# Patient Record
Sex: FEMALE | Race: WHITE | Employment: OTHER | URBAN - METROPOLITAN AREA
[De-identification: names, ages, dates, MRNs, and addresses within clinical notes are randomized per-mention and may not be internally consistent; named-entity substitution may affect disease eponyms.]

---

## 2017-03-21 ENCOUNTER — HOSPITAL ENCOUNTER (OUTPATIENT)
Dept: NON INVASIVE DIAGNOSTICS | Age: 77
Discharge: HOME | End: 2017-03-21
Attending: PSYCHIATRY & NEUROLOGY
Payer: MEDICARE

## 2017-03-21 DIAGNOSIS — G40.89 OTHER SEIZURES (HCC): ICD-10-CM

## 2017-03-21 PROCEDURE — 95819 EEG AWAKE AND ASLEEP: CPT

## 2017-03-22 NOTE — PROCEDURES
[address] 63   ELECTROENCEPHALOGRAM       Name:  Peyman Simms   MR#:  [id number]   :  1940   Account #:  [de-identified]   Date of Adm:  2017       CLINICAL PROBLEM: Question seizures. Medications are not listed. FINDINGS: The patient is 76years old, awake at the onset of the   recording at which time there is reactive alpha of 9 Hz. Photic   stimulation does not produce a driving response. Hyperventilation was not performed. The patient does become   drowsy, at which time theta range activity dominates background   rhythms. However, deeper stages of sleep are not recorded. No   focal or paroxysmal abnormalities noted. IMPRESSION: Normal awake and brief drowsy electroencephalogram.        MD Laura Levy / Sonia Gillette   D:  2017   08:53   T:  2017   12:34   Job #:  [id number]

## 2017-06-20 ENCOUNTER — HOSPITAL ENCOUNTER (EMERGENCY)
Age: 77
Discharge: HOME | End: 2017-06-20
Attending: EMERGENCY MEDICINE
Payer: MEDICARE

## 2017-06-20 VITALS
OXYGEN SATURATION: 92 % | TEMPERATURE: 97.9 F | HEIGHT: 66 IN | DIASTOLIC BLOOD PRESSURE: 73 MMHG | BODY MASS INDEX: 29.73 KG/M2 | SYSTOLIC BLOOD PRESSURE: 158 MMHG | HEART RATE: 65 BPM | WEIGHT: 185 LBS | RESPIRATION RATE: 18 BRPM

## 2017-06-20 DIAGNOSIS — I10 ESSENTIAL HYPERTENSION: ICD-10-CM

## 2017-06-20 DIAGNOSIS — R55 SYNCOPE AND COLLAPSE: Primary | ICD-10-CM

## 2017-06-20 LAB
ALBUMIN SERPL BCP-MCNC: 3.5 G/DL (ref 3.2–4.6)
ALBUMIN/GLOB SERPL: 0.9 {RATIO} (ref 1.2–3.5)
ALP SERPL-CCNC: 74 U/L (ref 50–136)
ALT SERPL-CCNC: 22 U/L (ref 12–65)
ANION GAP BLD CALC-SCNC: 7 MMOL/L (ref 7–16)
AST SERPL W P-5'-P-CCNC: 19 U/L (ref 15–37)
ATRIAL RATE: 68 BPM
BACTERIA URNS QL MICRO: 0 /HPF
BASOPHILS # BLD AUTO: 0.1 K/UL (ref 0–0.2)
BASOPHILS # BLD: 1 % (ref 0–2)
BILIRUB SERPL-MCNC: 0.7 MG/DL (ref 0.2–1.1)
BUN SERPL-MCNC: 6 MG/DL (ref 8–23)
CALCIUM SERPL-MCNC: 9 MG/DL (ref 8.3–10.4)
CALCULATED P AXIS, ECG09: 91 DEGREES
CALCULATED R AXIS, ECG10: 31 DEGREES
CALCULATED T AXIS, ECG11: 40 DEGREES
CASTS URNS QL MICRO: 0 /LPF
CHLORIDE SERPL-SCNC: 97 MMOL/L (ref 98–107)
CO2 SERPL-SCNC: 28 MMOL/L (ref 21–32)
CREAT SERPL-MCNC: 0.58 MG/DL (ref 0.6–1)
DIAGNOSIS, 93000: NORMAL
DIFFERENTIAL METHOD BLD: ABNORMAL
EOSINOPHIL # BLD: 0.1 K/UL (ref 0–0.8)
EOSINOPHIL NFR BLD: 1 % (ref 0.5–7.8)
EPI CELLS #/AREA URNS HPF: NORMAL /HPF
ERYTHROCYTE [DISTWIDTH] IN BLOOD BY AUTOMATED COUNT: 13.1 % (ref 11.9–14.6)
GLOBULIN SER CALC-MCNC: 3.8 G/DL (ref 2.3–3.5)
GLUCOSE SERPL-MCNC: 101 MG/DL (ref 65–100)
HCT VFR BLD AUTO: 39.5 % (ref 35.8–46.3)
HGB BLD-MCNC: 13.6 G/DL (ref 11.7–15.4)
IMM GRANULOCYTES # BLD: 0.1 K/UL (ref 0–0.5)
IMM GRANULOCYTES NFR BLD AUTO: 1.2 % (ref 0–5)
LYMPHOCYTES # BLD AUTO: 13 % (ref 13–44)
LYMPHOCYTES # BLD: 1 K/UL (ref 0.5–4.6)
MCH RBC QN AUTO: 29.2 PG (ref 26.1–32.9)
MCHC RBC AUTO-ENTMCNC: 34.4 G/DL (ref 31.4–35)
MCV RBC AUTO: 84.9 FL (ref 79.6–97.8)
MONOCYTES # BLD: 0.7 K/UL (ref 0.1–1.3)
MONOCYTES NFR BLD AUTO: 10 % (ref 4–12)
NEUTS SEG # BLD: 5.7 K/UL (ref 1.7–8.2)
NEUTS SEG NFR BLD AUTO: 74 % (ref 43–78)
P-R INTERVAL, ECG05: 194 MS
PLATELET # BLD AUTO: 388 K/UL (ref 150–450)
PMV BLD AUTO: 9.3 FL (ref 10.8–14.1)
POTASSIUM SERPL-SCNC: 3.6 MMOL/L (ref 3.5–5.1)
PROT SERPL-MCNC: 7.3 G/DL (ref 6.3–8.2)
Q-T INTERVAL, ECG07: 418 MS
QRS DURATION, ECG06: 82 MS
QTC CALCULATION (BEZET), ECG08: 444 MS
RBC # BLD AUTO: 4.65 M/UL (ref 4.05–5.25)
RBC #/AREA URNS HPF: NORMAL /HPF
SODIUM SERPL-SCNC: 132 MMOL/L (ref 136–145)
VENTRICULAR RATE, ECG03: 68 BPM
WBC # BLD AUTO: 7.6 K/UL (ref 4.3–11.1)
WBC URNS QL MICRO: NORMAL /HPF

## 2017-06-20 PROCEDURE — 85025 COMPLETE CBC W/AUTO DIFF WBC: CPT | Performed by: EMERGENCY MEDICINE

## 2017-06-20 PROCEDURE — 80053 COMPREHEN METABOLIC PANEL: CPT | Performed by: EMERGENCY MEDICINE

## 2017-06-20 PROCEDURE — 93005 ELECTROCARDIOGRAM TRACING: CPT | Performed by: EMERGENCY MEDICINE

## 2017-06-20 PROCEDURE — 81015 MICROSCOPIC EXAM OF URINE: CPT | Performed by: EMERGENCY MEDICINE

## 2017-06-20 PROCEDURE — 96360 HYDRATION IV INFUSION INIT: CPT | Performed by: EMERGENCY MEDICINE

## 2017-06-20 PROCEDURE — 99285 EMERGENCY DEPT VISIT HI MDM: CPT | Performed by: EMERGENCY MEDICINE

## 2017-06-20 PROCEDURE — 74011250636 HC RX REV CODE- 250/636: Performed by: EMERGENCY MEDICINE

## 2017-06-20 RX ORDER — SODIUM CHLORIDE 0.9 % (FLUSH) 0.9 %
5-10 SYRINGE (ML) INJECTION AS NEEDED
Status: DISCONTINUED | OUTPATIENT
Start: 2017-06-20 | End: 2017-06-20 | Stop reason: HOSPADM

## 2017-06-20 RX ORDER — SODIUM CHLORIDE 0.9 % (FLUSH) 0.9 %
5-10 SYRINGE (ML) INJECTION EVERY 8 HOURS
Status: DISCONTINUED | OUTPATIENT
Start: 2017-06-20 | End: 2017-06-20 | Stop reason: HOSPADM

## 2017-06-20 RX ADMIN — SODIUM CHLORIDE 1000 ML: 900 INJECTION, SOLUTION INTRAVENOUS at 05:17

## 2017-06-20 NOTE — ED NOTES
I have reviewed discharge instructions with the patient. The patient verbalized understanding. Patient adamant about not using wheelchair. Instructed patient to followup with Dr. Larry Nolasco.

## 2017-06-20 NOTE — ED NOTES
Pt assisted to [facility] and replaced to bed, approx 1L clear-colored UOP. Pt asking to have some water, was denied at this time w/ pt stating \"well, you know I pass out if I don't have my water\". Discussed w/ patient about her over use of drinking water w/ pt adamant that she is still dehydrated.

## 2017-06-20 NOTE — ED NOTES
Pt presents to ER for c/o dizziness that has been persisting x 1 year that \"No one can find a reason for, the last time it was from dehydration\". Pt states that she had a poss near-syncope yesterday w/ nausea and vomiting x 1 event, tonight she reports \"just feeling wrong\" w/ pt unable to elaborate. Pt appears quite anxious, agitated when being asked specific questions about her concerns tonight.

## 2017-06-20 NOTE — DISCHARGE INSTRUCTIONS

## 2017-06-20 NOTE — ED NOTES
Patient observed from entryway of room and noted to be resting quietly on stretcher with eyes closed with pulse oximetry attached, IVF continuing to infuse.

## 2017-06-20 NOTE — ED PROVIDER NOTES
HPI Comments: Patient states she has been having syncopal episodes and \"heavy headedness\" off and on for more than a year. She was admitted here a year ago for syncopal episode and had an extensive workup which did not lead to a definitive diagnosis. She states that she tonight she had the feeling of heavy headedness when she woke up to use the restroom. She states her hands then got very jittery and she got concerned. She states she felt anxious and that is very unusual for her. She just came here for evaluation. Elements of this note were made using speech recognition software. As such, errors of speech recognition may occur. Patient is a 76 y.o. female presenting with dizziness. The history is provided by the patient. Dizziness   Pertinent negatives include no vomiting and no nausea. Past Medical History:   Diagnosis Date    Acute encephalopathy 6/27/2016    Anxiety     Arthritis     Chronic pain     hip    Endocrine disease     hypothyroidism    Hypertension     Sepsis ([facility] [address] 6/27/2016    Thyroid disease        Past Surgical History:   Procedure Laterality Date    HX ORTHOPAEDIC      right total hip    TOTAL KNEE ARTHROPLASTY Left 7/23/15         No family history on file. Social History     Social History    Marital status:      Spouse name: N/A    Number of children: N/A    Years of education: N/A     Occupational History    Not on file. Social History Main Topics    Smoking status: Never Smoker    Smokeless tobacco: Not on file    Alcohol use No    Drug use: No    Sexual activity: Not on file     Other Topics Concern    Not on file     Social History Narrative         ALLERGIES: Ciprofloxacin; Percocet [oxycodone-acetaminophen]; and Other medication    Review of Systems   Constitutional: Negative for chills and fever. Gastrointestinal: Negative for nausea and vomiting. All other systems reviewed and are negative.       Vitals:    06/20/17 0238   BP: 179/78   Pulse: 65   Resp: 18   Temp: 97.9 °F (36.6 °C)   SpO2: 100%   Weight: 83.9 kg (185 lb)   Height: 5' 6\" (1.676 m)            Physical Exam   Constitutional: She is oriented to person, place, and time. She appears well-developed and well-nourished. HENT:   Head: Normocephalic and atraumatic. Eyes: Conjunctivae are normal. Pupils are equal, round, and reactive to light. Neck: Normal range of motion. Neck supple. Cardiovascular: Normal rate and regular rhythm. Pulmonary/Chest: Effort normal and breath sounds normal.   Abdominal: Soft. Bowel sounds are normal.   Musculoskeletal: She exhibits no edema or tenderness. Neurological: She is alert and oriented to person, place, and time. Skin: Skin is warm and dry. Psychiatric: She has a normal mood and affect. Her behavior is normal.   Nursing note and vitals reviewed. MDM  Number of Diagnoses or Management Options  Essential hypertension: established and worsening  Syncope and collapse: established and worsening  Diagnosis management comments: Differential diagnoses: Orthostatic hypotension, syncope, electrolyte abnormality, dehydration, arrhythmia  6:48 AM discussed results with the patient and son, no significant abnormality found.   She has a primary doctor that she has seen for  More than 20 years she can follow-up with       Amount and/or Complexity of Data Reviewed  Clinical lab tests: ordered and reviewed  Tests in the medicine section of CPT®: ordered and reviewed    Risk of Complications, Morbidity, and/or Mortality  Presenting problems: moderate  Diagnostic procedures: moderate  Management options: moderate    Patient Progress  Patient progress: stable    ED Course       Procedures

## 2017-11-13 ENCOUNTER — APPOINTMENT (OUTPATIENT)
Dept: CT IMAGING | Age: 77
End: 2017-11-13
Attending: EMERGENCY MEDICINE
Payer: MEDICARE

## 2017-11-13 ENCOUNTER — HOSPITAL ENCOUNTER (OUTPATIENT)
Age: 77
Setting detail: OBSERVATION
Discharge: HOME | End: 2017-11-14
Attending: EMERGENCY MEDICINE | Admitting: HOSPITALIST
Payer: MEDICARE

## 2017-11-13 DIAGNOSIS — R47.81 SLURRED SPEECH: ICD-10-CM

## 2017-11-13 DIAGNOSIS — G45.9 TRANSIENT CEREBRAL ISCHEMIA, UNSPECIFIED TYPE: Primary | ICD-10-CM

## 2017-11-13 LAB
ALBUMIN SERPL-MCNC: 3.8 G/DL (ref 3.2–4.6)
ALBUMIN/GLOB SERPL: 1 {RATIO} (ref 1.2–3.5)
ALP SERPL-CCNC: 88 U/L (ref 50–136)
ALT SERPL-CCNC: 22 U/L (ref 12–65)
ANION GAP SERPL CALC-SCNC: 10 MMOL/L (ref 7–16)
APTT PPP: 26.2 SEC (ref 23.5–31.7)
AST SERPL-CCNC: 20 U/L (ref 15–37)
ATRIAL RATE: 68 BPM
BASOPHILS # BLD: 0 K/UL (ref 0–0.2)
BASOPHILS NFR BLD: 0 % (ref 0–2)
BILIRUB SERPL-MCNC: 0.5 MG/DL (ref 0.2–1.1)
BUN SERPL-MCNC: 8 MG/DL (ref 8–23)
CALCIUM SERPL-MCNC: 9.1 MG/DL (ref 8.3–10.4)
CALCULATED P AXIS, ECG09: 108 DEGREES
CALCULATED R AXIS, ECG10: 36 DEGREES
CALCULATED T AXIS, ECG11: 70 DEGREES
CHLORIDE SERPL-SCNC: 93 MMOL/L (ref 98–107)
CO2 SERPL-SCNC: 26 MMOL/L (ref 21–32)
CREAT SERPL-MCNC: 0.69 MG/DL (ref 0.6–1)
DIAGNOSIS, 93000: NORMAL
DIFFERENTIAL METHOD BLD: ABNORMAL
EOSINOPHIL # BLD: 0.1 K/UL (ref 0–0.8)
EOSINOPHIL NFR BLD: 1 % (ref 0.5–7.8)
ERYTHROCYTE [DISTWIDTH] IN BLOOD BY AUTOMATED COUNT: 13.1 % (ref 11.9–14.6)
GLOBULIN SER CALC-MCNC: 3.8 G/DL (ref 2.3–3.5)
GLUCOSE BLD STRIP.AUTO-MCNC: 96 MG/DL (ref 65–100)
GLUCOSE SERPL-MCNC: 109 MG/DL (ref 65–100)
HCT VFR BLD AUTO: 41.2 % (ref 35.8–46.3)
HGB BLD-MCNC: 14.1 G/DL (ref 11.7–15.4)
IMM GRANULOCYTES # BLD: 0.1 K/UL (ref 0–0.5)
IMM GRANULOCYTES NFR BLD AUTO: 1 % (ref 0–5)
INR PPP: 1 (ref 0.9–1.2)
LYMPHOCYTES # BLD: 1.3 K/UL (ref 0.5–4.6)
LYMPHOCYTES NFR BLD: 13 % (ref 13–44)
MCH RBC QN AUTO: 30 PG (ref 26.1–32.9)
MCHC RBC AUTO-ENTMCNC: 34.2 G/DL (ref 31.4–35)
MCV RBC AUTO: 87.7 FL (ref 79.6–97.8)
MONOCYTES # BLD: 0.9 K/UL (ref 0.1–1.3)
MONOCYTES NFR BLD: 9 % (ref 4–12)
NEUTS SEG # BLD: 8 K/UL (ref 1.7–8.2)
NEUTS SEG NFR BLD: 76 % (ref 43–78)
P-R INTERVAL, ECG05: 194 MS
PLATELET # BLD AUTO: 361 K/UL (ref 150–450)
PMV BLD AUTO: 9.6 FL (ref 10.8–14.1)
POTASSIUM SERPL-SCNC: 3.6 MMOL/L (ref 3.5–5.1)
PROT SERPL-MCNC: 7.6 G/DL (ref 6.3–8.2)
PROTHROMBIN TIME: 10.7 SEC (ref 9.6–12)
Q-T INTERVAL, ECG07: 392 MS
QRS DURATION, ECG06: 80 MS
QTC CALCULATION (BEZET), ECG08: 416 MS
RBC # BLD AUTO: 4.7 M/UL (ref 4.05–5.25)
SODIUM SERPL-SCNC: 129 MMOL/L (ref 136–145)
VENTRICULAR RATE, ECG03: 68 BPM
WBC # BLD AUTO: 10.5 K/UL (ref 4.3–11.1)

## 2017-11-13 PROCEDURE — 93005 ELECTROCARDIOGRAM TRACING: CPT | Performed by: EMERGENCY MEDICINE

## 2017-11-13 PROCEDURE — 85610 PROTHROMBIN TIME: CPT | Performed by: EMERGENCY MEDICINE

## 2017-11-13 PROCEDURE — 85730 THROMBOPLASTIN TIME PARTIAL: CPT | Performed by: EMERGENCY MEDICINE

## 2017-11-13 PROCEDURE — 82962 GLUCOSE BLOOD TEST: CPT

## 2017-11-13 PROCEDURE — 70450 CT HEAD/BRAIN W/O DYE: CPT

## 2017-11-13 PROCEDURE — 85025 COMPLETE CBC W/AUTO DIFF WBC: CPT | Performed by: EMERGENCY MEDICINE

## 2017-11-13 PROCEDURE — 74011250637 HC RX REV CODE- 250/637: Performed by: EMERGENCY MEDICINE

## 2017-11-13 PROCEDURE — 83036 HEMOGLOBIN GLYCOSYLATED A1C: CPT | Performed by: HOSPITALIST

## 2017-11-13 PROCEDURE — 99285 EMERGENCY DEPT VISIT HI MDM: CPT | Performed by: EMERGENCY MEDICINE

## 2017-11-13 PROCEDURE — 84484 ASSAY OF TROPONIN QUANT: CPT

## 2017-11-13 PROCEDURE — 80053 COMPREHEN METABOLIC PANEL: CPT | Performed by: EMERGENCY MEDICINE

## 2017-11-13 RX ORDER — GUAIFENESIN 100 MG/5ML
324 LIQUID (ML) ORAL
Status: COMPLETED | OUTPATIENT
Start: 2017-11-13 | End: 2017-11-13

## 2017-11-13 RX ADMIN — ASPIRIN 81 MG 324 MG: 81 TABLET ORAL at 23:28

## 2017-11-13 NOTE — IP AVS SNAPSHOT
Fredy Bunch 
 
 
 [address] 
[phone] Patient: Liza Vivas MRN: [id number] NME:8/48/7430 About your hospitalization You were admitted on:  November 14, 2017 You last received care in the:  [facility] You were discharged on:  November 14, 2017 Why you were hospitalized Your primary diagnosis was:  Tia (Transient Ischemic Attack) Your diagnoses also included:  Slurred Speech, Hyponatremia Things You Need To Do (next 8 weeks) Follow up with Monika Hui MD in 2 week(s) APPT. NOV. 28th @ 3:30 Phone:  [phone] Where:  [address] Discharge Orders None A check víctor indicates which time of day the medication should be taken. My Medications TAKE these medications as instructed Instructions Each Dose to Equal  
 Morning Noon Evening Bedtime  
 aspirin 81 mg chewable tablet Your next dose is:  Tomorrow Take 2 Tabs by mouth daily. Indications: prevention of cerebrovascular accident 162 mg  
    
   
   
   
  
 pravastatin 10 mg tablet Commonly known as:  PRAVACHOL Your next dose is: Today Take 1 Tab by mouth nightly. Indications: prevention of cerebrovascular accident, prevention of transient ischemic attack 10 mg SYNTHROID 100 mcg tablet Generic drug:  levothyroxine Your next dose is:  Tomorrrow Take 100 mcg by mouth Daily (before breakfast). 100 mcg Where to Get Your Medications Information on where to get these meds will be given to you by the nurse or doctor. ! Ask your nurse or doctor about these medications  
  aspirin 81 mg chewable tablet  
 pravastatin 10 mg tablet Discharge Instructions DISCHARGE SUMMARY from Nurse The following personal items are in your possession at time of discharge: Dental Appliances: None Home Medications: None Jewelry: Watch, With patient Clothing: Pants, Sweater, Undergarments, Footwear Other Valuables: Melanie, [address], [place] [address], [place] PATIENT INSTRUCTIONS: 
 
After general anesthesia or intravenous sedation, for 24 hours or while taking prescription Narcotics: · Limit your activities · Do not drive and operate hazardous machinery · Do not make important personal or business decisions · Do  not drink alcoholic beverages · If you have not urinated within 8 hours after discharge, please contact your surgeon on call. Report the following to your surgeon: 
· Excessive pain, swelling, redness or odor of or around the surgical area · Temperature over 100.5 · Nausea and vomiting lasting longer than 4 hours or if unable to take medications · Any signs of decreased circulation or nerve impairment to extremity: change in color, persistent  numbness, tingling, coldness or increase pain · Any questions What to do at Home: 
Recommended activity: Activity as tolerated, perMD 
 
If you experience any of the following symptoms fever>101, pain unrelieved with medication, nausea/vomiting, shortness of breath, dizziness/fainting, chest pain. , please follow up with your doctor. *  Please give a list of your current medications to your Primary Care Provider. *  Please update this list whenever your medications are discontinued, doses are 
    changed, or new medications (including over-the-counter products) are added. *  Please carry medication information at all times in case of emergency situations. These are general instructions for a healthy lifestyle: No smoking/ No tobacco products/ Avoid exposure to second hand smoke Surgeon General's Warning:  Quitting smoking now greatly reduces serious risk to your health. Obesity, smoking, and sedentary lifestyle greatly increases your risk for illness A healthy diet, regular physical exercise & weight monitoring are important for maintaining a healthy lifestyle You may be retaining fluid if you have a history of heart failure or if you experience any of the following symptoms:  Weight gain of 3 pounds or more overnight or 5 pounds in a week, increased swelling in our hands or feet or shortness of breath while lying flat in bed. Please call your doctor as soon as you notice any of these symptoms; do not wait until your next office visit. Recognize signs and symptoms of STROKE: 
 
F-face looks uneven A-arms unable to move or move unevenly S-speech slurred or non-existent T-time-call 911 as soon as signs and symptoms begin-DO NOT go Back to bed or wait to see if you get better-TIME IS BRAIN. Warning Signs of HEART ATTACK Call 911 if you have these symptoms: 
? Chest discomfort. Most heart attacks involve discomfort in the center of the chest that lasts more than a few minutes, or that goes away and comes back. It can feel like uncomfortable pressure, squeezing, fullness, or pain. ? Discomfort in other areas of the upper body. Symptoms can include pain or discomfort in one or both arms, the back, neck, jaw, or stomach. ? Shortness of breath with or without chest discomfort. ? Other signs may include breaking out in a cold sweat, nausea, or lightheadedness. Don't wait more than five minutes to call [address]! Fast action can save your life. Calling 911 is almost always the fastest way to get lifesaving treatment. Emergency Medical Services staff can begin treatment when they arrive  up to an hour sooner than if someone gets to the hospital by car. The discharge information has been reviewed with the patient. The patient verbalized understanding. Discharge medications reviewed with the patient and appropriate educational materials and side effects teaching were provided. Stroke: After Your Visit Your Care Instructions You have had a stroke. Risk factors for stroke include being overweight, smoking, and sedentary lifestyle. This means that the blood flow to a part of your brain was blocked for some time, which damages the nerve cells in that part of the brain. The part of your body controlled by that part of your brain may not function properly now. The brain is an amazing organ that can heal itself to some degree. The stroke you had damaged part of your brain, but other parts of your brain may take over in some way for the damaged areas. You have already started this process. Going home may be hard for you and your family. The more you can try to do for yourself, the better. Remember to take each day one at a time. Follow-up care is a key part of your treatment and safety. Be sure to make and go to all appointments, and call your doctor if you are having problems. Its also a good idea to know your test results and keep a list of the medicines you take. How can you care for yourself at home? Enter a stroke rehabilitation (rehab) program, if your doctor recommends it. Physical, speech, and occupational therapies can help you manage bathing, dressing, eating, and other basics of daily living. Eat a heart-healthy diet that is low in cholesterol, saturated fat, and salt. Eat lots of fresh fruits and vegetables and foods high in fiber. Increase your activities slowly. Take short rest breaks when you get tired. Gradually increase the amount you walk. Start out by walking a little more than you did the day before. Do not drive until your doctor says it is okay. It is normal to feel sad or depressed after a stroke. If the blues last, talk to your doctor. If you are having problems with urine leakage, go to the bathroom at regular times, including when you first wake up and at bedtime. Also, limit fluids after dinner. If you are constipated, drink plenty of fluids, enough so that your urine is light yellow or clear like water. If you have kidney, heart, or liver disease and have to limit fluids, talk with your doctor before you increase the amount of fluids you drink. Set up a regular time for using the toilet. If you continue to have constipation, your doctor may suggest using a bulking agent, such as Metamucil, or a stool softener, laxative, or enema. Medicines Take your medicines exactly as prescribed. Call your doctor if you think you are having a problem with your medicine. You may be taking several medicines. ACE (angiotensin-converting enzyme) inhibitors, angiotensin II receptor blockers (ARBs), beta-blockers, diuretics (water pills), and calcium channel blockers control your blood pressure. Statins help lower cholesterol. Your doctor may also prescribe medicines for depression, pain, sleep problems, anxiety, or agitation. If your doctor has given you medicine that prevents blood clots, such as warfarin (Coumadin), aspirin combined with extended-release dipyridamole (Aggrenox), clopidogrel (Plavix), or aspirin to prevent another stroke, you should: 
Tell your dentist, pharmacist, and other health professionals that you take these medicines. Watch for unusual bruising or bleeding, such as blood in your urine, red or black stools, or bleeding from your nose or gums. Get regular blood tests to check your clotting time if you are taking Coumadin. Wear medical alert jewelry that says you take blood thinners. You can buy this at most drugstores. Do not take any over-the-counter medicines or herbal products without talking to your doctor first. 
If you take birth control pills or hormone replacement therapy, talk to your doctor about whether they are right for you. For family members and caregivers Make the home safe.  Set up a room so that your loved one does not have to climb stairs. Be sure the bathroom is on the same floor. Move throw rugs and furniture that could cause falls, and make sure that the lighting is good. Put grab bars and seats in tubs and showers. Find out what your loved one can do and what he or she needs help with. Try not to do things for your loved one that your loved one can do on his or her own. Help him or her learn and practice new skills. Visit and talk with your loved one often. Try doing activities together that you both enjoy, such as playing cards or board games. Keep in touch with your loved one's friends as much as you can, and encourage them to visit. Take care of yourself. Do not try to do everything yourself. Ask other family members to help. Eat well, get enough rest, and take time to do things that you enjoy. Keep up with your own doctor visits, and make sure to take your medicines regularly. Get out of the house as much as you can. Join a local support group. Find out if you qualify for home health care visits to help with rehab or for adult day care. When should you call for help? Call 911 anytime you think you may need emergency care. For example, call if: 
You have signs of another stroke. These may include: 
Sudden numbness, paralysis, or weakness in your face, arm, or leg, especially on only one side of your body. New problems with walking or balance. Sudden vision changes. Drooling or slurred speech. New problems speaking or understanding simple statements, or you feel confused. A sudden, severe headache that is different from past headaches. Call 911 even if these symptoms go away in a few minutes. You cough up blood. You vomit blood or what looks like coffee grounds. You pass maroon or very bloody stools. Call your doctor now or seek immediate medical care if: 
You have new bruises or blood spots under your skin. You have a nosebleed. Your gums bleed when you brush your teeth. You have blood in your urine. Your stools are black and tarlike or have streaks of blood. You have vaginal bleeding when you are not having your period, or heavy period bleeding. You have new symptoms that may be related to your stroke, such as falls or trouble swallowing. Watch closely for changes in your health, and be sure to contact your doctor if you have any problems. Where can you learn more? Go to Ide[employer].be Enter B580  in the search box to learn more about \"Stroke: After Your Visit\". © 7462-7022 Healthwise, Incorporated. Care instructions adapted under license by Leela Garner (which disclaims liability or warranty for this information). This care instruction is for use with your licensed healthcare professional. If you have questions about a medical condition or this instruction, always ask your healthcare professional. Arnold Del Rioes any warranty or liability for your use of this information. Smart Lunches Announcement We are excited to announce that we are making your provider's discharge notes available to you in Smart Lunches. You will see these notes when they are completed and signed by the physician that discharged you from your recent hospital stay. If you have any questions or concerns about any information you see in Smart Lunches, please call the Health Information Department where you were seen or reach out to your Primary Care Provider for more information about your plan of care. Introducing [facility] & HEALTH SERVICES! Dear Jeovany Sessions: 
Thank you for requesting a Smart Lunches account. Our records indicate that you already have an active Smart Lunches account. You can access your account anytime at [email or web address] Did you know that you can access your hospital and ER discharge instructions at any time in Smart Lunches? You can also review all of your test results from your hospital stay or ER visit. Additional Information If you have questions, please visit the Frequently Asked Questions section of the [employer] website at [email or web address] Remember, MyChart is NOT to be used for urgent needs. For medical emergencies, dial 911. Now available from your iPhone and Android! Providers Seen During Your Hospitalization Provider Specialty Primary office phone [address] [facility] MD Amadou Emergency Medicine [phone] Isha Elias MD Internal Medicine [phone] Your Primary Care Physician (PCP) Primary Care Physician Office Phone Office Fax Alex Stalls [address] [phone] You are allergic to the following Allergen Reactions Ciprofloxacin Nausea and Vomiting Percocet (Oxycodone-Acetaminophen) Other (comments)  
 hallucinations Other Medication Other (comments) I don't do well with any strong medications (poss narcotics, pt difficult to get an elaboration from) Recent Documentation Height Weight BMI OB Status Smoking Status 1.626 m 78 kg 29.52 kg/m2 Postmenopausal Never Smoker Emergency Contacts Name Discharge Info Relation Home Work Mobile Sasha Parsons  Son [22] [phone] Patient Belongings The following personal items are in your possession at time of discharge: 
  Dental Appliances: None         Home Medications: None   Jewelry: Watch, With patient  Clothing: Pants, Sweater, Undergarments, Footwear    Other Valuables: Arabella Navarro Please provide this summary of care documentation to your next provider. Signatures-by signing, you are acknowledging that this After Visit Summary has been reviewed with you and you have received a copy. Patient Signature:  ____________________________________________________________ Date:  ____________________________________________________________  
  
Althia Kras  Provider Signature: ____________________________________________________________ Date:  ____________________________________________________________

## 2017-11-14 ENCOUNTER — APPOINTMENT (OUTPATIENT)
Dept: MRI IMAGING | Age: 77
End: 2017-11-14
Attending: HOSPITALIST
Payer: MEDICARE

## 2017-11-14 ENCOUNTER — APPOINTMENT (OUTPATIENT)
Dept: ULTRASOUND IMAGING | Age: 77
End: 2017-11-14
Attending: HOSPITALIST
Payer: MEDICARE

## 2017-11-14 VITALS
OXYGEN SATURATION: 95 % | RESPIRATION RATE: 18 BRPM | HEART RATE: 61 BPM | SYSTOLIC BLOOD PRESSURE: 135 MMHG | BODY MASS INDEX: 29.37 KG/M2 | DIASTOLIC BLOOD PRESSURE: 63 MMHG | TEMPERATURE: 98.9 F | HEIGHT: 64 IN | WEIGHT: 172 LBS

## 2017-11-14 PROBLEM — G45.9 TIA (TRANSIENT ISCHEMIC ATTACK): Status: ACTIVE | Noted: 2017-11-14

## 2017-11-14 PROBLEM — R47.81 SLURRED SPEECH: Status: ACTIVE | Noted: 2017-11-14

## 2017-11-14 PROBLEM — E87.1 HYPONATREMIA: Status: ACTIVE | Noted: 2017-11-14

## 2017-11-14 LAB
ANION GAP SERPL CALC-SCNC: 7 MMOL/L (ref 7–16)
BUN SERPL-MCNC: 4 MG/DL (ref 8–23)
CALCIUM SERPL-MCNC: 8.3 MG/DL (ref 8.3–10.4)
CHLORIDE SERPL-SCNC: 98 MMOL/L (ref 98–107)
CHOLEST SERPL-MCNC: 167 MG/DL
CO2 SERPL-SCNC: 26 MMOL/L (ref 21–32)
CREAT SERPL-MCNC: 0.53 MG/DL (ref 0.6–1)
EST. AVERAGE GLUCOSE BLD GHB EST-MCNC: 111 MG/DL
GLUCOSE SERPL-MCNC: 91 MG/DL (ref 65–100)
HBA1C MFR BLD: 5.5 % (ref 4.8–6)
HDLC SERPL-MCNC: 63 MG/DL (ref 40–60)
HDLC SERPL: 2.7 {RATIO}
LDLC SERPL CALC-MCNC: 96.4 MG/DL
LIPID PROFILE,FLP: ABNORMAL
POTASSIUM SERPL-SCNC: 3.5 MMOL/L (ref 3.5–5.1)
SODIUM SERPL-SCNC: 131 MMOL/L (ref 136–145)
TRIGL SERPL-MCNC: 38 MG/DL (ref 35–150)
TROPONIN I BLD-MCNC: 0 NG/ML (ref 0.02–0.05)
VLDLC SERPL CALC-MCNC: 7.6 MG/DL (ref 6–23)

## 2017-11-14 PROCEDURE — 99218 HC RM OBSERVATION: CPT

## 2017-11-14 PROCEDURE — G8980 MOBILITY D/C STATUS: HCPCS

## 2017-11-14 PROCEDURE — 70551 MRI BRAIN STEM W/O DYE: CPT

## 2017-11-14 PROCEDURE — 97165 OT EVAL LOW COMPLEX 30 MIN: CPT

## 2017-11-14 PROCEDURE — 74011250637 HC RX REV CODE- 250/637: Performed by: HOSPITALIST

## 2017-11-14 PROCEDURE — G8987 SELF CARE CURRENT STATUS: HCPCS

## 2017-11-14 PROCEDURE — 36415 COLL VENOUS BLD VENIPUNCTURE: CPT | Performed by: HOSPITALIST

## 2017-11-14 PROCEDURE — 74011250636 HC RX REV CODE- 250/636: Performed by: HOSPITALIST

## 2017-11-14 PROCEDURE — G8996 SWALLOW CURRENT STATUS: HCPCS | Performed by: SPEECH-LANGUAGE PATHOLOGIST

## 2017-11-14 PROCEDURE — G8979 MOBILITY GOAL STATUS: HCPCS

## 2017-11-14 PROCEDURE — G8997 SWALLOW GOAL STATUS: HCPCS | Performed by: SPEECH-LANGUAGE PATHOLOGIST

## 2017-11-14 PROCEDURE — 80048 BASIC METABOLIC PNL TOTAL CA: CPT | Performed by: INTERNAL MEDICINE

## 2017-11-14 PROCEDURE — G8988 SELF CARE GOAL STATUS: HCPCS

## 2017-11-14 PROCEDURE — G8989 SELF CARE D/C STATUS: HCPCS

## 2017-11-14 PROCEDURE — 97161 PT EVAL LOW COMPLEX 20 MIN: CPT

## 2017-11-14 PROCEDURE — 92610 EVALUATE SWALLOWING FUNCTION: CPT | Performed by: SPEECH-LANGUAGE PATHOLOGIST

## 2017-11-14 PROCEDURE — G8978 MOBILITY CURRENT STATUS: HCPCS

## 2017-11-14 PROCEDURE — 80061 LIPID PANEL: CPT | Performed by: HOSPITALIST

## 2017-11-14 PROCEDURE — G8998 SWALLOW D/C STATUS: HCPCS | Performed by: SPEECH-LANGUAGE PATHOLOGIST

## 2017-11-14 PROCEDURE — 93880 EXTRACRANIAL BILAT STUDY: CPT

## 2017-11-14 RX ORDER — GUAIFENESIN 100 MG/5ML
162 LIQUID (ML) ORAL DAILY
Qty: 60 TAB | Refills: 0 | Status: SHIPPED
Start: 2017-11-14 | End: 2018-05-16

## 2017-11-14 RX ORDER — PRAVASTATIN SODIUM 20 MG/1
20 TABLET ORAL
Status: DISCONTINUED | OUTPATIENT
Start: 2017-11-14 | End: 2017-11-14 | Stop reason: HOSPADM

## 2017-11-14 RX ORDER — ENOXAPARIN SODIUM 100 MG/ML
40 INJECTION SUBCUTANEOUS EVERY 24 HOURS
Status: DISCONTINUED | OUTPATIENT
Start: 2017-11-14 | End: 2017-11-14 | Stop reason: HOSPADM

## 2017-11-14 RX ORDER — ONDANSETRON 2 MG/ML
4 INJECTION INTRAMUSCULAR; INTRAVENOUS
Status: DISCONTINUED | OUTPATIENT
Start: 2017-11-14 | End: 2017-11-14 | Stop reason: HOSPADM

## 2017-11-14 RX ORDER — HYDRALAZINE HYDROCHLORIDE 20 MG/ML
10 INJECTION INTRAMUSCULAR; INTRAVENOUS
Status: DISCONTINUED | OUTPATIENT
Start: 2017-11-14 | End: 2017-11-14 | Stop reason: HOSPADM

## 2017-11-14 RX ORDER — LEVOTHYROXINE SODIUM 88 UG/1
88 TABLET ORAL
Status: DISCONTINUED | OUTPATIENT
Start: 2017-11-14 | End: 2017-11-14

## 2017-11-14 RX ORDER — ASPIRIN 325 MG
325 TABLET ORAL DAILY
Status: DISCONTINUED | OUTPATIENT
Start: 2017-11-14 | End: 2017-11-14 | Stop reason: HOSPADM

## 2017-11-14 RX ORDER — PRAVASTATIN SODIUM 10 MG/1
10 TABLET ORAL
Qty: 30 TAB | Refills: 0 | Status: SHIPPED | OUTPATIENT
Start: 2017-11-14 | End: 2018-04-16

## 2017-11-14 RX ORDER — SODIUM CHLORIDE 0.9 % (FLUSH) 0.9 %
5-10 SYRINGE (ML) INJECTION AS NEEDED
Status: DISCONTINUED | OUTPATIENT
Start: 2017-11-14 | End: 2017-11-14 | Stop reason: HOSPADM

## 2017-11-14 RX ORDER — PRAVASTATIN SODIUM 20 MG/1
80 TABLET ORAL ONCE
Status: COMPLETED | OUTPATIENT
Start: 2017-11-14 | End: 2017-11-14

## 2017-11-14 RX ORDER — LEVOTHYROXINE SODIUM 100 UG/1
80 TABLET ORAL
COMMUNITY
End: 2020-02-03 | Stop reason: DRUGHIGH

## 2017-11-14 RX ORDER — ACETAMINOPHEN 325 MG/1
650 TABLET ORAL
Status: DISCONTINUED | OUTPATIENT
Start: 2017-11-14 | End: 2017-11-14 | Stop reason: HOSPADM

## 2017-11-14 RX ORDER — SODIUM CHLORIDE 0.9 % (FLUSH) 0.9 %
5-10 SYRINGE (ML) INJECTION EVERY 8 HOURS
Status: DISCONTINUED | OUTPATIENT
Start: 2017-11-14 | End: 2017-11-14 | Stop reason: HOSPADM

## 2017-11-14 RX ORDER — LEVOTHYROXINE SODIUM 100 UG/1
100 TABLET ORAL
Status: DISCONTINUED | OUTPATIENT
Start: 2017-11-15 | End: 2017-11-14 | Stop reason: HOSPADM

## 2017-11-14 RX ORDER — SODIUM CHLORIDE 9 MG/ML
75 INJECTION, SOLUTION INTRAVENOUS CONTINUOUS
Status: DISCONTINUED | OUTPATIENT
Start: 2017-11-14 | End: 2017-11-14 | Stop reason: HOSPADM

## 2017-11-14 RX ADMIN — Medication 10 ML: at 05:36

## 2017-11-14 RX ADMIN — LEVOTHYROXINE SODIUM 88 MCG: 88 TABLET ORAL at 05:35

## 2017-11-14 RX ADMIN — Medication 10 ML: at 02:51

## 2017-11-14 RX ADMIN — PRAVASTATIN SODIUM 80 MG: 20 TABLET ORAL at 02:49

## 2017-11-14 RX ADMIN — SODIUM CHLORIDE 75 ML/HR: 900 INJECTION, SOLUTION INTRAVENOUS at 02:03

## 2017-11-14 RX ADMIN — ASPIRIN 325 MG ORAL TABLET 325 MG: 325 PILL ORAL at 08:40

## 2017-11-14 NOTE — PROGRESS NOTES
Physical/Occupational therapy note: checked on pt for therapy assessments and pt off floor to MRI, will check back on later as schedule allows.  Shobha Ferro, PT

## 2017-11-14 NOTE — PROGRESS NOTES
Am shift assessment . Pt is A/o x 4. Lungs clear and respirations even and unlabored. S1 and S2 auscultated and heart rate regular. abdomen is soft and tender to touch , bowel sounds active x 4 quads. Pt states \" she feels like she is getting constipated \" . Iv patent and infusing .  No slurred speech and all  are strong and equal . No c/o pain this am . Son at bedside and call light In reach

## 2017-11-14 NOTE — PROGRESS NOTES
Problem: Mobility Impaired (Adult and Pediatric)  Goal: *Acute Goals and Plan of Care (Insert Text)    PHYSICAL THERAPY: Initial Assessment, Discharge, Treatment Day: Day of Assessment, PM 11/14/2017  OBSERVATION: Hospital Day: 2  [place] / [place] / Product Type: Medicare /      NAME/AGE/GENDER: Jaxon Ennis is a 76 y.o. female   PRIMARY DIAGNOSIS: TIA (transient ischemic attack) TIA (transient ischemic attack) TIA (transient ischemic attack)        ICD-10: Treatment Diagnosis:   · Generalized Muscle Weakness (M62.81)   Precaution/Allergies:  Ciprofloxacin; Percocet [oxycodone-acetaminophen]; and Other medication      ASSESSMENT:     Ms. Wyatt Trotter presents supine on contact and agreeable to assessment. Pt able to get out of bed, walk into bathroom and void, walked in muller and was able to go up and down stairs in stairwell with one rail. Returned to room and set up for lunch. No skilled PT interventions indicated at this time. Pt is safe with mobility, no dizziness or losses or balance. Will discharge PT at this time. This section established at most recent assessment   PROBLEM LIST (Impairments causing functional limitations): 1. none   INTERVENTIONS PLANNED: (Benefits and precautions of physical therapy have been discussed with the patient.)  1. none     TREATMENT PLAN: Frequency/Duration: one time visit for evaluation       RECOMMENDED REHABILITATION/EQUIPMENT: (at time of discharge pending progress): Due to the probability of continued deficits (see above) this patient will not likely need continued skilled physical therapy after discharge.   Equipment:    None at this time              HISTORY:   History of Present Injury/Illness (Reason for Referral):  PER MD NOTE: Patient is a 76years old female with history of HTN presents with slurred speech started around 6 pm or after tonight and almost resolved while in ED after 10 pm. She denies any other associated acute symptoms. She has no known hx of stroke or pre-stroke. She use aspirin only occasionally for her legs. She has chronic left ear pressure. Sometimes she feel dizzy when she get up from bed. Her BP was significantly elevated on arrival which is not normal for her  Past Medical History/Comorbidities:   Ms. Holden Ellison  has a past medical history of Acute encephalopathy (6/27/2016); Anxiety; Arthritis; Chronic pain; Endocrine disease; Hypertension; Sepsis ([address] (6/27/2016); Thyroid disease; and TIA (transient ischemic attack) (11/14/2017). She also has no past medical history of Aneurysm ([address] Arrhythmia; Asthma; Autoimmune disease ([address] CAD (coronary artery disease); Cancer ([address] Chronic kidney disease; Coagulation disorder ([address] COPD; DEMENTIA; Diabetes ([address] Gastrointestinal disorder; GERD (gastroesophageal reflux disease); Heart failure ([address] Ill-defined condition; Liver disease; Morbid obesity ([address] Other ill-defined conditions(799.89); Psychiatric disorder; PUD (peptic ulcer disease); Rheumatic fever; Seizures ([address] Sleep apnea; Sleep disorder; or Thromboembolus ([address] Ms. Holden Ellison  has a past surgical history that includes orthopaedic and total knee arthroplasty (Left, 7/23/15). Social History/Living Environment:   Home Environment: Private residence  # Steps to Enter: 0  One/Two Story Residence: Split level  Living Alone: Yes  Support Systems: Child(vivi), Family member(s)  Patient Expects to be Discharged to[de-identified] Private residence  Current DME Used/Available at Home: Cane, quad, Cane, straight  Tub or Shower Type: Shower  Prior Level of Function/Work/Activity:  Pt living at home, independent with gait and ADLs without assistive devices     Number of Personal Factors/Comorbidities that affect the Plan of Care: 0: LOW COMPLEXITY   EXAMINATION:   Most Recent Physical Functioning:   Gross Assessment:  AROM: Within functional limits  Strength:  Within functional limits  Coordination: Within functional limits  Tone: Normal  Sensation: Intact (except some general numbness lower extremities)               Posture:  Posture (WDL): Within defined limits  Balance:  Sitting: Intact  Standing: Intact Bed Mobility:  Supine to Sit: Independent  Sit to Supine:  (stayed up at side of bed)  Wheelchair Mobility:     Transfers:  Sit to Stand: Independent  Stand to Sit: Independent  Gait:     Distance (ft): 200 Feet (ft)  Ambulation - Level of Assistance: Independent  Number of Stairs Trained: 12  Stairs - Level of Assistance: Modified independent  Rail Use: Right       Body Structures Involved:  1. None Body Functions Affected:  1. None Activities and Participation Affected:  1. None   Number of elements that affect the Plan of Care: 1-2: LOW COMPLEXITY   CLINICAL PRESENTATION:   Presentation: Stable and uncomplicated: LOW COMPLEXITY   CLINICAL DECISION MAKIN[address] [place] [id number] AM-PAC 6 Clicks   Basic Mobility Inpatient Short Form  How much difficulty does the patient currently have. .. Unable A Lot A Little None   1. Turning over in bed (including adjusting bedclothes, sheets and blankets)? [] 1   [] 2   [] 3   [x] 4   2. Sitting down on and standing up from a chair with arms ( e.g., wheelchair, bedside commode, etc.)   [] 1   [] 2   [] 3   [x] 4   3. Moving from lying on back to sitting on the side of the bed? [] 1   [] 2   [] 3   [x] 4   How much help from another person does the patient currently need. .. Total A Lot A Little None   4. Moving to and from a bed to a chair (including a wheelchair)? [] 1   [] 2   [] 3   [x] 4   5. Need to walk in hospital room? [] 1   [] 2   [] 3   [x] 4   6. Climbing 3-5 steps with a railing? [] 1   [] 2   [] 3   [x] 4   © , Trustees of [address] [place] [id number], under license to [employer]. All rights reserved      Score:  Initial: 24 Most Recent: X (Date: -- )    Interpretation of Tool:  Represents activities that are increasingly more difficult (i.e. Bed mobility, Transfers, Gait). Score 24 23 22-20 19-15 14-10 9-7 6     Modifier CH CI CJ CK CL CM CN      ? Mobility - Walking and Moving Around:     - CURRENT STATUS: CH - 0% impaired, limited or restricted    - GOAL STATUS: CH - 0% impaired, limited or restricted    - D/C STATUS:  CH - 0% impaired, limited or restricted  [place] / Product Type: Medicare /      Medical Necessity:     · none. Reason for Services/Other Comments:  · none, independent and safe with all mobility.    Use of outcome tool(s) and clinical judgement create a POC that gives a: Clear prediction of patient's progress: LOW COMPLEXITY            TREATMENT:      Pre-treatment Symptoms/Complaints:  none  Pain: Initial:   Pain Intensity 1: 0  Post Session:  0/10     Assessment/Reassessment only, no treatment provided today    Braces/Orthotics/Lines/Etc:   · O2 Device: Room air  Treatment/Session Assessment:    · Response to Treatment:  Tolerated assessment well  · Interdisciplinary Collaboration:   o Occupational Therapist  o Registered Nurse  · After treatment position/precautions:   o sitting up on the side of the bed to eat lunch   · Recommendations/Intent for next treatment session:  Will discharge PT at this time  Total Treatment Duration:  PT Patient Time In/Time Out  Time In: 1230  Time Out: [address], PT

## 2017-11-14 NOTE — DISCHARGE SUMMARY
Hospitalist Discharge Summary     Patient ID:  Kelly Castellanos  [id number]  12 y.o.  1940  Admit date: 11/13/2017 10:16 PM  Discharge date and time: 11/14/2017  Attending: Tamara Womack MD  PCP:  Marcelino Mcgill MD  Treatment Team: Attending Provider: Tamara Womack MD; Utilization Review: Rainer Caceres RN    Principal Diagnosis TIA (transient ischemic attack)   Principal Problem:    TIA (transient ischemic attack) (11/14/2017)    Active Problems:    Slurred speech (11/14/2017)      Hyponatremia (11/14/2017)           HPI:  'Patient is a 76years old female with history of HTN presents with slurred speech started around 6 pm or after tonight and almost resolved while in ED after 10 pm. She denies any other associated acute symptoms. She has no known hx of stroke or pre-stroke. She use aspirin only occasionally for her legs. She has chronic left ear pressure. Sometimes she feel dizzy when she get up from bed. Her BP was significantly elevated on arrival which is not normal for her.'    Hospital Course:  She was placed in observation. Her symptoms resolved while in the ED and did not return. She was given aspirin 325 mg and pravastatin. MRI of the brain showed small vessel ischemic change and possible lacunar mindy CVA (but was more likely artifact). Carotid dopplers showed atherosclerosis but no blockage. She worked with PT and did fine with no dizziness nor balance issues. Upon discussion, she was ready to discharge home and deemed stable to do so. Her son, Seun Renee, was present at bedside for discharge discussion. It is recommended she start taking aspirin 162 mg daily (takes 81 mg every other day) and start low dose pravastatin for TIA prevention. Of note, she was noted to be slightly hyponatremic, which is chronic. I have recommended her to discuss possible nephrology consult with her PCP.     Significant Diagnostic Studies:       Labs: Results:       Chemistry Recent Labs      11/14/17 9696  11/13/17 2231   GLU  91  109*   NA  131*  129*   K  3.5  3.6   CL  98  93*   CO2  26  26   BUN  4*  8   CREA  0.53*  0.69   CA  8.3  9.1   AGAP  7  10   AP   --   88   TP   --   7.6   ALB   --   3.8   GLOB   --   3.8*   AGRAT   --   1.0*      CBC w/Diff Recent Labs      11/13/17 2231   WBC  10.5   RBC  4.70   HGB  14.1   HCT  41.2   PLT  361   GRANS  76   LYMPH  13   EOS  1      Cardiac Enzymes No results for input(s): CPK, CKND1, BLAINE in the last 72 hours. No lab exists for component: CKRMB, TROIP   Coagulation Recent Labs      11/13/17 2231   PTP  10.7   INR  1.0   APTT  26.2       Lipid Panel Lab Results   Component Value Date/Time    Cholesterol, total 167 11/14/2017 06:08 AM    HDL Cholesterol 63 11/14/2017 06:08 AM    LDL, calculated 96.4 11/14/2017 06:08 AM    VLDL, calculated 7.6 11/14/2017 06:08 AM    Triglyceride 38 11/14/2017 06:08 AM    CHOL/HDL Ratio 2.7 11/14/2017 06:08 AM      BNP No results for input(s): BNPP in the last 72 hours. Liver Enzymes Recent Labs      11/13/17 2231   TP  7.6   ALB  3.8   AP  88   SGOT  20      Thyroid Studies Lab Results   Component Value Date/Time    TSH 2.140 07/24/2016 12:40 AM          Imaging:    DUPLEX CAROTID BILATERAL   Final Result   IMPRESSION:      Bilateral carotid artery atherosclerosis. However, no hemodynamically   significant internal carotid artery stenosis. MRI BRAIN WO CONT   Final Result   IMPRESSION:      1. Bilateral anterior temporal encephalomalacia. 2. White matter findings compatible with chronic small vessel ischemic disease. 3. Artifact versus lacunar infarct in the left paramedian mindy. 4. Motion artifact. CT HEAD WO CONT   Final Result   IMPRESSION:      No evidence of acute intracranial abnormalities.       Date of Dictation: 11/13/2017 11:05 PM             Discharge Exam:  Visit Vitals    /63 (BP 1 Location: Left arm, BP Patient Position: At rest;Head of bed elevated (Comment degrees))  Comment: pt was down in MRI during vital times    Pulse 61    Temp 98.9 °F (37.2 °C)    Resp 18    Ht 5' 4\" (1.626 m)    Wt 78 kg (172 lb)    SpO2 95%    BMI 29.52 kg/m2     General appearance: alert, cooperative, no distress, appears stated age  Lungs: clear to auscultation bilaterally  Heart: regular rate and rhythm, S1, S2 normal, no murmur, click, rub or gallop  Abdomen: soft, non-tender. Bowel sounds normal. No masses,  no organomegaly  Extremities: no cyanosis or edema  Neurologic: Grossly normal    Disposition: home  Discharge Condition: stable  Patient Instructions:   Current Discharge Medication List      START taking these medications    Details   pravastatin (PRAVACHOL) 10 mg tablet Take 1 Tab by mouth nightly. Indications: prevention of cerebrovascular accident, prevention of transient ischemic attack  Qty: 30 Tab, Refills: 0      aspirin 81 mg chewable tablet Take 2 Tabs by mouth daily. Indications: prevention of cerebrovascular accident  Qty: 60 Tab, Refills: 0         CONTINUE these medications which have NOT CHANGED    Details   levothyroxine (SYNTHROID) 100 mcg tablet Take 100 mcg by mouth Daily (before breakfast). Activity: Activity as tolerated  Diet: Regular Diet      Follow-up  Follow-up Appointments   Procedures    FOLLOW UP VISIT Appointment in: Two Weeks With Dr. Luis E Gomez (PCP) for hospital follow up and med refill     With Dr. Luis E Gomez (PCP) for hospital follow up and med refill     Standing Status:   Standing     Number of Occurrences:   1     Order Specific Question:   Appointment in     Answer: Two Weeks       Time spent to discharge patient 28 minutes  Signed:  Sol Zendejas.  Sharee Cyr MD  11/14/2017  2:16 PM

## 2017-11-14 NOTE — ED NOTES
TRANSFER - OUT REPORT:    Verbal report given to Grisel Hutson RN on [facility]o & Sukhdeep  being transferred to med surg) for routine progression of care       Report consisted of patients Situation, Background, Assessment and   Recommendations(SBAR). Information from the following report(s) SBAR was reviewed with the receiving nurse. Lines:  20g lac     Opportunity for questions and clarification was provided.       Patient transported with:   Registered Nurse

## 2017-11-14 NOTE — DISCHARGE INSTRUCTIONS
DISCHARGE SUMMARY from Nurse    The following personal items are in your possession at time of discharge:    Dental Appliances: None        Home Medications: None  Jewelry: Watch, With patient  Clothing: Pants, Sweater, Undergarments, Footwear  Other Valuables: Cell Phone, Purse, Wallet, Keys             PATIENT INSTRUCTIONS:    After general anesthesia or intravenous sedation, for 24 hours or while taking prescription Narcotics:  · Limit your activities  · Do not drive and operate hazardous machinery  · Do not make important personal or business decisions  · Do  not drink alcoholic beverages  · If you have not urinated within 8 hours after discharge, please contact your surgeon on call. Report the following to your surgeon:  · Excessive pain, swelling, redness or odor of or around the surgical area  · Temperature over 100.5  · Nausea and vomiting lasting longer than 4 hours or if unable to take medications  · Any signs of decreased circulation or nerve impairment to extremity: change in color, persistent  numbness, tingling, coldness or increase pain  · Any questions        What to do at Home:  Recommended activity: Activity as tolerated, perMD    If you experience any of the following symptoms fever>101, pain unrelieved with medication, nausea/vomiting, shortness of breath, dizziness/fainting, chest pain. , please follow up with your doctor. *  Please give a list of your current medications to your Primary Care Provider. *  Please update this list whenever your medications are discontinued, doses are      changed, or new medications (including over-the-counter products) are added. *  Please carry medication information at all times in case of emergency situations.           These are general instructions for a healthy lifestyle:    No smoking/ No tobacco products/ Avoid exposure to second hand smoke    Surgeon General's Warning:  Quitting smoking now greatly reduces serious risk to your health. Obesity, smoking, and sedentary lifestyle greatly increases your risk for illness    A healthy diet, regular physical exercise & weight monitoring are important for maintaining a healthy lifestyle    You may be retaining fluid if you have a history of heart failure or if you experience any of the following symptoms:  Weight gain of 3 pounds or more overnight or 5 pounds in a week, increased swelling in our hands or feet or shortness of breath while lying flat in bed. Please call your doctor as soon as you notice any of these symptoms; do not wait until your next office visit. Recognize signs and symptoms of STROKE:    F-face looks uneven    A-arms unable to move or move unevenly    S-speech slurred or non-existent    T-time-call 911 as soon as signs and symptoms begin-DO NOT go       Back to bed or wait to see if you get better-TIME IS BRAIN. Warning Signs of HEART ATTACK     Call 911 if you have these symptoms:   Chest discomfort. Most heart attacks involve discomfort in the center of the chest that lasts more than a few minutes, or that goes away and comes back. It can feel like uncomfortable pressure, squeezing, fullness, or pain.  Discomfort in other areas of the upper body. Symptoms can include pain or discomfort in one or both arms, the back, neck, jaw, or stomach.  Shortness of breath with or without chest discomfort.  Other signs may include breaking out in a cold sweat, nausea, or lightheadedness. Don't wait more than five minutes to call 911 - MINUTES MATTER! Fast action can save your life. Calling 911 is almost always the fastest way to get lifesaving treatment. Emergency Medical Services staff can begin treatment when they arrive -- up to an hour sooner than if someone gets to the hospital by car. The discharge information has been reviewed with the patient. The patient verbalized understanding.     Discharge medications reviewed with the patient and appropriate educational materials and side effects teaching were provided. Stroke: After Your Visit     Your Care Instructions     You have had a stroke. Risk factors for stroke include being overweight, smoking, and sedentary lifestyle. This means that the blood flow to a part of your brain was blocked for some time, which damages the nerve cells in that part of the brain. The part of your body controlled by that part of your brain may not function properly now. The brain is an amazing organ that can heal itself to some degree. The stroke you had damaged part of your brain, but other parts of your brain may take over in some way for the damaged areas. You have already started this process. Going home may be hard for you and your family. The more you can try to do for yourself, the better. Remember to take each day one at a time. Follow-up care is a key part of your treatment and safety. Be sure to make and go to all appointments, and call your doctor if you are having problems. Its also a good idea to know your test results and keep a list of the medicines you take. How can you care for yourself at home? Enter a stroke rehabilitation (rehab) program, if your doctor recommends it. Physical, speech, and occupational therapies can help you manage bathing, dressing, eating, and other basics of daily living. Eat a heart-healthy diet that is low in cholesterol, saturated fat, and salt. Eat lots of fresh fruits and vegetables and foods high in fiber. Increase your activities slowly. Take short rest breaks when you get tired. Gradually increase the amount you walk. Start out by walking a little more than you did the day before. Do not drive until your doctor says it is okay. It is normal to feel sad or depressed after a stroke. If the blues last, talk to your doctor. If you are having problems with urine leakage, go to the bathroom at regular times, including when you first wake up and at bedtime. Also, limit fluids after dinner. If you are constipated, drink plenty of fluids, enough so that your urine is light yellow or clear like water. If you have kidney, heart, or liver disease and have to limit fluids, talk with your doctor before you increase the amount of fluids you drink. Set up a regular time for using the toilet. If you continue to have constipation, your doctor may suggest using a bulking agent, such as Metamucil, or a stool softener, laxative, or enema. Medicines  Take your medicines exactly as prescribed. Call your doctor if you think you are having a problem with your medicine. You may be taking several medicines. ACE (angiotensin-converting enzyme) inhibitors, angiotensin II receptor blockers (ARBs), beta-blockers, diuretics (water pills), and calcium channel blockers control your blood pressure. Statins help lower cholesterol. Your doctor may also prescribe medicines for depression, pain, sleep problems, anxiety, or agitation. If your doctor has given you medicine that prevents blood clots, such as warfarin (Coumadin), aspirin combined with extended-release dipyridamole (Aggrenox), clopidogrel (Plavix), or aspirin to prevent another stroke, you should:  Tell your dentist, pharmacist, and other health professionals that you take these medicines. Watch for unusual bruising or bleeding, such as blood in your urine, red or black stools, or bleeding from your nose or gums. Get regular blood tests to check your clotting time if you are taking Coumadin. Wear medical alert jewelry that says you take blood thinners. You can buy this at most drugstores. Do not take any over-the-counter medicines or herbal products without talking to your doctor first.  If you take birth control pills or hormone replacement therapy, talk to your doctor about whether they are right for you. For family members and caregivers  Make the home safe.  Set up a room so that your loved one does not have to climb stairs. Be sure the bathroom is on the same floor. Move throw rugs and furniture that could cause falls, and make sure that the lighting is good. Put grab bars and seats in tubs and showers. Find out what your loved one can do and what he or she needs help with. Try not to do things for your loved one that your loved one can do on his or her own. Help him or her learn and practice new skills. Visit and talk with your loved one often. Try doing activities together that you both enjoy, such as playing cards or board games. Keep in touch with your loved one's friends as much as you can, and encourage them to visit. Take care of yourself. Do not try to do everything yourself. Ask other family members to help. Eat well, get enough rest, and take time to do things that you enjoy. Keep up with your own doctor visits, and make sure to take your medicines regularly. Get out of the house as much as you can. Join a local support group. Find out if you qualify for home health care visits to help with rehab or for adult day care. When should you call for help? Call 911 anytime you think you may need emergency care. For example, call if:  You have signs of another stroke. These may include:  Sudden numbness, paralysis, or weakness in your face, arm, or leg, especially on only one side of your body. New problems with walking or balance. Sudden vision changes. Drooling or slurred speech. New problems speaking or understanding simple statements, or you feel confused. A sudden, severe headache that is different from past headaches. Call 911 even if these symptoms go away in a few minutes. You cough up blood. You vomit blood or what looks like coffee grounds. You pass maroon or very bloody stools. Call your doctor now or seek immediate medical care if:  You have new bruises or blood spots under your skin. You have a nosebleed. Your gums bleed when you brush your teeth. You have blood in your urine.   Your stools are black and tarlike or have streaks of blood. You have vaginal bleeding when you are not having your period, or heavy period bleeding. You have new symptoms that may be related to your stroke, such as falls or trouble swallowing. Watch closely for changes in your health, and be sure to contact your doctor if you have any problems. Where can you learn more? Go to [employer]    Enter C294  in the search box to learn more about \"Stroke: After Your Visit\". © 1608-6060 [employer]. Care instructions adapted under license by [employer] (which disclaims liability or warranty for this information). This care instruction is for use with your licensed healthcare professional. If you have questions about a medical condition or this instruction, always ask your healthcare professional. Prashant Dunlap any warranty or liability for your use of this information.

## 2017-11-14 NOTE — PROGRESS NOTES
Problem: Interdisciplinary Rounds  Goal: Interdisciplinary Rounds  Interdisciplinary team rounds were held 11/14/2017 with the following team members:Care Management, Nursing, Nutrition, Patient Relations and Pharmacy and the patient was in MRI. Plan of care discussed. See clinical pathway and/or care plan for interventions and desired outcomes.

## 2017-11-14 NOTE — ED PROVIDER NOTES
HPI Comments: 64-year-old female with history of HTN, Hypothyroidism presents with complaint of slurred speech, nausea, dizziness since noon. Patient last seen normal at noon. Patient with slurred speech and difficulty with word finding on arrival.  Denies any focal weakness, numbness, tingling, chest pain, shortness of breath, abdominal pain. Denies any recent trauma or injury. Patient is a 76 y.o. female presenting with weakness. The history is provided by the patient. No  was used. Extremity Weakness    This is a new problem. The current episode started 6 to 12 hours ago. The problem occurs constantly. The problem has not changed since onset. The quality of the pain is described as constant. The pain is at a severity of 0/10. The patient is experiencing no pain. Pertinent negatives include no numbness, full range of motion, no stiffness, no tingling, no itching, no back pain and no neck pain. She has tried nothing for the symptoms. The treatment provided no relief. There has been no history of extremity trauma. Past Medical History:   Diagnosis Date    Acute encephalopathy 6/27/2016    Anxiety     Arthritis     Chronic pain     hip    Endocrine disease     hypothyroidism    Hypertension     Sepsis ([facility] [address] 6/27/2016    Thyroid disease        Past Surgical History:   Procedure Laterality Date    HX ORTHOPAEDIC      right total hip    TOTAL KNEE ARTHROPLASTY Left 7/23/15         History reviewed. No pertinent family history. Social History     Social History    Marital status:      Spouse name: N/A    Number of children: N/A    Years of education: N/A     Occupational History    Not on file.      Social History Main Topics    Smoking status: Never Smoker    Smokeless tobacco: Never Used    Alcohol use No    Drug use: No    Sexual activity: Not on file     Other Topics Concern    Not on file     Social History Narrative         ALLERGIES: Ciprofloxacin; Percocet [oxycodone-acetaminophen]; and Other medication    Review of Systems   Constitutional: Negative for chills, fatigue and fever. HENT: Negative for congestion and rhinorrhea. Eyes: Negative for photophobia and visual disturbance. Respiratory: Negative for cough and shortness of breath. Cardiovascular: Negative for chest pain, palpitations and leg swelling. Gastrointestinal: Negative for abdominal distention, abdominal pain, blood in stool, diarrhea, nausea and vomiting. Genitourinary: Negative for dysuria, flank pain, genital sores, hematuria, pelvic pain, vaginal bleeding and vaginal discharge. Musculoskeletal: Negative for back pain, gait problem, joint swelling, neck pain, neck stiffness and stiffness. Skin: Negative for itching, pallor and rash. Neurological: Positive for speech difficulty and weakness. Negative for dizziness, tingling, seizures, syncope, numbness and headaches. Psychiatric/Behavioral: Negative for agitation and confusion. Vitals:    11/13/17 2209   BP: (!) 206/99   Pulse: 67   Resp: 16   Temp: 97.9 °F (36.6 °C)   SpO2: 100%   Height: 5' 8\" (1.727 m)            Physical Exam   Constitutional: She is oriented to person, place, and time. She appears well-developed and well-nourished. No distress. HENT:   Head: Normocephalic and atraumatic. Mouth/Throat: Oropharynx is clear and moist. No oropharyngeal exudate. Eyes: Conjunctivae and EOM are normal. Pupils are equal, round, and reactive to light. Neck: Normal range of motion. No JVD present. No tracheal deviation present. Cardiovascular: Normal rate, regular rhythm, normal heart sounds and intact distal pulses. No murmur heard. Pulmonary/Chest: Effort normal and breath sounds normal. No respiratory distress. She has no wheezes. She has no rales. She exhibits no tenderness. Abdominal: Soft. Bowel sounds are normal. She exhibits no distension. There is no tenderness. There is no rebound. Musculoskeletal: Normal range of motion. She exhibits no edema, tenderness or deformity. Neurological: She is alert and oriented to person, place, and time. No cranial nerve deficit. Coordination normal.   Strength 5 out of 5 throughout. Normal sensory exam.  No facial droop. Trouble with word finding. Mild slurred speech. Skin: Skin is warm and dry. No rash noted. No erythema. No pallor. Psychiatric: She has a normal mood and affect. Her behavior is normal.   Nursing note and vitals reviewed. MDM  Number of Diagnoses or Management Options  Slurred speech: new and requires workup  Transient cerebral ischemia, unspecified type: new and requires workup  Diagnosis management comments: Pt last seen normal around 12 noon. Pt outside tPA window. Pt given ASA 324mg po. Hospitalist consulted for admission. Amount and/or Complexity of Data Reviewed  Clinical lab tests: ordered and reviewed  Tests in the radiology section of CPT®: ordered and reviewed  Tests in the medicine section of CPT®: ordered and reviewed  Review and summarize past medical records: yes  Independent visualization of images, tracings, or specimens: yes    Risk of Complications, Morbidity, and/or Mortality  Presenting problems: moderate  Diagnostic procedures: moderate  Management options: moderate    Patient Progress  Patient progress: stable    ED Course   Comment By Time   CT head IMPRESSION:    No evidence of acute intracranial abnormalities. Burt Rodrigues MD 11/13 1076       Procedures                       NIHSS Stroke Scale      Interval: Baseline  Time: 10:15 PM    Person Administering Scale: Moy Sanders MD  Administer stroke scale items in the order listed. Record performance in each category after each subscale exam. Do not go back and change scores. Follow directions provided for each exam technique. Scores should reflect what the patient does, not what the clinician thinks the patient can do. The clinician should record answers while administering the exam and work quickly. Except where indicated, the patient should not be coached (i.e., repeated requests to patient to make a special effort). 1a  Level of consciousness: 0=alert; keenly responsive   1b. LOC questions:  0=Answers both questions correctly   1c. LOC commands: 0=Performs both tasks correctly   2. Best Gaze: 0=normal   3. Visual: 0=No visual loss   4. Facial Palsy: 0=Normal symmetric movement   5a. Motor left arm: 0=No drift, arm holds 90 (or 45) degrees for full 10 seconds   5b. Motor right arm: 0=No drift, arm holds 90 (or 45) degrees for full 10 seconds   6a. Motor left le=No drift; leg holds 30-degree position for full 5 seconds. 6b  Motor right le=No drift; leg holds 30-degree position for full 5 seconds. 7. Limb Ataxia: 0=Absent   8. Sensory: 0=Normal; no sensory loss   9. Best Language:  1=Mild to moderate aphasia; some obvious loss of fluency or facility of comprehension without significant limitation on ideas expressed or form of expression. Reduction of speech and/or comprehension, however, makes conversation about provided materials difficult or impossible. For example, in conversation about provided materials, examiner can identify picture or naming card content from patients response. 10. Dysarthria: 1=Mild to moderate, patient slurs at least some words and at worst, can be understood with some difficulty   11.  Extinction and Inattention: 0=No abnormality    Total:   1-4= Minor stroke

## 2017-11-14 NOTE — H&P
INTERNAL MEDICINE H&P/CONSULT    Subjective:     Patient is a 76years old female with history of HTN presents with slurred speech started around 6 pm or after tonight and almost resolved while in ED after 10 pm. She denies any other associated acute symptoms. She has no known hx of stroke or pre-stroke. She use aspirin only occasionally for her legs. She has chronic left ear pressure. Sometimes she feel dizzy when she get up from bed. Her BP was significantly elevated on arrival which is not normal for her. Past Medical History:   Diagnosis Date    Acute encephalopathy 6/27/2016    Anxiety     Arthritis     Chronic pain     hip    Endocrine disease     hypothyroidism    Hypertension     Sepsis ([facility] [address] 6/27/2016    Thyroid disease     TIA (transient ischemic attack) 11/14/2017      Past Surgical History:   Procedure Laterality Date    HX ORTHOPAEDIC      right total hip    TOTAL KNEE ARTHROPLASTY Left 7/23/15      Prior to Admission medications    Medication Sig Start Date End Date Taking? Authorizing Provider   levETIRAcetam (KEPPRA) 1,000 mg tablet Take 1 Tab by mouth two (2) times a day. 7/1/16   Wilfredo Yanez MD   amLODIPine (NORVASC) 10 mg tablet Take 1 Tab by mouth daily. 7/1/16   Wilfredo Yanez MD   levothyroxine (SYNTHROID) 88 mcg tablet Take 1 Tab by mouth Daily (before breakfast). 7/1/16   Wilfredo Yanez MD   dexamethasone (DECADRON) 2 mg tablet Take 2 Tabs by mouth two (2) times daily (with meals). Taper over 2 weeks 7/1/16   Wilfredo Yanez MD   ergocalciferol (ERGOCALCIFEROL) 50,000 unit capsule Take 50,000 Units by mouth. Every 5 days    Historical Provider   carboxymethylcellulose sodium 1 % dlgl Apply  to eye. Historical Provider   sodium chloride (OCEAN) 0.65 % nasal spray 2 Sprays by Both Nostrils route as needed for Congestion.     Historical Provider     Allergies   Allergen Reactions    Ciprofloxacin Nausea and Vomiting    Percocet [Oxycodone-Acetaminophen] Other (comments)     hallucinations    Other Medication Other (comments)     I don't do well with any strong medications (poss narcotics, pt difficult to get an elaboration from)      Social History   Substance Use Topics    Smoking status: Never Smoker    Smokeless tobacco: Never Used    Alcohol use No        Family History:  HTN    Review of Systems   A comprehensive review of systems was negative except for that written in the HPI. Objective: Intake / Output:          Physical Exam:  Visit Vitals    BP (!) 206/99 (BP 1 Location: Left arm, BP Patient Position: At rest)    Pulse 67    Temp 97.9 °F (36.6 °C)    Resp 16    Ht 5' 8\" (1.727 m)    SpO2 100%     General appearance: awake, alert, cooperative, mild distress, appears stated age   Head: Normocephalic, without obvious abnormality, atraumatic  Back: symmetric, no curvature. ROM normal. No CVA tenderness. Lungs: clear to auscultation bilaterally   Heart: regular rate and rhythm, S1, S2 normal, no murmur, click, rub or gallop. Abdomen: soft, no tenderness, no distension, normal bowel sound, no masses, no organomegaly  Extremities: atraumatic, no cyanosis - Bilateral lower limbs edema none. Skin: No rashes or ulceration. Neurologic: Cranial nerves 2-12 intact. Motor 5/5 in 4 limbs. No sensory deficits. No visual field defects. No dysmetria or nystagmus. DTR +2. Babiniski's reflex negative. Romberg's and gait tests are deferred at this time.     ECG: sinus rhythm     Data Review (Labs):   Recent Results (from the past 24 hour(s))   EKG, 12 LEAD, INITIAL    Collection Time: 11/13/17 10:28 PM   Result Value Ref Range    Ventricular Rate 68 BPM    Atrial Rate 68 BPM    P-R Interval 194 ms    QRS Duration 80 ms    Q-T Interval 392 ms    QTC Calculation (Bezet) 416 ms    Calculated P Axis 108 degrees    Calculated R Axis 36 degrees    Calculated T Axis 70 degrees    Diagnosis       Sinus rhythm with Premature supraventricular complexes  Nonspecific ST and T wave abnormality  Abnormal ECG  No previous ECGs available  Confirmed by lAea Thompson ([id number]) on 11/13/2017 11:08:51 PM     CBC WITH AUTOMATED DIFF    Collection Time: 11/13/17 10:31 PM   Result Value Ref Range    WBC 10.5 4.3 - 11.1 K/uL    RBC 4.70 4.05 - 5.25 M/uL    HGB 14.1 11.7 - 15.4 g/dL    HCT 41.2 35.8 - 46.3 %    MCV 87.7 79.6 - 97.8 FL    MCH 30.0 26.1 - 32.9 PG    MCHC 34.2 31.4 - 35.0 g/dL    RDW 13.1 11.9 - 14.6 %    PLATELET 657 684 - 300 K/uL    MPV 9.6 (L) 10.8 - 14.1 FL    DF AUTOMATED      NEUTROPHILS 76 43 - 78 %    LYMPHOCYTES 13 13 - 44 %    MONOCYTES 9 4.0 - 12.0 %    EOSINOPHILS 1 0.5 - 7.8 %    BASOPHILS 0 0.0 - 2.0 %    IMMATURE GRANULOCYTES 1 0.0 - 5.0 %    ABS. NEUTROPHILS 8.0 1.7 - 8.2 K/UL    ABS. LYMPHOCYTES 1.3 0.5 - 4.6 K/UL    ABS. MONOCYTES 0.9 0.1 - 1.3 K/UL    ABS. EOSINOPHILS 0.1 0.0 - 0.8 K/UL    ABS. BASOPHILS 0.0 0.0 - 0.2 K/UL    ABS. IMM. GRANS. 0.1 0.0 - 0.5 K/UL   METABOLIC PANEL, COMPREHENSIVE    Collection Time: 11/13/17 10:31 PM   Result Value Ref Range    Sodium 129 (L) 136 - 145 mmol/L    Potassium 3.6 3.5 - 5.1 mmol/L    Chloride 93 (L) 98 - 107 mmol/L    CO2 26 21 - 32 mmol/L    Anion gap 10 7 - 16 mmol/L    Glucose 109 (H) 65 - 100 mg/dL    BUN 8 8 - 23 MG/DL    Creatinine 0.69 0.6 - 1.0 MG/DL    GFR est AA >60 >60 ml/min/1.73m2    GFR est non-AA >60 >60 ml/min/1.73m2    Calcium 9.1 8.3 - 10.4 MG/DL    Bilirubin, total 0.5 0.2 - 1.1 MG/DL    ALT (SGPT) 22 12 - 65 U/L    AST (SGOT) 20 15 - 37 U/L    Alk.  phosphatase 88 50 - 136 U/L    Protein, total 7.6 6.3 - 8.2 g/dL    Albumin 3.8 3.2 - 4.6 g/dL    Globulin 3.8 (H) 2.3 - 3.5 g/dL    A-G Ratio 1.0 (L) 1.2 - 3.5     PROTHROMBIN TIME + INR    Collection Time: 11/13/17 10:31 PM   Result Value Ref Range    Prothrombin time 10.7 9.6 - 12.0 sec    INR 1.0 0.9 - 1.2     PTT    Collection Time: 11/13/17 10:31 PM   Result Value Ref Range    aPTT 26.2 23.5 - 31.7 SEC   POC TROPONIN-I    Collection Time: 11/13/17 10:32 PM   Result Value Ref Range    Troponin-I (POC) 0 (L) 0.02 - 0.05 ng/ml   GLUCOSE, POC    Collection Time: 11/13/17 10:33 PM   Result Value Ref Range    Glucose (POC) 96 65 - 100 mg/dL       Assessment:     Principal Problem:    TIA (transient ischemic attack) (11/14/2017)    Active Problems:    Slurred speech (11/14/2017)      Hyponatremia (11/14/2017)        Plan:     Observation  Daily aspirin  Statin x 1 and follow lipids in am  Follow MRI brain and Carotid US and orthostatics in am  UA pending  PT/ OT consulted  Speech Rx Consulted  IVF hydration  Blood pressure monitoring  AM lab as needed  Continue essential home medications. Patient is full code. Further management depends on patient progress. Thank you for the oppourtinity to contribute in the care of your patient. Will follow the patient with you as needed. Time 30 minutes.     Signed By: Mark Anthony Salgado MD     November 14, 2017

## 2017-11-14 NOTE — PROGRESS NOTES
Admission assessment complete. Pt alert and oriented x4, no facial droop, speech clear, respirations present, even and unlabored, HOB elevated, pt denies any SOB at this time, S1&S2 auscultated, HR regular, abd soft, non- tender, Active BS in all 4 quadrants, No pressure ulcers or edema noted, pt is up with assistance to the bathroom, voiding, IVF infusing without difficulty, pt instructed to call for assistance, pt verbalizes understanding, bed low and locked, side rails x3, call light within reach. Dual skin assessment completed with Brian Lovett RN. Scar to left knee, right ankle, right hip. Bruise to right arm, states its from a bracelet.  All other skin clean, dry, intact

## 2017-11-14 NOTE — PROGRESS NOTES
Problem: Dysphagia (Adult)  Goal: *Speech Goal: (INSERT TEXT)  NO SPEECH GOALS  Speech language pathology: bedside swallow note: Initial Assessment and Discharge   OBSERVATION    NAME/AGE/GENDER: Tee Vicente is a 76 y.o. female  DATE: 11/14/2017  PRIMARY DIAGNOSIS: TIA (transient ischemic attack)       ICD-10: Treatment Diagnosis: Dysphagia Other R13.19  INTERDISCIPLINARY COLLABORATION: Speech Therapist and Registered Nurse  PRECAUTIONS/ALLERGIES: Ciprofloxacin; Percocet [oxycodone-acetaminophen]; and Other medication ASSESSMENT:Based on the objective data described below, Ms. Mono Baird presents with a swallow function WNL's. Currently she is on a regular diet with thin liquids. OME was unremarkable. She was given p.o trials thin liquids via cup/straw, puree, mixed consistencies and solids. No overt clinical s/sx of aspiration was observed. No further ST is warranted at this time. Please re-consult if changes in patient's current. ?????? ? ? This section established at most recent assessment??????????  PLAN OF CARE:   Patient will benefit from skilled intervention to address the following impairments. RECOMMENDATIONS AND PLANNED INTERVENTIONS (Benefits and precautions of therapy have been discussed with the patient.):  · continue prescribed diet  · Liquids:  regular thin  MEDICATIONS:  · With liquid  COMPENSATORY STRATEGIES/MODIFICATIONS INCLUDING:  · None  OTHER RECOMMENDATIONS (including follow up treatment recommendations):   · none  RECOMMENDED DIET MODIFICATIONS DISCUSSED WITH:  · Nursing  · Patient  FREQUENCY/DURATION:Evaluation only. RECOMMENDED REHABILITATION/EQUIPMENT: (at time of discharge pending progress): Due to the probability of continued deficits (see above) this patient will not likely need continued skilled speech therapy after discharge. SUBJECTIVE:   \"I don't know why I have to do this. \"  History of Present Injury/Illness: Ms. Mono Baird  has a past medical history of Acute encephalopathy (6/27/2016); Anxiety; Arthritis; Chronic pain; Endocrine disease; Hypertension; Sepsis ([facility] [address] (6/27/2016); Thyroid disease; and TIA (transient ischemic attack) (11/14/2017). She also has no past medical history of Aneurysm ([address] Arrhythmia; Asthma; Autoimmune disease ([address] CAD (coronary artery disease); Cancer ([facility] [address] Chronic kidney disease; Coagulation disorder ([address] COPD; DEMENTIA; Diabetes ([address] Gastrointestinal disorder; GERD (gastroesophageal reflux disease); Heart failure ([address] Ill-defined condition; Liver disease; Morbid obesity ([facility] [address] Other ill-defined conditions(799.89); Psychiatric disorder; PUD (peptic ulcer disease); Rheumatic fever; Seizures ([facility] [address] Sleep apnea; Sleep disorder; or Thromboembolus ([facility] [address] .  She also  has a past surgical history that includes orthopaedic and total knee arthroplasty (Left, 7/23/15). Present Symptoms:    Pain Intensity 1: 0  Pain Location 1: Head  Current Medications:   No current facility-administered medications on file prior to encounter. Current Outpatient Prescriptions on File Prior to Encounter   Medication Sig Dispense Refill    levothyroxine (SYNTHROID) 88 mcg tablet Take 1 Tab by mouth Daily (before breakfast). 30 Tab 0    carboxymethylcellulose sodium 1 % dlgl Apply  to eye.  levETIRAcetam (KEPPRA) 1,000 mg tablet Take 1 Tab by mouth two (2) times a day. 60 Tab 0    amLODIPine (NORVASC) 10 mg tablet Take 1 Tab by mouth daily. 30 Tab 0    dexamethasone (DECADRON) 2 mg tablet Take 2 Tabs by mouth two (2) times daily (with meals). Taper over 2 weeks 28 Tab 0    ergocalciferol (ERGOCALCIFEROL) 50,000 unit capsule Take 50,000 Units by mouth. Every 5 days      sodium chloride (OCEAN) 0.65 % nasal spray 2 Sprays by Both Nostrils route as needed for Congestion.        Current Dietary Status:  Regular/Thin      History of reflux:  NO    Reflux medication:N/A  Social History/Home Situation:    Home Environment: Private residence  One/Two Story Residence: Split level  Living Alone: Yes  Support Systems: Child(vivi), Family member(s), Spouse/Significant Other/Partner  Patient Expects to be Discharged to[de-identified] Private residence  Current DME Used/Available at Home: [employer] [place], straight, Cane, quad, Walker  OBJECTIVE:   Respiratory Status:  Room air     CXR Results:N/A  MRI/CT Results:MRI pending  Oral Motor Structure/Speech:  Oral-Motor Structure/Motor Speech  Labial: No impairment  Dentition: Natural  Oral Hygiene: good  Lingual: Decreased strength (during left lateralization )  Velum: No impairment  Mandible: No impairment    Cognitive and Communication Status:  Neurologic State: Alert  Orientation Level: Oriented X4  Cognition: Follows commands  Perception: Appears intact  Perseveration: No perseveration noted  Safety/Judgement: Awareness of environment    BEDSIDE SWALLOW EVALUATION  Oral Assessment:  Oral Assessment  Labial: No impairment  Dentition: Natural  Oral Hygiene: good  Lingual: Decreased strength (during left lateralization )  Velum: No impairment  Mandible: No impairment  Gag Reflex: Present  P.O. Trials:  Patient Position: upright in bed    The patient was given teaspoon to tablespoon   amounts of the following:   Consistency Presented: Thin liquid; Solid;Puree;Mixed consistency  How Presented: Self-fed/presented;Cup/sip;Spoon;Straw    ORAL PHASE:  Bolus Acceptance: No impairment  Bolus Formation/Control: No impairment  Propulsion: No impairment     Oral Residue: None    PHARYNGEAL PHASE:  Initiation of Swallow: No impairment  Laryngeal Elevation: Functional  Aspiration Signs/Symptoms: None  Vocal Quality: No impairment           Pharyngeal Phase Characteristics: No impairment, issues, or problems     OTHER OBSERVATIONS:  Rate/bite size: WNL   Endurance:   WNL   Comments: some impulsivity while eating     Tool Used: Dysphagia Outcome and Severity Scale (BJORN)    Score Comments   Normal Diet  [x] 7 With no strategies or extra time needed   Functional Swallow  [] 6 May have mild oral or pharyngeal delay       Mild Dysphagia    [] 5 Which may require one diet consistency restricted (those who demonstrate penetration which is entirely cleared on MBS would be included)   Mild-Moderate Dysphagia  [] 4 With 1-2 diet consistencies restricted       Moderate Dysphagia  [] 3 With 2 or more diet consistencies restricted       Moderately Severe Dysphagia  [] 2 With partial PO strategies (trials with ST only)       Severe Dysphagia  [] 1 With inability to tolerate any PO safely          Score:  Initial: 7 Most Recent: X (Date: -- )   Interpretation of Tool: The Dysphagia Outcome and Severity Scale (BJORN) is a simple, easy-to-use, 7-point scale developed to systematically rate the functional severity of dysphagia based on objective assessment and make recommendations for diet level, independence level, and type of nutrition. Score 7 6 5 4 3 2 1   Modifier CH CI CJ CK CL CM CN   ?  Swallowing:     - CURRENT STATUS: CH - 0% impaired, limited or restricted    - GOAL STATUS:  CH - 0% impaired, limited or restricted    - D/C STATUS:  CH - 0% impaired, limited or restricted  [place] / [place] / Product Type: Medicare /     TREATMENT:    (In addition to Assessment/Re-Assessment sessions the following treatments were rendered)  Assessment/Reassessment only, no treatment provided today  MODALITIES:                                                                    ORAL MOTOR  EXERCISES:                                                                                                                                                                      LARYNGEAL / PHARYNGEAL EXERCISES:                                                                                                                                     __________________________________________________________________________________________________  Safety:   After treatment position/precautions:  · Upright in Bed  Treatment Assessment:  Swallow function is WNL's No further ST is warranted. Total Treatment Duration:  Time In: 0840  Time Out: 1350 NISSA Acosta Madeline

## 2017-11-14 NOTE — ED TRIAGE NOTES
Pt fell 2 years ago and head a clear negative head CT. Has had about 5 episodes since of nausea and dizziness. Today is also having slurring and inability to find the works she wants.

## 2017-11-14 NOTE — PROGRESS NOTES
Problem: Self Care Deficits Care Plan (Adult)  Goal: *Acute Goals and Plan of Care (Insert Text)    OCCUPATIONAL THERAPY: Initial Assessment and Discharge 11/14/2017  OBSERVATION: Hospital Day: 2  [place] / [place] / Product Type: Medicare /      NAME/AGE/GENDER: Lloyd Hicks is a 76 y.o. female   PRIMARY DIAGNOSIS:  TIA (transient ischemic attack) TIA (transient ischemic attack) TIA (transient ischemic attack)        ICD-10: Treatment Diagnosis:    · Other lack of cordination (R27.8)   Precautions/Allergies:     Ciprofloxacin; Percocet [oxycodone-acetaminophen]; and Other medication      ASSESSMENT:     Ms. Jose Gloria admitted with above diagnosis; pt is independent with self care and functional mobility. No skilled OT indicated at this time. Will discharge OT. This section established at most recent assessment   PROBLEM LIST (Impairments causing functional limitations)   INTERVENTIONS PLANNED: (Benefits and precautions of occupational therapy have been discussed with the patient. TREATMENT PLAN: Frequency/Duration: discharge OT  Rehabilitation Potential For Stated Goals: discharge OT     RECOMMENDED REHABILITATION/EQUIPMENT: (at time of discharge pending progress): Due to the probability of continued deficits (see above) this patient will not likely need continued skilled occupational therapy after discharge. Equipment:    None at this time              OCCUPATIONAL PROFILE AND HISTORY:   History of Present Injury/Illness (Reason for Referral):  TIA  Past Medical History/Comorbidities:   Ms. Jose Gloria  has a past medical history of Acute encephalopathy (6/27/2016); Anxiety; Arthritis; Chronic pain; Endocrine disease; Hypertension; Sepsis ([address] (6/27/2016); Thyroid disease; and TIA (transient ischemic attack) (11/14/2017). She also has no past medical history of Aneurysm ([address] Arrhythmia; Asthma; Autoimmune disease ([address] CAD (coronary artery disease); Cancer ([address]  Chronic kidney disease; Coagulation disorder ([facility] COPD; DEMENTIA; Diabetes ([facility] Gastrointestinal disorder; GERD (gastroesophageal reflux disease); Heart failure ([facility] Ill-defined condition; Liver disease; Morbid obesity ([facility] Other ill-defined conditions(799.89); Psychiatric disorder; PUD (peptic ulcer disease); Rheumatic fever; Seizures ([facility] Sleep apnea; Sleep disorder; or Thromboembolus ([facility] Ms. Talia Walden  has a past surgical history that includes orthopaedic and total knee arthroplasty (Left, 7/23/15). Social History/Living Environment:   Home Environment: Private residence  # Steps to Enter: 0  One/Two Story Residence: Split level  Living Alone: Yes  Support Systems: Child(vivi), Family member(s)  Patient Expects to be Discharged to[de-identified] Private residence  Current DME Used/Available at Home: Cane, quad, Cane, straight  Tub or Shower Type: Shower  Prior Level of Function/Work/Activity:  independent   Number of Personal Factors/Comorbidities that affect the Plan of Care: Brief history (0):  LOW COMPLEXITY   ASSESSMENT OF OCCUPATIONAL PERFORMANCE[de-identified]   Activities of Daily Living:           Basic ADLs (From Assessment) Complex ADLs (From Assessment)   Basic ADL  Feeding: Independent  Oral Facial Hygiene/Grooming: Independent  Bathing: Independent  Upper Body Dressing: Independent  Lower Body Dressing: Independent  Toileting: Independent     Grooming/Bathing/Dressing Activities of Daily Living     Cognitive Retraining  Safety/Judgement: Awareness of environment; Fall prevention                 Functional Transfers  Toilet Transfer : Independent  Shower Transfer: Independent     Bed/Mat Mobility  Rolling: Independent  Supine to Sit: Independent  Sit to Supine: Independent  Sit to Stand: Independent  Bed to Chair: Independent  Scooting: Independent       Most Recent Physical Functioning:   Gross Assessment:  AROM: Within functional limits (BUE)  Strength:  Within functional limits (BUE)  Coordination: Within functional limits (BUE)  Tone: Normal  Sensation: Intact               Posture:  Posture (WDL): Within defined limits  Balance:  Sitting: Intact  Standing: Intact Bed Mobility:  Rolling: Independent  Supine to Sit: Independent  Sit to Supine: Independent  Scooting: Independent  Wheelchair Mobility:     Transfers:  Sit to Stand: Independent  Stand to Sit: Independent  Bed to Chair: Independent              Patient Vitals for the past 6 hrs:   BP BP Patient Position SpO2 Pulse   17 0753 135/79 At rest;Head of bed elevated (Comment degrees) 96 % 60   17 1228 135/63 At rest;Head of bed elevated (Comment degrees) 95 % 61       Mental Status  Neurologic State: Alert  Orientation Level: Oriented X4  Cognition: Follows commands  Perception: Appears intact  Perseveration: No perseveration noted  Safety/Judgement: Awareness of environment, Fall prevention                          Physical Skills Involved:  1. Balance  2. Strength  3. Activity Tolerance Cognitive Skills Affected (resulting in the inability to perform in a timely and safe manner): 1. none Psychosocial Skills Affected:  1. none   Number of elements that affect the Plan of Care: 1-3:  LOW COMPLEXITY   CLINICAL DECISION MAKIN[address] [place] [id number] AM-PAC 6 Clicks   Daily Activity Inpatient Short Form  How much help from another person does the patient currently need. .. Total A Lot A Little None   1. Putting on and taking off regular lower body clothing? [] 1   [] 2   [] 3   [x] 4   2. Bathing (including washing, rinsing, drying)? [] 1   [] 2   [] 3   [x] 4   3. Toileting, which includes using toilet, bedpan or urinal?   [] 1   [] 2   [] 3   [x] 4   4. Putting on and taking off regular upper body clothing? [] 1   [] 2   [] 3   [x] 4   5. Taking care of personal grooming such as brushing teeth? [] 1   [] 2   [] 3   [x] 4   6. Eating meals? [] 1   [] 2   [] 3   [x] 4   © , Trustees of [address] [place] [id number], under license to [employer].  All rights reserved      Score:  Initial: 24 Most Recent: X (Date: -- )    Interpretation of Tool:  Represents activities that are increasingly more difficult (i.e. Bed mobility, Transfers, Gait). Score 24 23 22-20 19-15 14-10 9-7 6     Modifier CH CI CJ CK CL CM CN      ? Self Care:     - CURRENT STATUS: CH - 0% impaired, limited or restricted    - GOAL STATUS: CH - 0% impaired, limited or restricted    - D/C STATUS:  CH - 0% impaired, limited or restricted  [place] / [place] / Product Type: Medicare /      Medical Necessity:     Discharge OT  Reason for Services/Other Comments:discharge OT   Use of outcome tool(s) and clinical judgement create a POC that gives a: LOW COMPLEXITY         TREATMENT:   (In addition to Assessment/Re-Assessment sessions the following treatments were rendered)     Pre-treatment Symptoms/Complaints:  none  Pain: Initial:   Pain Intensity 1: 0  Post Session:  0     Assessment/Reassessment only, no treatment provided today    Braces/Orthotics/Lines/Etc:   · O2 Device: Room air  Treatment/Session Assessment:    · Response to Treatment:  Tolerated well  · Interdisciplinary Collaboration:   o Physical Therapist  o Occupational Therapist  o Registered Nurse  · After treatment position/precautions:   o sitting edge of bed eating lunch, son present   · Compliance with Program/Exercises: compliant all of the time.   · Recommendations/Intent for next treatment session:  Discharge OT  Total Treatment Duration:  OT Patient Time In/Time Out  Time In: 1240  Time Out: [address], OT

## 2017-11-14 NOTE — PROGRESS NOTES
TRANSFER - IN REPORT:    Verbal report received from Carmelo(name) on Francisco Kennedy  being received from ER(unit) for routine progression of care      Report consisted of patients Situation, Background, Assessment and   Recommendations(SBAR). Information from the following report(s) SBAR, Kardex, ED Summary, Procedure Summary, Intake/Output and MAR was reviewed with the receiving nurse. Opportunity for questions and clarification was provided. Assessment to be completed upon patients arrival to unit and care assumed.

## 2017-11-14 NOTE — PROGRESS NOTES
Discharge instructions and prescriptions provided and explained to the pt. Med side effect sheet reviewed. Opportunity for questions provided. Pt is ready to go, called for wheelchair.

## 2017-11-16 ENCOUNTER — HOSPITAL ENCOUNTER (EMERGENCY)
Age: 77
Discharge: HOME | End: 2017-11-16
Attending: EMERGENCY MEDICINE
Payer: MEDICARE

## 2017-11-16 VITALS
HEIGHT: 64 IN | DIASTOLIC BLOOD PRESSURE: 69 MMHG | WEIGHT: 172 LBS | TEMPERATURE: 97.7 F | SYSTOLIC BLOOD PRESSURE: 153 MMHG | OXYGEN SATURATION: 99 % | HEART RATE: 77 BPM | RESPIRATION RATE: 18 BRPM | BODY MASS INDEX: 29.37 KG/M2

## 2017-11-16 DIAGNOSIS — I10 HYPERTENSION, UNSPECIFIED TYPE: Primary | ICD-10-CM

## 2017-11-16 DIAGNOSIS — R51.9 ACUTE NONINTRACTABLE HEADACHE, UNSPECIFIED HEADACHE TYPE: ICD-10-CM

## 2017-11-16 PROCEDURE — 99284 EMERGENCY DEPT VISIT MOD MDM: CPT | Performed by: EMERGENCY MEDICINE

## 2017-11-16 PROCEDURE — 96374 THER/PROPH/DIAG INJ IV PUSH: CPT | Performed by: EMERGENCY MEDICINE

## 2017-11-16 PROCEDURE — 74011250636 HC RX REV CODE- 250/636: Performed by: EMERGENCY MEDICINE

## 2017-11-16 PROCEDURE — 74011250637 HC RX REV CODE- 250/637: Performed by: EMERGENCY MEDICINE

## 2017-11-16 PROCEDURE — 96375 TX/PRO/DX INJ NEW DRUG ADDON: CPT | Performed by: EMERGENCY MEDICINE

## 2017-11-16 RX ORDER — AMLODIPINE BESYLATE 10 MG/1
10 TABLET ORAL DAILY
Qty: 30 TAB | Refills: 0 | Status: SHIPPED | OUTPATIENT
Start: 2017-11-16 | End: 2017-12-16

## 2017-11-16 RX ORDER — CLONIDINE HYDROCHLORIDE 0.1 MG/1
0.1 TABLET ORAL
Status: DISCONTINUED | OUTPATIENT
Start: 2017-11-16 | End: 2017-11-16

## 2017-11-16 RX ORDER — PROCHLORPERAZINE EDISYLATE 5 MG/ML
10 INJECTION INTRAMUSCULAR; INTRAVENOUS
Status: DISCONTINUED | OUTPATIENT
Start: 2017-11-16 | End: 2017-11-16

## 2017-11-16 RX ORDER — PROCHLORPERAZINE EDISYLATE 5 MG/ML
10 INJECTION INTRAMUSCULAR; INTRAVENOUS
Status: DISCONTINUED | OUTPATIENT
Start: 2017-11-16 | End: 2017-11-16 | Stop reason: HOSPADM

## 2017-11-16 RX ORDER — KETOROLAC TROMETHAMINE 30 MG/ML
30 INJECTION, SOLUTION INTRAMUSCULAR; INTRAVENOUS
Status: DISCONTINUED | OUTPATIENT
Start: 2017-11-16 | End: 2017-11-16

## 2017-11-16 RX ORDER — KETOROLAC TROMETHAMINE 30 MG/ML
15 INJECTION, SOLUTION INTRAMUSCULAR; INTRAVENOUS
Status: DISCONTINUED | OUTPATIENT
Start: 2017-11-16 | End: 2017-11-16 | Stop reason: HOSPADM

## 2017-11-16 RX ORDER — NAPROXEN 250 MG/1
250 TABLET ORAL
Status: COMPLETED | OUTPATIENT
Start: 2017-11-16 | End: 2017-11-16

## 2017-11-16 RX ORDER — HYDRALAZINE HYDROCHLORIDE 20 MG/ML
10 INJECTION INTRAMUSCULAR; INTRAVENOUS
Status: COMPLETED | OUTPATIENT
Start: 2017-11-16 | End: 2017-11-16

## 2017-11-16 RX ADMIN — NAPROXEN 250 MG: 250 TABLET ORAL at 07:11

## 2017-11-16 RX ADMIN — HYDRALAZINE HYDROCHLORIDE 10 MG: 20 INJECTION INTRAMUSCULAR; INTRAVENOUS at 06:18

## 2017-11-16 NOTE — DISCHARGE INSTRUCTIONS
Headache: Care Instructions  Your Care Instructions    Headaches have many possible causes. Most headaches aren't a sign of a more serious problem, and they will get better on their own. Home treatment may help you feel better faster. The doctor has checked you carefully, but problems can develop later. If you notice any problems or new symptoms, get medical treatment right away. Follow-up care is a key part of your treatment and safety. Be sure to make and go to all appointments, and call your doctor if you are having problems. It's also a good idea to know your test results and keep a list of the medicines you take. How can you care for yourself at home? · Do not drive if you have taken a prescription pain medicine. · Rest in a quiet, dark room until your headache is gone. Close your eyes and try to relax or go to sleep. Don't watch TV or read. · Put a cold, moist cloth or cold pack on the painful area for 10 to 20 minutes at a time. Put a thin cloth between the cold pack and your skin. · Use a warm, moist towel or a heating pad set on low to relax tight shoulder and neck muscles. · Have someone gently massage your neck and shoulders. · Take pain medicines exactly as directed. ¨ If the doctor gave you a prescription medicine for pain, take it as prescribed. ¨ If you are not taking a prescription pain medicine, ask your doctor if you can take an over-the-counter medicine. · Be careful not to take pain medicine more often than the instructions allow, because you may get worse or more frequent headaches when the medicine wears off. · Do not ignore new symptoms that occur with a headache, such as a fever, weakness or numbness, vision changes, or confusion. These may be signs of a more serious problem. To prevent headaches  · Keep a headache diary so you can figure out what triggers your headaches. Avoiding triggers may help you prevent headaches.  Record when each headache began, how long it lasted, and what the pain was like (throbbing, aching, stabbing, or dull). Write down any other symptoms you had with the headache, such as nausea, flashing lights or dark spots, or sensitivity to bright light or loud noise. Note if the headache occurred near your period. List anything that might have triggered the headache, such as certain foods (chocolate, cheese, wine) or odors, smoke, bright light, stress, or lack of sleep. · Find healthy ways to deal with stress. Headaches are most common during or right after stressful times. Take time to relax before and after you do something that has caused a headache in the past.  · Try to keep your muscles relaxed by keeping good posture. Check your jaw, face, neck, and shoulder muscles for tension, and try relaxing them. When sitting at a desk, change positions often, and stretch for 30 seconds each hour. · Get plenty of sleep and exercise. · Eat regularly and well. Long periods without food can trigger a headache. · Treat yourself to a massage. Some people find that regular massages are very helpful in relieving tension. · Limit caffeine by not drinking too much coffee, tea, or soda. But don't quit caffeine suddenly, because that can also give you headaches. · Reduce eyestrain from computers by blinking frequently and looking away from the computer screen every so often. Make sure you have proper eyewear and that your monitor is set up properly, about an arm's length away. · Seek help if you have depression or anxiety. Your headaches may be linked to these conditions. Treatment can both prevent headaches and help with symptoms of anxiety or depression. When should you call for help? Call 911 anytime you think you may need emergency care. For example, call if:  ? · You have signs of a stroke. These may include:  ¨ Sudden numbness, paralysis, or weakness in your face, arm, or leg, especially on only one side of your body. ¨ Sudden vision changes.   ¨ Sudden trouble speaking. ¨ Sudden confusion or trouble understanding simple statements. ¨ Sudden problems with walking or balance. ¨ A sudden, severe headache that is different from past headaches. ?Call your doctor now or seek immediate medical care if:  ? · You have a new or worse headache. ? · Your headache gets much worse. Where can you learn more? Go to http://agus-branden.info/. Enter M271 in the search box to learn more about \"Headache: Care Instructions. \"  Current as of: October 14, 2016  Content Version: 11.4  © 8545-0228 [employer]. Care instructions adapted under license by [employer] (which disclaims liability or warranty for this information). If you have questions about a medical condition or this instruction, always ask your healthcare professional. [address] any warranty or liability for your use of this information. High Blood Pressure: Care Instructions  Your Care Instructions    If your blood pressure is usually above 140/90, you have high blood pressure, or hypertension. That means the top number is 140 or higher or the bottom number is 90 or higher, or both. Despite what a lot of people think, high blood pressure usually doesn't cause headaches or make you feel dizzy or lightheaded. It usually has no symptoms. But it does increase your risk for heart attack, stroke, and kidney or eye damage. The higher your blood pressure, the more your risk increases. Your doctor will give you a goal for your blood pressure. Your goal will be based on your health and your age. An example of a goal is to keep your blood pressure below 140/90. Lifestyle changes, such as eating healthy and being active, are always important to help lower blood pressure. You might also take medicine to reach your blood pressure goal.  Follow-up care is a key part of your treatment and safety.  Be sure to make and go to all appointments, and call your doctor if you are having problems. It's also a good idea to know your test results and keep a list of the medicines you take. How can you care for yourself at home? Medical treatment  · If you stop taking your medicine, your blood pressure will go back up. You may take one or more types of medicine to lower your blood pressure. Be safe with medicines. Take your medicine exactly as prescribed. Call your doctor if you think you are having a problem with your medicine. · Talk to your doctor before you start taking aspirin every day. Aspirin can help certain people lower their risk of a heart attack or stroke. But taking aspirin isn't right for everyone, because it can cause serious bleeding. · See your doctor regularly. You may need to see the doctor more often at first or until your blood pressure comes down. · If you are taking blood pressure medicine, talk to your doctor before you take decongestants or anti-inflammatory medicine, such as ibuprofen. Some of these medicines can raise blood pressure. · Learn how to check your blood pressure at home. Lifestyle changes  · Stay at a healthy weight. This is especially important if you put on weight around the waist. Losing even 10 pounds can help you lower your blood pressure. · If your doctor recommends it, get more exercise. Walking is a good choice. Bit by bit, increase the amount you walk every day. Try for at least 30 minutes on most days of the week. You also may want to swim, bike, or do other activities. · Avoid or limit alcohol. Talk to your doctor about whether you can drink any alcohol. · Try to limit how much sodium you eat to less than 2,300 milligrams (mg) a day. Your doctor may ask you to try to eat less than 1,500 mg a day. · Eat plenty of fruits (such as bananas and oranges), vegetables, legumes, whole grains, and low-fat dairy products. · Lower the amount of saturated fat in your diet.  Saturated fat is found in animal products such as milk, cheese, and meat. Limiting these foods may help you lose weight and also lower your risk for heart disease. · Do not smoke. Smoking increases your risk for heart attack and stroke. If you need help quitting, talk to your doctor about stop-smoking programs and medicines. These can increase your chances of quitting for good. When should you call for help? Call 911 anytime you think you may need emergency care. This may mean having symptoms that suggest that your blood pressure is causing a serious heart or blood vessel problem. Your blood pressure may be over 180/110. ? For example, call 911 if:  ? · You have symptoms of a heart attack. These may include:  ¨ Chest pain or pressure, or a strange feeling in the chest.  ¨ Sweating. ¨ Shortness of breath. ¨ Nausea or vomiting. ¨ Pain, pressure, or a strange feeling in the back, neck, jaw, or upper belly or in one or both shoulders or arms. ¨ Lightheadedness or sudden weakness. ¨ A fast or irregular heartbeat. ? · You have symptoms of a stroke. These may include:  ¨ Sudden numbness, tingling, weakness, or loss of movement in your face, arm, or leg, especially on only one side of your body. ¨ Sudden vision changes. ¨ Sudden trouble speaking. ¨ Sudden confusion or trouble understanding simple statements. ¨ Sudden problems with walking or balance. ¨ A sudden, severe headache that is different from past headaches. ? · You have severe back or belly pain. ?Do not wait until your blood pressure comes down on its own. Get help right away. ?Call your doctor now or seek immediate care if:  ? · Your blood pressure is much higher than normal (such as 180/110 or higher), but you don't have symptoms. ? · You think high blood pressure is causing symptoms, such as:  ¨ Severe headache. ¨ Blurry vision. ? Watch closely for changes in your health, and be sure to contact your doctor if:  ? · Your blood pressure measures 140/90 or higher at least 2 times.  That means the top number is 140 or higher or the bottom number is 90 or higher, or both. ? · You think you may be having side effects from your blood pressure medicine. ? · Your blood pressure is usually normal, but it goes above normal at least 2 times. Where can you learn more? Go to http://agus-branden.info/. Enter O183 in the search box to learn more about \"High Blood Pressure: Care Instructions. \"  Current as of: September 21, 2016  Content Version: 11.4  © 2843-8674 [employer] Care instructions adapted under license by [employer] (which disclaims liability or warranty for this information). If you have questions about a medical condition or this instruction, always ask your healthcare professional. [address] any warranty or liability for your use of this information.

## 2017-11-16 NOTE — ED NOTES
Pt denies taking any BP meds in >1 yr, refused all ordered meds other than the hydralazine, MD notified, will cont to monitor, son currently at [facility].

## 2017-11-16 NOTE — ED PROVIDER NOTES
HPI Comments: Patient with left temporal headache. Dull in nature. Woke her up at 4 AM.  Did not take anything for it. Was discharged from the hospital yesterday with negative stroke workup diagnosed with a TIA. Patient is a 76 y.o. female presenting with headaches. The history is provided by the patient. No  was used. Headache    This is a new problem. The current episode started 1 to 2 hours ago. The problem occurs constantly. The problem has been gradually improving. The headache is aggravated by nothing. The pain is located in the temporal and left unilateral region. The quality of the pain is described as dull. The pain is moderate. Associated symptoms include nausea. Pertinent negatives include no fever, no malaise/fatigue, no chest pressure, no orthopnea, no palpitations, no shortness of breath, no weakness, no dizziness, no visual change and no vomiting. She has tried nothing for the symptoms. Past Medical History:   Diagnosis Date    Acute encephalopathy 6/27/2016    Anxiety     Arthritis     Chronic pain     hip    Endocrine disease     hypothyroidism    Hypertension     Sepsis ([address] 6/27/2016    Thyroid disease     TIA (transient ischemic attack) 11/14/2017       Past Surgical History:   Procedure Laterality Date    HX ORTHOPAEDIC      right total hip    TOTAL KNEE ARTHROPLASTY Left 7/23/15         No family history on file. Social History     Social History    Marital status:      Spouse name: N/A    Number of children: N/A    Years of education: N/A     Occupational History    Not on file.      Social History Main Topics    Smoking status: Never Smoker    Smokeless tobacco: Never Used    Alcohol use No    Drug use: No    Sexual activity: Not on file     Other Topics Concern    Not on file     Social History Narrative         ALLERGIES: Ciprofloxacin; Percocet [oxycodone-acetaminophen]; and Other medication    Review of Systems Constitutional: Negative for chills, fever and malaise/fatigue. Eyes: Negative for pain, redness and visual disturbance. Respiratory: Negative for chest tightness, shortness of breath and wheezing. Cardiovascular: Negative for chest pain, palpitations, orthopnea and leg swelling. Gastrointestinal: Positive for nausea. Negative for abdominal pain, diarrhea and vomiting. Musculoskeletal: Negative for back pain, gait problem, neck pain and neck stiffness. Skin: Negative for color change and rash. Neurological: Positive for headaches. Negative for dizziness, facial asymmetry, speech difficulty, weakness and numbness. Vitals:    11/16/17 0557 11/16/17 0606   BP: 190/81 (!) 216/104   Pulse: 64 65   Resp: 16    Temp: 97.7 °F (36.5 °C)    SpO2: 97% 99%   Weight: 78 kg (172 lb)    Height: 5' 4\" (1.626 m)             Physical Exam   Constitutional: She is oriented to person, place, and time. She appears well-developed and well-nourished. HENT:   Head: Normocephalic and atraumatic. Eyes: Conjunctivae and EOM are normal. Pupils are equal, round, and reactive to light. Neck: Normal range of motion. Neck supple. Cardiovascular: Normal rate and regular rhythm. No murmur heard. Pulmonary/Chest: Effort normal and breath sounds normal. She has no wheezes. Abdominal: Soft. Bowel sounds are normal. There is no tenderness. Musculoskeletal: Normal range of motion. She exhibits no edema. Neurological: She is alert and oriented to person, place, and time. No cranial nerve deficit. She exhibits normal muscle tone. Coordination normal.   Skin: Skin is warm and dry. Nursing note and vitals reviewed. MDM  Number of Diagnoses or Management Options  Diagnosis management comments: HA with HTN. Improved now. Will discharge on blood pressure meds.         Amount and/or Complexity of Data Reviewed  Clinical lab tests: ordered and reviewed  Tests in the medicine section of CPT®: ordered and reviewed    Patient Progress  Patient progress: stable    ED Course       Procedures

## 2017-11-16 NOTE — ED TRIAGE NOTES
Current Outpatient Prescriptions on File Prior to Encounter   Medication Sig Dispense Refill    levothyroxine (SYNTHROID) 88 mcg tablet Take 1 Tab by mouth Daily (before breakfast). 30 Tab 0    carboxymethylcellulose sodium 1 % dlgl Apply  to eye.        levETIRAcetam (KEPPRA) 1,000 mg tablet Take 1 Tab by mouth two (2) times a day. 60 Tab 0    amLODIPine (NORVASC) 10 mg tablet Take 1 Tab by mouth daily. 30 Tab 0    dexamethasone (DECADRON) 2 mg tablet Take 2 Tabs by mouth two (2) times daily (with meals). Taper over 2 weeks 28 Tab 0    ergocalciferol (ERGOCALCIFEROL) 50,000 unit capsule Take 50,000 Units by mouth.  Every 5 days        sodium chloride (OCEAN) 0.65 % nasal spray 2 Sprays by Both Nostrils route as needed for Congestion.

## 2017-11-16 NOTE — ED TRIAGE NOTES
Pt presents to ER via EMS from home after waking w/ headache at 0400 w/ assoc nausea. EMS reports BGL 72, pt anxious, short-tempered w/ staff.

## 2017-11-16 NOTE — ED NOTES
I have reviewed discharge instructions with the patient. The patient verbalized understanding. Patient left ED via Discharge Method: ambulatory to Home with son. Opportunity for questions and clarification provided. Patient given 1 scripts. To continue your aftercare when you leave the hospital, you may receive an automated call from our care team to check in on how you are doing. This is a free service and part of our promise to provide the best care and service to meet your aftercare needs.  If you have questions, or wish to unsubscribe from this service please call [phone]. Thank you for Choosing our [facility].

## 2018-04-16 PROBLEM — I48.91 ATRIAL FIBRILLATION (HCC): Status: ACTIVE | Noted: 2018-04-16

## 2018-05-14 ENCOUNTER — HOSPITAL ENCOUNTER (INPATIENT)
Age: 78
LOS: 2 days | Discharge: HOME HEALTH | DRG: 064 | End: 2018-05-16
Attending: EMERGENCY MEDICINE | Admitting: FAMILY MEDICINE
Payer: MEDICARE

## 2018-05-14 ENCOUNTER — APPOINTMENT (OUTPATIENT)
Dept: MRI IMAGING | Age: 78
DRG: 064 | End: 2018-05-14
Attending: FAMILY MEDICINE
Payer: MEDICARE

## 2018-05-14 ENCOUNTER — APPOINTMENT (OUTPATIENT)
Dept: CT IMAGING | Age: 78
DRG: 064 | End: 2018-05-14
Attending: EMERGENCY MEDICINE
Payer: MEDICARE

## 2018-05-14 DIAGNOSIS — R41.82 ALTERED MENTAL STATUS, UNSPECIFIED ALTERED MENTAL STATUS TYPE: ICD-10-CM

## 2018-05-14 DIAGNOSIS — I16.1 HYPERTENSIVE EMERGENCY: Primary | ICD-10-CM

## 2018-05-14 PROBLEM — R41.0 CONFUSION: Status: ACTIVE | Noted: 2018-05-14

## 2018-05-14 PROBLEM — E03.9 HYPOTHYROIDISM: Status: ACTIVE | Noted: 2018-05-14

## 2018-05-14 LAB
ALBUMIN SERPL-MCNC: 3.6 G/DL (ref 3.2–4.6)
ALBUMIN/GLOB SERPL: 0.9 {RATIO} (ref 1.2–3.5)
ALP SERPL-CCNC: 97 U/L (ref 50–136)
ALT SERPL-CCNC: 23 U/L (ref 12–65)
ANION GAP SERPL CALC-SCNC: 10 MMOL/L (ref 7–16)
APPEARANCE UR: CLEAR
AST SERPL-CCNC: 21 U/L (ref 15–37)
ATRIAL RATE: 120 BPM
BASOPHILS # BLD: 0.1 K/UL (ref 0–0.2)
BASOPHILS NFR BLD: 1 % (ref 0–2)
BILIRUB SERPL-MCNC: 0.8 MG/DL (ref 0.2–1.1)
BILIRUB UR QL: NEGATIVE
BUN SERPL-MCNC: 5 MG/DL (ref 8–23)
CALCIUM SERPL-MCNC: 8.9 MG/DL (ref 8.3–10.4)
CALCULATED R AXIS, ECG10: 62 DEGREES
CALCULATED T AXIS, ECG11: 90 DEGREES
CHLORIDE SERPL-SCNC: 98 MMOL/L (ref 98–107)
CO2 SERPL-SCNC: 26 MMOL/L (ref 21–32)
COLOR UR: YELLOW
CREAT SERPL-MCNC: 0.64 MG/DL (ref 0.6–1)
CRP SERPL HS-MCNC: 7.6 MG/L
DIAGNOSIS, 93000: NORMAL
DIFFERENTIAL METHOD BLD: ABNORMAL
EOSINOPHIL # BLD: 0.1 K/UL (ref 0–0.8)
EOSINOPHIL NFR BLD: 2 % (ref 0.5–7.8)
ERYTHROCYTE [DISTWIDTH] IN BLOOD BY AUTOMATED COUNT: 13.3 % (ref 11.9–14.6)
ERYTHROCYTE [SEDIMENTATION RATE] IN BLOOD: 12 MM/HR (ref 0–30)
GLOBULIN SER CALC-MCNC: 3.9 G/DL (ref 2.3–3.5)
GLUCOSE SERPL-MCNC: 104 MG/DL (ref 65–100)
GLUCOSE UR STRIP.AUTO-MCNC: NEGATIVE MG/DL
HCT VFR BLD AUTO: 41.8 % (ref 35.8–46.3)
HGB BLD-MCNC: 14.4 G/DL (ref 11.7–15.4)
HGB UR QL STRIP: NEGATIVE
IMM GRANULOCYTES # BLD: 0.1 K/UL (ref 0–0.5)
IMM GRANULOCYTES NFR BLD AUTO: 1 % (ref 0–5)
INR PPP: 1
KETONES UR QL STRIP.AUTO: >80 MG/DL
LEUKOCYTE ESTERASE UR QL STRIP.AUTO: NEGATIVE
LYMPHOCYTES # BLD: 1.4 K/UL (ref 0.5–4.6)
LYMPHOCYTES NFR BLD: 18 % (ref 13–44)
MCH RBC QN AUTO: 29.5 PG (ref 26.1–32.9)
MCHC RBC AUTO-ENTMCNC: 34.4 G/DL (ref 31.4–35)
MCV RBC AUTO: 85.7 FL (ref 79.6–97.8)
MONOCYTES # BLD: 0.8 K/UL (ref 0.1–1.3)
MONOCYTES NFR BLD: 10 % (ref 4–12)
NEUTS SEG # BLD: 5.4 K/UL (ref 1.7–8.2)
NEUTS SEG NFR BLD: 68 % (ref 43–78)
NITRITE UR QL STRIP.AUTO: NEGATIVE
PH UR STRIP: 7.5 [PH] (ref 5–9)
PLATELET # BLD AUTO: 416 K/UL (ref 150–450)
PMV BLD AUTO: 9.6 FL (ref 10.8–14.1)
POTASSIUM SERPL-SCNC: 3.3 MMOL/L (ref 3.5–5.1)
PROT SERPL-MCNC: 7.5 G/DL (ref 6.3–8.2)
PROT UR STRIP-MCNC: NEGATIVE MG/DL
PROTHROMBIN TIME: 13.4 SEC (ref 11.5–14.5)
Q-T INTERVAL, ECG07: 434 MS
QRS DURATION, ECG06: 82 MS
QTC CALCULATION (BEZET), ECG08: 415 MS
RBC # BLD AUTO: 4.88 M/UL (ref 4.05–5.25)
SODIUM SERPL-SCNC: 134 MMOL/L (ref 136–145)
SP GR UR REFRACTOMETRY: 1.01 (ref 1–1.02)
TROPONIN I SERPL-MCNC: <0.04 NG/ML (ref 0.02–0.05)
TSH SERPL DL<=0.005 MIU/L-ACNC: 1.2 UIU/ML (ref 0.36–3.74)
UROBILINOGEN UR QL STRIP.AUTO: 0.2 EU/DL (ref 0.2–1)
VENTRICULAR RATE, ECG03: 55 BPM
WBC # BLD AUTO: 7.8 K/UL (ref 4.3–11.1)

## 2018-05-14 PROCEDURE — 85025 COMPLETE CBC W/AUTO DIFF WBC: CPT | Performed by: EMERGENCY MEDICINE

## 2018-05-14 PROCEDURE — G8996 SWALLOW CURRENT STATUS: HCPCS | Performed by: SPEECH-LANGUAGE PATHOLOGIST

## 2018-05-14 PROCEDURE — G8998 SWALLOW D/C STATUS: HCPCS | Performed by: SPEECH-LANGUAGE PATHOLOGIST

## 2018-05-14 PROCEDURE — 93005 ELECTROCARDIOGRAM TRACING: CPT | Performed by: EMERGENCY MEDICINE

## 2018-05-14 PROCEDURE — 99285 EMERGENCY DEPT VISIT HI MDM: CPT | Performed by: EMERGENCY MEDICINE

## 2018-05-14 PROCEDURE — 70544 MR ANGIOGRAPHY HEAD W/O DYE: CPT

## 2018-05-14 PROCEDURE — 70450 CT HEAD/BRAIN W/O DYE: CPT

## 2018-05-14 PROCEDURE — 92610 EVALUATE SWALLOWING FUNCTION: CPT | Performed by: SPEECH-LANGUAGE PATHOLOGIST

## 2018-05-14 PROCEDURE — 81003 URINALYSIS AUTO W/O SCOPE: CPT | Performed by: FAMILY MEDICINE

## 2018-05-14 PROCEDURE — 74011250636 HC RX REV CODE- 250/636: Performed by: FAMILY MEDICINE

## 2018-05-14 PROCEDURE — G8997 SWALLOW GOAL STATUS: HCPCS | Performed by: SPEECH-LANGUAGE PATHOLOGIST

## 2018-05-14 PROCEDURE — 85610 PROTHROMBIN TIME: CPT | Performed by: EMERGENCY MEDICINE

## 2018-05-14 PROCEDURE — 84443 ASSAY THYROID STIM HORMONE: CPT | Performed by: EMERGENCY MEDICINE

## 2018-05-14 PROCEDURE — 85652 RBC SED RATE AUTOMATED: CPT | Performed by: FAMILY MEDICINE

## 2018-05-14 PROCEDURE — 84484 ASSAY OF TROPONIN QUANT: CPT | Performed by: EMERGENCY MEDICINE

## 2018-05-14 PROCEDURE — 87086 URINE CULTURE/COLONY COUNT: CPT | Performed by: FAMILY MEDICINE

## 2018-05-14 PROCEDURE — 86141 C-REACTIVE PROTEIN HS: CPT | Performed by: FAMILY MEDICINE

## 2018-05-14 PROCEDURE — 96374 THER/PROPH/DIAG INJ IV PUSH: CPT | Performed by: EMERGENCY MEDICINE

## 2018-05-14 PROCEDURE — 80053 COMPREHEN METABOLIC PANEL: CPT | Performed by: EMERGENCY MEDICINE

## 2018-05-14 PROCEDURE — 74011250636 HC RX REV CODE- 250/636: Performed by: EMERGENCY MEDICINE

## 2018-05-14 PROCEDURE — 99218 HC RM OBSERVATION: CPT

## 2018-05-14 PROCEDURE — 96376 TX/PRO/DX INJ SAME DRUG ADON: CPT | Performed by: EMERGENCY MEDICINE

## 2018-05-14 PROCEDURE — 36415 COLL VENOUS BLD VENIPUNCTURE: CPT | Performed by: FAMILY MEDICINE

## 2018-05-14 PROCEDURE — 96375 TX/PRO/DX INJ NEW DRUG ADDON: CPT | Performed by: EMERGENCY MEDICINE

## 2018-05-14 PROCEDURE — 74011250637 HC RX REV CODE- 250/637: Performed by: FAMILY MEDICINE

## 2018-05-14 PROCEDURE — 70551 MRI BRAIN STEM W/O DYE: CPT

## 2018-05-14 RX ORDER — ONDANSETRON 2 MG/ML
4 INJECTION INTRAMUSCULAR; INTRAVENOUS
Status: COMPLETED | OUTPATIENT
Start: 2018-05-14 | End: 2018-05-14

## 2018-05-14 RX ORDER — HALOPERIDOL 5 MG/ML
2 INJECTION INTRAMUSCULAR ONCE
Status: COMPLETED | OUTPATIENT
Start: 2018-05-14 | End: 2018-05-14

## 2018-05-14 RX ORDER — DILTIAZEM HYDROCHLORIDE 120 MG/1
120 CAPSULE, EXTENDED RELEASE ORAL DAILY
COMMUNITY
End: 2018-05-23 | Stop reason: DRUGHIGH

## 2018-05-14 RX ORDER — LABETALOL HYDROCHLORIDE 5 MG/ML
5 INJECTION, SOLUTION INTRAVENOUS
Status: DISCONTINUED | OUTPATIENT
Start: 2018-05-14 | End: 2018-05-16 | Stop reason: HOSPADM

## 2018-05-14 RX ORDER — ENOXAPARIN SODIUM 100 MG/ML
40 INJECTION SUBCUTANEOUS EVERY 24 HOURS
Status: DISCONTINUED | OUTPATIENT
Start: 2018-05-14 | End: 2018-05-14

## 2018-05-14 RX ORDER — LEVOTHYROXINE SODIUM 100 UG/1
100 TABLET ORAL
Status: DISCONTINUED | OUTPATIENT
Start: 2018-05-14 | End: 2018-05-16 | Stop reason: HOSPADM

## 2018-05-14 RX ORDER — ACETAMINOPHEN 500 MG
500 TABLET ORAL
Status: DISCONTINUED | OUTPATIENT
Start: 2018-05-14 | End: 2018-05-16 | Stop reason: HOSPADM

## 2018-05-14 RX ORDER — IBUPROFEN 600 MG/1
600 TABLET ORAL
Status: DISCONTINUED | OUTPATIENT
Start: 2018-05-14 | End: 2018-05-16 | Stop reason: HOSPADM

## 2018-05-14 RX ORDER — AMLODIPINE BESYLATE 5 MG/1
5 TABLET ORAL DAILY
Status: DISCONTINUED | OUTPATIENT
Start: 2018-05-14 | End: 2018-05-14

## 2018-05-14 RX ORDER — DILTIAZEM HYDROCHLORIDE 180 MG/1
120 CAPSULE, COATED, EXTENDED RELEASE ORAL DAILY
Refills: 6 | Status: ON HOLD | COMMUNITY
Start: 2018-04-16 | End: 2018-05-14

## 2018-05-14 RX ORDER — PROMETHAZINE HYDROCHLORIDE 25 MG/ML
INJECTION, SOLUTION INTRAMUSCULAR; INTRAVENOUS
Status: ACTIVE
Start: 2018-05-14 | End: 2018-05-14

## 2018-05-14 RX ORDER — GUAIFENESIN 100 MG/5ML
162 LIQUID (ML) ORAL DAILY
Status: DISCONTINUED | OUTPATIENT
Start: 2018-05-14 | End: 2018-05-16

## 2018-05-14 RX ORDER — ONDANSETRON 2 MG/ML
4 INJECTION INTRAMUSCULAR; INTRAVENOUS
Status: DISCONTINUED | OUTPATIENT
Start: 2018-05-14 | End: 2018-05-16 | Stop reason: HOSPADM

## 2018-05-14 RX ORDER — HALOPERIDOL 5 MG/ML
3 INJECTION INTRAMUSCULAR ONCE
Status: ACTIVE | OUTPATIENT
Start: 2018-05-14 | End: 2018-05-15

## 2018-05-14 RX ORDER — HYDRALAZINE HYDROCHLORIDE 20 MG/ML
10 INJECTION INTRAMUSCULAR; INTRAVENOUS
Status: COMPLETED | OUTPATIENT
Start: 2018-05-14 | End: 2018-05-14

## 2018-05-14 RX ORDER — MELATONIN
2000 DAILY
Status: DISCONTINUED | OUTPATIENT
Start: 2018-05-14 | End: 2018-05-16 | Stop reason: HOSPADM

## 2018-05-14 RX ORDER — HALOPERIDOL 5 MG/ML
2 INJECTION INTRAMUSCULAR
Status: DISCONTINUED | OUTPATIENT
Start: 2018-05-14 | End: 2018-05-16 | Stop reason: HOSPADM

## 2018-05-14 RX ORDER — HALOPERIDOL 5 MG/ML
INJECTION INTRAMUSCULAR
Status: ACTIVE
Start: 2018-05-14 | End: 2018-05-15

## 2018-05-14 RX ORDER — SODIUM CHLORIDE 0.9 % (FLUSH) 0.9 %
5-10 SYRINGE (ML) INJECTION AS NEEDED
Status: DISCONTINUED | OUTPATIENT
Start: 2018-05-14 | End: 2018-05-16 | Stop reason: HOSPADM

## 2018-05-14 RX ORDER — DILTIAZEM HYDROCHLORIDE 120 MG/1
120 CAPSULE, COATED, EXTENDED RELEASE ORAL DAILY
Status: DISCONTINUED | OUTPATIENT
Start: 2018-05-14 | End: 2018-05-16 | Stop reason: HOSPADM

## 2018-05-14 RX ORDER — AMLODIPINE BESYLATE 5 MG/1
5 TABLET ORAL DAILY
COMMUNITY
End: 2018-05-23

## 2018-05-14 RX ORDER — SODIUM CHLORIDE 0.9 % (FLUSH) 0.9 %
5-10 SYRINGE (ML) INJECTION EVERY 8 HOURS
Status: DISCONTINUED | OUTPATIENT
Start: 2018-05-14 | End: 2018-05-16 | Stop reason: HOSPADM

## 2018-05-14 RX ADMIN — Medication 10 ML: at 11:04

## 2018-05-14 RX ADMIN — ASPIRIN 81 MG 162 MG: 81 TABLET ORAL at 10:56

## 2018-05-14 RX ADMIN — ACETAMINOPHEN 500 MG: 500 TABLET, FILM COATED ORAL at 14:18

## 2018-05-14 RX ADMIN — Medication 10 ML: at 22:00

## 2018-05-14 RX ADMIN — ONDANSETRON 4 MG: 2 INJECTION INTRAMUSCULAR; INTRAVENOUS at 06:56

## 2018-05-14 RX ADMIN — PROMETHAZINE HYDROCHLORIDE 12.5 MG: 25 INJECTION INTRAMUSCULAR; INTRAVENOUS at 09:08

## 2018-05-14 RX ADMIN — VITAMIN D, TAB 1000IU (100/BT) 2000 UNITS: 25 TAB at 10:56

## 2018-05-14 RX ADMIN — ENOXAPARIN SODIUM 40 MG: 40 INJECTION SUBCUTANEOUS at 10:56

## 2018-05-14 RX ADMIN — HYDRALAZINE HYDROCHLORIDE 10 MG: 20 INJECTION INTRAMUSCULAR; INTRAVENOUS at 06:38

## 2018-05-14 RX ADMIN — IBUPROFEN 600 MG: 600 TABLET, FILM COATED ORAL at 10:56

## 2018-05-14 RX ADMIN — HALOPERIDOL LACTATE 2 MG: 5 INJECTION, SOLUTION INTRAMUSCULAR at 11:43

## 2018-05-14 RX ADMIN — LEVOTHYROXINE SODIUM 100 MCG: 100 TABLET ORAL at 10:56

## 2018-05-14 RX ADMIN — HYDRALAZINE HYDROCHLORIDE 10 MG: 20 INJECTION INTRAMUSCULAR; INTRAVENOUS at 07:13

## 2018-05-14 RX ADMIN — DILTIAZEM HYDROCHLORIDE 120 MG: 120 CAPSULE, COATED, EXTENDED RELEASE ORAL at 10:56

## 2018-05-14 RX ADMIN — Medication 10 ML: at 14:00

## 2018-05-14 NOTE — IP AVS SNAPSHOT
[address] 
[phone] Patient: Mai Richmond MRN: [id number] TOQ:[id number] About your hospitalization You were admitted on:  May 14, 2018 You last received care in the:  [address] [place] [address] 3 ICU You were discharged on:  May 16, 2018 Why you were hospitalized Your primary diagnosis was:  Cva (Cerebral Vascular Accident) (Hcc) Your diagnoses also included:  Confusion, Hypertensive Emergency, Htn (Hypertension), Atrial Fibrillation (Hcc), Acute Encephalopathy, Hypothyroidism Follow-up Information Follow up With Details Comments Contact Info Mckay Soto MD   [address] [place] [id number] 
[phone] Discharge Orders Procedure Order Date Status Priority Quantity Spec Type Associated Dx DISCHARGE INSTRUCTIONS 05/16/18 0947 Normal Routine 1 Comments: If worsened, call your doctor or return to hospital.  
When follow up with your doctor, make sure that your doctor is aware of this admission and review hospital record and follow up on lab results for continuity of care. Also, review your medications with your doctor for possible need of adjustment or refills. A check víctor indicates which time of day the medication should be taken. My Medications START taking these medications Instructions Each Dose to Equal  
 Morning Noon Evening Bedtime  
 atorvastatin 10 mg tablet Commonly known as:  LIPITOR Your next dose is:  tonight Take 1 Tab by mouth daily for 15 days. 10 mg  
    
   
   
   
  
 clopidogrel 75 mg Tab Commonly known as:  PLAVIX Your next dose is:  tomorrow Take 1 Tab by mouth daily. 75 mg CHANGE how you take these medications Instructions Each Dose to Equal  
 Morning Noon Evening Bedtime  
 dilTIAZem  mg XR capsule Commonly known as:  DILACOR XR  
 What changed:  Another medication with the same name was removed. Continue taking this medication, and follow the directions you see here. Your next dose is:  tomorrow Take 120 mg by mouth daily. Indications: VENTRICULAR RATE CONTROL IN ATRIAL FIBRILLATION  
 120 mg CONTINUE taking these medications Instructions Each Dose to Equal  
 Morning Noon Evening Bedtime NORVASC 5 mg tablet Generic drug:  amLODIPine Your next dose is:  Tomorrow am.  
   
 Take 5 mg by mouth daily. 5 mg SYNTHROID 100 mcg tablet Generic drug:  levothyroxine Your next dose is:  Tomorrow am  
   
 Take 100 mcg by mouth Daily (before breakfast). 100 mcg VITAMIN D3 2,000 unit Tab Generic drug:  cholecalciferol (vitamin D3) Your next dose is:  Tomorrow am.  
   
 Take  by mouth daily. STOP taking these medications   
 aspirin 81 mg chewable tablet Where to Get Your Medications Information on where to get these meds will be given to you by the nurse or doctor. ! Ask your nurse or doctor about these medications  
  atorvastatin 10 mg tablet  
 clopidogrel 75 mg Tab Discharge Instructions Stroke: After Your Visit Your Care Instructions You have had a stroke. Risk factors for stroke include being overweight, smoking, and sedentary lifestyle. This means that the blood flow to a part of your brain was blocked for some time, which damages the nerve cells in that part of the brain. The part of your body controlled by that part of your brain may not function properly now. The brain is an amazing organ that can heal itself to some degree. The stroke you had damaged part of your brain, but other parts of your brain may take over in some way for the damaged areas. You have already started this process. Going home may be hard for you and your family. The more you can try to do for yourself, the better. Remember to take each day one at a time. Follow-up care is a key part of your treatment and safety. Be sure to make and go to all appointments, and call your doctor if you are having problems. Its also a good idea to know your test results and keep a list of the medicines you take. How can you care for yourself at home? Enter a stroke rehabilitation (rehab) program, if your doctor recommends it. Physical, speech, and occupational therapies can help you manage bathing, dressing, eating, and other basics of daily living. Eat a heart-healthy diet that is low in cholesterol, saturated fat, and salt. Eat lots of fresh fruits and vegetables and foods high in fiber. Increase your activities slowly. Take short rest breaks when you get tired. Gradually increase the amount you walk. Start out by walking a little more than you did the day before. Do not drive until your doctor says it is okay. It is normal to feel sad or depressed after a stroke. If the blues last, talk to your doctor. If you are having problems with urine leakage, go to the bathroom at regular times, including when you first wake up and at bedtime. Also, limit fluids after dinner. If you are constipated, drink plenty of fluids, enough so that your urine is light yellow or clear like water. If you have kidney, heart, or liver disease and have to limit fluids, talk with your doctor before you increase the amount of fluids you drink. Set up a regular time for using the toilet. If you continue to have constipation, your doctor may suggest using a bulking agent, such as Metamucil, or a stool softener, laxative, or enema. Medicines Take your medicines exactly as prescribed. Call your doctor if you think you are having a problem with your medicine. You may be taking several medicines.  ACE (angiotensin-converting enzyme) inhibitors, angiotensin II receptor blockers (ARBs), beta-blockers, diuretics (water pills), and calcium channel blockers control your blood pressure. Statins help lower cholesterol. Your doctor may also prescribe medicines for depression, pain, sleep problems, anxiety, or agitation. If your doctor has given you medicine that prevents blood clots, such as warfarin (Coumadin), aspirin combined with extended-release dipyridamole (Aggrenox), clopidogrel (Plavix), or aspirin to prevent another stroke, you should: 
Tell your dentist, pharmacist, and other health professionals that you take these medicines. Watch for unusual bruising or bleeding, such as blood in your urine, red or black stools, or bleeding from your nose or gums. Get regular blood tests to check your clotting time if you are taking Coumadin. Wear medical alert jewelry that says you take blood thinners. You can buy this at most [employer]. Do not take any over-the-counter medicines or herbal products without talking to your doctor first. 
If you take birth control pills or hormone replacement therapy, talk to your doctor about whether they are right for you. For family members and caregivers Make the home safe. Set up a room so that your loved one does not have to climb stairs. Be sure the bathroom is on the same floor. Move throw rugs and furniture that could cause falls, and make sure that the lighting is good. Put grab bars and seats in tubs and showers. Find out what your loved one can do and what he or she needs help with. Try not to do things for your loved one that your loved one can do on his or her own. Help him or her learn and practice new skills. Visit and talk with your loved one often. Try doing activities together that you both enjoy, such as playing cards or board games. Keep in touch with your loved one's friends as much as you can, and encourage them to visit. Take care of yourself. Do not try to do everything yourself.  Ask other family members to help. Eat well, get enough rest, and take time to do things that you enjoy. Keep up with your own doctor visits, and make sure to take your medicines regularly. Get out of the house as much as you can. Join a local support group. Find out if you qualify for home health care visits to help with rehab or for adult day care. When should you call for help? Call 911 anytime you think you may need emergency care. For example, call if: 
You have signs of another stroke. These may include: 
Sudden numbness, paralysis, or weakness in your face, arm, or leg, especially on only one side of your body. New problems with walking or balance. Sudden vision changes. Drooling or slurred speech. New problems speaking or understanding simple statements, or you feel confused. A sudden, severe headache that is different from past headaches. Call 911 even if these symptoms go away in a few minutes. You cough up blood. You vomit blood or what looks like coffee grounds. You pass maroon or very bloody stools. Call your doctor now or seek immediate medical care if: 
You have new bruises or blood spots under your skin. You have a nosebleed. Your gums bleed when you brush your teeth. You have blood in your urine. Your stools are black and tarlike or have streaks of blood. You have vaginal bleeding when you are not having your period, or heavy period bleeding. You have new symptoms that may be related to your stroke, such as falls or trouble swallowing. Watch closely for changes in your health, and be sure to contact your doctor if you have any problems. Where can you learn more? Go to [employer] Enter C501  in the search box to learn more about \"Stroke: After Your Visit\". © 1666-2357 Healthwise, Incorporated.  Care instructions adapted under license by [employer] (which disclaims liability or warranty for this information). This care instruction is for use with your licensed healthcare professional. If you have questions about a medical condition or this instruction, always ask your healthcare professional. Magdy Fair any warranty or liability for your use of this information. [employer] Announcement We are excited to announce that we are making your provider's discharge notes available to you in [employer]. You will see these notes when they are completed and signed by the physician that discharged you from your recent hospital stay. If you have any questions or concerns about any information you see in [employer], please call the Health Information Department where you were seen or reach out to your Primary Care Provider for more information about your plan of care. Introducing [facility]! Dear Viral Townsend: 
Thank you for requesting a [employer] account. Our records indicate that you already have an active [employer] account. You can access your account anytime at [email or web address] Did you know that you can access your hospital and ER discharge instructions at any time in [employer]? You can also review all of your test results from your hospital stay or ER visit. Additional Information If you have questions, please visit the Frequently Asked Questions section of the [employer] website at [email or web address] Remember, [employer] is NOT to be used for urgent needs. For medical emergencies, dial 911. Now available from your iPhone and Android! Introducing Buddy Vance As a [facility] patient, I wanted to make you aware of our electronic visit tool called [employer]. [facility] 24/7 allows you to connect within minutes with a medical provider 24 hours a day, seven days a week via a mobile device or tablet or logging into a secure website from your computer.   You can access [employer] 24/7 from anywhere in the [place]. A virtual visit might be right for you when you have a simple condition and feel like you just dont want to get out of bed, or cant get away from work for an appointment, when your regular [facility] provider is not available (evenings, weekends or holidays), or when youre out of town and need minor care. Electronic visits cost only $49 and if the [facility] 24/7 provider determines a prescription is needed to treat your condition, one can be electronically transmitted to a nearby pharmacy*. Please take a moment to enroll today if you have not already done so. The enrollment process is free and takes just a few minutes. To enroll, please download the [facility] 24/7 gibran to your tablet or phone, or visit [email or web address] to enroll on your computer. And, as an [address] [place] [address] patient with a [employer] account, the results of your visits will be scanned into your electronic medical record and your primary care provider will be able to view the scanned results. We urge you to continue to see your regular [facility] provider for your ongoing medical care. And while your primary care provider may not be the one available when you seek a [employer] virtual visit, the peace of mind you get from getting a real diagnosis real time can be priceless. For more information on [employer], view our Frequently Asked Questions (FAQs) at [email or web address]. Sincerely, 
 
Arnaud Cordova MD 
Chief Medical Officer [address] *:  certain medications cannot be prescribed via [employer] Unresulted Labs-Please follow up with your PCP about these lab tests Order Current Status CULTURE, URINE Preliminary result Providers Seen During Your Hospitalization Provider Specialty Primary office phone [address] [place] Emergency Medicine [phone] Henrietta Calixto MD Family Practice [phone] Your Primary Care Physician (PCP) Primary Care Physician Office Phone Office Fax Rachael Lane [address] [phone] You are allergic to the following Allergen Reactions Ciprofloxacin Nausea and Vomiting Percocet (Oxycodone-Acetaminophen) Other (comments)  
 hallucinations Other Medication Other (comments) I don't do well with any strong medications (poss narcotics, pt difficult to get an elaboration from) Recent Documentation Height Weight BMI OB Status Smoking Status 1.626 m 77.1 kg 29.18 kg/m2 Postmenopausal Never Smoker Emergency Contacts Name Discharge Info Relation Home Work Mobile Nilsa Swift  Son [22] [phone] Ross Jeffery  Son [22] [phone] Patient Belongings The following personal items are in your possession at time of discharge: 
  Dental Appliances: None         Home Medications: None   Jewelry: None  Clothing: None    Other Valuables: None Please provide this summary of care documentation to your next provider. Signatures-by signing, you are acknowledging that this After Visit Summary has been reviewed with you and you have received a copy. Patient Signature:  ____________________________________________________________ Date:  ____________________________________________________________  
  
Santino Cart Provider Signature:  ____________________________________________________________ Date:  ____________________________________________________________

## 2018-05-14 NOTE — PROGRESS NOTES
Patient states she does not need any medication and that she cannot stand to lay flat on table for MRA any longer. Patient's expressive aphasia limits ability to assess orientation. Call placed to Dr. Daniella Villarreal. Per MD, hold haldol and scan for now. MD to re-evaluate in the morning.

## 2018-05-14 NOTE — ED NOTES
Report to Keren Burleson RN, pt appears more anxious r/t pain making her not want to answer questions, has a echolalic-like speech stating \"I'm sick\" over and over. Son remains at [facility].

## 2018-05-14 NOTE — ROUTINE PROCESS
TRANSFER - OUT REPORT:    [facility] for routine progression of care       Report consisted of patients Situation, Background, Assessment and   Recommendations(SBAR). Information from the following report(s) SBAR, MAR, History, Vitals was reviewed with the receiving nurse. Lines:   Peripheral IV 05/14/18 Left Antecubital (Active)   Site Assessment Clean, dry, & intact 5/14/2018  6:53 AM   Phlebitis Assessment 0 5/14/2018  6:53 AM   Infiltration Assessment 0 5/14/2018  6:53 AM   Dressing Status Clean, dry, & intact 5/14/2018  6:53 AM   Hub Color/Line Status Pink 5/14/2018  6:53 AM        Opportunity for questions and clarification was provided.       Patient transported with:   Registered Nurse   Cardiac Monitor

## 2018-05-14 NOTE — PROGRESS NOTES
Please see H&P from this morning for details of presentation. MRI showed evidence of an acute ischemic left parieto-occipital CVA. Consulted Neurology. I discussed at length atrial fibrillation, rate-controlling medications, anticoagulation, stroke risks, fall risks. She has declined anticoagulation with her PCP after being initially diagnosed with afib, as well as declined at Cardiology appointment. Currently delaying anticoagulation anyway, but patient still has not decided on anticoagulation. Of note, patient is not as confused as she was on admission exam, and she does speak well without slurred speech, but on re-evaluation this afternoon, she has an intermittent expressive aphasia where she cannot find the right word, or she uses a similar word rather than correct word. Will order MRA head/neck per Neurology recommendations. Continue therapy consults as ordered.

## 2018-05-14 NOTE — ED NOTES
Pt gets agitated when asking questions. States she feels sick but won't expand. Son said that she \"just isn't acting right\". Waiting to give report to ICU and will transfer pt.

## 2018-05-14 NOTE — CONSULTS
[address] [facility] Neurology  [address] [facility]  [address] [facility], [address] [facility]          Chief Complaint   Patient presents with    Headache     Impression:    Acute ischemic stroke, left parieto-occipital, probably embolic  Paroxysmal atrial fibrillation/flutter  Headache    Recommendation:    Further vascular imaging in the form of either CTA of head and neck or MRA head and neck. Depending on the optimal imaging modality at your facility. Delayed anticoagulation with either Coumadin or Eloquis  ESR CRP . rule out Giant cell arteritis//polymyalgia rheumatica      History    Alfonso Ashley is a [de-identified] y.o. female who presents on referral from hospitalist service [facility]. .The patient was admitted this morning with severe headaches and slurred speech. She is not typically affected by headaches but has experience periods of confusion evaluated at [facility] in the past year. The patient indicates that she started feeling bad yesterday. She wanted to go to a dress shop and was driving herself there Saturday but when she tried to go traffic was too bad. She denies any trouble driving. The next day she \"didn't feel good\" but can't describe what she means by this. She didn't feel well this morning and called Donnell Son her son who picked her up and drove her to the ED at [facility]. There she was noted to complain of HA although she denies this at this time. She experienced emesis times two in the ED. She denies numbness weakness and any difficulty with vision. On questioning she admits to HA. She denies jaw claudication but endorses possible odynophagia. She endorses cold intolerance and diffuse muscle and joint pain 'big time\". Unintentional weight loss 30 lbs in the past year.      Had [facility] encounter in November in which the following was recorded:    \"'Patient is a 76years old female with history of HTN presents with slurred speech started around 6 pm or after tonight and almost resolved while in ED after 10 pm. She denies any other associated acute symptoms. She has no known hx of stroke or pre-stroke. She use aspirin only occasionally for her legs. She has chronic left ear pressure. Sometimes she feel dizzy when she get up from bed. Her BP was significantly elevated on arrival which is not normal for her.'     Hospital Course:  She was placed in observation. Her symptoms resolved while in the ED and did not return. She was given aspirin 325 mg and pravastatin. MRI of the brain showed small vessel ischemic change and possible lacunar mindy CVA (but was more likely artifact). Carotid dopplers showed atherosclerosis but no blockage. She worked with PT and did fine with no dizziness nor balance issues. Upon discussion, she was ready to discharge home and deemed stable to do so.     Her son, Shakira Wallace, was present at bedside for discharge discussion. It is recommended she start taking aspirin 162 mg daily (takes 81 mg every other day) and start low dose pravastatin for TIA prevention.     Of note, she was noted to be slightly hyponatremic, which is chronic. \"    The patient has been in diagnosed with afib for the past 2 months. She has had a recent echocardiogram. Echo in 2016 showed LAE. The patient had a syncopal episode in 2015. About 1 1/2 year ago she fell down the steps. She was seen at the [facility].            Past Medical History:   Diagnosis Date    Acute encephalopathy 6/27/2016    Anxiety     Arthritis     Atrial fibrillation ([address] 4/16/2018    Chronic pain     hip    Endocrine disease     hypothyroidism    Hypertension     [address] [address] [address] 6/27/2016    Thyroid disease     TIA (transient ischemic attack) 11/14/2017       Past Surgical History:   Procedure Laterality Date    HX ORTHOPAEDIC      right total hip    TOTAL KNEE ARTHROPLASTY Left 7/23/15   Hip replacement right    Family History   Problem Relation Age of Onset    Heart Attack Father        Social History     Social History    Marital status:  Spouse name: N/A    Number of children: N/A    Years of education: N/A     Social History Main Topics    Smoking status: Never Smoker    Smokeless tobacco: Never Used    Alcohol use No    Drug use: No    Sexual activity: Not on file     Other Topics Concern    Not on file     Social History Narrative           Current Facility-Administered Medications:     acetaminophen (TYLENOL) tablet 500 mg, 500 mg, Oral, Q4H PRN, Tk Nice MD, 500 mg at 05/14/18 1418    ibuprofen (MOTRIN) tablet 600 mg, 600 mg, Oral, Q6H PRN, Tk Nice MD, 600 mg at 05/14/18 1056    levothyroxine (SYNTHROID) tablet 100 mcg, 100 mcg, Oral, ACB, Tk Nice MD, 100 mcg at 05/14/18 1056    aspirin chewable tablet 162 mg, 162 mg, Oral, DAILY, Tk Nice MD, 162 mg at 05/14/18 1056    cholecalciferol (VITAMIN D3) tablet 2,000 Units, 2,000 Units, Oral, DAILY, Tk Nice MD, 2,000 Units at 05/14/18 1056    amLODIPine (NORVASC) tablet 5 mg, 5 mg, Oral, DAILY, Tk Nice MD, Stopped at 05/14/18 1000    dilTIAZem CD (CARDIZEM CD) capsule 120 mg, 120 mg, Oral, DAILY, Tk Nice MD, 120 mg at 05/14/18 1056    sodium chloride (NS) flush 5-10 mL, 5-10 mL, IntraVENous, Q8H, Tk Nice MD, 10 mL at 05/14/18 1400    sodium chloride (NS) flush 5-10 mL, 5-10 mL, IntraVENous, PRN, Tk Nice MD    ondansetron [facility]) injection 4 mg, 4 mg, IntraVENous, Q6H PRN, Tk Nice MD    labetalol (NORMODYNE;TRANDATE) injection 5 mg, 5 mg, IntraVENous, Q10MIN PRN, Tk Nice MD    enoxaparin (LOVENOX) injection 40 mg, 40 mg, SubCUTAneous, Q24H, Tk Nice MD, 40 mg at 05/14/18 1056    promethazine (PHENERGAN) with saline injection 12.5 mg, 12.5 mg, IntraVENous, Q4H PRN, Tk Nice MD, 12.5 mg at 05/14/18 0908    promethazine (PHENERGAN) 25 mg/mL injection, , , ,     Allergies   Allergen Reactions    Ciprofloxacin Nausea and Vomiting    Percocet [Oxycodone-Acetaminophen] Other (comments)     hallucinations    Other Medication Other (comments)     I don't do well with any strong medications (poss narcotics, pt difficult to get an elaboration from)       Review of Systems    Visit Vitals    /61    Pulse 78    Temp 100.1 °F (37.8 °C)    Resp 12    Ht 5' 4\" (1.626 m)    Wt 170 lb (77.1 kg)    SpO2 94%    BMI 29.18 kg/m2       Neurologic Exam     General: well nourished, appears stated age    Eyes: no proptosis or exophthalmos; conjunctivae clear, sclerae non-icteric        Neurological:    MSE: alert, oriented times 3; fluent speech; no paraphasic errors; follows commands without difficulty    CN 2: visual fields full; no afferent pupillary defect; VA not checked; fundoscopic exam ... CN 3,4,6: Pupils symmetrical in size, reactive to light directly and consensually; no ptosis; full versions and ductions  CN 5: facial sensation intact to light touch and pin prick. Corneal reflex . ..   CN 7: Symmetrical facial tone and movements  CN 8 responds to spoken voice  CN 9,10; palate symmetrical gag intact  CN 11: head turn and shoulder shrug intact  CN 12: tongue midline without atrophy or fasiculations    Motor:  Power 5/5 UE and LE proximal to distal  Fine motor movements symmetrical  Tone normal  Atrophy: absent      Cerebellar:  Finger to nose;  intact      Sensory    Intact to primary modalities in all 4 extremities    Reflexes    Symmetrical and normally active at 2+ in UE and LE  Plantar response flexor bilaterally    Recent Results (from the past 24 hour(s))   CBC WITH AUTOMATED DIFF    Collection Time: 05/14/18  6:30 AM   Result Value Ref Range    WBC 7.8 4.3 - 11.1 K/uL    RBC 4.88 4.05 - 5.25 M/uL    HGB 14.4 11.7 - 15.4 g/dL    HCT 41.8 35.8 - 46.3 %    MCV 85.7 79.6 - 97.8 FL    MCH 29.5 26.1 - 32.9 PG    MCHC 34.4 31.4 - 35.0 g/dL    RDW 13.3 11.9 - 14.6 %    PLATELET 354 400 - 301 K/uL    MPV 9.6 (L) 10.8 - 14.1 FL    DF AUTOMATED      NEUTROPHILS 68 43 - 78 % LYMPHOCYTES 18 13 - 44 %    MONOCYTES 10 4.0 - 12.0 %    EOSINOPHILS 2 0.5 - 7.8 %    BASOPHILS 1 0.0 - 2.0 %    IMMATURE GRANULOCYTES 1 0.0 - 5.0 %    ABS. NEUTROPHILS 5.4 1.7 - 8.2 K/UL    ABS. LYMPHOCYTES 1.4 0.5 - 4.6 K/UL    ABS. MONOCYTES 0.8 0.1 - 1.3 K/UL    ABS. EOSINOPHILS 0.1 0.0 - 0.8 K/UL    ABS. BASOPHILS 0.1 0.0 - 0.2 K/UL    ABS. IMM. GRANS. 0.1 0.0 - 0.5 K/UL   PROTHROMBIN TIME + INR    Collection Time: 05/14/18  6:30 AM   Result Value Ref Range    Prothrombin time 13.4 11.5 - 14.5 sec    INR 1.0     METABOLIC PANEL, COMPREHENSIVE    Collection Time: 05/14/18  6:30 AM   Result Value Ref Range    Sodium 134 (L) 136 - 145 mmol/L    Potassium 3.3 (L) 3.5 - 5.1 mmol/L    Chloride 98 98 - 107 mmol/L    CO2 26 21 - 32 mmol/L    Anion gap 10 7 - 16 mmol/L    Glucose 104 (H) 65 - 100 mg/dL    BUN 5 (L) 8 - 23 MG/DL    Creatinine 0.64 0.6 - 1.0 MG/DL    GFR est AA >60 >60 ml/min/1.73m2    GFR est non-AA >60 >60 ml/min/1.73m2    Calcium 8.9 8.3 - 10.4 MG/DL    Bilirubin, total 0.8 0.2 - 1.1 MG/DL    ALT (SGPT) 23 12 - 65 U/L    AST (SGOT) 21 15 - 37 U/L    Alk. phosphatase 97 50 - 136 U/L    Protein, total 7.5 6.3 - 8.2 g/dL    Albumin 3.6 3.2 - 4.6 g/dL    Globulin 3.9 (H) 2.3 - 3.5 g/dL    A-G Ratio 0.9 (L) 1.2 - 3.5     TROPONIN I    Collection Time: 05/14/18  6:30 AM   Result Value Ref Range    Troponin-I, Qt. <0.04 0.02 - 0.05 NG/ML   TSH 3RD GENERATION    Collection Time: 05/14/18  6:30 AM   Result Value Ref Range    TSH 1.197 0.358 - 3.740 uIU/mL   EKG, 12 LEAD, INITIAL    Collection Time: 05/14/18  6:35 AM   Result Value Ref Range    Ventricular Rate 55 BPM    Atrial Rate 120 BPM    QRS Duration 82 ms    Q-T Interval 434 ms    QTC Calculation (Bezet) 415 ms    Calculated R Axis 62 degrees    Calculated T Axis 90 degrees    Diagnosis       !! AGE AND GENDER SPECIFIC ECG ANALYSIS !!   Sinus bradycardia  Abnormal ECG  When compared with ECG of 13-NOV-2017 22:28,  Junctional rhythm has replaced Sinus rhythm  Confirmed by [facility]et  MD (), Silke Chavez ([id number]) on 5/14/2018 7:50:56 AM     URINALYSIS W/ RFLX MICROSCOPIC    Collection Time: 05/14/18  2:57 PM   Result Value Ref Range    Color YELLOW      Appearance CLEAR      Specific gravity 1.012 1.001 - 1.023      pH (UA) 7.5 5.0 - 9.0      Protein NEGATIVE  NEG mg/dL    Glucose NEGATIVE  mg/dL    Ketone >80 (A) NEG mg/dL    Bilirubin NEGATIVE  NEG      Blood NEGATIVE  NEG      Urobilinogen 0.2 0.2 - 1.0 EU/dL    Nitrites NEGATIVE  NEG      Leukocyte Esterase NEGATIVE  NEG       MRI brain reviewed. Subacute LT occipital lobe area restricted consistent with acute infarct. Flare imaging demonstrates multiple areas of increased FLAIR signal consistent with chronic ischemic change.     Follow-up Disposition: Not on File      Oli Krause MD

## 2018-05-14 NOTE — PROGRESS NOTES
PT & OT deferred therapy evaluations today due to MD request & tests pending. Therapy will follow up as indicated 5/15/18.  Adrianna Connors, PT, ROXANA

## 2018-05-14 NOTE — PROGRESS NOTES
Call from MRI tech April. Patient is not cooperating with MRI staff. Discussed with Dr. Wallace Elaine. Order for haldol received. Will go to MRI to administer.

## 2018-05-14 NOTE — PROGRESS NOTES
TRANSFER - IN REPORT:    Verbal report received from Carter Thompson RN(name) on Nacho Tarango  being received from ED(unit) for routine progression of care      Report consisted of patients Situation, Background, Assessment and   Recommendations(SBAR). Information from the following report(s) SBAR, Kardex, ED Summary, Intake/Output, MAR, Recent Results and Cardiac Rhythm Junctional was reviewed with the receiving nurse. Opportunity for questions and clarification was provided. Assessment completed upon patients arrival to unit and care assumed.

## 2018-05-14 NOTE — PROGRESS NOTES
Problem: Dysphagia (Adult)  Goal: *Speech Goal: (INSERT TEXT)  NO SWALLOWING GOALS  Speech language pathology: bedside swallow note: Initial Assessment    NAME/AGE/GENDER: Wendy Turner is a [de-identified] y.o. female  DATE: 5/14/2018  PRIMARY DIAGNOSIS: Hypertensive emergency  Confusion       ICD-10: Treatment Diagnosis: Other Dysphagia R13.19  INTERDISCIPLINARY COLLABORATION: Speech Therapist and Registered Nurse  PRECAUTIONS/ALLERGIES: Ciprofloxacin; Percocet [oxycodone-acetaminophen]; and Other medication ASSESSMENT:Based on the objective data described below, Ms. Nelly Hearn presents without clinical s/sx of Dysphagia. OME was completed and essentially WNL's. Patient was given trials of thin liquids via cup/straw, puree, mixed and solids. No overt clinical s/sx of aspiration observed. ST recommends continue with current diet. Patient does present with expressive and receptive aphasia and therefore an ST evaluation is warranted. ST to follow. Patient will benefit from skilled intervention to address the below impairments. ?????? ? ? This section established at most recent assessment??????????  PROBLEM LIST (Impairments causing functional limitations):  1. CVA  REHABILITATION POTENTIAL FOR STATED GOALS: Good  PLAN OF CARE:   Patient will benefit from skilled intervention to address the following impairments. RECOMMENDATIONS AND PLANNED INTERVENTIONS (Benefits and precautions of therapy have been discussed with the patient.):  · continue prescribed diet  MEDICATIONS:  · With liquid  COMPENSATORY STRATEGIES/MODIFICATIONS INCLUDING:  · Small sips and bites  OTHER RECOMMENDATIONS (including follow up treatment recommendations):   · none  RECOMMENDED DIET MODIFICATIONS DISCUSSED WITH:  · Family  · Patient  FREQUENCY/DURATION: Continue to follow patient 2 times a week for duration of hospital stay to address above goals. RECOMMENDED REHABILITATION/EQUIPMENT: (at time of discharge pending progress): Due to the probability of continued deficits (see above) this patient will likely need continued skilled speech therapy after discharge. SUBJECTIVE:   Alert  History of Present Injury/Illness: Ms. Tin Cisse  has a past medical history of Acute encephalopathy (6/27/2016); Anxiety; Arthritis; Atrial fibrillation ([address] (4/16/2018); Chronic pain; Endocrine disease; Hypertension; Sepsis ([address] (6/27/2016); Thyroid disease; and TIA (transient ischemic attack) (11/14/2017). She also has no past medical history of Aneurysm ([address] Asthma; Autoimmune disease ([address] CAD (coronary artery disease); Cancer ([address] Chronic kidney disease; Coagulation disorder ([address] COPD; DEMENTIA; Diabetes ([address] Gastrointestinal disorder; GERD (gastroesophageal reflux disease); Heart failure ([address] Ill-defined condition; Liver disease; Morbid obesity ([address] Other ill-defined conditions(799.89); Psychiatric disorder; PUD (peptic ulcer disease); Rheumatic fever; Seizures ([address] Sleep apnea; Sleep disorder; or Thromboembolus ([address] .  She also  has a past surgical history that includes hx orthopaedic and pr total knee arthroplasty (Left, 7/23/15). Present Symptoms:    Pain Intensity 1: 5  Pain Location 1: Head  Pain Orientation 1: Left  Pain Intervention(s) 1: Rest, Repositioned  Current Medications:   No current facility-administered medications on file prior to encounter. Current Outpatient Prescriptions on File Prior to Encounter   Medication Sig Dispense Refill    cholecalciferol, vitamin D3, (VITAMIN D3) 2,000 unit tab Take  by mouth daily.  levothyroxine (SYNTHROID) 100 mcg tablet Take 100 mcg by mouth Daily (before breakfast).  aspirin 81 mg chewable tablet Take 2 Tabs by mouth daily. Indications: prevention of cerebrovascular accident (Patient taking differently: Take 162 mg by mouth daily.  As needed  Indications: prevention of cerebrovascular accident) 60 Tab 0     Current Dietary Status:  Regular      History of reflux:  NO    Reflux medication:N/A  Social History/Home Situation: Lives alone      OBJECTIVE:   Respiratory Status:  Room air     CXR Results:N/A  MRI/CT Results:1. Acute to early subacute infarct at the left parieto-occipital junction.     2. Motion artifact.     3. Right temporal encephalomalacia.     4. White matter findings compatible with chronic small vessel ischemic disease. Oral Motor Structure/Speech:  Oral-Motor Structure/Motor Speech  Labial: No impairment  Dentition: Natural  Oral Hygiene: good  Lingual: No impairment  Velum: No impairment  Mandible: No impairment    Cognitive and Communication Status:  Neurologic State: Alert  Orientation Level: Oriented to person  Cognition: Follows commands  Perception: Appears intact  Perseveration: Perseverates during conversation  Safety/Judgement: Not assessed    BEDSIDE SWALLOW EVALUATION  Oral Assessment:  Oral Assessment  Labial: No impairment  Dentition: Natural  Oral Hygiene: good  Lingual: No impairment  Velum: No impairment  Mandible: No impairment  Gag Reflex: Present  P.O. Trials:  Patient Position: upright in bed    The patient was given teaspoon to tablespoon   amounts of the following:   Consistency Presented: Thin liquid; Solid;Puree;Mixed consistency  How Presented: Self-fed/presented;Cup/sip;Spoon;Straw;Successive swallows    ORAL PHASE:  Bolus Acceptance: No impairment  Bolus Formation/Control: No impairment  Propulsion: No impairment     Oral Residue: None    PHARYNGEAL PHASE:  Initiation of Swallow: No impairment  Laryngeal Elevation: Functional  Aspiration Signs/Symptoms: None  Vocal Quality: No impairment           Pharyngeal Phase Characteristics: No impairment, issues, or problems     OTHER OBSERVATIONS:  Rate/bite size: WNL   Endurance: WNL   Comments:       Tool Used: Dysphagia Outcome and Severity Scale (BJORN)    Score Comments   Normal Diet  [x] 7 With no strategies or extra time needed   Functional Swallow  [] 6 May have mild oral or pharyngeal delay       Mild Dysphagia    [] 5 Which may require one diet consistency restricted (those who demonstrate penetration which is entirely cleared on MBS would be included)   Mild-Moderate Dysphagia  [] 4 With 1-2 diet consistencies restricted       Moderate Dysphagia  [] 3 With 2 or more diet consistencies restricted       Moderately Severe Dysphagia  [] 2 With partial PO strategies (trials with ST only)       Severe Dysphagia  [] 1 With inability to tolerate any PO safely          Score:  Initial: 7 Most Recent: X (Date: -- )   Interpretation of Tool: The Dysphagia Outcome and Severity Scale (BJORN) is a simple, easy-to-use, 7-point scale developed to systematically rate the functional severity of dysphagia based on objective assessment and make recommendations for diet level, independence level, and type of nutrition. Score 7 6 5 4 3 2 1   Modifier CH CI CJ CK CL CM CN   ?  Swallowing:     - CURRENT STATUS: CH - 0% impaired, limited or restricted    - GOAL STATUS:  CH - 0% impaired, limited or restricted    - D/C STATUS:  CH - 0% impaired, limited or restricted  [place] / [place] / Product Type: Medicare /     TREATMENT:    (In addition to Assessment/Re-Assessment sessions the following treatments were rendered)  Assessment/Reassessment only, no treatment provided today  MODALITIES:                                                                    ORAL MOTOR  EXERCISES:                                                                                                                                                                      LARYNGEAL / PHARYNGEAL EXERCISES:                                                                                                                                     __________________________________________________________________________________________________  Safety:   After treatment position/precautions:  · Upright in Bed  Treatment Assessment:  Swallow is WNL's. Patient would benefit from speech evaluation. Recommendations/Intent for next treatment session: speech evaluation  . Total Treatment Duration:  Time In: 1420  Time Out: 408 Vishnu Unger. Ed CCC-SLP\

## 2018-05-14 NOTE — ED PROVIDER NOTES
HPI Comments: 23-year-old female with history of hypothyroidism, TIA, Afib, & hypertension presents with complaint of severe headache since around 5 AM.  Patient last seen normal at 7:30 PM last evening per son. Patient states she went to sleep around 9 PM.  States she woke up around 5 AM with severe headache. Patient called her son complaining of severe headache. Denies slurred speech, facial droop, focal weakness, numbness, tingling. Uncertain of any recent falls or injuries. Denies being on any form of anticoagulation. Reports compliance with her medications. Patient denies chest pain, shortness of breath, nausea, vomiting, visual disturbance. Patient with repetitive statement of \"I am sick\". Patient is a [de-identified] y.o. female presenting with headaches. The history is provided by the patient and a relative. No  was used. Headache    This is a new problem. The current episode started 1 to 2 hours ago. The problem occurs constantly. The problem has not changed since onset. The headache is aggravated by nothing. The pain is located in the frontal and bilateral region. The pain is at a severity of 5/10. The pain is moderate. Pertinent negatives include no fever, no syncope, no shortness of breath, no tingling, no nausea and no vomiting. She has tried nothing for the symptoms. The treatment provided no relief.         Past Medical History:   Diagnosis Date    Acute encephalopathy 6/27/2016    Anxiety     Arthritis     Atrial fibrillation ([facility] [address] 4/16/2018    Chronic pain     hip    Endocrine disease     hypothyroidism    Hypertension     Sepsis ([address] 6/27/2016    Thyroid disease     TIA (transient ischemic attack) 11/14/2017       Past Surgical History:   Procedure Laterality Date    HX ORTHOPAEDIC      right total hip    TOTAL KNEE ARTHROPLASTY Left 7/23/15         Family History:   Problem Relation Age of Onset    Heart Attack Father        Social History     Social History    Marital status:      Spouse name: N/A    Number of children: N/A    Years of education: N/A     Occupational History    Not on file. Social History Main Topics    Smoking status: Never Smoker    Smokeless tobacco: Never Used    Alcohol use No    Drug use: No    Sexual activity: Not on file     Other Topics Concern    Not on file     Social History Narrative         ALLERGIES: Ciprofloxacin; Percocet [oxycodone-acetaminophen]; and Other medication    Review of Systems   Constitutional: Negative for chills, fatigue and fever. Respiratory: Negative for cough and shortness of breath. Cardiovascular: Negative for chest pain and syncope. Gastrointestinal: Negative for abdominal pain, diarrhea, nausea and vomiting. Musculoskeletal: Negative for myalgias, neck pain and neck stiffness. Skin: Negative for pallor. Neurological: Positive for headaches. Negative for tingling, seizures, syncope and numbness. Psychiatric/Behavioral: Positive for confusion. Vitals:    05/14/18 0648 05/14/18 0707 05/14/18 0713 05/14/18 0714   BP:  185/83 185/83 180/75   Pulse: (!) 56 64 66 65   Resp:       Temp:       SpO2: 100% 100%  99%   Weight:       Height:                Physical Exam   Constitutional: She is oriented to person, place, and time. Patient sitting up in bed repeating \"I am sick\". HENT:   Head: Normocephalic and atraumatic. Mouth/Throat: Oropharynx is clear and moist. No oropharyngeal exudate. Eyes: Conjunctivae and EOM are normal. Pupils are equal, round, and reactive to light. Neck: Normal range of motion. No JVD present. No tracheal deviation present. Cardiovascular: Normal rate, regular rhythm, normal heart sounds and intact distal pulses. Pulmonary/Chest: Effort normal and breath sounds normal. She exhibits no tenderness. CTAB. Abdominal: Soft. There is no tenderness. There is no rebound and no guarding. Soft, NTND. Musculoskeletal: Normal range of motion.  She exhibits no edema or tenderness. Neurological: She is alert and oriented to person, place, and time. No cranial nerve deficit. Coordination normal.   Patient moving all extremities equally. Normal sensory exam.  No facial droop. No slurred speech. Patient with repetitive language stating \" I am sick\". When patient redirected she answers all other questions appropriately. No meningeal signs present. Skin: No rash noted. No erythema. Nursing note and vitals reviewed. MDM  Number of Diagnoses or Management Options  Altered mental status, unspecified altered mental status type: new and requires workup  Hypertensive emergency: new and requires workup  Diagnosis management comments: Patient with confusion/altered mental status and elevated blood pressure. Son states she is normally compliant with her blood pressure medications. Patient states she hasn't murmur she took her blood pressure medication or not. Patient not candidate for tPA. CT head with no acute or concerning findings. Blood pressure initially 567M systolic. Patient given hydralazine ×2 w/ improvement. Repeat blood pressure 180s/90s. Hospitalist consulted for admission. Amount and/or Complexity of Data Reviewed  Clinical lab tests: ordered and reviewed  Tests in the radiology section of CPT®: ordered and reviewed  Tests in the medicine section of CPT®: ordered and reviewed  Obtain history from someone other than the patient: yes  Review and summarize past medical records: yes  Independent visualization of images, tracings, or specimens: yes    Risk of Complications, Morbidity, and/or Mortality  Presenting problems: moderate  Diagnostic procedures: moderate  Management options: moderate    Patient Progress  Patient progress: (Guarded )        ED Course   Comment By Time   CT head IMPRESSION:    1. No evidence of acute intracranial abnormality. No hydrocephalus. 2. Chronic small vessel changes.  Mild generalized cerebral volume loss,  age-related. No significant change since the prior exam. Dionte Linder MD 05/14 0701       EKG  Date/Time: 5/14/2018 7:21 AM  Performed by: Krystian Allison, [address] [place] [address]  Authorized by: Destiny Garcia     ECG reviewed by ED Physician in the absence of a cardiologist: yes    Rate:     ECG rate:  55    ECG rate assessment: bradycardic    Rhythm:     Rhythm: junctional    Ectopy:     Ectopy: none    QRS:     QRS axis:  Normal    QRS intervals:  Normal  Conduction:     Conduction: normal    ST segments:     ST segments:  Normal  T waves:     T waves: normal            Results Include:    Recent Results (from the past 24 hour(s))   CBC WITH AUTOMATED DIFF    Collection Time: 05/14/18  6:30 AM   Result Value Ref Range    WBC 7.8 4.3 - 11.1 K/uL    RBC 4.88 4.05 - 5.25 M/uL    HGB 14.4 11.7 - 15.4 g/dL    HCT 41.8 35.8 - 46.3 %    MCV 85.7 79.6 - 97.8 FL    MCH 29.5 26.1 - 32.9 PG    MCHC 34.4 31.4 - 35.0 g/dL    RDW 13.3 11.9 - 14.6 %    PLATELET 740 005 - 232 K/uL    MPV 9.6 (L) 10.8 - 14.1 FL    DF AUTOMATED      NEUTROPHILS 68 43 - 78 %    LYMPHOCYTES 18 13 - 44 %    MONOCYTES 10 4.0 - 12.0 %    EOSINOPHILS 2 0.5 - 7.8 %    BASOPHILS 1 0.0 - 2.0 %    IMMATURE GRANULOCYTES 1 0.0 - 5.0 %    ABS. NEUTROPHILS 5.4 1.7 - 8.2 K/UL    ABS. LYMPHOCYTES 1.4 0.5 - 4.6 K/UL    ABS. MONOCYTES 0.8 0.1 - 1.3 K/UL    ABS. EOSINOPHILS 0.1 0.0 - 0.8 K/UL    ABS. BASOPHILS 0.1 0.0 - 0.2 K/UL    ABS. IMM.  GRANS. 0.1 0.0 - 0.5 K/UL   PROTHROMBIN TIME + INR    Collection Time: 05/14/18  6:30 AM   Result Value Ref Range    Prothrombin time 13.4 11.5 - 14.5 sec    INR 1.0     METABOLIC PANEL, COMPREHENSIVE    Collection Time: 05/14/18  6:30 AM   Result Value Ref Range    Sodium 134 (L) 136 - 145 mmol/L    Potassium 3.3 (L) 3.5 - 5.1 mmol/L    Chloride 98 98 - 107 mmol/L    CO2 26 21 - 32 mmol/L    Anion gap 10 7 - 16 mmol/L    Glucose 104 (H) 65 - 100 mg/dL    BUN 5 (L) 8 - 23 MG/DL    Creatinine 0.64 0.6 - 1.0 MG/DL    GFR est AA >60 >60 ml/min/1.73m2    GFR est non-AA >60 >60 ml/min/1.73m2    Calcium 8.9 8.3 - 10.4 MG/DL    Bilirubin, total 0.8 0.2 - 1.1 MG/DL    ALT (SGPT) 23 12 - 65 U/L    AST (SGOT) 21 15 - 37 U/L    Alk. phosphatase 97 50 - 136 U/L    Protein, total 7.5 6.3 - 8.2 g/dL    Albumin 3.6 3.2 - 4.6 g/dL    Globulin 3.9 (H) 2.3 - 3.5 g/dL    A-G Ratio 0.9 (L) 1.2 - 3.5     TROPONIN I    Collection Time: 18  6:30 AM   Result Value Ref Range    Troponin-I, Qt. <0.04 0.02 - 0.05 NG/ML   TSH 3RD GENERATION    Collection Time: 18  6:30 AM   Result Value Ref Range    TSH 1.197 0.358 - 3.740 uIU/mL          NIHSS Stroke Scale      Interval: Pt last seen normal last evening at 7:00 pm; contacted her son at around 5:00 pm c/o HA/confusion. Time: 6:00 AM    Person Administering Scale: Moy Weldon MD  Administer stroke scale items in the order listed. Record performance in each category after each subscale exam. Do not go back and change scores. Follow directions provided for each exam technique. Scores should reflect what the patient does, not what the clinician thinks the patient can do. The clinician should record answers while administering the exam and work quickly. Except where indicated, the patient should not be coached (i.e., repeated requests to patient to make a special effort). 1a  Level of consciousness: 0=alert; keenly responsive   1b. LOC questions:  0=Answers both questions correctly   1c. LOC commands: 0=Performs both tasks correctly   2. Best Gaze: 0=normal   3. Visual: 0=No visual loss   4. Facial Palsy: 0=Normal symmetric movement   5a. Motor left arm: 0=No drift, arm holds 90 (or 45) degrees for full 10 seconds   5b. Motor right arm: 0=No drift, arm holds 90 (or 45) degrees for full 10 seconds   6a. Motor left le=No drift; leg holds 30-degree position for full 5 seconds. 6b  Motor right le=No drift; leg holds 30-degree position for full 5 seconds.    7. Limb Ataxia: 0=Absent   8. Sensory: 0=Normal; no sensory loss   9. Best Language:  0=No aphasia, normal   10. Dysarthria: 0=Normal   11.  Extinction and Inattention: 0=No abnormality    Total:   0= No stroke

## 2018-05-14 NOTE — PROGRESS NOTES
Speech Pathology:     ST attempted to see patient however patient was getting ready for MRI. ST will re-attempt if schedule permits. LY Dang. Grisel Chavira

## 2018-05-14 NOTE — PROGRESS NOTES
Unable to get MRA neck done due to patient motion. Nurse and doctor aware exam not completed. Only able to get MRA head but exam has motion.

## 2018-05-14 NOTE — IP AVS SNAPSHOT
[address] 
[phone] Patient: Mai Richmond MRN: [id number] TRT:6/[birth year]10 A check víctor indicates which time of day the medication should be taken. My Medications START taking these medications Instructions Each Dose to Equal  
 Morning Noon Evening Bedtime  
 atorvastatin 10 mg tablet Commonly known as:  LIPITOR Your next dose is:  tonight Take 1 Tab by mouth daily for 15 days. 10 mg  
    
   
   
   
  
 clopidogrel 75 mg Tab Commonly known as:  PLAVIX Your next dose is:  tomorrow Take 1 Tab by mouth daily. 75 mg CHANGE how you take these medications Instructions Each Dose to Equal  
 Morning Noon Evening Bedtime  
 dilTIAZem  mg XR capsule Commonly known as:  DILACOR XR What changed:  Another medication with the same name was removed. Continue taking this medication, and follow the directions you see here. Your next dose is:  tomorrow Take 120 mg by mouth daily. Indications: VENTRICULAR RATE CONTROL IN ATRIAL FIBRILLATION  
 120 mg CONTINUE taking these medications Instructions Each Dose to Equal  
 Morning Noon Evening Bedtime NORVASC 5 mg tablet Generic drug:  amLODIPine Your next dose is:  Tomorrow am.  
   
 Take 5 mg by mouth daily. 5 mg SYNTHROID 100 mcg tablet Generic drug:  levothyroxine Your next dose is:  Tomorrow am  
   
 Take 100 mcg by mouth Daily (before breakfast). 100 mcg VITAMIN D3 2,000 unit Tab Generic drug:  cholecalciferol (vitamin D3) Your next dose is:  Tomorrow am.  
   
 Take  by mouth daily. STOP taking these medications   
 aspirin 81 mg chewable tablet Where to Get Your Medications Information on where to get these meds will be given to you by the nurse or doctor. ! Ask your nurse or doctor about these medications  
  atorvastatin 10 mg tablet  
 clopidogrel 75 mg Tab

## 2018-05-14 NOTE — PROGRESS NOTES
Patient given a Patient Stroke Education book. Patient and sons educated on signs and symptoms of stroke and importance of getting to nearest ED as soon as symptoms occur. Patient and sons educated on risk factors of stroke.

## 2018-05-14 NOTE — H&P
HOSPITALIST H&P/CONSULT  NAME:  Brianne Lara   Age:  [de-identified] y.o.  :   1940   MRN:   [id number]  PCP: Samantha Wagner MD  Consulting MD:  Treatment Team: Attending Provider: [address] [place] [address] MD Amadou; Primary Nurse: Leonie Ortiz, RN; Primary Nurse: Abbey Duane, RN  HPI:   Patient is an [de-identified] with PMH significant for afib (not on anticoagulation as far as record and son is aware), hypertension, hypothyroidism, and multiple TIAs who presents to the ER this morning with confusion. History is obtained from the patient's son, as the patient tells me \"don't bother me\" and \"talk to [facility]" given her acute condition. Patient's son reports that this morning around 5:30am, the patient called him and said \"I am sick. I don't feel well. \"  He came to her house and noted that she was not herself, she was very confused. He states that she has had multiple \"mini-strokes\" in the past, but she has never this confused. Reports that at baseline, she has very good mentation. The patient continued to tell me to [facility] me alone\" and did not wish for me to talk to her or examine her. The only complaint I could elicit directly from the patient is a severe headache, but when I ask if she would like anything to help with it, she told me and her son \"I'm fine. I don't want anything. \"    She continued to repeat \"I'm sick\" and \"it's not my fault. \"  She would not cooperate with full neurologic exam either. When asked if she hurt anywhere else besides her head, she was able to tell me \"no. \"    Unable to complete full ROS given patient's acute confusion.   Past Medical History:   Diagnosis Date    Acute encephalopathy 2016    Anxiety     Arthritis     Atrial fibrillation ([facility] [address] 2018    Chronic pain     hip    Endocrine disease     hypothyroidism    Hypertension     Sepsis ([facility] [address] 2016    Thyroid disease     TIA (transient ischemic attack) 2017      Past Surgical History: Procedure Laterality Date    HX ORTHOPAEDIC      right total hip    TOTAL KNEE ARTHROPLASTY Left 7/23/15      Prior to Admission Medications   Prescriptions Last Dose Informant Patient Reported? Taking? amLODIPine (NORVASC) 5 mg tablet   Yes Yes   Sig: Take 5 mg by mouth daily. aspirin 81 mg chewable tablet   No No   Sig: Take 2 Tabs by mouth daily. Indications: prevention of cerebrovascular accident   Patient taking differently: Take 162 mg by mouth daily. As needed  Indications: prevention of cerebrovascular accident   cholecalciferol, vitamin D3, (VITAMIN D3) 2,000 unit tab   Yes No   Sig: Take  by mouth daily. dilTIAZem CD (CARDIZEM CD) 180 mg ER capsule   Yes No   Sig: Take 180 mg by mouth daily. levothyroxine (SYNTHROID) 100 mcg tablet   Yes No   Sig: Take 100 mcg by mouth Daily (before breakfast).       Facility-Administered Medications: None     Allergies   Allergen Reactions    Ciprofloxacin Nausea and Vomiting    Percocet [Oxycodone-Acetaminophen] Other (comments)     hallucinations    Other Medication Other (comments)     I don't do well with any strong medications (poss narcotics, pt difficult to get an elaboration from)      Social History   Substance Use Topics    Smoking status: Never Smoker    Smokeless tobacco: Never Used    Alcohol use No      Family History   Problem Relation Age of Onset    Heart Attack Father       Objective:     Visit Vitals    /74    Pulse 71    Temp 97 °F (36.1 °C)    Resp 20    Ht 5' 4\" (1.626 m)    Wt 77.1 kg (170 lb)    SpO2 98%    BMI 29.18 kg/m2      Temp (24hrs), Av °F (36.1 °C), Min:97 °F (36.1 °C), Max:97 °F (36.1 °C)    Oxygen Therapy  O2 Sat (%): 98 % (18 0744)  Pulse via Oximetry: 71 beats per minute (18 0744)  O2 Device: Room air (18 3339)  Physical Exam:  General:    Alert, appears stated age, angry and difficult to interact with at times  Head:   Normocephalic, without obvious abnormality, atraumatic. Nose:  Nares normal. No drainage or sinus tenderness. Lungs:   Clear to auscultation bilaterally. No Wheezing or Rhonchi. No rales. Heart:   Regular rate and rhythm,  no murmur, rub or gallop. Abdomen:   Soft, non-tender. Not distended. Bowel sounds normal.   Extremities: No cyanosis. No edema. No clubbing  Skin:     Texture, turgor normal. No rashes or lesions. Not Jaundiced  Neurologic: Alert, oriented to person and that her son is in the room, no slurred speech, no obvious facial droop, spontaneously moves all 4 extremities, but does not cooperate with my exam (tells me to [facility] alone\" repeatedly)     Data Review:   Recent Results (from the past 24 hour(s))   CBC WITH AUTOMATED DIFF    Collection Time: 05/14/18  6:30 AM   Result Value Ref Range    WBC 7.8 4.3 - 11.1 K/uL    RBC 4.88 4.05 - 5.25 M/uL    HGB 14.4 11.7 - 15.4 g/dL    HCT 41.8 35.8 - 46.3 %    MCV 85.7 79.6 - 97.8 FL    MCH 29.5 26.1 - 32.9 PG    MCHC 34.4 31.4 - 35.0 g/dL    RDW 13.3 11.9 - 14.6 %    PLATELET 255 344 - 424 K/uL    MPV 9.6 (L) 10.8 - 14.1 FL    DF AUTOMATED      NEUTROPHILS 68 43 - 78 %    LYMPHOCYTES 18 13 - 44 %    MONOCYTES 10 4.0 - 12.0 %    EOSINOPHILS 2 0.5 - 7.8 %    BASOPHILS 1 0.0 - 2.0 %    IMMATURE GRANULOCYTES 1 0.0 - 5.0 %    ABS. NEUTROPHILS 5.4 1.7 - 8.2 K/UL    ABS. LYMPHOCYTES 1.4 0.5 - 4.6 K/UL    ABS. MONOCYTES 0.8 0.1 - 1.3 K/UL    ABS. EOSINOPHILS 0.1 0.0 - 0.8 K/UL    ABS. BASOPHILS 0.1 0.0 - 0.2 K/UL    ABS. IMM.  GRANS. 0.1 0.0 - 0.5 K/UL   PROTHROMBIN TIME + INR    Collection Time: 05/14/18  6:30 AM   Result Value Ref Range    Prothrombin time 13.4 11.5 - 14.5 sec    INR 1.0     METABOLIC PANEL, COMPREHENSIVE    Collection Time: 05/14/18  6:30 AM   Result Value Ref Range    Sodium 134 (L) 136 - 145 mmol/L    Potassium 3.3 (L) 3.5 - 5.1 mmol/L    Chloride 98 98 - 107 mmol/L    CO2 26 21 - 32 mmol/L    Anion gap 10 7 - 16 mmol/L    Glucose 104 (H) 65 - 100 mg/dL    BUN 5 (L) 8 - 23 MG/DL Creatinine 0.64 0.6 - 1.0 MG/DL    GFR est AA >60 >60 ml/min/1.73m2    GFR est non-AA >60 >60 ml/min/1.73m2    Calcium 8.9 8.3 - 10.4 MG/DL    Bilirubin, total 0.8 0.2 - 1.1 MG/DL    ALT (SGPT) 23 12 - 65 U/L    AST (SGOT) 21 15 - 37 U/L    Alk. phosphatase 97 50 - 136 U/L    Protein, total 7.5 6.3 - 8.2 g/dL    Albumin 3.6 3.2 - 4.6 g/dL    Globulin 3.9 (H) 2.3 - 3.5 g/dL    A-G Ratio 0.9 (L) 1.2 - 3.5     TROPONIN I    Collection Time: 05/14/18  6:30 AM   Result Value Ref Range    Troponin-I, Qt. <0.04 0.02 - 0.05 NG/ML   TSH 3RD GENERATION    Collection Time: 05/14/18  6:30 AM   Result Value Ref Range    TSH 1.197 0.358 - 3.740 uIU/mL   EKG, 12 LEAD, INITIAL    Collection Time: 05/14/18  6:35 AM   Result Value Ref Range    Ventricular Rate 55 BPM    Atrial Rate 120 BPM    QRS Duration 82 ms    Q-T Interval 434 ms    QTC Calculation (Bezet) 415 ms    Calculated R Axis 62 degrees    Calculated T Axis 90 degrees    Diagnosis       !! AGE AND GENDER SPECIFIC ECG ANALYSIS !! Sinus bradycardia  Abnormal ECG  When compared with ECG of 13-NOV-2017 22:28,  Junctional rhythm has replaced Sinus rhythm  Confirmed by José Miguel Chan MD (), Lux Butcher ([id number]) on 5/14/2018 7:50:56 AM       Imaging Glenna Purl /Studies     Assessment and Plan:      Active Hospital Problems    Diagnosis Date Noted    Confusion 05/14/2018    Hypertensive emergency 05/14/2018    Atrial fibrillation ([address] 04/16/2018    HTN (hypertension) 06/27/2016       PLAN  Hypertensive Emergency  - Headache and severely elevated BP of 247/120 on presentation, along with mental status change  - CT head did not show intracranial bleed or acute CVA  - Will check MRI brain  - Will attempt to bring BP down slowly until MRI results, as it is unclear if this is an early CVA or if it is 2/2 severely elevated BP  - Will monitor in ICU  - Continue home BP meds with lopressor IV prn systolic BP >396    Confusion  - Most likely related to severely elevated BP, see plan above  - Will order UA/urine cx for further evaluation as patient will not participate in ROS, but no leukocytosis, and labs are not grossly abnormal.  Na is slightly low at 134. - Q4h neurochecks  - Hopefully, patient's mentation will improve with BP improvement    Atrial Fibrillation  - Recently diagnosed  - Not currently in RVR  - Not on anticoagulation, per PCP and Cardiology notes, the patient declined, but Cardiology recommends anti-coagulation with a CHADS score documented at 4. Pt was recommended to re-consider at her Cardiology visit  - Echo was ordered as an outpatient, but it is unclear if it has been completed. If not complete, will order to be performed here while hospitalized  - Carotids were normal 6 months ago (11/2017), so will not recheck this  - Continue Cardizem  - Monitor of cardiac telemetry    Hypothyroidism  - TSH obtained in ER and is normal  - Continue home dose of Synthroid    Code Status: full    Anticipated discharge: 1-2 days, depending on work-up for acute mental status change and BP control. Will need close ICU monitoring on admission.     Signed By: Rl Castano MD     May 14, 2018

## 2018-05-15 ENCOUNTER — HOME HEALTH ADMISSION (OUTPATIENT)
Dept: HOME HEALTH SERVICES | Facility: HOME HEALTH | Age: 78
End: 2018-05-15

## 2018-05-15 ENCOUNTER — APPOINTMENT (OUTPATIENT)
Dept: GENERAL RADIOLOGY | Age: 78
DRG: 064 | End: 2018-05-15
Attending: INTERNAL MEDICINE
Payer: MEDICARE

## 2018-05-15 PROBLEM — I63.9 CVA (CEREBRAL VASCULAR ACCIDENT) (HCC): Status: ACTIVE | Noted: 2018-05-15

## 2018-05-15 LAB
CHOLEST SERPL-MCNC: 151 MG/DL
EST. AVERAGE GLUCOSE BLD GHB EST-MCNC: 117 MG/DL
HBA1C MFR BLD: 5.7 % (ref 4.8–6)
HDLC SERPL-MCNC: 57 MG/DL (ref 40–60)
HDLC SERPL: 2.6 {RATIO}
LDLC SERPL CALC-MCNC: 83 MG/DL
LIPID PROFILE,FLP: NORMAL
TRIGL SERPL-MCNC: 55 MG/DL (ref 35–150)
VLDLC SERPL CALC-MCNC: 11 MG/DL (ref 6–23)

## 2018-05-15 PROCEDURE — G8987 SELF CARE CURRENT STATUS: HCPCS

## 2018-05-15 PROCEDURE — 96125 COGNITIVE TEST BY HC PRO: CPT

## 2018-05-15 PROCEDURE — 97161 PT EVAL LOW COMPLEX 20 MIN: CPT

## 2018-05-15 PROCEDURE — G9169 MEMORY GOAL STATUS: HCPCS

## 2018-05-15 PROCEDURE — 71045 X-RAY EXAM CHEST 1 VIEW: CPT

## 2018-05-15 PROCEDURE — 93306 TTE W/DOPPLER COMPLETE: CPT

## 2018-05-15 PROCEDURE — 99218 HC RM OBSERVATION: CPT

## 2018-05-15 PROCEDURE — 80061 LIPID PANEL: CPT | Performed by: FAMILY MEDICINE

## 2018-05-15 PROCEDURE — 83036 HEMOGLOBIN GLYCOSYLATED A1C: CPT | Performed by: FAMILY MEDICINE

## 2018-05-15 PROCEDURE — G8978 MOBILITY CURRENT STATUS: HCPCS

## 2018-05-15 PROCEDURE — G9168 MEMORY CURRENT STATUS: HCPCS

## 2018-05-15 PROCEDURE — 97165 OT EVAL LOW COMPLEX 30 MIN: CPT

## 2018-05-15 PROCEDURE — 36415 COLL VENOUS BLD VENIPUNCTURE: CPT | Performed by: FAMILY MEDICINE

## 2018-05-15 PROCEDURE — G8988 SELF CARE GOAL STATUS: HCPCS

## 2018-05-15 PROCEDURE — 65270000029 HC RM PRIVATE

## 2018-05-15 PROCEDURE — G8979 MOBILITY GOAL STATUS: HCPCS

## 2018-05-15 PROCEDURE — 74011250637 HC RX REV CODE- 250/637: Performed by: FAMILY MEDICINE

## 2018-05-15 RX ADMIN — Medication 10 ML: at 14:46

## 2018-05-15 RX ADMIN — ACETAMINOPHEN 500 MG: 500 TABLET, FILM COATED ORAL at 02:44

## 2018-05-15 RX ADMIN — Medication 5 ML: at 22:00

## 2018-05-15 RX ADMIN — ASPIRIN 81 MG 162 MG: 81 TABLET ORAL at 09:37

## 2018-05-15 RX ADMIN — Medication 5 ML: at 06:00

## 2018-05-15 RX ADMIN — LEVOTHYROXINE SODIUM 100 MCG: 100 TABLET ORAL at 09:36

## 2018-05-15 RX ADMIN — VITAMIN D, TAB 1000IU (100/BT) 2000 UNITS: 25 TAB at 09:37

## 2018-05-15 RX ADMIN — DILTIAZEM HYDROCHLORIDE 120 MG: 120 CAPSULE, COATED, EXTENDED RELEASE ORAL at 09:37

## 2018-05-15 NOTE — PROGRESS NOTES
Bedside shift change report given to Luis Enrique Benitez RN (oncoming nurse) by LENNOX Corona (offgoing nurse).  Report included the following information SBAR, Kardex, Intake/Output, MAR, Accordion, Recent Results, Med Rec Status and Cardiac Rhythm SB.

## 2018-05-15 NOTE — PROGRESS NOTES
.Medicare Outpatient Observation Notice provided to the patient. Oral explanation was provided and all questions answered. Signed document placed in the medical record under media tab.  Copy to patient    DCR

## 2018-05-15 NOTE — PROGRESS NOTES
Problem: Self Care Deficits Care Plan (Adult)  Goal: *Acute Goals and Plan of Care (Insert Text)  1. Patient will perform grooming with supervision. 2. Patient will perform Upper body dressing with supervision  3. Patient will perform lower body dressing with supervision  4. Patient will perform upper and lower body bathing with supervision. 5. Patient will perform toilet transfers with CGA. 6. Patient will perform shower transfer with CGA. 7. Patient will participate in 30 + minutes of ADL/ therapeutic exercise/therapeutic activity with min rest breaks to increase activity tolerance for self care. 8. Patient will perform ADL functional mobility in room with CGA. Goals to be achieved in 7 days. OCCUPATIONAL THERAPY: Initial Assessment 5/15/2018  OBSERVATION: Hospital Day: 2  [place] / Product Type: Medicare /      NAME/AGE/GENDER: Chyna Huggins is a [de-identified] y.o. female   PRIMARY DIAGNOSIS:  Hypertensive emergency  Confusion Hypertensive emergency Hypertensive emergency        ICD-10: Treatment Diagnosis:    · Generalized Muscle Weakness (M62.81)  · mild confusion    Precautions/Allergies:     Ciprofloxacin; Percocet [oxycodone-acetaminophen]; and Other medication      ASSESSMENT:     Ms. Tae Tapia presents with above diagnosis and was seen in ICU room with daughter in law present. Pt was noted to have generalized weakness and decreased balance. Pt was also noted to have some confusion about the time and noted in general small talk conversation. Not sure that the mild confusion is related to the current event or is baseline, suspect it is baseline. OT will follow to maximize safety and independence with self care and functional mobility as pt lives alone. This section established at most recent assessment   PROBLEM LIST (Impairments causing functional limitations):  1. Decreased Strength  2. Decreased Balance  3. Decreased Activity Tolerance  4.  Decreased Cognition   INTERVENTIONS PLANNED: (Benefits and precautions of occupational therapy have been discussed with the patient.)  1. Activities of daily living training  2. Balance training  3. Cognitive training  4. Therapeutic activity  5. Therapeutic exercise     TREATMENT PLAN: Frequency/Duration: Follow patient 3 times per week to address above goals. Rehabilitation Potential For Stated Goals: Good     RECOMMENDED REHABILITATION/EQUIPMENT: (at time of discharge pending progress): Due to the probability of continued deficits (see above) this patient will not likely need continued skilled occupational therapy after discharge. Equipment:    None at this time              OCCUPATIONAL PROFILE AND HISTORY:   History of Present Injury/Illness (Reason for Referral):  Weakness, confusion   Past Medical History/Comorbidities:   Ms. Leda Ceja  has a past medical history of Acute encephalopathy (6/27/2016); Anxiety; Arthritis; Atrial fibrillation ([address] (4/16/2018); Chronic pain; Endocrine disease; Hypertension; Sepsis ([address] (6/27/2016); Thyroid disease; and TIA (transient ischemic attack) (11/14/2017). She also has no past medical history of Aneurysm ([address] Asthma; Autoimmune disease ([address] CAD (coronary artery disease); Cancer ([address] Chronic kidney disease; Coagulation disorder ([address] COPD; DEMENTIA; Diabetes ([address] Gastrointestinal disorder; GERD (gastroesophageal reflux disease); Heart failure ([address] Ill-defined condition; Liver disease; Morbid obesity ([address] Other ill-defined conditions(799.89); Psychiatric disorder; PUD (peptic ulcer disease); Rheumatic fever; Seizures ([address] Sleep apnea; Sleep disorder; or Thromboembolus ([address] Ms. Leda Ceja  has a past surgical history that includes hx orthopaedic and pr total knee arthroplasty (Left, 7/23/15).   Social History/Living Environment:   Home Environment: Private residence  One/Two Story Residence: Split level  Living Alone: Yes  Support Systems: Family member(s), Child(vivi)  Patient Expects to be Discharged to[de-identified] Private residence  Current DME Used/Available at Home: jose Sharma  Prior Level of Function/Work/Activity:  Independent per pt and daughter in law     Number of Personal Factors/Comorbidities that affect the Plan of Care: Brief history (0):  LOW COMPLEXITY   ASSESSMENT OF OCCUPATIONAL PERFORMANCE[de-identified]   Activities of Daily Living:           Basic ADLs (From Assessment) Complex ADLs (From Assessment)   Feeding: Independent  Oral Facial Hygiene/Grooming: Supervision  Bathing: Minimum assistance  Upper Body Dressing: Stand-by assistance  Lower Body Dressing: Minimum assistance  Toileting: Minimum assistance     Grooming/Bathing/Dressing Activities of Daily Living     Cognitive Retraining  Safety/Judgement: Awareness of environment                 Functional Transfers  Toilet Transfer : Minimum assistance  Shower Transfer: Minimum assistance     Bed/Mat Mobility  Supine to Sit: Contact guard assistance  Sit to Supine: Contact guard assistance  Sit to Stand: Minimum assistance;Contact guard assistance  Bed to Chair: Minimum assistance;Contact guard assistance       Most Recent Physical Functioning:   Gross Assessment:                  Posture:  Posture (WDL): Exceptions to WDL  Posture Assessment: Rounded shoulders  Balance:  Sitting: Intact  Standing: With support Bed Mobility:  Supine to Sit: Contact guard assistance  Sit to Supine: Contact guard assistance  Wheelchair Mobility:     Transfers:  Sit to Stand: Minimum assistance;Contact guard assistance  Stand to Sit: Minimum assistance;Contact guard assistance  Bed to Chair: Minimum assistance;Contact guard assistance                Patient Vitals for the past 6 hrs:   BP SpO2 Pulse   05/15/18 0601 135/60 93 % (!) 54   05/15/18 0631 123/61 93 % (!) 52   05/15/18 0701 124/57 95 % (!) 53   05/15/18 0731 140/65 96 % (!) 53   05/15/18 0800 - 96 % (!) 55   05/15/18 0801 142/71 96 % -   05/15/18 0831 169/76 97 % (!) 59 05/15/18 0900 - 96 % 70   05/15/18 0901 136/62 97 % -   05/15/18 0931 124/53 96 % 66   05/15/18 1001 119/60 96 % 72       Mental Status  Neurologic State: Alert  Orientation Level: Oriented to situation, Oriented to person, Oriented to place, Disoriented to time  Cognition: Follows commands  Perception: Appears intact  Perseveration: No perseveration noted  Safety/Judgement: Awareness of environment                          Physical Skills Involved:  1. Balance  2. Strength  3. Activity Tolerance Cognitive Skills Affected (resulting in the inability to perform in a timely and safe manner):  1. Executive Function Psychosocial Skills Affected:  1. Environmental Adaptation   Number of elements that affect the Plan of Care: 5+:  HIGH COMPLEXITY   CLINICAL DECISION MAKIN[address] [place] [address] AM-PAC 6 Clicks   Daily Activity Inpatient Short Form  How much help from another person does the patient currently need. .. Total A Lot A Little None   1. Putting on and taking off regular lower body clothing? [] 1   [] 2   [x] 3   [] 4   2. Bathing (including washing, rinsing, drying)? [] 1   [] 2   [x] 3   [] 4   3. Toileting, which includes using toilet, bedpan or urinal?   [] 1   [] 2   [x] 3   [] 4   4. Putting on and taking off regular upper body clothing? [] 1   [] 2   [x] 3   [] 4   5. Taking care of personal grooming such as brushing teeth? [] 1   [] 2   [] 3   [x] 4   6. Eating meals? [] 1   [] 2   [] 3   [x] 4   © , Trustees of [address] [place] [id number], under license to [employer]. All rights reserved      Score:  Initial: 20 Most Recent: X (Date: -- )    Interpretation of Tool:  Represents activities that are increasingly more difficult (i.e. Bed mobility, Transfers, Gait). Score 24 23 22-20 19-15 14-10 9-7 6     Modifier CH CI CJ CK CL CM CN      ?  Self Care:     - CURRENT STATUS: CJ - 20%-39% impaired, limited or restricted    - GOAL STATUS: CI - 1%-19% impaired, limited or restricted    - D/C STATUS:  ---------------To be determined---------------  [place] / Product Type: Medicare /      Medical Necessity:     · Patient is expected to demonstrate progress in strength, balance and functional technique to increase independence with self care. Reason for Services/Other Comments:  · Patient would benefit from OT to  follow to maximize safety and independence ewith self care and functional mobility as pt lives alone. .   Use of outcome tool(s) and clinical judgement create a POC that gives a: LOW COMPLEXITY         TREATMENT:   (In addition to Assessment/Re-Assessment sessions the following treatments were rendered)     Pre-treatment Symptoms/Complaints:  Pt up in room no complaint of pain  Pain: Initial:   Pain Intensity 1: 0 0 Post Session:  0     Assessment/Reassessment only, no treatment provided today    Braces/Orthotics/Lines/Etc:   · ICU monitors  · O2 Device: Room air  Treatment/Session Assessment:    · Response to Treatment:  Pt up in room tolerated well  · Interdisciplinary Collaboration:   o Physical Therapist  o Occupational Therapist  o Registered Nurse  · After treatment position/precautions:   o Supine in bed  o Bed/Chair-wheels locked  o Bed in low position  o Call light within reach  o RN notified  o Family at bedside   · Compliance with Program/Exercises: compliant all of the time. · Recommendations/Intent for next treatment session: \"Next visit will focus on advancements to more challenging activities and reduction in assistance provided\".   Total Treatment Duration:  OT Patient Time In/Time Out  Time In: 1035  Time Out: [address] OT

## 2018-05-15 NOTE — PROGRESS NOTES
Problem: Neurolinguistics Impaired (Adult)  Goal: *Acute Goals and Plan of Care (Insert Text)  ST. Patient will demonstrate 90% accuracy on verbal problem solving tasks at the intermediate level given minimal cues. 2. Patient will demonstrate 90% accuracy for verbal recall tasks at the intermediate level given minimal cues. 3. Patient will demonstrate 90% accuracy for auditory comprehension tasks at the intermediate level given minimal cues within to improve safety and independence with ADL's.   4. Patient will demonstrate 75% accuracy for math word problems at the simple level given minimal cues. 5. Patient will demonstrate 90% accuracy for short term memory tasks at the intermediate level given minimal cues. LTG: Patient will reach highest cognitive function for maximum independence at discharge. Speech language pathology: Speech-language and cognitive note: Initial Assessment   OBSERVATION PATIENT    NAME/AGE/GENDER: Angie Duncan is a [de-identified] y.o. female  DATE: 5/15/2018  PRIMARY DIAGNOSIS: Hypertensive emergency  Confusion       ICD-10: Treatment Diagnosis: Cognitive communication deficit (R41.841)  INTERDISCIPLINARY COLLABORATION: Registered NurseASSESSMENT:Based on the objective data described below, Ms. Dianne Thibodeaux presents with mild cognitive deficits in the areas of complex instructions, executive function, attention, and short-term memory. Patient reports that she lives alone and handles her own finances, calendar and medications. She does have supportive family living close by. Patient scored 48/50 on Expressive Index of the [address] Test and 37/40 on the Receptive Index. Patient also given portions of the [facility] [place] Cognitive Assessment with scores of 0/6 for attention and 2/5 for delayed recall. Recommend continued speech therapy for cognition to maximize safety and independence at discharge.  Patient will benefit from skilled intervention to address the below impairments. ?????? ? ? This section established at most recent assessment??????????  PROBLEM LIST (Impairments causing functional limitations):  1. Cognitive communication deficits  REHABILITATION POTENTIAL FOR STATED GOALS: Good  PLAN OF CARE:   Patient will benefit from skilled intervention to address the following impairments. INTERVENTIONS PLANNED: (Benefits and precautions of therapy have been discussed with the patient.)  1. Cognitive therapy  FREQUENCY/DURATION: Continue to follow patient 3 times a week for duration of hospital stay to address above goals. RECOMMENDED REHABILITATION/EQUIPMENT: (at time of discharge pending progress): Due to the probability of continued deficits (see above) this patient will likely need continued skilled speech therapy after discharge. SUBJECTIVE:   \"I am talking better. My memory is still not 100%. \"  History of Present Injury/Illness: Ms. Nito Martin  has a past medical history of Acute encephalopathy (6/27/2016); Anxiety; Arthritis; Atrial fibrillation ([address] (4/16/2018); Chronic pain; Endocrine disease; Hypertension; Sepsis ([address] (6/27/2016); Thyroid disease; and TIA (transient ischemic attack) (11/14/2017). She also has no past medical history of Aneurysm ([address] Asthma; Autoimmune disease ([address] CAD (coronary artery disease); Cancer ([address] Chronic kidney disease; Coagulation disorder ([address] COPD; DEMENTIA; Diabetes ([address] Gastrointestinal disorder; GERD (gastroesophageal reflux disease); Heart failure ([address] Ill-defined condition; Liver disease; Morbid obesity ([address] Other ill-defined conditions(799.89); Psychiatric disorder; PUD (peptic ulcer disease); Rheumatic fever; Seizures ([address] Sleep apnea; Sleep disorder; or Thromboembolus ([address] She also  has a past surgical history that includes hx orthopaedic and pr total knee arthroplasty (Left, 7/23/15).    Present Symptoms: Cognitive deficits   Pain Intensity 1: 0  Pain Location 1: Head  Pain Orientation 1: Left  Pain Intervention(s) 1: Rest, Repositioned  Current Medications:   No current facility-administered medications on file prior to encounter. Current Outpatient Prescriptions on File Prior to Encounter   Medication Sig Dispense Refill    cholecalciferol, vitamin D3, (VITAMIN D3) 2,000 unit tab Take  by mouth daily.  levothyroxine (SYNTHROID) 100 mcg tablet Take 100 mcg by mouth Daily (before breakfast).  aspirin 81 mg chewable tablet Take 2 Tabs by mouth daily. Indications: prevention of cerebrovascular accident (Patient taking differently: Take 162 mg by mouth daily.  As needed  Indications: prevention of cerebrovascular accident) 60 Tab 0     Current Dietary Status:  Regular textures, thin liquids    Social History/Home Situation:    Home Environment: Private residence  One/Two Story Residence: Split level  Living Alone: Yes  Support Systems: Family member(s), Child(vivi)  Patient Expects to be Discharged to[de-identified] Private residence  Current DME Used/Available at Home: None  Work/Activity History: Retired   OBJECTIVE:   Oral Motor Structure/Speech:  Oral-Motor Structure/Motor Speech  Labial: No impairment  Dentition: Natural  Oral Hygiene: good  Lingual: No impairment  Velum: No impairment  Mandible: No impairment  Apraxic Characteristics: None  Dysarthric Characteristics: None  Intelligibility: No impairment    SPEECH-LANGUAGE COGNITIVE EVALUATION  Tests Given:[place] Aphasia Screening Text and portions of [place] Cognitive Assessment    Mental Status:  Neurologic State: Alert  Orientation Level: Oriented to person;Oriented to place;Oriented to situation;Disoriented to time  Cognition: Follows commands  Perception: Appears intact  Perseveration: Perseverates during conversation  Safety/Judgement: Awareness of environment    Motor Speech:  Oral-Motor Structure/Motor Speech  Labial: No impairment  Dentition: Natural  Lingual: No impairment  Velum: No impairment  Mandible: No impairment  Apraxic Characteristics: None  Dysarthric Characteristics: None  Intelligibility: No impairment    Auditory Comprehension:   Auditory Comprehension  Auditory Impairment: Yes  Response to Basic Yes/No Questions (%): 100 %  Response to Moderately Complex Yes/No Questions (%): 50 %  One-Step Basic Commands (%): 100 %  Two-Step Basic Commands (%): 75 %  Object Identification: Field of 4 (%)  Body Part Identification (%): 077 %  Right/Left Discrimination (%): 100 %  Interfering Components: Environmental distractions    Reading Comprehension:   Reading Comprehension  Visual Impairment: Unable to assess (comment) (Glasses not available)  Pre-Morbid Reading Status: Literate  Interfering Components: Attention to detail    Verbal Expression:   Verbal Expression  Primary Mode of Expression: Verbal  Initiation: No impairment  Automatic Speech Task: Impaired (comment) (Accurate once she understands instructions)  Automatic speech task cueing type: Verbal  Automatic speech task cueing amount: Minimal  Repetition: Impaired  Word Repetition (%): 100 %  Phrase Repetition (%): 75 %  Sentence Repetition (%): 95 %  Naming: No impairment  Sentence Completion: No impairment  Sentence Formulation: No impairment  Conversation: Fluent  Speech Characteristics: Jargon (minimal)  Interfering Components: Environmental distractions  Overall Impairment: Mild    Written Expression:   Written Expression  Pre-Morbid Dominant Hand: Right  Legibility : Uses dominant hand  Dictation : No impairment                         Pragmatics:  Pragmatics Impairment: No impairment       Assessment/Reassessment only, no treatment provided today    Tool Used: Functional Webb Measure (FIMTM)   Score Comments   Eating       Comprehension       Expression   Primary Mode of Expression: Verbal   Social Interaction       Problem Solving       Memory  5        Score:  Initial: 5 Most Recent: X (Date: -- )   Interpretation of Tool: Provides a uniform system of measurement for disability based on the International Classification of Impairment, Disabilities and Handicaps; measures the level of a patient's disability and indicates how much assistance is required for the individual to carry out activities of daily living. Score 7 6 5 4 3 2 1   Modifier CH CI CJ CK CL CM CN   ? Memory:     - CURRENT STATUS: CJ - 20%-39% impaired, limited or restricted    - GOAL STATUS:  CI - 1%-19% impaired, limited or restricted    - D/C STATUS:  ---------------To be determined---------------  [place] / Product Type: Medicare /   __________________________________________________________________________________________________  Safety:   After treatment position/precautions:  · Family at bedside  · Upright in Bed  Treatment Assessment:  Patient would benefit from cognitive therapy. Progression/Medical Necessity:   · Patient is expected to demonstrate progress in cognitive ability to increase independence with activities of daily living. Compliance with Program/Exercises: Will assess as treatment progresses. Reason for Continuation of Services/Other Comments:  · Patient continues to require skilled intervention due to cognitive deficits. Recommendations/Intent for next treatment session: \"Treatment next visit will focus on advancements to more challenging activities\".     Total Treatment Duration:  Time In: 0830  Time Out: [address] Bob Suarez MA, CCC-SLP

## 2018-05-15 NOTE — PROGRESS NOTES
[facility]  Face to Face Encounter    Patients Name: Pravin Mckeon    YOB: 1940    Ordering Physician:  NATIVIDAD Garcia    Primary Diagnosis: Hypertensive emergency  Confusion,  TIA/CVA    Date of Face to Face:   5/15/2018                                  Face to Face Encounter findings are related to primary reason for home care:   yes. 1. I certify that the patient needs intermittent care as follows: skilled nursing care:  skilled observation/assessment, patient education  physical therapy: strengthening and gait/stair training  occupational therapy:  ADL safety (ie. cooking, bathing, dressing)  speech therapy:  cognition training    2. I certify that this patient is homebound, that is: 1) patient requires the use of a cane device, special transportation, or assistance of another to leave the home; or 2) patient's condition makes leaving the home medically contraindicated; and 3) patient has a normal inability to leave the home and leaving the home requires considerable and taxing effort. Patient may leave the home for infrequent and short duration for medical reasons, and occasional absences for non-medical reasons. Homebound status is due to the following functional limitations: Patient with strength deficits limiting the performance of all ADL's without caregiver assistance or the use of an assistive device. 3. I certify that this patient is under my care and that I, or a nurse practitioner or  [id number], or clinical nurse specialist, or certified nurse midwife, working with me, had a Face-to-Face Encounter that meets the physician Face-to-Face Encounter requirements.   The following are the clinical findings from the [address] [place] [address] encounter that support the need for skilled services and is a summary of the encounter:   See Progress Notes     See attached progess note      MALIK Graff  5/15/2018      THE FOLLOWING TO BE COMPLETED BY THE COMMUNITY PHYSICIAN:    I concur with the findings described above from the F2F encounter that this patient is homebound and in need of a skilled service.     Certifying Physician: _____________________________________      Printed Certifying Physician Name: _____________________________________    Date: _________________

## 2018-05-15 NOTE — PROGRESS NOTES
Bedside shift report received from [facility]. Patient is calm, alert and oriented X4 with expressive aphasia periodically. Family at bedside.

## 2018-05-15 NOTE — PROGRESS NOTES
Referral per MD.   [facility] RN, PT, OT ST.   SW offered choices of [facility] and pt agreeable to [facility]. SW made referral to [facility].

## 2018-05-15 NOTE — PROGRESS NOTES
Problem: Mobility Impaired (Adult and Pediatric)  Goal: *Acute Goals and Plan of Care (Insert Text)  STG:  (1.)Ms. Dionisio Iqbal will move from supine to sit and sit to supine  with STAND BY ASSIST within 3 treatment day(s). (2.)Ms. Dionisio Iqbal will transfer from bed to chair and chair to bed with STAND BY ASSIST using the least restrictive device within 3 treatment day(s). (3.)Ms. Dionisio Iqbal will ambulate with STAND BY ASSIST for 200 feet with the least restrictive device within 3 treatment day(s). LTG:  (1.)Ms. Dionisio Iqbal will move from supine to sit and sit to supine  in bed with SUPERVISION within 7 treatment day(s). (2.)Ms. Dionsiio Iqbal will transfer from bed to chair and chair to bed with SUPERVISION using the least restrictive device within 7 treatment day(s). (3.)Ms. Dionisio Iqbal will ambulate with SUPERVISION for 500 feet with the least restrictive device within 7 treatment day(s). (4)Ms. Dionisio Iqbal will perform HEP with cues and assistance to increase safety on her feet in 7 days. (5)Ms. Dionisio Iqbal will go up and down 7 steps with rail and CGA in 7 days. ________________________________________________________________________________________________      PHYSICAL THERAPY: Initial Assessment, Treatment Day: Day of Assessment, AM 5/15/2018  OBSERVATION: Hospital Day: 2  [place] / [place] / Product Type: Medicare /      NAME/AGE/GENDER: Zoran Villagran is a [de-identified] y.o. female   PRIMARY DIAGNOSIS: Hypertensive emergency  Confusion Hypertensive emergency Hypertensive emergency        ICD-10: Treatment Diagnosis:    · Generalized Muscle Weakness (M62.81)  · Other abnormalities of gait and mobility (R26.89)   Precaution/Allergies:  Ciprofloxacin; Percocet [oxycodone-acetaminophen]; and Other medication      ASSESSMENT:     Ms. Dionisio Iqbal presents with general decrease in functional mobility and gait this am.  She is somewhat confused, knew she was in the hospital but had trouble knowing the year.  She followed commands. She was able to walk but is unsteady on her feet. She did better with a walker but does not normally use one. Will follow. Family plans to stay with her when she leaves the hospital.    This section established at most recent assessment   PROBLEM LIST (Impairments causing functional limitations):  1. Decreased Strength  2. Decreased ADL/Functional Activities  3. Decreased Transfer Abilities  4. Decreased Ambulation Ability/Technique  5. Decreased Balance  6. Decreased Activity Tolerance  7. Decreased Knowledge of Precautions  8. Decreased Person with Home Exercise Program  9. Decreased Cognition   INTERVENTIONS PLANNED: (Benefits and precautions of physical therapy have been discussed with the patient.)  1. Balance Exercise  2. Bed Mobility  3. Family Education  4. Gait Training  5. Home Exercise Program (HEP)  6. Range of Motion (ROM)  7. Therapeutic Activites  8. Therapeutic Exercise/Strengthening  9. Transfer Training     TREATMENT PLAN: Frequency/Duration: daily for duration of hospital stay  Rehabilitation Potential For Stated Goals: Good     RECOMMENDED REHABILITATION/EQUIPMENT: (at time of discharge pending progress): Due to the probability of continued deficits (see above) this patient will likely need continued skilled physical therapy after discharge. Equipment:    None at this time              HISTORY:   History of Present Injury/Illness (Reason for Referral):  Patient is an [de-identified] with PMH significant for afib (not on anticoagulation as far as record and son is aware), hypertension, hypothyroidism, and multiple TIAs who presents to the ER this morning with confusion. History is obtained from the patient's son, as the patient tells me \"don't bother me\" and \"talk to [facility]" given her acute condition.     Patient's son reports that this morning around 5:30am, the patient called him and said \"I am sick. I don't feel well. \"  He came to her house and noted that she was not herself, she was very confused. He states that she has had multiple \"mini-strokes\" in the past, but she has never this confused. Reports that at baseline, she has very good mentation.     The patient continued to tell me to Irish brenda me alone\" and did not wish for me to talk to her or examine her. The only complaint I could elicit directly from the patient is a severe headache, but when I ask if she would like anything to help with it, she told me and her son \"I'm fine. I don't want anything. \"     She continued to repeat \"I'm sick\" and \"it's not my fault. \"  She would not cooperate with full neurologic exam either. When asked if she hurt anywhere else besides her head, she was able to tell me \"no. \"  Past Medical History/Comorbidities:   Ms. Roger Chatterjee  has a past medical history of Acute encephalopathy (6/27/2016); Anxiety; Arthritis; Atrial fibrillation ([address] (4/16/2018); Chronic pain; Endocrine disease; Hypertension; Sepsis ([address] (6/27/2016); Thyroid disease; and TIA (transient ischemic attack) (11/14/2017). She also has no past medical history of Aneurysm ([address] Asthma; Autoimmune disease ([address] CAD (coronary artery disease); Cancer ([address] Chronic kidney disease; Coagulation disorder ([address] COPD; DEMENTIA; Diabetes ([address] Gastrointestinal disorder; GERD (gastroesophageal reflux disease); Heart failure ([address] Ill-defined condition; Liver disease; Morbid obesity ([address] Other ill-defined conditions(799.89); Psychiatric disorder; PUD (peptic ulcer disease); Rheumatic fever; Seizures ([address] Sleep apnea; Sleep disorder; or Thromboembolus ([address] Ms. Roger Chatterjee  has a past surgical history that includes hx orthopaedic and pr total knee arthroplasty (Left, 7/23/15).   Social History/Living Environment:   Home Environment: Private residence  One/Two Story Residence: Split level  Living Alone: Yes  Support Systems: Family member(s), Child(vivi)  Patient Expects to be Discharged to[de-identified] Private residence  Current DME Used/Available at Home: Brandon Amin, rolling  Prior Level of Function/Work/Activity:  Independent, lives alone   Number of Personal Factors/Comorbidities that affect the Plan of Care: 3+: HIGH COMPLEXITY   EXAMINATION:   Most Recent Physical Functioning:   Gross Assessment:  AROM: Within functional limits (LE's)  Strength: Generally decreased, functional (LE's)  Sensation: Intact               Posture:  Posture (WDL): Exceptions to WDL  Posture Assessment: Rounded shoulders  Balance:  Sitting: Intact  Standing: With support Bed Mobility:  Supine to Sit: Contact guard assistance  Sit to Supine: Contact guard assistance  Wheelchair Mobility:     Transfers:  Sit to Stand: Minimum assistance;Contact guard assistance  Stand to Sit: Minimum assistance;Contact guard assistance  Bed to Chair: Minimum assistance;Contact guard assistance  Gait:     Speed/Sil: Delayed  Gait Abnormalities: Path deviations  Distance (ft): 50 Feet (ft) (another 50 feet without walker)  Assistive Device: Walker, rolling  Ambulation - Level of Assistance: Minimal assistance  Interventions: Safety awareness training      Body Structures Involved:  1. Bones  2. Joints  3. Muscles  4. Ligaments Body Functions Affected:  1. Movement Related Activities and Participation Affected:  1. Mobility   Number of elements that affect the Plan of Care: 3: MODERATE COMPLEXITY   CLINICAL PRESENTATION:   Presentation: Stable and uncomplicated: LOW COMPLEXITY   CLINICAL DECISION MAKIN [place] [id number] AM-PAC 6 Clicks   Basic Mobility Inpatient Short Form  How much difficulty does the patient currently have. .. Unable A Lot A Little None   1. Turning over in bed (including adjusting bedclothes, sheets and blankets)? [] 1   [] 2   [x] 3   [] 4   2. Sitting down on and standing up from a chair with arms ( e.g., wheelchair, bedside commode, etc.)   [] 1   [] 2   [x] 3   [] 4   3. Moving from lying on back to sitting on the side of the bed?    [] 1   [] 2   [x] 3   [] 4   How much help from another person does the patient currently need. .. Total A Lot A Little None   4. Moving to and from a bed to a chair (including a wheelchair)? [] 1   [] 2   [x] 3   [] 4   5. Need to walk in hospital room? [] 1   [] 2   [x] 3   [] 4   6. Climbing 3-5 steps with a railing? [] 1   [] 2   [x] 3   [] 4   © 2007, Trustees of [address] [place] [address] [address] [id number], under license to [employer]. All rights reserved      Score:  Initial: 18 Most Recent: X (Date: -- )    Interpretation of Tool:  Represents activities that are increasingly more difficult (i.e. Bed mobility, Transfers, Gait). Score 24 23 22-20 19-15 14-10 9-7 6     Modifier CH CI CJ CK CL CM CN      ? Mobility - Walking and Moving Around:     - CURRENT STATUS: CK - 40%-59% impaired, limited or restricted    - GOAL STATUS: CJ - 20%-39% impaired, limited or restricted    - D/C STATUS:  ---------------To be determined---------------  [place] / [place] / Product Type: Medicare /      Medical Necessity:     · Patient is expected to demonstrate progress in strength, range of motion and balance to decrease assistance required with theraputic exercises and functional mobility. Reason for Services/Other Comments:  · Patient continues to require present interventions due to patient's inability to perform theraputic exercises and functional mobility independently. Use of outcome tool(s) and clinical judgement create a POC that gives a: Clear prediction of patient's progress: LOW COMPLEXITY            TREATMENT:   (In addition to Assessment/Re-Assessment sessions the following treatments were rendered)   Pre-treatment Symptoms/Complaints:  none  Pain: Initial:      Post Session:  0     Assessment/Reassessment only, no treatment provided today    Braces/Orthotics/Lines/Etc:   · telemetry  · O2 Device: Room air  Treatment/Session Assessment:    · Response to Treatment:  Pt.  Unsteady on her feet  · Interdisciplinary Collaboration: o Occupational Therapist  o Registered Nurse  · After treatment position/precautions:   o Supine in bed  o Bed/Chair-wheels locked  o Bed in low position  o Caregiver at bedside  o Call light within reach  o RN notified  o Family at bedside   · Compliance with Program/Exercises: Will assess as treatment progresses. No questions. · Recommendations/Intent for next treatment session: \"Next visit will focus on advancements to more challenging activities and reduction in assistance provided\".   Total Treatment Duration:  PT Patient Time In/Time Out  Time In: 1025  Time Out: Ti Beckett 61, PT

## 2018-05-15 NOTE — PROGRESS NOTES
[facility] Patient remained calm and oriented throughout shift with minimal expressive aphasia noted.

## 2018-05-15 NOTE — PROGRESS NOTES
Care Management Interventions  PCP Verified by CM: Yes  Mode of Transport at Discharge: Other (see comment)  Transition of Care Consult (CM Consult): Discharge Planning  Physical Therapy Consult: Yes  Occupational Therapy Consult: Yes  Current Support Network: Own Home, Lives Alone  Confirm Follow Up Transport: Family  Plan discussed with Pt/Family/Caregiver: Yes  Discharge Location  Discharge Placement: Home      Referral per MD.   D/C planning. KELSI spoke with pt & d-in-law Blake Mosqueda [phone] ). Pt is A&O. PTA pt indep with ADL'S, lives alone, still drives. Pt admitted with CVA, came in with slurred speech. At this time pt appears to be communicating without difficulty. Pt is pending PT, OT, ST evaluations. Pt has 3 sons who live nearby. [facility] ( d-in-law ) at bedside states she does not work & would be available to help pt at d/c. At this time unsure if pt has any deficits from CVA. KELSI will continue to follow.

## 2018-05-15 NOTE — INTERDISCIPLINARY ROUNDS
Interdisciplinary team rounds were held 5/15/2018 with the following team members:Care Management, Nursing, Physical Therapy and Physician and the patient. Plan of care discussed. See clinical pathway and/or care plan for interventions and desired outcomes.

## 2018-05-15 NOTE — PROGRESS NOTES
Progress Note    Patient: Mai Richmond MRN: [id number]  SSN: [id number]    YOB: 1940  Age: [de-identified] y.o. Sex: female      Admit Date: 5/14/2018    LOS: 0 days     Subjective:     From previous note:  Patient is an [de-identified] with PMH significant for afib (not on anticoagulation as far as record and son is aware), hypertension, hypothyroidism, and multiple TIAs who presents to the ER with confusion. 5/15/2018  Patient is more alert. She recognized her sons at bedside. She answered my questions appropriately. She denied other complaints. No fever. No chills. No chest pain. No shortness of breath. Objective:     Vitals:    05/15/18 1131 05/15/18 1201 05/15/18 1231 05/15/18 1232   BP: 140/70 160/72 146/63    Pulse: (!) 55 (!) 56  (!) 56   Resp: 16 25  28   Temp:  98.9 °F (37.2 °C)     SpO2: 96% 96% 96% 95%   Weight:       Height:            Intake and Output:  Current Shift: 05/15 0701 - 05/15 1900  In: 240 [P.O.:240]  Out: -   Last three shifts: 05/13 1901 - 05/15 0700  In: -   Out: 475 [Urine:475]    Physical Exam:   General:                    Alert, appears stated age, not agitated this morning. Head:                                   Normocephalic, without obvious abnormality, atraumatic. Nose:                                   Nares normal. No drainage or sinus tenderness. Lungs:                       Clear to auscultation bilaterally. No Wheezing or Rhonchi. No rales. Heart:                                  Regular rate and rhythm,  no murmur, rub or gallop. Abdomen:                  Soft, non-tender. Not distended. Bowel sounds normal.   Extremities:               No cyanosis. No edema. No clubbing  Skin:                                    Texture, turgor normal. No rashes or lesions.   Not Jaundiced  Neurologic:                alert and oriented to place, time and person, no slurred speech, no obvious facial droop, spontaneously moves all 4 extremities,        Lab/Data Review:    Recent Results (from the past 24 hour(s))   URINALYSIS W/ RFLX MICROSCOPIC    Collection Time: 05/14/18  2:57 PM   Result Value Ref Range    Color YELLOW      Appearance CLEAR      Specific gravity 1.012 1.001 - 1.023      pH (UA) 7.5 5.0 - 9.0      Protein NEGATIVE  NEG mg/dL    Glucose NEGATIVE  mg/dL    Ketone >80 (A) NEG mg/dL    Bilirubin NEGATIVE  NEG      Blood NEGATIVE  NEG      Urobilinogen 0.2 0.2 - 1.0 EU/dL    Nitrites NEGATIVE  NEG      Leukocyte Esterase NEGATIVE  NEG     CULTURE, URINE    Collection Time: 05/14/18  2:58 PM   Result Value Ref Range    Special Requests: NO SPECIAL REQUESTS      Culture result:        NO GROWTH AFTER SHORT PERIOD OF INCUBATION. FURTHER RESULTS TO FOLLOW AFTER OVERNIGHT INCUBATION. SED RATE, AUTOMATED    Collection Time: 05/14/18  7:03 PM   Result Value Ref Range    Sed rate, automated 12 0 - 30 mm/hr   CRP, HIGH SENSITIVITY    Collection Time: 05/14/18  7:03 PM   Result Value Ref Range    CRP, High sensitivity 7.6 mg/L   LIPID PANEL    Collection Time: 05/15/18  3:46 AM   Result Value Ref Range    LIPID PROFILE          Cholesterol, total 151 <200 MG/DL    Triglyceride 55 35 - 150 MG/DL    HDL Cholesterol 57 40 - 60 MG/DL    LDL, calculated 83 <100 MG/DL    VLDL, calculated 11 6.0 - 23.0 MG/DL    CHOL/HDL Ratio 2.6     HEMOGLOBIN A1C WITH EAG    Collection Time: 05/15/18  3:46 AM   Result Value Ref Range    Hemoglobin A1c 5.7 4.8 - 6.0 %    Est. average glucose 117 mg/dL     5/14/2018  CT head  IMPRESSION:     1. No evidence of acute intracranial abnormality. No hydrocephalus.     2. Chronic small vessel changes. Mild generalized cerebral volume loss,  age-related. No significant change since the prior exam.    5/14/2018  MRI brain  IMPRESSION:     1. Acute to early subacute infarct at the left parieto-occipital junction.     2. Motion artifact.     3. Right temporal encephalomalacia.     4.  White matter findings compatible with chronic small vessel ischemic disease. 5/14/2018  MRA  IMPRESSION: Very limited the study secondary to motion artifact. No gross  proximal abnormalities identified. 5/15/2018  CXR  IMPRESSION: No acute findings in the chest       Assessment:     Principal Problem:    Hypertensive emergency (5/14/2018)    Active Problems:    HTN (hypertension) (6/27/2016)      Atrial fibrillation ([address] (4/16/2018)      Confusion (5/14/2018)        Plan:     Hypertensive Emergency  - Headache and severely elevated BP of 247/120 on presentation, along with mental status change  - likely this BP elevation is related to new CVA as evidenced by MRI.   - BP is improved now. - will monitor.      Confusion  - Most likely related to severely elevated BP and CVA,   -- Q6h neurochecks     Atrial Fibrillation  - Recently diagnosed  - Not currently in RVR  - Not on anticoagulation, per PCP and Cardiology notes, the patient declined, but Cardiology recommends anti-coagulation with a CHADS score documented at 4. Pt was recommended to re-consider at her Cardiology visit.    - no result of echo, will order.   - Carotids were normal 6 months ago (11/2017), so will not recheck this  - Continue Cardizem  - Monitor of cardiac telemetry     Hypothyroidism  - TSH obtained in ER and is normal  - Continue home dose of Synthroid     Code Status: full     Anticipated discharge: 1-2 days, depending on work-up for acute mental status change and BP control.         Signed By: Freda Snider MD     May 15, 2018

## 2018-05-15 NOTE — PROGRESS NOTES
Initial visit with patient and daughter in law. Prayer and support given. Sia dillon. Amy Gaston M.Div.

## 2018-05-15 NOTE — PHYSICIAN ADVISORY
Letter of Determination: Upgrade from Observation to Inpatient Status    This patient was originally hospitalized as Outpatient Status with Observation Services on 5/14/2018 for hypertensive emergency. This patient now meets for Inpatient Admission in accordance with CMS regulation Section 43 .3. Specifically, patient's stay is now expected to be over Two Midnights and was medically necessary. The patient's stay was medically necessary based on advanced age, vital signs significant for blood pressure of 247/120 mmHg, and magnetic resonance imaging study demonstrating acute infarct at the left parieto-occipital junction. Consistent with CMS guidelines, patient meets for inpatient status. It is our recommendation that this patient's hospitalization status should be upgraded from OBSERVATION to INPATIENT status.      The final decision regarding the patient's hospitalization status depends on the attending physician's judgement.     Rober Denny MD, SHAI,   Physician [place].

## 2018-05-15 NOTE — PROGRESS NOTES
Spoke with Dr. Mikki Navarro. Patient moving during MRI and MRA Brains yesterday, unable to do MRA neck. Concerned about injecting patient and not getting diagnostic study. Physician cancelled MRA Neck and will do Carotid US instead.

## 2018-05-16 VITALS
SYSTOLIC BLOOD PRESSURE: 151 MMHG | BODY MASS INDEX: 29.02 KG/M2 | DIASTOLIC BLOOD PRESSURE: 67 MMHG | WEIGHT: 170 LBS | RESPIRATION RATE: 23 BRPM | OXYGEN SATURATION: 92 % | HEIGHT: 64 IN | TEMPERATURE: 99.2 F | HEART RATE: 60 BPM

## 2018-05-16 PROCEDURE — 74011250637 HC RX REV CODE- 250/637: Performed by: FAMILY MEDICINE

## 2018-05-16 PROCEDURE — 97535 SELF CARE MNGMENT TRAINING: CPT

## 2018-05-16 PROCEDURE — 97116 GAIT TRAINING THERAPY: CPT

## 2018-05-16 PROCEDURE — 74011250637 HC RX REV CODE- 250/637: Performed by: INTERNAL MEDICINE

## 2018-05-16 RX ORDER — AMLODIPINE BESYLATE 5 MG/1
5 TABLET ORAL DAILY
Status: DISCONTINUED | OUTPATIENT
Start: 2018-05-16 | End: 2018-05-16 | Stop reason: HOSPADM

## 2018-05-16 RX ORDER — ATORVASTATIN CALCIUM 10 MG/1
10 TABLET, FILM COATED ORAL DAILY
Qty: 15 TAB | Refills: 0 | Status: SHIPPED | OUTPATIENT
Start: 2018-05-16 | End: 2018-06-05

## 2018-05-16 RX ORDER — CLOPIDOGREL BISULFATE 75 MG/1
75 TABLET ORAL DAILY
Qty: 15 TAB | Refills: 0 | Status: SHIPPED | OUTPATIENT
Start: 2018-05-16 | End: 2018-06-05

## 2018-05-16 RX ORDER — GUAIFENESIN 100 MG/5ML
81 LIQUID (ML) ORAL DAILY
Status: DISCONTINUED | OUTPATIENT
Start: 2018-05-16 | End: 2018-05-16 | Stop reason: HOSPADM

## 2018-05-16 RX ADMIN — LEVOTHYROXINE SODIUM 100 MCG: 100 TABLET ORAL at 07:54

## 2018-05-16 RX ADMIN — DILTIAZEM HYDROCHLORIDE 120 MG: 120 CAPSULE, COATED, EXTENDED RELEASE ORAL at 08:51

## 2018-05-16 RX ADMIN — VITAMIN D, TAB 1000IU (100/BT) 2000 UNITS: 25 TAB at 08:51

## 2018-05-16 RX ADMIN — Medication 5 ML: at 06:00

## 2018-05-16 RX ADMIN — ASPIRIN 81 MG 81 MG: 81 TABLET ORAL at 09:10

## 2018-05-16 RX ADMIN — AMLODIPINE BESYLATE 5 MG: 5 TABLET ORAL at 09:09

## 2018-05-16 NOTE — PROGRESS NOTES
Night MD note. ICU full. Chart reviewed. Pt on cardiac monitor due to afib but rate well controlled. I dont see any obvious indication to keep in ICU. Will transfer out.

## 2018-05-16 NOTE — DISCHARGE INSTRUCTIONS

## 2018-05-16 NOTE — PROGRESS NOTES
DISCHARGE SUMMARY from Nurse    PATIENT INSTRUCTIONS:    After general anesthesia or intravenous sedation, for 24 hours or while taking prescription Narcotics:  · Limit your activities  · Do not drive and operate hazardous machinery  · Do not make important personal or business decisions  · Do  not drink alcoholic beverages  · If you have not urinated within 8 hours after discharge, please contact your surgeon on call. Report the following to your surgeon:  · Excessive pain, swelling, redness or odor of or around the surgical area  · Temperature over 100.5  · Nausea and vomiting lasting longer than 4 hours or if unable to take medications  · Any signs of decreased circulation or nerve impairment to extremity: change in color, persistent  numbness, tingling, coldness or increase pain  · Any questions    What to do at Home:  Recommended activity: Activity as tolerated,     If you experience any of the following symptoms numbness, slurred speech, dizziness, please follow up with PCP. *  Please give a list of your current medications to your Primary Care Provider. *  Please update this list whenever your medications are discontinued, doses are      changed, or new medications (including over-the-counter products) are added. *  Please carry medication information at all times in case of emergency situations. These are general instructions for a healthy lifestyle:    No smoking/ No tobacco products/ Avoid exposure to second hand smoke  Surgeon General's Warning:  Quitting smoking now greatly reduces serious risk to your health.     Obesity, smoking, and sedentary lifestyle greatly increases your risk for illness    A healthy diet, regular physical exercise & weight monitoring are important for maintaining a healthy lifestyle    You may be retaining fluid if you have a history of heart failure or if you experience any of the following symptoms:  Weight gain of 3 pounds or more overnight or 5 pounds in a week, increased swelling in our hands or feet or shortness of breath while lying flat in bed. Please call your doctor as soon as you notice any of these symptoms; do not wait until your next office visit. Recognize signs and symptoms of STROKE:    F-face looks uneven    A-arms unable to move or move unevenly    S-speech slurred or non-existent    T-time-call 911 as soon as signs and symptoms begin-DO NOT go       Back to bed or wait to see if you get better-TIME IS BRAIN. Warning Signs of HEART ATTACK     Call 911 if you have these symptoms:   Chest discomfort. Most heart attacks involve discomfort in the center of the chest that lasts more than a few minutes, or that goes away and comes back. It can feel like uncomfortable pressure, squeezing, fullness, or pain.  Discomfort in other areas of the upper body. Symptoms can include pain or discomfort in one or both arms, the back, neck, jaw, or stomach.  Shortness of breath with or without chest discomfort.  Other signs may include breaking out in a cold sweat, nausea, or lightheadedness. Don't wait more than five minutes to call [address]! Fast action can save your life. Calling 911 is almost always the fastest way to get lifesaving treatment. Emergency Medical Services staff can begin treatment when they arrive  up to an hour sooner than if someone gets to the hospital by car. The discharge information has been reviewed with the patient and caregiver. The patient and caregiver verbalized understanding. Discharge medications reviewed with the patient and caregiver and appropriate educational materials and side effects teaching were provided.   ___________________________________________________________________________________________________________________________________

## 2018-05-16 NOTE — PROGRESS NOTES
Bedside shift report received from [facility]. Patient with family at bedside. Oriented to self, place, and disoriented to time. Periodically has expressive aphasia. Will monitor.

## 2018-05-16 NOTE — PROGRESS NOTES
Problem: Mobility Impaired (Adult and Pediatric)  Goal: *Acute Goals and Plan of Care (Insert Text)  STG:  (1.)Ms. Jelly Alvarenga will move from supine to sit and sit to supine  with STAND BY ASSIST within 3 treatment day(s). (2.)Ms. Jelly Alvarenga will transfer from bed to chair and chair to bed with STAND BY ASSIST using the least restrictive device within 3 treatment day(s). (3.)Ms. Jelly Alvarenga will ambulate with STAND BY ASSIST for 200 feet with the least restrictive device within 3 treatment day(s). LTG:  (1.)Ms. Jelly Alvarenga will move from supine to sit and sit to supine  in bed with SUPERVISION within 7 treatment day(s). (2.)Ms. Jelly Alvarenga will transfer from bed to chair and chair to bed with SUPERVISION using the least restrictive device within 7 treatment day(s). (3.)Ms. Jelly Alvarenga will ambulate with SUPERVISION for 500 feet with the least restrictive device within 7 treatment day(s). (4)Ms. Jelly Alvarenga will perform HEP with cues and assistance to increase safety on her feet in 7 days. (5)Ms. Jelly Alvarenga will go up and down 7 steps with rail and CGA in 7 days.   ________________________________________________________________________________________________      PHYSICAL THERAPY: Daily Note, Treatment Day: 1st, AM 5/16/2018  INPATIENT: Hospital Day: 3  [place] / Product Type: Medicare /      NAME/AGE/GENDER: Akash Agarwal is a [de-identified] y.o. female   PRIMARY DIAGNOSIS: Hypertensive emergency  Confusion  CVA (cerebral vascular accident) ([facility] [address]  Hypertensive emergency CVA (cerebral vascular accident) ([facility] [address] CVA (cerebral vascular accident) ([facility] [address]        ICD-10: Treatment Diagnosis:    · Generalized Muscle Weakness (M62.81)  · Other abnormalities of gait and mobility (R26.89)   Precaution/Allergies:  Ciprofloxacin; Percocet [oxycodone-acetaminophen]; and Other medication      ASSESSMENT:     Ms. Jelly Alvarenga presents with general decrease in functional mobility and gait this am.    Pt. Reports doing much better this am and was told she could go home today by MD.  Family member present. She increased gait distance this am and did a few stairs. She did not lose her balance but is generally unsteady and likes to hold onto the furniture or walls or rail when she can. Spoke at length and in detail about safety and not falling and encouraged her to have family with her at all times initially at home. She did not want to do that and acted like she would be fine on her own. She reports she has canes and walkers at home and will use them when she needs to. No questions. Plans for home health PT. This section established at most recent assessment   PROBLEM LIST (Impairments causing functional limitations):  1. Decreased Strength  2. Decreased ADL/Functional Activities  3. Decreased Transfer Abilities  4. Decreased Ambulation Ability/Technique  5. Decreased Balance  6. Decreased Activity Tolerance  7. Decreased Knowledge of Precautions  8. Decreased Scott with Home Exercise Program  9. Decreased Cognition   INTERVENTIONS PLANNED: (Benefits and precautions of physical therapy have been discussed with the patient.)  1. Balance Exercise  2. Bed Mobility  3. Family Education  4. Gait Training  5. Home Exercise Program (HEP)  6. Range of Motion (ROM)  7. Therapeutic Activites  8. Therapeutic Exercise/Strengthening  9. Transfer Training     TREATMENT PLAN: Frequency/Duration: daily for duration of hospital stay  Rehabilitation Potential For Stated Goals: Good     RECOMMENDED REHABILITATION/EQUIPMENT: (at time of discharge pending progress): Due to the probability of continued deficits (see above) this patient will likely need continued skilled physical therapy after discharge.   Equipment:    None at this time              HISTORY:   History of Present Injury/Illness (Reason for Referral):  Patient is an [de-identified] with PMH significant for afib (not on anticoagulation as far as record and son is aware), hypertension, hypothyroidism, and multiple TIAs who presents to the ER this morning with confusion. History is obtained from the patient's son, as the patient tells me \"don't bother me\" and \"talk to [facility]" given her acute condition.     Patient's son reports that this morning around 5:30am, the patient called him and said \"I am sick. I don't feel well. \"  He came to her house and noted that she was not herself, she was very confused. He states that she has had multiple \"mini-strokes\" in the past, but she has never this confused. Reports that at baseline, she has very good mentation.     The patient continued to tell me to [facility] alone\" and did not wish for me to talk to her or examine her. The only complaint I could elicit directly from the patient is a severe headache, but when I ask if she would like anything to help with it, she told me and her son \"I'm fine. I don't want anything. \"     She continued to repeat \"I'm sick\" and \"it's not my fault. \"  She would not cooperate with full neurologic exam either. When asked if she hurt anywhere else besides her head, she was able to tell me \"no. \"  Past Medical History/Comorbidities:   Ms. Ty Tabares  has a past medical history of Acute encephalopathy (6/27/2016); Anxiety; Arthritis; Atrial fibrillation ([address] (4/16/2018); Chronic pain; Endocrine disease; Hypertension; Sepsis ([address] (6/27/2016); Thyroid disease; and TIA (transient ischemic attack) (11/14/2017). She also has no past medical history of Aneurysm ([address] Asthma; Autoimmune disease ([address] CAD (coronary artery disease); Cancer ([address] Chronic kidney disease; Coagulation disorder ([address] COPD; DEMENTIA; Diabetes ([address] Gastrointestinal disorder; GERD (gastroesophageal reflux disease); Heart failure ([address] Ill-defined condition; Liver disease; Morbid obesity ([address] Other ill-defined conditions(799.89); Psychiatric disorder; PUD (peptic ulcer disease); Rheumatic fever; Seizures ([facility]  Sleep apnea; Sleep disorder; or Thromboembolus [facility]).  Ms. Tin Cisse  has a past surgical history that includes hx orthopaedic and pr total knee arthroplasty (Left, 7/23/15). Social History/Living Environment:   Home Environment: Private residence  One/Two Story Residence: Split level  Living Alone: Yes  Support Systems: Family member(s), Child(vivi)  Patient Expects to be Discharged to[de-identified] Private residence  Current DME Used/Available at Home: Walker, rolling  Prior Level of Function/Work/Activity:  Independent, lives alone   Number of Personal Factors/Comorbidities that affect the Plan of Care: 3+: HIGH COMPLEXITY   EXAMINATION:   Most Recent Physical Functioning:   Gross Assessment:  AROM: Within functional limits (LE's)  Strength: Generally decreased, functional (LE's)  Sensation: Intact               Posture:  Posture (WDL): Exceptions to WDL  Posture Assessment: Rounded shoulders  Balance:  Sitting: Intact  Standing: Intact; Impaired  Standing - Static: Good  Standing - Dynamic : Fair Bed Mobility:  Supine to Sit:  (up in chair on contact)  Wheelchair Mobility:     Transfers:  Sit to Stand: Stand-by assistance  Stand to Sit: Stand-by assistance  Gait:     Speed/Sil: Delayed  Gait Abnormalities: Decreased step clearance; Path deviations  Distance (ft): 175 Feet (ft)  Assistive Device:  (none)  Ambulation - Level of Assistance: Stand-by assistance;Contact guard assistance  Number of Stairs Trained: 5  Stairs - Level of Assistance: Contact guard assistance  Rail Use: Left   Interventions: Safety awareness training;Verbal cues  Duration: 15 Minutes      Body Structures Involved:  1. Bones  2. Joints  3. Muscles  4. Ligaments Body Functions Affected:  1. Movement Related Activities and Participation Affected:  1.  Mobility   Number of elements that affect the Plan of Care: 3: MODERATE COMPLEXITY   CLINICAL PRESENTATION:   Presentation: Stable and uncomplicated: LOW COMPLEXITY   CLINICAL DECISION MAKIN [facility] [address] [id number] AM-PAC 6 Clicks   Basic Mobility Inpatient Short Form  How much difficulty does the patient currently have. .. Unable A Lot A Little None   1. Turning over in bed (including adjusting bedclothes, sheets and blankets)? [] 1   [] 2   [x] 3   [] 4   2. Sitting down on and standing up from a chair with arms ( e.g., wheelchair, bedside commode, etc.)   [] 1   [] 2   [x] 3   [] 4   3. Moving from lying on back to sitting on the side of the bed? [] 1   [] 2   [x] 3   [] 4   How much help from another person does the patient currently need. .. Total A Lot A Little None   4. Moving to and from a bed to a chair (including a wheelchair)? [] 1   [] 2   [x] 3   [] 4   5. Need to walk in hospital room? [] 1   [] 2   [x] 3   [] 4   6. Climbing 3-5 steps with a railing? [] 1   [] 2   [x] 3   [] 4   © 2007, Trustees of [address] [place] [address], under license to [employer]. All rights reserved      Score:  Initial: 18 Most Recent: X (Date: -- )    Interpretation of Tool:  Represents activities that are increasingly more difficult (i.e. Bed mobility, Transfers, Gait). Score 24 23 22-20 19-15 14-10 9-7 6     Modifier CH CI CJ CK CL CM CN      ? Mobility - Walking and Moving Around:     - CURRENT STATUS: CK - 40%-59% impaired, limited or restricted    - GOAL STATUS: CJ - 20%-39% impaired, limited or restricted    - D/C STATUS:  ---------------To be determined---------------  [place] / Product Type: Medicare /      Medical Necessity:     · Patient is expected to demonstrate progress in strength, range of motion and balance to decrease assistance required with theraputic exercises and functional mobility. Reason for Services/Other Comments:  · Patient continues to require present interventions due to patient's inability to perform theraputic exercises and functional mobility independently.    Use of outcome tool(s) and clinical judgement create a POC that gives a: Clear prediction of patient's progress: LOW COMPLEXITY            TREATMENT:   (In addition to Assessment/Re-Assessment sessions the following treatments were rendered)   Pre-treatment Symptoms/Complaints:  none  Pain: Initial:      Post Session:  0     Gait Training (15 Minutes):  Gait training to improve and/or restore physical functioning as related to mobility, strength and balance. Ambulated 175 Feet (ft) with Stand-by assistance;Contact guard assistance using a  (none) and minimal Safety awareness training;Verbal cues related to their stance phase and stride length to promote proper body alignment, promote proper body posture and promote proper body mechanics. Braces/Orthotics/Lines/Etc:   · telemetry  · O2 Device: Room air  Treatment/Session Assessment:    · Response to Treatment:  Pt. Did fine  · Interdisciplinary Collaboration:   o Registered Nurse  · After treatment position/precautions:   o Up in chair  o Bed/Chair-wheels locked  o Bed in low position  o Caregiver at bedside  o Call light within reach  o RN notified  o Family at bedside   · Compliance with Program/Exercises: Will assess as treatment progresses. No questions. · Recommendations/Intent for next treatment session: \"Next visit will focus on advancements to more challenging activities and reduction in assistance provided\".   Total Treatment Duration:  PT Patient Time In/Time Out  Time In: 1040  Time Out: Abel Calderon PT

## 2018-05-16 NOTE — PROGRESS NOTES
Interdisciplinary team rounds were held 5/16/2018 with the following team members:Care Management, Nursing, Outcomes Management, Physical Therapy and Physician and the patient. Plan of care discussed. See clinical pathway and/or care plan for interventions and desired outcomes.

## 2018-05-16 NOTE — PROGRESS NOTES
Problem: Self Care Deficits Care Plan (Adult)  Goal: *Acute Goals and Plan of Care (Insert Text)  1. Patient will perform grooming with supervision. 2. Patient will perform Upper body dressing with supervision GOAL MET 5/16/2018  3. Patient will perform lower body dressing with supervision GOAL MET 5/16/2018  4. Patient will perform upper and lower body bathing with supervision. 5. Patient will perform toilet transfers with CGA. 6. Patient will perform shower transfer with CGA. 7. Patient will participate in 30 + minutes of ADL/ therapeutic exercise/therapeutic activity with min rest breaks to increase activity tolerance for self care. 8. Patient will perform ADL functional mobility in room with CGA. Goals to be achieved in 7 days. OCCUPATIONAL THERAPY: Daily Note, Treatment Day: 1st and AM 5/16/2018  INPATIENT: Hospital Day: 3  [place] / Product Type: Medicare /      NAME/AGE/GENDER: Patt Jacobson is a [de-identified] y.o. female   PRIMARY DIAGNOSIS:  Hypertensive emergency  Confusion  CVA (cerebral vascular accident) ([address]  Hypertensive emergency CVA (cerebral vascular accident) ([address] CVA (cerebral vascular accident) ([address]        ICD-10: Treatment Diagnosis:    · Generalized Muscle Weakness (M62.81)  · mild confusion    Precautions/Allergies:     Ciprofloxacin; Percocet [oxycodone-acetaminophen]; and Other medication      ASSESSMENT:     Ms. Marquez Bourgeois presents with above diagnosis and was seen in ICU room with niece present. Pt was sitting on edge of bed. Pt completed dressing with the assistance listed below. Continue POC. This section established at most recent assessment   PROBLEM LIST (Impairments causing functional limitations):  1. Decreased Strength  2. Decreased Balance  3. Decreased Activity Tolerance  4. Decreased Cognition   INTERVENTIONS PLANNED: (Benefits and precautions of occupational therapy have been discussed with the patient.)  1.  Activities of daily living training  2. Balance training  3. Cognitive training  4. Therapeutic activity  5. Therapeutic exercise     TREATMENT PLAN: Frequency/Duration: Follow patient 3 times per week to address above goals. Rehabilitation Potential For Stated Goals: Good     RECOMMENDED REHABILITATION/EQUIPMENT: (at time of discharge pending progress): Due to the probability of continued deficits (see above) this patient will not likely need continued skilled occupational therapy after discharge. Equipment:    None at this time              OCCUPATIONAL PROFILE AND HISTORY:   History of Present Injury/Illness (Reason for Referral):  Weakness, confusion   Past Medical History/Comorbidities:   Ms. Nito Martin  has a past medical history of Acute encephalopathy (6/27/2016); Anxiety; Arthritis; Atrial fibrillation ([address] (4/16/2018); Chronic pain; Endocrine disease; Hypertension; Sepsis ([address] (6/27/2016); Thyroid disease; and TIA (transient ischemic attack) (11/14/2017). She also has no past medical history of Aneurysm ([address] Asthma; Autoimmune disease ([address] CAD (coronary artery disease); Cancer ([address] Chronic kidney disease; Coagulation disorder ([address] COPD; DEMENTIA; Diabetes ([address] Gastrointestinal disorder; GERD (gastroesophageal reflux disease); Heart failure ([address] Ill-defined condition; Liver disease; Morbid obesity ([address] Other ill-defined conditions(799.89); Psychiatric disorder; PUD (peptic ulcer disease); Rheumatic fever; Seizures ([address] Sleep apnea; Sleep disorder; or Thromboembolus ([address] Ms. Nito Martin  has a past surgical history that includes hx orthopaedic and pr total knee arthroplasty (Left, 7/23/15).   Social History/Living Environment:   Home Environment: Private residence  One/Two Story Residence: Split level  Living Alone: Yes  Support Systems: Family member(s), Child(vivi)  Patient Expects to be Discharged to[de-identified] Private residence  Current DME Used/Available at Home: Walker, rolling  Prior Level of Function/Work/Activity:  Independent per pt and daughter in law     Number of Personal Factors/Comorbidities that affect the Plan of Care: Brief history (0):  LOW COMPLEXITY   ASSESSMENT OF OCCUPATIONAL PERFORMANCE[de-identified]   Activities of Daily Living:           Basic ADLs (From Assessment) Complex ADLs (From Assessment)   Feeding: Independent  Oral Facial Hygiene/Grooming: Supervision  Bathing: Minimum assistance  Upper Body Dressing: Stand-by assistance  Lower Body Dressing: Minimum assistance  Toileting: Minimum assistance     Grooming/Bathing/Dressing Activities of Daily Living     Cognitive Retraining  Safety/Judgement: Fall prevention               Upper Body Dressing Assistance  Dressing Assistance: Independent  Bra: Independent  Pullover Shirt: Independent     Lower Body Dressing Assistance  Underpants: Independent  Socks: Independent Bed/Mat Mobility  Supine to Sit:  (up in chair on contact)  Sit to Stand: Independent       Most Recent Physical Functioning:   Gross Assessment:                  Posture:  Posture (WDL): Exceptions to WDL  Posture Assessment: Rounded shoulders  Balance:  Sitting: Intact  Standing: Intact; Impaired  Standing - Static: Good  Standing - Dynamic : Fair Bed Mobility:  Supine to Sit:  (up in chair on contact)  Wheelchair Mobility:     Transfers:  Sit to Stand: Independent  Stand to Sit: Stand-by assistance                Patient Vitals for the past 6 hrs:   BP SpO2 Pulse   05/16/18 0700 148/65 92 % 70   05/16/18 0800 (!) 162/120 - 62   05/16/18 0900 125/58 - 72   05/16/18 1000 151/67 - 60       Mental Status  Neurologic State: Alert  Orientation Level: Oriented X4  Cognition: Appropriate decision making, Appropriate for age attention/concentration  Perception: Appears intact  Perseveration: No perseveration noted  Safety/Judgement: Fall prevention                          Physical Skills Involved:  1. Balance  2. Strength  3.  Activity Tolerance Cognitive Skills Affected (resulting in the inability to perform in a timely and safe manner):  1. Executive Function Psychosocial Skills Affected:  1. Environmental Adaptation   Number of elements that affect the Plan of Care: 5+:  HIGH COMPLEXITY   CLINICAL DECISION MAKIN[address] [place] [address] AM-PAC 6 Clicks   Daily Activity Inpatient Short Form  How much help from another person does the patient currently need. .. Total A Lot A Little None   1. Putting on and taking off regular lower body clothing? [] 1   [] 2   [x] 3   [] 4   2. Bathing (including washing, rinsing, drying)? [] 1   [] 2   [x] 3   [] 4   3. Toileting, which includes using toilet, bedpan or urinal?   [] 1   [] 2   [x] 3   [] 4   4. Putting on and taking off regular upper body clothing? [] 1   [] 2   [x] 3   [] 4   5. Taking care of personal grooming such as brushing teeth? [] 1   [] 2   [] 3   [x] 4   6. Eating meals? [] 1   [] 2   [] 3   [x] 4   © , Trustees of [address] [place] [address], under license to [employer]. All rights reserved      Score:  Initial: 20 Most Recent: X (Date: -- )    Interpretation of Tool:  Represents activities that are increasingly more difficult (i.e. Bed mobility, Transfers, Gait). Score 24 23 22-20 19-15 14-10 9-7 6     Modifier CH CI CJ CK CL CM CN      ? Self Care:     - CURRENT STATUS: CJ - 20%-39% impaired, limited or restricted    - GOAL STATUS: CI - 1%-19% impaired, limited or restricted    - D/C STATUS:  ---------------To be determined---------------  [place] / Product Type: Medicare /      Medical Necessity:     · Patient is expected to demonstrate progress in strength, balance and functional technique to increase independence with self care. Reason for Services/Other Comments:  · Patient would benefit from OT to  follow to maximize safety and independence ewith self care and functional mobility as pt lives alone.  .   Use of outcome tool(s) and clinical judgement create a POC that gives a: LOW COMPLEXITY         TREATMENT:   (In addition to Assessment/Re-Assessment sessions the following treatments were rendered)     Pre-treatment Symptoms/Complaints:  Pt up in room no complaint of pain  Pain: Initial:   Pain Intensity 1: 0 0 Post Session:  0     Self Care: (23): Procedure(s) (per grid) utilized to improve and/or restore self-care/home management as related to dressing. Required min verbal cueing to facilitate activities of daily living skills. Braces/Orthotics/Lines/Etc:   · ICU monitors  · O2 Device: Room air  Treatment/Session Assessment:    · Response to Treatment:  Pt up in room tolerated well  · Interdisciplinary Collaboration:   o Physical Therapist  o Certified Occupational Therapy Assistant  o Registered Nurse  · After treatment position/precautions:   o Up in chair  o Bed/Chair-wheels locked  o Bed in low position  o Call light within reach  o RN notified  o Family at bedside   · Compliance with Program/Exercises: compliant all of the time. · Recommendations/Intent for next treatment session: \"Next visit will focus on advancements to more challenging activities and reduction in assistance provided\".   Total Treatment Duration:  OT Patient Time In/Time Out  Time In: 1051  Time Out: 100[facility] Chip Cheri

## 2018-05-16 NOTE — DISCHARGE SUMMARY
Discharge Summary     Patient: Pravin Mckeon MRN: [id number]  SSN: [id number]    YOB: 1940  Age: [de-identified] y.o. Sex: female       Admit Date: 5/14/2018    Discharge Date: 5/16/2018      Admission Diagnoses: Hypertensive emergency  Confusion  CVA (cerebral vascular accident) [facility])  Hypertensive emergency    Discharge Diagnoses:   Problem List as of 5/16/2018  Date Reviewed: 6/27/2016          Codes Class Noted - Resolved    * (Principal)CVA (cerebral vascular accident) [facility]) ICD-10-CM: I63.9  ICD-9-CM: 434.91  5/15/2018 - Present        Confusion ICD-10-CM: R41.0  ICD-9-CM: 298.9  5/14/2018 - Present        Hypertensive emergency ICD-10-CM: I16.1  ICD-9-CM: 401.9  5/14/2018 - Present        Hypothyroidism ICD-10-CM: E03.9  ICD-9-CM: 244.9  5/14/2018 - Present        Atrial fibrillation ([facility] ICD-10-CM: I48.91  ICD-9-CM: 427.31  4/16/2018 - Present        TIA (transient ischemic attack) ICD-10-CM: G45.9  ICD-9-CM: 435.9  11/14/2017 - Present        Slurred speech ICD-10-CM: R47.81  ICD-9-CM: 784.59  11/14/2017 - Present        Hyponatremia ICD-10-CM: E87.1  ICD-9-CM: 276.1  11/14/2017 - Present        Acute encephalopathy ICD-10-CM: G93.40  ICD-9-CM: 348.30  6/27/2016 - Present        HTN (hypertension) ICD-10-CM: I10  ICD-9-CM: 401.9  6/27/2016 - Present        Sepsis ([facility] ICD-10-CM: A41.9  ICD-9-CM: 038.9, 995.91  6/27/2016 - Present        RESOLVED: Brain mass ICD-10-CM: G93.9  ICD-9-CM: 348.9  6/27/2016 - 11/14/2017        RESOLVED: Leukocytosis ICD-10-CM: G14.605  ICD-9-CM: 288.60  6/27/2016 - 11/14/2017               Discharge Condition: Stable    Hospital Course:     Patient is an [de-identified] with PMH significant for afib (not on anticoagulation as far as record and son is aware), hypertension, hypothyroidism, and multiple TIAs who presents to the ER with confusion.      Patient had acute encephalopathy due to CVA and hypertension. Her MRI is consistent with a new CVA as shown below.      Patient was improved during admission. She is alert and oriented more than 24 hours before discharge. She ate well. She has no new complaints. Physical Exam:   General:                    Alert, appears stated age, not agitated this morning. Head:                                   Normocephalic, without obvious abnormality, atraumatic. Nose:                                   Nares normal. No drainage or sinus tenderness. Lungs:                       Clear to auscultation bilaterally.  No Wheezing or Rhonchi. No rales. Heart:                                  Regular rate and rhythm,  no murmur, rub or gallop. Abdomen:                  Soft, non-tender. Not distended.  Bowel sounds normal.   Extremities:               No cyanosis.  No edema.  No clubbing  Skin:                                    Texture, turgor normal. No rashes or lesions.  Not Jaundiced  Neurologic:                alert and oriented to place, time and person, no slurred speech, no obvious facial droop, spontaneously moves all 4 extremities,         Consults: None    Significant Diagnostic Studies:     5/14/2018  9:06 PM - [facility]   Component Results   Component Value Flag Ref Range Units Status   CRP, High sensitivity 7.6   mg/L Final   Comment:   (NOTE)    Value                          Risk     <1.0 mg/L                        Low   1.0-3.0 mg/L                     Average   >3.0 mg/L                        High        5/14/2018  7:46 PM - [facility]   Component Results   Component Value Flag Ref Range Units Status   Sed rate, automated 12  0 - 30 mm/hr Final     5/14/2018  7:16 AM - [facility]   Component Results   Component Value Flag Ref Range Units Status   TSH 1.197  0.358 - 3.740 uIU/mL Final     5/14/2018  7:12 AM - [facility]   Component Results   Component Value Flag Ref Range Units Status   Troponin-I, Qt. <0.04  0.02 - 0.05 NG/ML Final     5/14/2018  7:12 AM - [facility]   Component Results   Component Value Flag Ref Range Units Status   Sodium 134 (L) 136 - 145 mmol/L Final   Potassium 3.3 (L) 3.5 - 5.1 mmol/L Final   Chloride 98  98 - 107 mmol/L Final   CO2 26  21 - 32 mmol/L Final   Anion gap 10  7 - 16 mmol/L Final   Glucose 104 (H) 65 - 100 mg/dL Final   Comment:   47 - 60 mg/dl Consistent with, but not fully diagnostic of hypoglycemia. 101 - 125 mg/dl Impaired fasting glucose/consistent with pre-diabetes mellitus   > 126 mg/dl Fasting glucose consistent with overt diabetes mellitus      BUN 5 (L) 8 - 23 MG/DL Final   Creatinine 0.64  0.6 - 1.0 MG/DL Final   GFR est AA >60  >60 ml/min/1.73m2 Final   GFR est non-AA >60  >60 ml/min/1.73m2 Final   Comment:   (NOTE)   Estimated GFR is calculated using the Modification of Diet in Renal   Disease (MDRD) Study equation, reported for both  Americans   (GFRAA) and non- Americans (GFRNA), and normalized to 1.73m2   body surface area. The physician must decide which value applies to   the patient. The MDRD study equation should only be used in   individuals age 25 or older. It has not been validated for the   following: pregnant women, patients with serious comorbid conditions,   or on certain medications, or persons with extremes of body size,   muscle mass, or nutritional status. Calcium 8.9  8.3 - 10.4 MG/DL Final   Bilirubin, total 0.8  0.2 - 1.1 MG/DL Final   ALT (SGPT) 23  12 - 65 U/L Final   AST (SGOT) 21  15 - 37 U/L Final   Alk.  phosphatase 97  50 - 136 U/L Final   Protein, total 7.5  6.3 - 8.2 g/dL Final   Albumin 3.6  3.2 - 4.6 g/dL Final   Globulin 3.9 (H) 2.3 - 3.5 g/dL Final   A-G Ratio 0.9 (L) 1.2 - 3.5   Final     5/14/2018  6:43 AM - [facility]   Component Results   Component Value Flag Ref Range Units Status   WBC 7.8  4.3 - 11.1 K/uL Final   RBC 4.88  4.05 - 5.25 M/uL Final   HGB 14.4  11.7 - 15.4 g/dL Final   HCT 41.8  35.8 - 46.3 % Final   MCV 85.7  79.6 - 97.8 FL Final   MCH 29.5  26.1 - 32.9 PG Final   MCHC 34.4  31.4 - 35.0 g/dL Final   RDW 13.3  11.9 - 14.6 % Final   PLATELET 413  296 - 060 K/uL Final   MPV 9.6 (L) 10.8 - 14.1 FL Final   DF AUTOMATED     Final   NEUTROPHILS 68  43 - 78 % Final   LYMPHOCYTES 18  13 - 44 % Final   MONOCYTES 10  4.0 - 12.0 % Final   EOSINOPHILS 2  0.5 - 7.8 % Final   BASOPHILS 1  0.0 - 2.0 % Final   IMMATURE GRANULOCYTES 1  0.0 - 5.0 % Final         Disposition: home    Discharge Medications:   Current Discharge Medication List      START taking these medications    Details   clopidogrel (PLAVIX) 75 mg tab Take 1 Tab by mouth daily. Qty: 15 Tab, Refills: 0            Lipitor 10 mg orally once per day      CONTINUE these medications which have NOT CHANGED    Details   amLODIPine (NORVASC) 5 mg tablet Take 5 mg by mouth daily. dilTIAZem XR (DILACOR XR) 120 mg XR capsule Take 120 mg by mouth daily. Indications: VENTRICULAR RATE CONTROL IN ATRIAL FIBRILLATION      cholecalciferol, vitamin D3, (VITAMIN D3) 2,000 unit tab Take  by mouth daily. levothyroxine (SYNTHROID) 100 mcg tablet Take 100 mcg by mouth Daily (before breakfast). STOP taking these medications       dilTIAZem CD (CARDIZEM CD) 180 mg ER capsule Comments:   Reason for Stopping:         dilTIAZem CD (CARDIZEM CD) 120 mg ER capsule Comments:   Reason for Stopping:         aspirin 81 mg chewable tablet Comments:   Reason for Stopping:               Activity: Activity as tolerated  Diet: Cardiac Diet  Wound Care: None needed    Follow-up Appointments   Procedures    FOLLOW UP VISIT Appointment in: 3 - 5 Days See primary doctor     See primary doctor     Standing Status:   Standing     Number of Occurrences:   1     Order Specific Question:   Appointment in     Answer:   3 - 5 Days     I have discussed the plan of care with patient. Time spent on discharge is 34 minutes.        Signed By: Guerda Hanks MD     May 16, 2018

## 2018-05-17 LAB
BACTERIA SPEC CULT: NORMAL
SERVICE CMNT-IMP: NORMAL

## 2018-06-01 ENCOUNTER — HOSPITAL ENCOUNTER (INPATIENT)
Age: 78
LOS: 4 days | Discharge: HOME | DRG: 244 | End: 2018-06-05
Attending: INTERNAL MEDICINE | Admitting: INTERNAL MEDICINE
Payer: MEDICARE

## 2018-06-01 PROBLEM — I48.91 A-FIB (HCC): Status: ACTIVE | Noted: 2018-06-01

## 2018-06-01 LAB
ANION GAP SERPL CALC-SCNC: 10 MMOL/L (ref 7–16)
BASOPHILS # BLD: 0 K/UL (ref 0–0.2)
BASOPHILS NFR BLD: 0 % (ref 0–2)
BUN SERPL-MCNC: 10 MG/DL (ref 8–23)
CALCIUM SERPL-MCNC: 9.1 MG/DL (ref 8.3–10.4)
CHLORIDE SERPL-SCNC: 98 MMOL/L (ref 98–107)
CO2 SERPL-SCNC: 27 MMOL/L (ref 21–32)
CREAT SERPL-MCNC: 0.62 MG/DL (ref 0.6–1)
DIFFERENTIAL METHOD BLD: ABNORMAL
EOSINOPHIL # BLD: 0.1 K/UL (ref 0–0.8)
EOSINOPHIL NFR BLD: 1 % (ref 0.5–7.8)
ERYTHROCYTE [DISTWIDTH] IN BLOOD BY AUTOMATED COUNT: 13.8 % (ref 11.9–14.6)
GLUCOSE SERPL-MCNC: 102 MG/DL (ref 65–100)
HCT VFR BLD AUTO: 42.3 % (ref 35.8–46.3)
HGB BLD-MCNC: 14.4 G/DL (ref 11.7–15.4)
IMM GRANULOCYTES # BLD: 0.1 K/UL (ref 0–0.5)
IMM GRANULOCYTES NFR BLD AUTO: 1 % (ref 0–5)
LYMPHOCYTES # BLD: 1.9 K/UL (ref 0.5–4.6)
LYMPHOCYTES NFR BLD: 18 % (ref 13–44)
MCH RBC QN AUTO: 29.8 PG (ref 26.1–32.9)
MCHC RBC AUTO-ENTMCNC: 34 G/DL (ref 31.4–35)
MCV RBC AUTO: 87.4 FL (ref 79.6–97.8)
MONOCYTES # BLD: 0.5 K/UL (ref 0.1–1.3)
MONOCYTES NFR BLD: 4 % (ref 4–12)
NEUTS SEG # BLD: 7.7 K/UL (ref 1.7–8.2)
NEUTS SEG NFR BLD: 76 % (ref 43–78)
PLATELET # BLD AUTO: 461 K/UL (ref 150–450)
PMV BLD AUTO: 10.2 FL (ref 10.8–14.1)
POTASSIUM SERPL-SCNC: 4.2 MMOL/L (ref 3.5–5.1)
RBC # BLD AUTO: 4.84 M/UL (ref 4.05–5.25)
SODIUM SERPL-SCNC: 135 MMOL/L (ref 136–145)
WBC # BLD AUTO: 10.2 K/UL (ref 4.3–11.1)

## 2018-06-01 PROCEDURE — 65660000000 HC RM CCU STEPDOWN

## 2018-06-01 PROCEDURE — 74011000250 HC RX REV CODE- 250: Performed by: NURSE PRACTITIONER

## 2018-06-01 PROCEDURE — 74011250637 HC RX REV CODE- 250/637: Performed by: NURSE PRACTITIONER

## 2018-06-01 PROCEDURE — 85025 COMPLETE CBC W/AUTO DIFF WBC: CPT | Performed by: INTERNAL MEDICINE

## 2018-06-01 PROCEDURE — 74011000258 HC RX REV CODE- 258: Performed by: NURSE PRACTITIONER

## 2018-06-01 PROCEDURE — 80048 BASIC METABOLIC PNL TOTAL CA: CPT | Performed by: INTERNAL MEDICINE

## 2018-06-01 RX ORDER — GUAIFENESIN 100 MG/5ML
81 LIQUID (ML) ORAL DAILY
Status: DISCONTINUED | OUTPATIENT
Start: 2018-06-02 | End: 2018-06-03

## 2018-06-01 RX ORDER — LEVOTHYROXINE SODIUM 100 UG/1
100 TABLET ORAL
Status: DISCONTINUED | OUTPATIENT
Start: 2018-06-02 | End: 2018-06-05 | Stop reason: HOSPADM

## 2018-06-01 RX ORDER — SODIUM CHLORIDE 0.9 % (FLUSH) 0.9 %
5-10 SYRINGE (ML) INJECTION AS NEEDED
Status: DISCONTINUED | OUTPATIENT
Start: 2018-06-01 | End: 2018-06-05 | Stop reason: HOSPADM

## 2018-06-01 RX ORDER — ATORVASTATIN CALCIUM 10 MG/1
10 TABLET, FILM COATED ORAL DAILY
Status: DISCONTINUED | OUTPATIENT
Start: 2018-06-02 | End: 2018-06-05 | Stop reason: HOSPADM

## 2018-06-01 RX ORDER — TRAMADOL HYDROCHLORIDE 50 MG/1
50 TABLET ORAL
Status: DISCONTINUED | OUTPATIENT
Start: 2018-06-01 | End: 2018-06-05 | Stop reason: HOSPADM

## 2018-06-01 RX ORDER — SODIUM CHLORIDE 0.9 % (FLUSH) 0.9 %
5-10 SYRINGE (ML) INJECTION EVERY 8 HOURS
Status: DISCONTINUED | OUTPATIENT
Start: 2018-06-01 | End: 2018-06-05 | Stop reason: HOSPADM

## 2018-06-01 RX ORDER — DILTIAZEM HYDROCHLORIDE 5 MG/ML
10 INJECTION INTRAVENOUS ONCE
Status: COMPLETED | OUTPATIENT
Start: 2018-06-01 | End: 2018-06-01

## 2018-06-01 RX ORDER — ACETAMINOPHEN 325 MG/1
650 TABLET ORAL
Status: DISCONTINUED | OUTPATIENT
Start: 2018-06-01 | End: 2018-06-05 | Stop reason: HOSPADM

## 2018-06-01 RX ORDER — LANOLIN ALCOHOL/MO/W.PET/CERES
400 CREAM (GRAM) TOPICAL DAILY
Status: DISCONTINUED | OUTPATIENT
Start: 2018-06-02 | End: 2018-06-05 | Stop reason: HOSPADM

## 2018-06-01 RX ADMIN — DILTIAZEM HYDROCHLORIDE 10 MG: 5 INJECTION INTRAVENOUS at 14:52

## 2018-06-01 RX ADMIN — Medication 10 ML: at 14:52

## 2018-06-01 RX ADMIN — Medication 10 ML: at 22:00

## 2018-06-01 RX ADMIN — DILTIAZEM HYDROCHLORIDE 10 MG/HR: 5 INJECTION INTRAVENOUS at 15:06

## 2018-06-01 RX ADMIN — RIVAROXABAN 20 MG: 20 TABLET, FILM COATED ORAL at 17:10

## 2018-06-01 NOTE — PROGRESS NOTES
Patient received to room 312 as direct admit. Patient oriented to room, call light and plan of care. Pt is sitting in chair, breaths unlabored, skin pink warm and dry. Admission skin assessment completed with second RN and reveals the following: no breakdown or wounds noted. Ecchymosis scattered.

## 2018-06-01 NOTE — H&P
SUBJECTIVE:   Alfonso Ashley is a [de-identified] y.o. female seen for a follow up visit regarding the following: PAF,Hypertension,and CVA. She has refused anticoagulation in the past.  On her last visit,Cardizem was increased from 120 mg daily to 180 mg daily. Apparetly,she called triage reporting that she did not feel well. Event monitor was placed on 5-31-18 and reported atrial fibrillation with RVR. She returns for follow up. Recent echo in 5-2018 reported normal LV EF with marked LAE and moderate MR/TR. No chief complaint on file.        HPI:     Irregular Heart Beat    The history is provided by the patient. This is a recurrent problem. Episode onset: unknown. The problem has not changed since onset. The problem occurs constantly. The problem is associated with nothing. Associated symptoms include malaise/fatigue and weakness.  Pertinent negatives include no diaphoresis, no fever, no numbness, no chest pain, no chest pressure, no claudication, no exertional chest pressure, no irregular heartbeat, no near-syncope, no orthopnea, no PND, no syncope, no abdominal pain, no nausea, no vomiting, no headaches, no back pain, no leg pain, no lower extremity edema, no dizziness, no cough, no hemoptysis, no shortness of breath and no sputum production.         Past Medical History, Past Surgical History, Family history, Social History, and Medications were all reviewed with the patient today and updated as necessary.               Allergies   Allergen Reactions    Ciprofloxacin Nausea and Vomiting    Percocet [Oxycodone-Acetaminophen] Other (comments)       hallucinations    Other Medication Other (comments)       I don't do well with any strong medications (poss narcotics, pt difficult to get an elaboration from)           Past Medical History:   Diagnosis Date    Acute encephalopathy 6/27/2016    Anxiety      Arthritis      [facility] fibrillation ([facility] 4/16/2018    Chronic pain       hip    Endocrine disease       hypothyroidism  Hypertension      Sepsis ([facility] 6/27/2016    Thyroid disease      TIA (transient ischemic attack) 11/14/2017            Past Surgical History:   Procedure Laterality Date    HX ORTHOPAEDIC         right total hip    TOTAL KNEE ARTHROPLASTY Left 7/23/15            Family History   Problem Relation Age of Onset    Heart Attack Father             Social History   Substance Use Topics    Smoking status: Never Smoker    Smokeless tobacco: Never Used    Alcohol use No         ROS:     Review of Systems   Constitution: Positive for weakness and malaise/fatigue. Negative for decreased appetite, diaphoresis, fever and weight loss. HENT: Negative for nosebleeds. Eyes: Negative for double vision. Cardiovascular: Positive for dyspnea on exertion. Negative for chest pain, claudication, irregular heartbeat, leg swelling, near-syncope, orthopnea, palpitations, paroxysmal nocturnal dyspnea and syncope. Respiratory: Negative for cough, hemoptysis, shortness of breath, sputum production and wheezing. Endocrine: Negative for polydipsia, polyphagia and polyuria. Hematologic/Lymphatic: Negative for bleeding problem. Skin: Negative for rash. Musculoskeletal: Negative for arthritis, back pain, falls, muscle cramps, muscle weakness and myalgias. Gastrointestinal: Negative for abdominal pain, change in bowel habit, hematemesis, hematochezia, melena, nausea and vomiting. Genitourinary: Negative for dysuria and hematuria. Neurological: Negative for dizziness, focal weakness, headaches, light-headedness and numbness. Psychiatric/Behavioral: Negative for altered mental status and depression.             PHYSICAL EXAM:           Visit Vitals    /90    Pulse (!) 150    Ht 5' 4\" (1.626 m)    Wt 157 lb (71.2 kg)    BMI 26.95 kg/m2         Physical Exam   Constitutional: She appears well-developed and well-nourished. No distress. HENT:   Head: Normocephalic and atraumatic.    Eyes: EOM are normal. Pupils are equal, round, and reactive to light. Neck: Normal range of motion. Neck supple. No JVD present. No thyromegaly present. Cardiovascular: Normal rate. An irregularly irregular rhythm present. Exam reveals no gallop and no friction rub. No murmur heard. Pulmonary/Chest: Effort normal and breath sounds normal. She has no wheezes. She has no rales. She exhibits no tenderness. Abdominal: Soft. Bowel sounds are normal. She exhibits no distension and no mass. There is no tenderness. There is no rebound. Musculoskeletal: She exhibits no edema or tenderness. Neurological: She is alert. Skin: Skin is dry. No rash noted. Psychiatric: She has a normal mood and affect. Her behavior is normal.         Medical problems and test results were reviewed with the patient today.       Recent Results           Recent Results (from the past 672 hour(s))   CBC WITH AUTOMATED DIFF     Collection Time: 05/14/18  6:30 AM   Result Value Ref Range     WBC 7.8 4.3 - 11.1 K/uL     RBC 4.88 4.05 - 5.25 M/uL     HGB 14.4 11.7 - 15.4 g/dL     HCT 41.8 35.8 - 46.3 %     MCV 85.7 79.6 - 97.8 FL     MCH 29.5 26.1 - 32.9 PG     MCHC 34.4 31.4 - 35.0 g/dL     RDW 13.3 11.9 - 14.6 %     PLATELET 077 216 - 616 K/uL     MPV 9.6 (L) 10.8 - 14.1 FL     DF AUTOMATED        NEUTROPHILS 68 43 - 78 %     LYMPHOCYTES 18 13 - 44 %     MONOCYTES 10 4.0 - 12.0 %     EOSINOPHILS 2 0.5 - 7.8 %     BASOPHILS 1 0.0 - 2.0 %     IMMATURE GRANULOCYTES 1 0.0 - 5.0 %     ABS. NEUTROPHILS 5.4 1.7 - 8.2 K/UL     ABS. LYMPHOCYTES 1.4 0.5 - 4.6 K/UL     ABS. MONOCYTES 0.8 0.1 - 1.3 K/UL     ABS. EOSINOPHILS 0.1 0.0 - 0.8 K/UL     ABS. BASOPHILS 0.1 0.0 - 0.2 K/UL     ABS. IMM.  GRANS. 0.1 0.0 - 0.5 K/UL   PROTHROMBIN TIME + INR     Collection Time: 05/14/18  6:30 AM   Result Value Ref Range     Prothrombin time 13.4 11.5 - 14.5 sec     INR 1.0      METABOLIC PANEL, COMPREHENSIVE     Collection Time: 05/14/18  6:30 AM   Result Value Ref Range     Sodium 134 (L) 136 - 145 mmol/L     Potassium 3.3 (L) 3.5 - 5.1 mmol/L     Chloride 98 98 - 107 mmol/L     CO2 26 21 - 32 mmol/L     Anion gap 10 7 - 16 mmol/L     Glucose 104 (H) 65 - 100 mg/dL     BUN 5 (L) 8 - 23 MG/DL     Creatinine 0.64 0.6 - 1.0 MG/DL     GFR est AA >60 >60 ml/min/1.73m2     GFR est non-AA >60 >60 ml/min/1.73m2     Calcium 8.9 8.3 - 10.4 MG/DL     Bilirubin, total 0.8 0.2 - 1.1 MG/DL     ALT (SGPT) 23 12 - 65 U/L     AST (SGOT) 21 15 - 37 U/L     Alk. phosphatase 97 50 - 136 U/L     Protein, total 7.5 6.3 - 8.2 g/dL     Albumin 3.6 3.2 - 4.6 g/dL     Globulin 3.9 (H) 2.3 - 3.5 g/dL     A-G Ratio 0.9 (L) 1.2 - 3.5     TROPONIN I     Collection Time: 05/14/18  6:30 AM   Result Value Ref Range     Troponin-I, Qt. <0.04 0.02 - 0.05 NG/ML   TSH 3RD GENERATION     Collection Time: 05/14/18  6:30 AM   Result Value Ref Range     TSH 1.197 0.358 - 3.740 uIU/mL   EKG, 12 LEAD, INITIAL     Collection Time: 05/14/18  6:35 AM   Result Value Ref Range     Ventricular Rate 55 BPM     Atrial Rate 120 BPM     QRS Duration 82 ms     Q-T Interval 434 ms     QTC Calculation (Bezet) 415 ms     Calculated R Axis 62 degrees     Calculated T Axis 90 degrees     Diagnosis           !! AGE AND GENDER SPECIFIC ECG ANALYSIS !!   Sinus bradycardia  Abnormal ECG  When compared with ECG of 13-NOV-2017 22:28,  Junctional rhythm has replaced Sinus rhythm  Confirmed by Quin Cox MD (), Clarence Hines ([id number]) on 5/14/2018 7:50:56 AM   URINALYSIS W/ RFLX MICROSCOPIC     Collection Time: 05/14/18  2:57 PM   Result Value Ref Range     Color YELLOW        Appearance CLEAR        Specific gravity 1.012 1.001 - 1.023       pH (UA) 7.5 5.0 - 9.0       Protein NEGATIVE  NEG mg/dL     Glucose NEGATIVE  mg/dL     Ketone >80 (A) NEG mg/dL     Bilirubin NEGATIVE  NEG       Blood NEGATIVE  NEG       Urobilinogen 0.2 0.2 - 1.0 EU/dL     Nitrites NEGATIVE  NEG       Leukocyte Esterase NEGATIVE  NEG     CULTURE, URINE     Collection Time: 05/14/18  2:58 PM Result Value Ref Range     Special Requests: NO SPECIAL REQUESTS        Culture result:            50,000-100,000 COLONIES/mL MIXED SKIN MICHAEL ISOLATED   SED RATE, AUTOMATED     Collection Time: 05/14/18  7:03 PM   Result Value Ref Range     Sed rate, automated 12 0 - 30 mm/hr   CRP, HIGH SENSITIVITY     Collection Time: 05/14/18  7:03 PM   Result Value Ref Range     CRP, High sensitivity 7.6 mg/L   LIPID PANEL     Collection Time: 05/15/18  3:46 AM   Result Value Ref Range     LIPID PROFILE           Cholesterol, total 151 <200 MG/DL     Triglyceride 55 35 - 150 MG/DL     HDL Cholesterol 57 40 - 60 MG/DL     LDL, calculated 83 <100 MG/DL     VLDL, calculated 11 6.0 - 23.0 MG/DL     CHOL/HDL Ratio 2.6      HEMOGLOBIN A1C WITH EAG     Collection Time: 05/15/18  3:46 AM   Result Value Ref Range     Hemoglobin A1c 5.7 4.8 - 6.0 %     Est. average glucose 117 mg/dL               Lab Results   Component Value Date/Time     Cholesterol, total 151 05/15/2018 03:46 AM     HDL Cholesterol 57 05/15/2018 03:46 AM     LDL, calculated 83 05/15/2018 03:46 AM     VLDL, calculated 11 05/15/2018 03:46 AM     Triglyceride 55 05/15/2018 03:46 AM     CHOL/HDL Ratio 2.6 05/15/2018 03:46 AM          Results for orders placed or performed in visit on 06/01/18   AMB POC EKG ROUTINE W/ 12 LEADS, INTER & REP     Impression     Possible atrial fibrillation   -ST depression   +   Nonspecific T-abnormality  -Nondiagnostic. Low voltage -possible pulmonary disease.      ABNORMAL          ASSESSMENT and PLAN     Diagnoses and all orders for this visit:     1. Atrial fibrillation, unspecified type (HCC):Recurrent atrial fibrillation with RVR. I recommended in hospital admission for iv Cardizem and systemic anticoagulation(presently,she is agreeable to switching Plavix to NOAC) and telemetry. .  Future considerations may include LUCAS cardioversion or chronic anti arrhythmic therapy.   -     EKG     2. Essential hypertension:Stable.                    Follow-up Disposition:  Return in about 2 weeks (around 6/15/2018), or After hosptalization.

## 2018-06-01 NOTE — IP AVS SNAPSHOT
[address] 
[phone] Patient: Kye Porras MRN: [id number] [facility]:8/52/7518 A check víctor indicates which time of day the medication should be taken. My Medications START taking these medications Instructions Each Dose to Equal  
 Morning Noon Evening Bedtime  
 dofetilide 500 mcg capsule Commonly known as:  Svetlana Steve Take 1 Cap by mouth every twelve (12) hours every twelve (12) hours. 500 mcg  
    
  
   
   
  
   
  
 rivaroxaban 20 mg Tab tablet Commonly known as:  Andre Zuñiga Take 1 Tab by mouth daily (with dinner). 20 mg CONTINUE taking these medications Instructions Each Dose to Equal  
 Morning Noon Evening Bedtime Magnesium Oxide 500 mg Cap Take  by mouth. SYNTHROID 100 mcg tablet Generic drug:  levothyroxine Take 100 mcg by mouth Daily (before breakfast). 100 mcg VITAMIN D3 2,000 unit Tab Generic drug:  cholecalciferol (vitamin D3) Take  by mouth daily. STOP taking these medications   
 atorvastatin 10 mg tablet Commonly known as:  LIPITOR  
   
  
 clopidogrel 75 mg Tab Commonly known as:  PLAVIX  
   
  
 dilTIAZem  mg XR capsule Commonly known as:  DILACOR XR Where to Get Your Medications Information on where to get these meds will be given to you by the nurse or doctor. ! Ask your nurse or doctor about these medications  
  dofetilide 500 mcg capsule  
 rivaroxaban 20 mg Tab tablet

## 2018-06-01 NOTE — PROGRESS NOTES
Bedside and Verbal shift change report given to Leyla Garcia RN (oncoming nurse) by this RN (offgoing nurse).  Report included the following information SBAR, Kardex, MAR, Recent Results and Cardiac Rhythm junctional.

## 2018-06-01 NOTE — IP AVS SNAPSHOT
[address] 
[phone] Patient: Tiffanie Montez MRN: [id number] About your hospitalization You were admitted on:  June 1, 2018 You last received care in the:  [facility] 3 TELEMETRY You were discharged on:  June 5, 2018 Why you were hospitalized Your primary diagnosis was:  Not on File Your diagnoses also included:  A-Fib (Hcc) Follow-up Information Follow up With Details Comments Contact Info Thalia Quarles MD  Sandrine 15 @ 3:30  [address] [facility] [address] 
[phone] Avelino Hernandez MD  As needed [address] [facility] [address] [facility] [address] 
[phone] Your Scheduled Appointments Friday Sandrine 15, 2018  3:30 PM EDT  
OFFICE DEVICE CHECKS with [facility] [address] [facility] CARDIOLOGY [place] OFFICE ([address] [facility]) [address] 
[address] [facility] 81  
[phone] This upcoming device check will take place IN OUR OFFICE. Please arrive 15 minutes early to review any necessary paperwork requirements. If you have any further questions or need to change this appointment, we are happy to help and can be reached at [phone]. Discharge Orders None A check víctor indicates which time of day the medication should be taken. My Medications START taking these medications Instructions Each Dose to Equal  
 Morning Noon Evening Bedtime  
 dofetilide 500 mcg capsule Commonly known as:  Nonda Artie Take 1 Cap by mouth every twelve (12) hours every twelve (12) hours. 500 mcg  
    
  
   
   
  
   
  
 rivaroxaban 20 mg Tab tablet Commonly known as:  Elesa Hoof Take 1 Tab by mouth daily (with dinner). 20 mg CONTINUE taking these medications Instructions Each Dose to Equal  
 Morning Noon Evening Bedtime Magnesium Oxide 500 mg Cap Take  by mouth. SYNTHROID 100 mcg tablet Generic drug:  levothyroxine Take 100 mcg by mouth Daily (before breakfast). 100 mcg VITAMIN D3 2,000 unit Tab Generic drug:  cholecalciferol (vitamin D3) Take  by mouth daily. STOP taking these medications   
 atorvastatin 10 mg tablet Commonly known as:  LIPITOR  
   
  
 clopidogrel 75 mg Tab Commonly known as:  PLAVIX  
   
  
 dilTIAZem  mg XR capsule Commonly known as:  DILACOR XR Where to Get Your Medications Information on where to get these meds will be given to you by the nurse or doctor. ! Ask your nurse or doctor about these medications  
  dofetilide 500 mcg capsule  
 rivaroxaban 20 mg Tab tablet Discharge Instructions Atrial Fibrillation: Care Instructions Your Care Instructions Atrial fibrillation is an irregular and often fast heartbeat. Treating this condition is important for several reasons. It can cause blood clots, which can travel from your heart to your brain and cause a stroke. If you have a fast heartbeat, you may feel lightheaded, dizzy, and weak. An irregular heartbeat can also increase your risk for heart failure. Atrial fibrillation is often the result of another heart condition, such as high blood pressure or coronary artery disease. Making changes to improve your heart condition will help you stay healthy and active. Follow-up care is a key part of your treatment and safety. Be sure to make and go to all appointments, and call your doctor if you are having problems. It's also a good idea to know your test results and keep a list of the medicines you take. How can you care for yourself at home? Medicines ? · Take your medicines exactly as prescribed. Call your doctor if you think you are having a problem with your medicine.  You will get more details on the specific medicines your doctor prescribes. ? · If your doctor has given you a blood thinner to prevent a stroke, be sure you get instructions about how to take your medicine safely. Blood thinners can cause serious bleeding problems. ? · Do not take any vitamins, over-the-counter drugs, or herbal products without talking to your doctor first. ? Lifestyle changes ? · Do not smoke. Smoking can increase your chance of a stroke and heart attack. If you need help quitting, talk to your doctor about stop-smoking programs and medicines. These can increase your chances of quitting for good. ? · Eat a heart-healthy diet. ? · Stay at a healthy weight. Lose weight if you need to.  
? · Limit alcohol to 2 drinks a day for men and 1 drink a day for women. Too much alcohol can cause health problems. ? · Avoid colds and flu. Get a pneumococcal vaccine shot. If you have had one before, ask your doctor whether you need another dose. Get a flu shot every year. If you must be around people with colds or flu, wash your hands often. Activity ? · If your doctor recommends it, get more exercise. Walking is a good choice. Bit by bit, increase the amount you walk every day. Try for at least 30 minutes on most days of the week. You also may want to swim, bike, or do other activities. Your doctor may suggest that you join a cardiac rehabilitation program so that you can have help increasing your physical activity safely. ? · Start light exercise if your doctor says it is okay. Even a small amount will help you get stronger, have more energy, and manage stress. Walking is an easy way to get exercise. Start out by walking a little more than you did in the hospital. Gradually increase the amount you walk. ? · When you exercise, watch for signs that your heart is working too hard. You are pushing too hard if you cannot talk while you are exercising.  If you become short of breath or dizzy or have chest pain, sit down and rest immediately. ? · Check your pulse regularly. Place two fingers on the artery at the palm side of your wrist, in line with your thumb. If your heartbeat seems uneven or fast, talk to your doctor. When should you call for help? Call 911 anytime you think you may need emergency care. For example, call if: 
? · You have symptoms of a heart attack. These may include: ¨ Chest pain or pressure, or a strange feeling in the chest. 
¨ Sweating. ¨ Shortness of breath. ¨ Nausea or vomiting. ¨ Pain, pressure, or a strange feeling in the back, neck, jaw, or upper belly or in one or both shoulders or arms. ¨ Lightheadedness or sudden weakness. ¨ A fast or irregular heartbeat. After you call 911, the  may tell you to chew 1 adult-strength or 2 to 4 low-dose aspirin. Wait for an ambulance. Do not try to drive yourself. ? · You have symptoms of a stroke. These may include: 
¨ Sudden numbness, tingling, weakness, or loss of movement in your face, arm, or leg, especially on only one side of your body. ¨ Sudden vision changes. ¨ Sudden trouble speaking. ¨ Sudden confusion or trouble understanding simple statements. ¨ Sudden problems with walking or balance. ¨ A sudden, severe headache that is different from past headaches. ? · You passed out (lost consciousness). ?Call your doctor now or seek immediate medical care if: 
? · You have new or increased shortness of breath. ? · You feel dizzy or lightheaded, or you feel like you may faint. ? · Your heart rate becomes irregular. ? · You can feel your heart flutter in your chest or skip heartbeats. Tell your doctor if these symptoms are new or worse. ? Watch closely for changes in your health, and be sure to contact your doctor if you have any problems. Where can you learn more? Go to http://agus-branden.info/. Enter U020 in the search box to learn more about \"Atrial Fibrillation: Care Instructions. \" 
 Current as of: September 21, 2016 Content Version: 11.4 © 1771-6238 TRA. Care instructions adapted under license by [employer] (which disclaims liability or warranty for this information). If you have questions about a medical condition or this instruction, always ask your healthcare professional. [address] any warranty or liability for your use of this information. Heart-Healthy Diet: Care Instructions Your Care Instructions A heart-healthy diet has lots of vegetables, fruits, nuts, beans, and whole grains, and is low in salt. It limits foods that are high in saturated fat, such as meats, cheeses, and fried foods. It may be hard to change your diet, but even small changes can lower your risk of heart attack and heart disease. Follow-up care is a key part of your treatment and safety. Be sure to make and go to all appointments, and call your doctor if you are having problems. It's also a good idea to know your test results and keep a list of the medicines you take. How can you care for yourself at home? Watch your portions · Learn what a serving is. A \"serving\" and a \"portion\" are not always the same thing. Make sure that you are not eating larger portions than are recommended. For example, a serving of pasta is ½ cup. A serving size of meat is 2 to 3 ounces. A 3-ounce serving is about the size of a deck of cards. Measure serving sizes until you are good at [place]" them. Keep in mind that restaurants often serve portions that are 2 or 3 times the size of one serving. · To keep your energy level up and keep you from feeling hungry, eat often but in smaller portions. · Eat only the number of calories you need to stay at a healthy weight. If you need to lose weight, eat fewer calories than your body burns (through exercise and other physical activity). Eat more fruits and vegetables · Eat a variety of fruit and vegetables every day. Dark green, deep orange, red, or yellow fruits and vegetables are especially good for you. Examples include spinach, carrots, peaches, and berries. · Keep carrots, celery, and other veggies handy for snacks. Buy fruit that is in season and store it where you can see it so that you will be tempted to eat it. · Cook dishes that have a lot of veggies in them, such as stir-fries and soups. Limit saturated and trans fat · Read food labels, and try to avoid saturated and trans fats. They increase your risk of heart disease. Trans fat is found in many processed foods such as cookies and crackers. · Use olive or canola oil when you cook. Try cholesterol-lowering spreads, such as Benecol or Take Control. · Bake, broil, grill, or steam foods instead of frying them. · Choose lean meats instead of high-fat meats such as hot dogs and sausages. Cut off all visible fat when you prepare meat. · Eat fish, skinless poultry, and meat alternatives such as soy products instead of high-fat meats. Soy products, such as tofu, may be especially good for your heart. · Choose low-fat or fat-free milk and dairy products. Eat fish · Eat at least two servings of fish a week. Certain fish, such as salmon and tuna, contain omega-3 fatty acids, which may help reduce your risk of heart attack. Eat foods high in fiber · Eat a variety of grain products every day. Include whole-grain foods that have lots of fiber and nutrients. Examples of whole-grain foods include oats, whole wheat bread, and brown rice. · Buy whole-grain breads and cereals, instead of white bread or pastries. Limit salt and sodium · Limit how much salt and sodium you eat to help lower your blood pressure. · Taste food before you salt it. Add only a little salt when you think you need it. With time, your taste buds will adjust to less salt. · Eat fewer snack items, fast foods, and other high-salt, processed foods. Check food labels for the amount of sodium in packaged foods. · Choose low-sodium versions of canned goods (such as soups, vegetables, and beans). Limit sugar · Limit drinks and foods with added sugar. These include candy, desserts, and soda pop. Limit alcohol · Limit alcohol to no more than 2 drinks a day for men and 1 drink a day for women. Too much alcohol can cause health problems. When should you call for help? Watch closely for changes in your health, and be sure to contact your doctor if: 
? · You would like help planning heart-healthy meals. Where can you learn more? Go to http://agus-branden.info/. Enter V137 in the search box to learn more about \"Heart-Healthy Diet: Care Instructions. \" Current as of: September 21, 2016 Content Version: 11.4 © 4779-9333 Panoratio. Care instructions adapted under license by [employer] (which disclaims liability or warranty for this information). If you have questions about a medical condition or this instruction, always ask your healthcare professional. [address] any warranty or liability for your use of this information. YouNoodle Announcement We are excited to announce that we are making your provider's discharge notes available to you in YouNoodle. You will see these notes when they are completed and signed by the physician that discharged you from your recent hospital stay. If you have any questions or concerns about any information you see in YouNoodle, please call the Health Information Department where you were seen or reach out to your Primary Care Provider for more information about your plan of care. Introducing [facility]! Dear Will Tao: 
Thank you for requesting a YouNoodle account. Our records indicate that you already have an active YouNoodle account. You can access your account anytime at [email or web address] Did you know that you can access your hospital and ER discharge instructions at any time in BeatDeck? You can also review all of your test results from your hospital stay or ER visit. Additional Information If you have questions, please visit the Frequently Asked Questions section of the Emme [employer] website at [email or web address]/. Remember, BeatDeck is NOT to be used for urgent needs. For medical emergencies, dial 911. Now available from your iPhone and Android! Introducing Buddy Vance As a [facility] patient, I wanted to make you aware of our electronic visit tool called Buddy Vance. [employer] 24/7 allows you to connect within minutes with a medical provider 24 hours a day, seven days a week via a mobile device or tablet or logging into a secure website from your computer. You can access Buddy Vance from anywhere in the [place]. A virtual visit might be right for you when you have a simple condition and feel like you just dont want to get out of bed, or cant get away from work for an appointment, when your regular [facility] provider is not available (evenings, weekends or holidays), or when youre out of town and need minor care. Electronic visits cost only $49 and if the [facility]Xanitos provider determines a prescription is needed to treat your condition, one can be electronically transmitted to a nearby pharmacy*. Please take a moment to enroll today if you have not already done so. The enrollment process is free and takes just a few minutes. To enroll, please download the 51.com/Xanitos gibran to your tablet or phone, or visit [email or web address] to enroll on your computer. And, as an [address] [place] [address] patient with a Composeright account, the results of your visits will be scanned into your electronic medical record and your primary care provider will be able to view the scanned results. We urge you to continue to see your regular [facility] provider for your ongoing medical care. And while your primary care provider may not be the one available when you seek a iCrossingshahbazfin virtual visit, the peace of mind you get from getting a real diagnosis real time can be priceless. For more information on Netechy, view our Frequently Asked Questions (FAQs) at [email or web address]. Sincerely, 
 
Camelia Larkin MD 
Chief Medical Officer [employer] *:  certain medications cannot be prescribed via Netechy Unresulted Labs-Please follow up with your PCP about these lab tests Order Current Status EKG, 12 LEAD, INITIAL Preliminary result Providers Seen During Your Hospitalization Provider Specialty Primary office phone Dewayne Goldberg, MD Cardiology [phone] Your Primary Care Physician (PCP) Primary Care Physician Office Phone Office Fax Lum Man [address] [facility] [phone] You are allergic to the following Allergen Reactions Ciprofloxacin Nausea and Vomiting Percocet (Oxycodone-Acetaminophen) Other (comments)  
 hallucinations Other Medication Other (comments) I don't do well with any strong medications (poss narcotics, pt difficult to get an elaboration from) Recent Documentation Weight BMI OB Status Smoking Status 70.4 kg 26.62 kg/m2 Postmenopausal Never Smoker Emergency Contacts Name Discharge Info Relation Home Work [facility]  Son [22] [phone] Ross Jeffery  Son [22] [phone] Patient Belongings The following personal items are in your possession at time of discharge: 
  Dental Appliances: None         Home Medications: None   Jewelry: Ring, Watch  Clothing: At bedside Please provide this summary of care documentation to your next provider. Signatures-by signing, you are acknowledging that this After Visit Summary has been reviewed with you and you have received a copy. Patient Signature:  ____________________________________________________________ Date:  ____________________________________________________________  
  
Dustin Brake Provider Signature:  ____________________________________________________________ Date:  ____________________________________________________________

## 2018-06-01 NOTE — PROGRESS NOTES
Pt with pause and junctional rhythm in 40s. Some sinus beats and runs of afib but mostly junctional at this time. Pt sitting up in chair, /59. Son at bedside. cardizem stopped. Isaak Copeland, NP informed and aware. Orders to stop gtt and monitor. Reviewed with pt symptoms to report and instructed to call for help up and do not attempt on her own.  She and son verbalize understading

## 2018-06-02 LAB
ANION GAP SERPL CALC-SCNC: 8 MMOL/L (ref 7–16)
ATRIAL RATE: 56 BPM
ATRIAL RATE: 58 BPM
BASOPHILS # BLD: 0 K/UL (ref 0–0.2)
BASOPHILS NFR BLD: 1 % (ref 0–2)
BUN SERPL-MCNC: 13 MG/DL (ref 8–23)
CALCIUM SERPL-MCNC: 8.5 MG/DL (ref 8.3–10.4)
CALCULATED P AXIS, ECG09: 85 DEGREES
CALCULATED P AXIS, ECG09: 85 DEGREES
CALCULATED R AXIS, ECG10: -16 DEGREES
CALCULATED R AXIS, ECG10: 17 DEGREES
CALCULATED T AXIS, ECG11: 70 DEGREES
CALCULATED T AXIS, ECG11: 92 DEGREES
CHLORIDE SERPL-SCNC: 100 MMOL/L (ref 98–107)
CO2 SERPL-SCNC: 27 MMOL/L (ref 21–32)
CREAT SERPL-MCNC: 0.66 MG/DL (ref 0.6–1)
DIAGNOSIS, 93000: NORMAL
DIAGNOSIS, 93000: NORMAL
DIFFERENTIAL METHOD BLD: ABNORMAL
EOSINOPHIL # BLD: 0.1 K/UL (ref 0–0.8)
EOSINOPHIL NFR BLD: 2 % (ref 0.5–7.8)
ERYTHROCYTE [DISTWIDTH] IN BLOOD BY AUTOMATED COUNT: 13.9 % (ref 11.9–14.6)
GLUCOSE SERPL-MCNC: 88 MG/DL (ref 65–100)
HCT VFR BLD AUTO: 38.6 % (ref 35.8–46.3)
HGB BLD-MCNC: 13.4 G/DL (ref 11.7–15.4)
IMM GRANULOCYTES # BLD: 0.1 K/UL (ref 0–0.5)
IMM GRANULOCYTES NFR BLD AUTO: 1 % (ref 0–5)
LYMPHOCYTES # BLD: 1.3 K/UL (ref 0.5–4.6)
LYMPHOCYTES NFR BLD: 17 % (ref 13–44)
MAGNESIUM SERPL-MCNC: 2 MG/DL (ref 1.8–2.4)
MCH RBC QN AUTO: 30.2 PG (ref 26.1–32.9)
MCHC RBC AUTO-ENTMCNC: 34.7 G/DL (ref 31.4–35)
MCV RBC AUTO: 86.9 FL (ref 79.6–97.8)
MONOCYTES # BLD: 0.9 K/UL (ref 0.1–1.3)
MONOCYTES NFR BLD: 11 % (ref 4–12)
NEUTS SEG # BLD: 5.2 K/UL (ref 1.7–8.2)
NEUTS SEG NFR BLD: 68 % (ref 43–78)
P-R INTERVAL, ECG05: 184 MS
P-R INTERVAL, ECG05: 206 MS
PLATELET # BLD AUTO: 382 K/UL (ref 150–450)
PMV BLD AUTO: 9.6 FL (ref 10.8–14.1)
POTASSIUM SERPL-SCNC: 3.9 MMOL/L (ref 3.5–5.1)
Q-T INTERVAL, ECG07: 464 MS
Q-T INTERVAL, ECG07: 476 MS
QRS DURATION, ECG06: 86 MS
QRS DURATION, ECG06: 88 MS
QTC CALCULATION (BEZET), ECG08: 455 MS
QTC CALCULATION (BEZET), ECG08: 459 MS
RBC # BLD AUTO: 4.44 M/UL (ref 4.05–5.25)
SODIUM SERPL-SCNC: 135 MMOL/L (ref 136–145)
VENTRICULAR RATE, ECG03: 56 BPM
VENTRICULAR RATE, ECG03: 58 BPM
WBC # BLD AUTO: 7.6 K/UL (ref 4.3–11.1)

## 2018-06-02 PROCEDURE — 65660000000 HC RM CCU STEPDOWN

## 2018-06-02 PROCEDURE — 74011250637 HC RX REV CODE- 250/637: Performed by: INTERNAL MEDICINE

## 2018-06-02 PROCEDURE — 80048 BASIC METABOLIC PNL TOTAL CA: CPT | Performed by: INTERNAL MEDICINE

## 2018-06-02 PROCEDURE — 74011250637 HC RX REV CODE- 250/637: Performed by: NURSE PRACTITIONER

## 2018-06-02 PROCEDURE — 93005 ELECTROCARDIOGRAM TRACING: CPT | Performed by: INTERNAL MEDICINE

## 2018-06-02 PROCEDURE — 83735 ASSAY OF MAGNESIUM: CPT | Performed by: INTERNAL MEDICINE

## 2018-06-02 PROCEDURE — 85025 COMPLETE CBC W/AUTO DIFF WBC: CPT | Performed by: INTERNAL MEDICINE

## 2018-06-02 PROCEDURE — 36415 COLL VENOUS BLD VENIPUNCTURE: CPT | Performed by: INTERNAL MEDICINE

## 2018-06-02 RX ORDER — DOFETILIDE 0.5 MG/1
500 CAPSULE ORAL EVERY 12 HOURS
Status: DISCONTINUED | OUTPATIENT
Start: 2018-06-02 | End: 2018-06-05 | Stop reason: HOSPADM

## 2018-06-02 RX ADMIN — Medication 5 ML: at 14:00

## 2018-06-02 RX ADMIN — LEVOTHYROXINE SODIUM 100 MCG: 100 TABLET ORAL at 08:04

## 2018-06-02 RX ADMIN — RIVAROXABAN 20 MG: 20 TABLET, FILM COATED ORAL at 18:27

## 2018-06-02 RX ADMIN — Medication 5 ML: at 06:00

## 2018-06-02 RX ADMIN — DOFETILIDE 500 MCG: 0.5 CAPSULE ORAL at 22:19

## 2018-06-02 RX ADMIN — Medication 400 MG: at 11:14

## 2018-06-02 RX ADMIN — DOFETILIDE 500 MCG: 0.5 CAPSULE ORAL at 11:13

## 2018-06-02 RX ADMIN — ATORVASTATIN CALCIUM 10 MG: 10 TABLET, FILM COATED ORAL at 11:14

## 2018-06-02 RX ADMIN — Medication 10 ML: at 22:19

## 2018-06-02 NOTE — CONSULTS
[facility] Cardiology/Electrophyiology Consult                Date of  Admission: 6/1/2018 12:27 PM     CC/Reason for consult: atrial fibrillation    Patt Jacobson is a [de-identified] y.o. female admitted for Atrial Fib with RVR; A-fib ([facility] [address] [de-identified] WF with a history of HTN, TIA/CVA, hyponatremia admitted with afib with RVR and EP was consulted for management of afib. Pt called the office reporting some rapid palpitations, had monitor placed, and was admitted with afib with RVR noted on monitor. Pt reports afib is a new diagnosis, reports frequent episodes of rapid palpitations, associated with fatigue, weakness, anxiety, and shortness of breath. Pt has not been on anticoagulation and has not been on antiarrhythmic medication. Pt afib is a new diagnosis, uncontrolled, recurrent, worsening, no aggravating or alleviating factors, with symptoms as noted above occurring daily over the past few days to weeks. Pt also reports a history of episodes of syncope. She reports feeling lightheaded and dizzy at times, and has had 3-4 prior syncopal events dating back to 2016. She is not sure of any symptoms with recent post conversion pause on telemetry.      Cardiac PMH: (Old records have been reviewed and summarized below)  TTE (5/15/18): EF 60%, severe LAE, mod MR/TR    Patient Active Problem List   Diagnosis Code    Acute encephalopathy G93.40    HTN (hypertension) I10    Sepsis ([address] A41.9    TIA (transient ischemic attack) G45.9    Slurred speech R47.81    Hyponatremia E87.1    Atrial fibrillation ([address] I48.91    Confusion R41.0    Hypertensive emergency I16.1    Hypothyroidism E03.9    CVA (cerebral vascular accident) ([address] I63.9    A-fib ([address] I48.91       Past Medical History:   Diagnosis Date    Acute encephalopathy 6/27/2016    Anxiety     Arthritis     Atrial fibrillation ([address] 4/16/2018    Chronic pain     hip    Endocrine disease     hypothyroidism    Hypertension     Sepsis ([address] 6/27/2016    Thyroid disease     TIA (transient ischemic attack) 11/14/2017      Past Surgical History:   Procedure Laterality Date    HX ORTHOPAEDIC      right total hip    TOTAL KNEE ARTHROPLASTY Left 7/23/15     Allergies   Allergen Reactions    Ciprofloxacin Nausea and Vomiting    Percocet [Oxycodone-Acetaminophen] Other (comments)     hallucinations    Other Medication Other (comments)     I don't do well with any strong medications (poss narcotics, pt difficult to get an elaboration from)      Family History   Problem Relation Age of Onset    Heart Attack Father         Current Facility-Administered Medications   Medication Dose Route Frequency    dofetilide (TIKOSYN) capsule 500 mcg  500 mcg Oral Q12H    atorvastatin (LIPITOR) tablet 10 mg  10 mg Oral DAILY    levothyroxine (SYNTHROID) tablet 100 mcg  100 mcg Oral ACB    magnesium oxide (MAG-OX) tablet 400 mg  400 mg Oral DAILY    sodium chloride (NS) flush 5-10 mL  5-10 mL IntraVENous Q8H    sodium chloride (NS) flush 5-10 mL  5-10 mL IntraVENous PRN    aspirin chewable tablet 81 mg  81 mg Oral DAILY    rivaroxaban (XARELTO) tablet 20 mg  20 mg Oral DAILY WITH DINNER    dilTIAZem (CARDIZEM) 125 mg in dextrose 5% 125 mL infusion  0-15 mg/hr IntraVENous TITRATE    traMADol (ULTRAM) tablet 50 mg  50 mg Oral Q6H PRN    acetaminophen (TYLENOL) tablet 650 mg  650 mg Oral Q4H PRN       Review of Symptoms:  A comprehensive ROS was performed with the pertinent positives and negatives mentioned in the HPI, all other systems reviewed and are negative       Physical Exam  Vitals:    06/01/18 1823 06/01/18 2105 06/02/18 0107 06/02/18 0504   BP:  101/51 120/65 124/77   Pulse: (!) 45 (!) 45 65 (!) 109   Resp:  18 16 18   Temp:  97.6 °F (36.4 °C) 98.2 °F (36.8 °C) 98.2 °F (36.8 °C)   SpO2:  98% 97% 97%   Weight:    71 kg (156 lb 9.6 oz)       Physical Exam:  General appearance - Alert, well appearing, and in no distress   Mental status - Affect appropriate to mood.   Eyes - Sclerae anicteric,  ENMT - Hearing grossly normal bilaterally, Dental hygiene good. Neck - Carotids upstroke normal bilaterally, no bruits, no JVD. Resp - Clear to auscultation, no wheezes, rales or rhonchi, symmetric air entry. Heart - bradycardic, regular rhythm, normal S1, S2, no murmurs, rubs, clicks or gallops. GI - Soft, nontender, nondistended, no masses or organomegaly. Neurological - Grossly intact - normal speech, no focal findings  Musculoskeletal - No joint tenderness, deformity or swelling, no muscular tenderness noted. Extremities - Peripheral pulses normal, no pedal edema, no clubbing or cyanosis. Skin - Normal coloration and turgor. Psych -  oriented to person, place, and time. Cardiographics    Telemetry: sinus bradycardia, post conversion pause  ECG (Indpendently visualized and interpreted): NSR< NSST  Echocardiogram: see above    Labs:   Recent Labs      06/02/18   0522  06/01/18   1335   NA  135*  135*   K  3.9  4.2   BUN  13  10   CREA  0.66  0.62   GLU  88  102*   WBC  7.6  10.2   HGB  13.4  14.4   HCT  38.6  42.3   PLT  382  461*        Assessment:      Active Problems:    A-fib ([facility] [address] (6/1/2018)    [de-identified] WF with a history of HTN, TIA/CVA, hyponatremia admitted with afib with RVR and EP was consulted for management of afib and patient with clear tachy dusty syndrome on monitor, post conversion pauses and history of syncope. Plan:   1. Paroxysmal atrial fibrillation, uncontrolled: I had a long discussion with the Pt today regarding rate and rhythm control strategies, rhythm control strategy treatment options including DCCV, antiarrhythmic therapy, catheter ablation and the combination of the above. I discussed at length the advantages and disadvantages of all treatment strategies. Pt with symptomatic pAF in the setting of moderate MR and severe LAE.  She is unlikely to respond to class IC agents, has underlying bradycardia and post conversion pauses, and amiodarone is not a good option given toxicities. Pt wants to try tikosyn for rhythm control and understands this might be an expensive drug option. Give post conversion pauses and bradycardia feel a PPM is warranted as discussed below. Creatinine clearance > 60, cont telemetry and post dose ECGs per protocol. Close monitoring in the setting of bradycardia, QTc 455 msec this AM.     2. Tachy dusty syndrome, post conversion pauses, marked bradycardia, uncontrolled: I had a long discussion today with the patient regarding the risks, benefits, and alternatives of an implantable cardiac rhythm device. I discussed in detail the potential benefits of pacemaker therapy that are largely aimed at improving symptoms from symptomatic bradycardia. Additionally, I discussed with the patient the potential risks device implantation, including the risk of bleeding, infection, large blood vessel injury, lung or cardiac injury, venous occlusion, DVT/PE, pneumothorax, cardiac tamponade, perforation, need for urgent open heart surgery or additional procedures, device/lead failure, lead dislodgement, heart attack, stroke, arrhythmia, oversedation, respiratory arrest, and even death. Will likely proceed with PPM this admission. 3. Syncope: unclear etiology, possibly 2/2 tachy dusty and post conversion pauses, plans for PPM as noted above. Thank you very much for this referral. We appreciate the opportunity to participate in this patient's care. We will follow along with above stated plan. Osiel Luis MD, MS  Cardiology/Electrophysiology

## 2018-06-02 NOTE — PROGRESS NOTES
Verbal bedside report given to earlene Oden RN. Patient's situation, background, assessment and recommendations provided. Opportunity for questions provided. Oncoming RN assumed care of patient.

## 2018-06-03 LAB
ANION GAP SERPL CALC-SCNC: 8 MMOL/L (ref 7–16)
BUN SERPL-MCNC: 8 MG/DL (ref 8–23)
CALCIUM SERPL-MCNC: 8.4 MG/DL (ref 8.3–10.4)
CHLORIDE SERPL-SCNC: 100 MMOL/L (ref 98–107)
CO2 SERPL-SCNC: 26 MMOL/L (ref 21–32)
CREAT SERPL-MCNC: 0.55 MG/DL (ref 0.6–1)
GLUCOSE SERPL-MCNC: 93 MG/DL (ref 65–100)
POTASSIUM SERPL-SCNC: 4 MMOL/L (ref 3.5–5.1)
SODIUM SERPL-SCNC: 134 MMOL/L (ref 136–145)

## 2018-06-03 PROCEDURE — 74011250637 HC RX REV CODE- 250/637: Performed by: INTERNAL MEDICINE

## 2018-06-03 PROCEDURE — 93005 ELECTROCARDIOGRAM TRACING: CPT | Performed by: INTERNAL MEDICINE

## 2018-06-03 PROCEDURE — 74011250637 HC RX REV CODE- 250/637: Performed by: NURSE PRACTITIONER

## 2018-06-03 PROCEDURE — 80048 BASIC METABOLIC PNL TOTAL CA: CPT | Performed by: INTERNAL MEDICINE

## 2018-06-03 PROCEDURE — 36415 COLL VENOUS BLD VENIPUNCTURE: CPT | Performed by: INTERNAL MEDICINE

## 2018-06-03 PROCEDURE — 65660000000 HC RM CCU STEPDOWN

## 2018-06-03 RX ADMIN — Medication 10 ML: at 21:15

## 2018-06-03 RX ADMIN — DOFETILIDE 500 MCG: 0.5 CAPSULE ORAL at 10:03

## 2018-06-03 RX ADMIN — LEVOTHYROXINE SODIUM 100 MCG: 100 TABLET ORAL at 07:39

## 2018-06-03 RX ADMIN — ATORVASTATIN CALCIUM 10 MG: 10 TABLET, FILM COATED ORAL at 10:03

## 2018-06-03 RX ADMIN — DOFETILIDE 500 MCG: 0.5 CAPSULE ORAL at 21:14

## 2018-06-03 RX ADMIN — Medication 400 MG: at 10:03

## 2018-06-03 NOTE — PROGRESS NOTES
[facility] CARDIOLOGY PROGRESS NOTE           6/3/2018 7:10 AM    Admit Date: 6/1/2018    Admit Diagnosis: Atrial Fib with RVR; A-fib (HCC)      Subjective:   No complaints this AM, no chest pain or shortness of breath    Interval History: (History of pertinent interval events obtained from nursing staff)  Remains in NSR since starting tikosyn    ROS:  GEN:  No fever or chills  Cardiovascular:  As noted above  Pulmonary:  As noted above  Neuro:  No new focal motor or sensory loss      Objective:     Vitals:    06/02/18 2219 06/03/18 0118 06/03/18 0434 06/03/18 0503   BP:  117/70  141/79   Pulse: (!) 59 75  (!) 55   Resp:  18  16   Temp:  98.2 °F (36.8 °C)  97.7 °F (36.5 °C)   SpO2:  94%  97%   Weight:   71.4 kg (157 lb 4.8 oz)        Physical Exam:  General-Well Developed, Well Nourished, No Acute Distress, Alert & Oriented x 3, appropriate mood. Neck- supple, no JVD  CV- regular rate and rhythm no MRG  Lung- clear bilaterally  Abd- soft, nontender, nondistended  Ext- no edema bilaterally.   Skin- warm and dry    Current Facility-Administered Medications   Medication Dose Route Frequency    dofetilide (TIKOSYN) capsule 500 mcg  500 mcg Oral Q12H    atorvastatin (LIPITOR) tablet 10 mg  10 mg Oral DAILY    levothyroxine (SYNTHROID) tablet 100 mcg  100 mcg Oral ACB    magnesium oxide (MAG-OX) tablet 400 mg  400 mg Oral DAILY    sodium chloride (NS) flush 5-10 mL  5-10 mL IntraVENous Q8H    sodium chloride (NS) flush 5-10 mL  5-10 mL IntraVENous PRN    aspirin chewable tablet 81 mg  81 mg Oral DAILY    rivaroxaban (XARELTO) tablet 20 mg  20 mg Oral DAILY WITH DINNER    dilTIAZem (CARDIZEM) 125 mg in dextrose 5% 125 mL infusion  0-15 mg/hr IntraVENous TITRATE    traMADol (ULTRAM) tablet 50 mg  50 mg Oral Q6H PRN    acetaminophen (TYLENOL) tablet 650 mg  650 mg Oral Q4H PRN     Data Review:   Recent Results (from the past 24 hour(s))   EKG, 12 LEAD, INITIAL    Collection Time: 06/02/18  7:55 AM   Result Value Ref Range    Ventricular Rate 58 BPM    Atrial Rate 58 BPM    P-R Interval 184 ms    QRS Duration 86 ms    Q-T Interval 464 ms    QTC Calculation (Bezet) 455 ms    Calculated P Axis 85 degrees    Calculated R Axis -16 degrees    Calculated T Axis 92 degrees    Diagnosis       Sinus bradycardia with Premature atrial complexes  Nonspecific ST and T wave abnormality  Abnormal ECG  When compared with ECG of 14-MAY-2018 06:35,  Premature atrial complexes are now Present  Confirmed by ABBY OSWALD ([id number]) on 6/2/2018 7:27:04 PM     EKG, 12 LEAD, SUBSEQUENT    Collection Time: 06/02/18  1:49 PM   Result Value Ref Range    Ventricular Rate 56 BPM    Atrial Rate 56 BPM    P-R Interval 206 ms    QRS Duration 88 ms    Q-T Interval 476 ms    QTC Calculation (Bezet) 459 ms    Calculated P Axis 85 degrees    Calculated R Axis 17 degrees    Calculated T Axis 70 degrees    Diagnosis       Sinus bradycardia  Otherwise normal ECG  When compared with ECG of 02-JUN-2018 07:55,  Premature atrial complexes are no longer Present  T wave inversion no longer evident in Lateral leads  Confirmed by ABBY OSWALD ([id number]) on 6/2/2018 7:40:52 PM     EKG, 12 LEAD, INITIAL    Collection Time: 06/03/18 12:12 AM   Result Value Ref Range    Ventricular Rate 61 BPM    Atrial Rate 61 BPM    P-R Interval 202 ms    QRS Duration 84 ms    Q-T Interval 492 ms    QTC Calculation (Bezet) 495 ms    Calculated P Axis 86 degrees    Calculated R Axis 41 degrees    Calculated T Axis 91 degrees    Diagnosis       Normal sinus rhythm  Prolonged QT  Abnormal ECG  When compared with ECG of 02-JUN-2018 13:49,  No significant change was found         EKG:  (EKG has been independently visualized by me with interpretation below) NSR, QTc prolonged but < 500 msec  Assessment:     Active Problems:    [facility] (6/1/2018)      Plan:   1.  Paroxysmal atrial fibrillation, uncontrolled:  Pt with symptomatic pAF in the setting of moderate MR and severe LAE. She is unlikely to respond to class IC agents, has underlying bradycardia and post conversion pauses, and amiodarone is not a good option given toxicities. Pt wants to try tikosyn for rhythm control and understands this might be an expensive drug option. Give post conversion pauses and bradycardia feel a PPM is warranted as discussed below. Creatinine clearance > 60, cont telemetry and post dose ECGs per protocol. Close monitoring in the setting of bradycardia, QTc prolonged this AM but < 500 msec, cont current dose.     2. Tachy dusty syndrome, post conversion pauses, marked bradycardia, uncontrolled: likely plan for PPM tomorrow     3. Syncope: unclear etiology, possibly 2/2 tachy dusty and post conversion pauses, plans for PPM as noted above. Mayco Luis MD  Cardiology/Electrophysiology

## 2018-06-03 NOTE — PROGRESS NOTES
Bedside and Verbal shift change report given to [address] (oncoming nurse) by Bradley Herrera RN (offgoing nurse). Report included the following information SBAR, Kardex, Accordion and Recent Results.

## 2018-06-04 ENCOUNTER — APPOINTMENT (OUTPATIENT)
Dept: GENERAL RADIOLOGY | Age: 78
DRG: 244 | End: 2018-06-04
Attending: INTERNAL MEDICINE
Payer: MEDICARE

## 2018-06-04 ENCOUNTER — HOSPITAL ENCOUNTER (OUTPATIENT)
Dept: PHYSICAL THERAPY | Age: 78
End: 2018-06-04

## 2018-06-04 ENCOUNTER — ANESTHESIA (OUTPATIENT)
Dept: SURGERY | Age: 78
DRG: 244 | End: 2018-06-04
Payer: MEDICARE

## 2018-06-04 ENCOUNTER — ANESTHESIA EVENT (OUTPATIENT)
Dept: SURGERY | Age: 78
DRG: 244 | End: 2018-06-04
Payer: MEDICARE

## 2018-06-04 LAB
ATRIAL RATE: 52 BPM
ATRIAL RATE: 56 BPM
ATRIAL RATE: 57 BPM
ATRIAL RATE: 58 BPM
ATRIAL RATE: 61 BPM
CALCULATED P AXIS, ECG09: 66 DEGREES
CALCULATED P AXIS, ECG09: 79 DEGREES
CALCULATED P AXIS, ECG09: 83 DEGREES
CALCULATED P AXIS, ECG09: 86 DEGREES
CALCULATED P AXIS, ECG09: 87 DEGREES
CALCULATED R AXIS, ECG10: 20 DEGREES
CALCULATED R AXIS, ECG10: 23 DEGREES
CALCULATED R AXIS, ECG10: 37 DEGREES
CALCULATED R AXIS, ECG10: 41 DEGREES
CALCULATED R AXIS, ECG10: 47 DEGREES
CALCULATED T AXIS, ECG11: 73 DEGREES
CALCULATED T AXIS, ECG11: 74 DEGREES
CALCULATED T AXIS, ECG11: 75 DEGREES
CALCULATED T AXIS, ECG11: 85 DEGREES
CALCULATED T AXIS, ECG11: 91 DEGREES
DIAGNOSIS, 93000: NORMAL
P-R INTERVAL, ECG05: 182 MS
P-R INTERVAL, ECG05: 192 MS
P-R INTERVAL, ECG05: 194 MS
P-R INTERVAL, ECG05: 196 MS
P-R INTERVAL, ECG05: 202 MS
Q-T INTERVAL, ECG07: 466 MS
Q-T INTERVAL, ECG07: 472 MS
Q-T INTERVAL, ECG07: 474 MS
Q-T INTERVAL, ECG07: 492 MS
Q-T INTERVAL, ECG07: 516 MS
QRS DURATION, ECG06: 84 MS
QRS DURATION, ECG06: 84 MS
QRS DURATION, ECG06: 92 MS
QRS DURATION, ECG06: 92 MS
QRS DURATION, ECG06: 94 MS
QTC CALCULATION (BEZET), ECG08: 455 MS
QTC CALCULATION (BEZET), ECG08: 457 MS
QTC CALCULATION (BEZET), ECG08: 461 MS
QTC CALCULATION (BEZET), ECG08: 479 MS
QTC CALCULATION (BEZET), ECG08: 495 MS
VENTRICULAR RATE, ECG03: 52 BPM
VENTRICULAR RATE, ECG03: 56 BPM
VENTRICULAR RATE, ECG03: 57 BPM
VENTRICULAR RATE, ECG03: 58 BPM
VENTRICULAR RATE, ECG03: 61 BPM

## 2018-06-04 PROCEDURE — 74011000272 HC RX REV CODE- 272: Performed by: INTERNAL MEDICINE

## 2018-06-04 PROCEDURE — 74011250637 HC RX REV CODE- 250/637: Performed by: INTERNAL MEDICINE

## 2018-06-04 PROCEDURE — C1785 PMKR, DUAL, RATE-RESP: HCPCS

## 2018-06-04 PROCEDURE — 33208 INSRT HEART PM ATRIAL & VENT: CPT

## 2018-06-04 PROCEDURE — 74011250636 HC RX REV CODE- 250/636

## 2018-06-04 PROCEDURE — 0JH606Z INSERTION OF PACEMAKER, DUAL CHAMBER INTO CHEST SUBCUTANEOUS TISSUE AND FASCIA, OPEN APPROACH: ICD-10-PCS | Performed by: INTERNAL MEDICINE

## 2018-06-04 PROCEDURE — 77030012935 HC DRSG AQUACEL BMS -B

## 2018-06-04 PROCEDURE — 74011000250 HC RX REV CODE- 250: Performed by: INTERNAL MEDICINE

## 2018-06-04 PROCEDURE — 65660000000 HC RM CCU STEPDOWN

## 2018-06-04 PROCEDURE — 93005 ELECTROCARDIOGRAM TRACING: CPT | Performed by: INTERNAL MEDICINE

## 2018-06-04 PROCEDURE — 77030013687 HC GD NDL BARD -B

## 2018-06-04 PROCEDURE — 74011000250 HC RX REV CODE- 250

## 2018-06-04 PROCEDURE — 74011250636 HC RX REV CODE- 250/636: Performed by: INTERNAL MEDICINE

## 2018-06-04 PROCEDURE — C1894 INTRO/SHEATH, NON-LASER: HCPCS

## 2018-06-04 PROCEDURE — 74011250636 HC RX REV CODE- 250/636: Performed by: ANESTHESIOLOGY

## 2018-06-04 PROCEDURE — 76060000033 HC ANESTHESIA 1 TO 1.5 HR: Performed by: INTERNAL MEDICINE

## 2018-06-04 PROCEDURE — L3650 SO 8 ABD RESTRAINT PRE OTS: HCPCS

## 2018-06-04 PROCEDURE — 77030008467 HC STPLR SKN COVD -B

## 2018-06-04 PROCEDURE — 02HK3JZ INSERTION OF PACEMAKER LEAD INTO RIGHT VENTRICLE, PERCUTANEOUS APPROACH: ICD-10-PCS | Performed by: INTERNAL MEDICINE

## 2018-06-04 PROCEDURE — 02H63JZ INSERTION OF PACEMAKER LEAD INTO RIGHT ATRIUM, PERCUTANEOUS APPROACH: ICD-10-PCS | Performed by: INTERNAL MEDICINE

## 2018-06-04 PROCEDURE — C1892 INTRO/SHEATH,FIXED,PEEL-AWAY: HCPCS

## 2018-06-04 PROCEDURE — C1898 LEAD, PMKR, OTHER THAN TRANS: HCPCS

## 2018-06-04 PROCEDURE — 74011250636 HC RX REV CODE- 250/636: Performed by: NURSE PRACTITIONER

## 2018-06-04 PROCEDURE — 76937 US GUIDE VASCULAR ACCESS: CPT

## 2018-06-04 PROCEDURE — 74011250637 HC RX REV CODE- 250/637: Performed by: NURSE PRACTITIONER

## 2018-06-04 PROCEDURE — 71045 X-RAY EXAM CHEST 1 VIEW: CPT

## 2018-06-04 RX ORDER — PROPOFOL 10 MG/ML
INJECTION, EMULSION INTRAVENOUS AS NEEDED
Status: DISCONTINUED | OUTPATIENT
Start: 2018-06-04 | End: 2018-06-04 | Stop reason: HOSPADM

## 2018-06-04 RX ORDER — ALBUTEROL SULFATE 0.83 MG/ML
2.5 SOLUTION RESPIRATORY (INHALATION) AS NEEDED
Status: CANCELLED | OUTPATIENT
Start: 2018-06-04

## 2018-06-04 RX ORDER — FENTANYL CITRATE 50 UG/ML
100 INJECTION, SOLUTION INTRAMUSCULAR; INTRAVENOUS ONCE
Status: CANCELLED | OUTPATIENT
Start: 2018-06-04 | End: 2018-06-04

## 2018-06-04 RX ORDER — SODIUM CHLORIDE, SODIUM LACTATE, POTASSIUM CHLORIDE, CALCIUM CHLORIDE 600; 310; 30; 20 MG/100ML; MG/100ML; MG/100ML; MG/100ML
100 INJECTION, SOLUTION INTRAVENOUS CONTINUOUS
Status: CANCELLED | OUTPATIENT
Start: 2018-06-04

## 2018-06-04 RX ORDER — SODIUM CHLORIDE, SODIUM LACTATE, POTASSIUM CHLORIDE, CALCIUM CHLORIDE 600; 310; 30; 20 MG/100ML; MG/100ML; MG/100ML; MG/100ML
100 INJECTION, SOLUTION INTRAVENOUS CONTINUOUS
Status: CANCELLED | OUTPATIENT
Start: 2018-06-04 | End: 2018-06-05

## 2018-06-04 RX ORDER — SODIUM CHLORIDE, SODIUM LACTATE, POTASSIUM CHLORIDE, CALCIUM CHLORIDE 600; 310; 30; 20 MG/100ML; MG/100ML; MG/100ML; MG/100ML
INJECTION, SOLUTION INTRAVENOUS
Status: DISCONTINUED | OUTPATIENT
Start: 2018-06-04 | End: 2018-06-04 | Stop reason: HOSPADM

## 2018-06-04 RX ORDER — LIDOCAINE HYDROCHLORIDE AND EPINEPHRINE 20; 10 MG/ML; UG/ML
1.5 INJECTION, SOLUTION INFILTRATION; PERINEURAL ONCE
Status: COMPLETED | OUTPATIENT
Start: 2018-06-04 | End: 2018-06-04

## 2018-06-04 RX ORDER — CEFAZOLIN SODIUM/WATER 2 G/20 ML
2 SYRINGE (ML) INTRAVENOUS EVERY 8 HOURS
Status: COMPLETED | OUTPATIENT
Start: 2018-06-04 | End: 2018-06-05

## 2018-06-04 RX ORDER — NALOXONE HYDROCHLORIDE 0.4 MG/ML
0.1 INJECTION, SOLUTION INTRAMUSCULAR; INTRAVENOUS; SUBCUTANEOUS AS NEEDED
Status: CANCELLED | OUTPATIENT
Start: 2018-06-04

## 2018-06-04 RX ORDER — SODIUM CHLORIDE 0.9 % (FLUSH) 0.9 %
5-10 SYRINGE (ML) INJECTION AS NEEDED
Status: DISCONTINUED | OUTPATIENT
Start: 2018-06-04 | End: 2018-06-05 | Stop reason: HOSPADM

## 2018-06-04 RX ORDER — ONDANSETRON 2 MG/ML
4 INJECTION INTRAMUSCULAR; INTRAVENOUS ONCE
Status: COMPLETED | OUTPATIENT
Start: 2018-06-05 | End: 2018-06-04

## 2018-06-04 RX ORDER — SODIUM CHLORIDE 0.9 % (FLUSH) 0.9 %
5-10 SYRINGE (ML) INJECTION EVERY 8 HOURS
Status: DISCONTINUED | OUTPATIENT
Start: 2018-06-04 | End: 2018-06-05 | Stop reason: HOSPADM

## 2018-06-04 RX ORDER — ONDANSETRON 2 MG/ML
4 INJECTION INTRAMUSCULAR; INTRAVENOUS ONCE
Status: CANCELLED | OUTPATIENT
Start: 2018-06-04 | End: 2018-06-04

## 2018-06-04 RX ORDER — MIDAZOLAM HYDROCHLORIDE 1 MG/ML
2 INJECTION, SOLUTION INTRAMUSCULAR; INTRAVENOUS
Status: CANCELLED | OUTPATIENT
Start: 2018-06-04

## 2018-06-04 RX ORDER — PROPOFOL 10 MG/ML
INJECTION, EMULSION INTRAVENOUS
Status: DISCONTINUED | OUTPATIENT
Start: 2018-06-04 | End: 2018-06-04 | Stop reason: HOSPADM

## 2018-06-04 RX ORDER — HYDROMORPHONE HYDROCHLORIDE 2 MG/ML
0.5 INJECTION, SOLUTION INTRAMUSCULAR; INTRAVENOUS; SUBCUTANEOUS
Status: CANCELLED | OUTPATIENT
Start: 2018-06-04

## 2018-06-04 RX ORDER — LIDOCAINE HYDROCHLORIDE 10 MG/ML
0.1 INJECTION INFILTRATION; PERINEURAL AS NEEDED
Status: CANCELLED | OUTPATIENT
Start: 2018-06-04

## 2018-06-04 RX ORDER — HYDRALAZINE HYDROCHLORIDE 20 MG/ML
10 INJECTION INTRAMUSCULAR; INTRAVENOUS ONCE
Status: COMPLETED | OUTPATIENT
Start: 2018-06-04 | End: 2018-06-04

## 2018-06-04 RX ORDER — MIDAZOLAM HYDROCHLORIDE 1 MG/ML
2 INJECTION, SOLUTION INTRAMUSCULAR; INTRAVENOUS ONCE
Status: CANCELLED | OUTPATIENT
Start: 2018-06-04 | End: 2018-06-04

## 2018-06-04 RX ORDER — LIDOCAINE HYDROCHLORIDE 20 MG/ML
INJECTION, SOLUTION EPIDURAL; INFILTRATION; INTRACAUDAL; PERINEURAL AS NEEDED
Status: DISCONTINUED | OUTPATIENT
Start: 2018-06-04 | End: 2018-06-04 | Stop reason: HOSPADM

## 2018-06-04 RX ORDER — HYDROMORPHONE HYDROCHLORIDE 2 MG/ML
0.5 INJECTION, SOLUTION INTRAMUSCULAR; INTRAVENOUS; SUBCUTANEOUS ONCE
Status: ACTIVE | OUTPATIENT
Start: 2018-06-04 | End: 2018-06-05

## 2018-06-04 RX ORDER — DIPHENHYDRAMINE HYDROCHLORIDE 50 MG/ML
12.5 INJECTION, SOLUTION INTRAMUSCULAR; INTRAVENOUS
Status: CANCELLED | OUTPATIENT
Start: 2018-06-04

## 2018-06-04 RX ADMIN — PROPOFOL 30 MG: 10 INJECTION, EMULSION INTRAVENOUS at 19:39

## 2018-06-04 RX ADMIN — Medication 400 MG: at 09:09

## 2018-06-04 RX ADMIN — DOFETILIDE 500 MCG: 0.5 CAPSULE ORAL at 22:17

## 2018-06-04 RX ADMIN — Medication 5 ML: at 14:00

## 2018-06-04 RX ADMIN — ATORVASTATIN CALCIUM 10 MG: 10 TABLET, FILM COATED ORAL at 09:09

## 2018-06-04 RX ADMIN — LIDOCAINE HYDROCHLORIDE AND EPINEPHRINE 30 MG: 20; 10 INJECTION, SOLUTION INFILTRATION; PERINEURAL at 19:54

## 2018-06-04 RX ADMIN — LIDOCAINE HYDROCHLORIDE 20 MG: 20 INJECTION, SOLUTION EPIDURAL; INFILTRATION; INTRACAUDAL; PERINEURAL at 19:37

## 2018-06-04 RX ADMIN — Medication 10 ML: at 06:00

## 2018-06-04 RX ADMIN — HYDRALAZINE HYDROCHLORIDE 10 MG: 20 INJECTION INTRAMUSCULAR; INTRAVENOUS at 21:28

## 2018-06-04 RX ADMIN — Medication 2 G: at 22:56

## 2018-06-04 RX ADMIN — SODIUM CHLORIDE, SODIUM LACTATE, POTASSIUM CHLORIDE, CALCIUM CHLORIDE: 600; 310; 30; 20 INJECTION, SOLUTION INTRAVENOUS at 19:30

## 2018-06-04 RX ADMIN — PROPOFOL 30 MG: 10 INJECTION, EMULSION INTRAVENOUS at 20:23

## 2018-06-04 RX ADMIN — Medication 10 ML: at 22:28

## 2018-06-04 RX ADMIN — NEOMYCIN AND POLYMYXIN B SULFATES: 40; 200000 IRRIGANT IRRIGATION at 19:53

## 2018-06-04 RX ADMIN — ONDANSETRON 4 MG: 2 INJECTION INTRAMUSCULAR; INTRAVENOUS at 23:39

## 2018-06-04 RX ADMIN — LEVOTHYROXINE SODIUM 100 MCG: 100 TABLET ORAL at 09:09

## 2018-06-04 RX ADMIN — DOFETILIDE 500 MCG: 0.5 CAPSULE ORAL at 09:09

## 2018-06-04 RX ADMIN — PROPOFOL 140 MCG/KG/MIN: 10 INJECTION, EMULSION INTRAVENOUS at 19:40

## 2018-06-04 NOTE — ANESTHESIA PREPROCEDURE EVALUATION
Anesthetic History     PONV          Review of Systems / Medical History  Patient summary reviewed and pertinent labs reviewed    Pulmonary  Within defined limits                 Neuro/Psych       CVA (2 weeks ago stroke noted on MRI.  Pt and son say no residual neurologic problems.)  TIA    Comments: Acute encephalopathy- alert, oriented x 0 Cardiovascular    Hypertension: well controlled        Dysrhythmias (AFib with RVR on admission, then found to have tachy/dusty syndrome)       Exercise tolerance: <4 METS  Comments: Echo 5/2018 showed EF 60-65%, mod MR and severe LAE   GI/Hepatic/Renal  Within defined limits              Endo/Other      Hypothyroidism  Arthritis and cancer (brain mass)     Other Findings            Physical Exam    Airway  Mallampati: II  TM Distance: < 4 cm  Neck ROM: normal range of motion   Mouth opening: Normal    Comments: Limited by patient compliance Cardiovascular  Regular rate and rhythm,  S1 and S2 normal,  no murmur, click, rub, or gallop             Dental  No notable dental hx       Pulmonary  Breath sounds clear to auscultation               Abdominal  GI exam deferred       Other Findings            Anesthetic Plan    ASA: 3  Anesthesia type: total IV anesthesia          Induction: Intravenous  Anesthetic plan and risks discussed with: Patient    Son

## 2018-06-04 NOTE — PROGRESS NOTES
TRANSFER - OUT REPORT:    Verbal report given to Tara George RN(name) on Coral Span  being transferred to [facility](unit) for ordered procedure       Report consisted of patients Situation, Background, Assessment and   Recommendations(SBAR). Information from the following report(s) SBAR, Intake/Output, MAR and Cardiac Rhythm NSR/sinus bradycardia was reviewed with the receiving nurse. Lines:   Peripheral IV 06/02/18 Right Antecubital (Active)   Site Assessment Clean, dry, & intact 6/4/2018  7:30 AM   Phlebitis Assessment 0 6/4/2018  7:30 AM   Infiltration Assessment 0 6/4/2018  7:30 AM   Dressing Status Clean, dry, & intact 6/4/2018  7:30 AM   Dressing Type Transparent;Tape 6/4/2018  7:30 AM   Hub Color/Line Status Blue;Patent; Flushed 6/4/2018  7:30 AM        Opportunity for questions and clarification was provided.       Patient transported with:   Mung[place]

## 2018-06-04 NOTE — PROGRESS NOTES
[facility] CARDIOLOGY PROGRESS NOTE           6/4/2018 6:49 AM    Admit Date: 6/1/2018    Admit Diagnosis: Atrial Fib with RVR; A-fib (HCC)      Subjective:   No complaints this AM, no chest pain or shortness of breath    Interval History: (History of pertinent interval events obtained from nursing staff)  Sinus bradycardia on telemetry, no events    ROS:  GEN:  No fever or chills  Cardiovascular:  As noted above  Pulmonary:  As noted above  Neuro:  No new focal motor or sensory loss      Objective:     Vitals:    06/03/18 1724 06/03/18 2114 06/04/18 0123 06/04/18 0536   BP: 144/61 141/66 134/65 139/74   Pulse: (!) 56 (!) 57 (!) 58 (!) 53   Resp: 18 17 17 17   Temp: 98.3 °F (36.8 °C) 97.7 °F (36.5 °C) 98.3 °F (36.8 °C) 99 °F (37.2 °C)   SpO2: 97% 98% 97% 96%   Weight:    71.4 kg (157 lb 4.8 oz)       Physical Exam:  General-Well Developed, Well Nourished, No Acute Distress, Alert & Oriented x 3, appropriate mood. Neck- supple, no JVD  CV- regular rate and rhythm no MRG  Lung- clear bilaterally  Abd- soft, nontender, nondistended  Ext- no edema bilaterally.   Skin- warm and dry    Current Facility-Administered Medications   Medication Dose Route Frequency    dofetilide (TIKOSYN) capsule 500 mcg  500 mcg Oral Q12H    atorvastatin (LIPITOR) tablet 10 mg  10 mg Oral DAILY    levothyroxine (SYNTHROID) tablet 100 mcg  100 mcg Oral ACB    magnesium oxide (MAG-OX) tablet 400 mg  400 mg Oral DAILY    sodium chloride (NS) flush 5-10 mL  5-10 mL IntraVENous Q8H    sodium chloride (NS) flush 5-10 mL  5-10 mL IntraVENous PRN    dilTIAZem (CARDIZEM) 125 mg in dextrose 5% 125 mL infusion  0-15 mg/hr IntraVENous TITRATE    traMADol (ULTRAM) tablet 50 mg  50 mg Oral Q6H PRN    acetaminophen (TYLENOL) tablet 650 mg  650 mg Oral Q4H PRN     Data Review:   Recent Results (from the past 24 hour(s))   EKG, 12 LEAD, SUBSEQUENT    Collection Time: 06/03/18  1:09 PM   Result Value Ref Range    Ventricular Rate 57 BPM    Atrial Rate 57 BPM    P-R Interval 192 ms    QRS Duration 94 ms    Q-T Interval 474 ms    QTC Calculation (Bezet) 461 ms    Calculated P Axis 79 degrees    Calculated R Axis 47 degrees    Calculated T Axis 75 degrees    Diagnosis       Sinus bradycardia  Otherwise normal ECG  When compared with ECG of 03-JUN-2018 13:07,  PREVIOUS ECG IS PRESENT     EKG, 12 LEAD, INITIAL    Collection Time: 06/04/18 12:03 AM   Result Value Ref Range    Ventricular Rate 58 BPM    Atrial Rate 58 BPM    P-R Interval 194 ms    QRS Duration 92 ms    Q-T Interval 466 ms    QTC Calculation (Bezet) 457 ms    Calculated P Axis 83 degrees    Calculated R Axis 37 degrees    Calculated T Axis 85 degrees    Diagnosis       Sinus bradycardia  Otherwise normal ECG  When compared with ECG of 03-JUN-2018 20:40,  Nonspecific T wave abnormality now evident in Lateral leads         EKG:  (EKG has been independently visualized by me with interpretation below) sinus bradycardia, QTc 457 msec  Assessment:     Active Problems:    A-fib ([address] (6/1/2018)      Plan:   1. Paroxysmal atrial fibrillation, uncontrolled:  Pt with symptomatic pAF in the setting of moderate MR and severe LAE. She is unlikely to respond to class IC agents, has underlying bradycardia and post conversion pauses, and amiodarone is not a good option given toxicities. Pt wants to try tikosyn for rhythm control and understands this might be an expensive drug option. Give post conversion pauses and bradycardia feel a PPM is warranted as discussed below. Creatinine clearance > 60, cont telemetry and post dose ECGs per protocol. Close monitoring in the setting of bradycardia, QTc 457 msec, cont current dose.      2. Tachy dusty syndrome, post conversion pauses, marked bradycardia, uncontrolled: discussed again risks, benefits and alternatives to PPM implantation, plan for procedure today     3.  Syncope: unclear etiology, possibly 2/2 tachy dusty and post conversion pauses, plans for PPM as noted above. Aimee Luis MD  Cardiology/Electrophysiology

## 2018-06-04 NOTE — PROGRESS NOTES
Care Management Interventions  PCP Verified by CM: Yes  Palliative Care Criteria Met (RRAT>21 & CHF Dx)?: No (RRAT 17 Dx Atrial Fib )  Transition of Care Consult (CM Consult): Discharge Planning  Discharge Durable Medical Equipment: No  Physical Therapy Consult: No  Occupational Therapy Consult: No  Speech Therapy Consult: No  Current Support Network: Lives Alone  Confirm Follow Up Transport: Self  Plan discussed with Pt/Family/Caregiver: Yes  Freedom of Choice Offered: Yes  Discharge Location  Discharge Placement: Home  Met with patient for d/c planning. Patient alert and oriented x 3, independent of ADL's and lives alone. She has 3 sons who can assist if needed. She is a retired . She is able to drive and obtains her medications at [facility] [phone]. Her PCP is Dr. Jacek Vera who she saw 2 weeks ago. Discharge plan is home when medically stable.

## 2018-06-04 NOTE — PROGRESS NOTES
Verbal bedside report given to Juan Sanderson oncoming RN. Patient's situation, background, assessment and recommendations provided. Opportunity for questions provided. Oncoming RN assumed care of patient.

## 2018-06-04 NOTE — PROGRESS NOTES
Initial visit made to patient and a prayer was provided to patient and her son.          [employer]

## 2018-06-05 VITALS
BODY MASS INDEX: 26.62 KG/M2 | WEIGHT: 155.1 LBS | HEART RATE: 79 BPM | TEMPERATURE: 98.7 F | DIASTOLIC BLOOD PRESSURE: 60 MMHG | RESPIRATION RATE: 17 BRPM | SYSTOLIC BLOOD PRESSURE: 124 MMHG | OXYGEN SATURATION: 97 %

## 2018-06-05 LAB
ATRIAL RATE: 74 BPM
CALCULATED P AXIS, ECG09: 88 DEGREES
CALCULATED R AXIS, ECG10: 26 DEGREES
CALCULATED T AXIS, ECG11: 97 DEGREES
DIAGNOSIS, 93000: NORMAL
P-R INTERVAL, ECG05: 198 MS
Q-T INTERVAL, ECG07: 398 MS
QRS DURATION, ECG06: 94 MS
QTC CALCULATION (BEZET), ECG08: 441 MS
VENTRICULAR RATE, ECG03: 74 BPM

## 2018-06-05 PROCEDURE — 74011250637 HC RX REV CODE- 250/637: Performed by: INTERNAL MEDICINE

## 2018-06-05 PROCEDURE — 74011250636 HC RX REV CODE- 250/636: Performed by: INTERNAL MEDICINE

## 2018-06-05 PROCEDURE — 74011250637 HC RX REV CODE- 250/637: Performed by: NURSE PRACTITIONER

## 2018-06-05 PROCEDURE — 93005 ELECTROCARDIOGRAM TRACING: CPT | Performed by: INTERNAL MEDICINE

## 2018-06-05 RX ORDER — DOFETILIDE 0.5 MG/1
500 CAPSULE ORAL EVERY 12 HOURS
Qty: 60 CAP | Refills: 6 | Status: SHIPPED | OUTPATIENT
Start: 2018-06-05 | End: 2018-12-27 | Stop reason: SDUPTHER

## 2018-06-05 RX ORDER — NAPROXEN 250 MG/1
250 TABLET ORAL
Status: DISCONTINUED | OUTPATIENT
Start: 2018-06-05 | End: 2018-06-05 | Stop reason: HOSPADM

## 2018-06-05 RX ADMIN — NAPROXEN 250 MG: 250 TABLET ORAL at 05:07

## 2018-06-05 RX ADMIN — Medication 400 MG: at 08:31

## 2018-06-05 RX ADMIN — Medication 5 ML: at 06:56

## 2018-06-05 RX ADMIN — Medication 5 ML: at 06:57

## 2018-06-05 RX ADMIN — DOFETILIDE 500 MCG: 0.5 CAPSULE ORAL at 08:34

## 2018-06-05 RX ADMIN — ATORVASTATIN CALCIUM 10 MG: 10 TABLET, FILM COATED ORAL at 08:31

## 2018-06-05 RX ADMIN — LEVOTHYROXINE SODIUM 100 MCG: 100 TABLET ORAL at 08:30

## 2018-06-05 RX ADMIN — Medication 2 G: at 08:30

## 2018-06-05 NOTE — PROGRESS NOTES
Care Management Interventions  PCP Verified by CM: Yes  Palliative Care Criteria Met (RRAT>21 & CHF Dx)?: No (RRAT 17 Dx Atrial Fib )  Mode of Transport at Discharge: Other (see comment) (family )  Transition of Care Consult (CM Consult): Discharge Planning  Discharge Durable Medical Equipment: No  Physical Therapy Consult: No  Occupational Therapy Consult: No  Speech Therapy Consult: No  Current Support Network: Lives Alone  Confirm Follow Up Transport: Self  Plan discussed with Pt/Family/Caregiver: Yes  Freedom of Choice Offered: Yes  Discharge Location  Discharge Placement: Home  Patient ready for d/c. She states spoke with her son and they are going to go ahead and pay for the Tikosyn after discount per month as \"I need the medication. \" Patient given Tikosyn discount card to use and explained may need to call to see if in stock and who accepts the discount card. Patient verbalizes understanding. Patient voices no concern or needs. Patient to d/c home with son.

## 2018-06-05 NOTE — PROGRESS NOTES
Dr Keanu Marquez at bedside discharged pt to the floor. Pt states she feels much better, but still a little sore. Pt transported to her room via stretcher, awake, and vss.

## 2018-06-05 NOTE — PROGRESS NOTES
TRANSFER - OUT REPORT:    Verbal report given to Yoanna HIGH on Nacho Tarango  being transferred to [facility] (unit) for routine progression of care       Report consisted of patients Situation, Background, Assessment and   Recommendations(SBAR). Information from the following report(s) SBAR, Procedure Summary and Recent Results was reviewed with the receiving nurse. Lines:   Peripheral IV 06/02/18 Right Antecubital (Active)   Site Assessment Clean, dry, & intact 6/4/2018  3:44 PM   Phlebitis Assessment 0 6/4/2018  3:44 PM   Infiltration Assessment 0 6/4/2018  3:44 PM   Dressing Status Clean, dry, & intact 6/4/2018  3:44 PM   Dressing Type Transparent;Tape 6/4/2018  3:44 PM   Hub Color/Line Status Blue;Patent 6/4/2018  3:44 PM        Opportunity for questions and clarification was provided.       Patient transported with:   Registered Nurse

## 2018-06-05 NOTE — PROGRESS NOTES
[id number] report given to Jamil Prakash RN, for continued care. Pt remains off floor in CCL at this time.

## 2018-06-05 NOTE — PROGRESS NOTES
Blood pressure 200/101. Dr. Fermin Ceja notified and at bedside. Orders received. Hydralazine 10mg IVP given. Pt verbalizes relief from IV Dilaudid.

## 2018-06-05 NOTE — PROGRESS NOTES
Pt c/o \"hurting where my surgery site is 5/10\". T/C to Dr. Ben Epps. Orders received. Dilaudid 0.5mg IVP given.

## 2018-06-05 NOTE — DISCHARGE SUMMARY
Physician Discharge Summary     Patient ID:  Mai Richmond  [id number]  75 y.o.  1940    Admit date: 6/1/2018    Discharge date and time: 6/    Admitting Physician: Amy Avalos MD     Primary Cardiologist:Dr. Arely Bronson     Primary Care Mega Quinn MD    Discharge Physician: Gilberto Joseph NP    Admission Diagnoses: Atrial Fib with RVR  [facility])  SYMPTOMATIC BRADYCARDIA    Discharge Diagnoses:   Patient Active Problem List    Diagnosis Date Noted    [facility]) 06/01/2018    CVA (cerebral vascular accident) ([facility] [address] 05/15/2018    Confusion 05/14/2018    Hypertensive emergency 05/14/2018    Hypothyroidism 05/14/2018    Atrial fibrillation ([facility] [address] 04/16/2018    TIA (transient ischemic attack) 11/14/2017    Slurred speech 11/14/2017    Hyponatremia 11/14/2017    Acute encephalopathy 06/27/2016    HTN (hypertension) 06/27/2016    Sepsis ([facility] [address] 06/27/2016           Hospital Course:Patient admitted as direct admit 2/2 home monitor showing AFib RVR. A Cardizem bolus was given with good response. The patient was admitted to telemetry and Cardizem drip was continued. After approx 5 hrs of IV Cardizem, she was noted to have a pause and then converted to a junctional rhythm HR 40s. 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 She was loaded with tikosyn 500 ucg BID for which she tolerated without QTc prolongation. She will start Xarelto 20 mg every dinner tonight. Patient has been instructed to keep Left arm below shoulder height for the next 4 weeks. Avoid reaching, pulling, pushing with affected arm. Call for any problems with pacemaker site including any suspicious drainage, fever of unknown origin for the next month. Keep site clean and dry applying no powders or lotions to site. No driving until seen in follow up. Keep dressing intact until seen in clinic.  Today pts device was interrogated revealing normal operation, threshold and chest xray without pneumothorax. She was maintaining sinus rhythm with atrial paced. It was discussed with patient the importance of oral anticoagulation, to take this every day and monitor stools for signs and symptoms of GI bleed. Report to us or PCP any dark tarry stools or chicho blood in stool. Discharge Exam:     Visit Vitals    /60 (BP 1 Location: Left arm, BP Patient Position: At rest)    Pulse 79    Temp 98.7 °F (37.1 °C)    Resp 17    Wt 70.4 kg (155 lb 1.6 oz)    SpO2 97%    BMI 26.62 kg/m2     General Appearance:  Well developed, well nourished,alert and oriented x 3, and individual in no acute distress. Ears/Nose/Mouth/Throat:   Hearing grossly normal.         Neck: Supple. Chest:   Lungs clear to auscultation bilaterally. Cardiovascular:  Regular rate and rhythm, S1, S2 normal, no murmur. Abdomen:   Soft, non-tender, bowel sounds are active. Extremities: No edema bilaterally. Skin: Warm and dry.                Final Laboratory Data:  Recent Results (from the past 24 hour(s))   EKG, 12 LEAD, INITIAL    Collection Time: 06/04/18 10:54 AM   Result Value Ref Range    Ventricular Rate 52 BPM    Atrial Rate 52 BPM    P-R Interval 182 ms    QRS Duration 84 ms    Q-T Interval 516 ms    QTC Calculation (Bezet) 479 ms    Calculated P Axis 66 degrees    Calculated R Axis 20 degrees    Calculated T Axis 73 degrees    Diagnosis       Sinus bradycardia  Otherwise normal ECG  When compared with ECG of 04-JUN-2018 00:03,  No significant change was found  Confirmed by ST DAMIÁN CORREIA MD (), ABBY SERRANO ([id number]) on 6/4/2018 10:47:09 PM     EKG, 12 LEAD, INITIAL    Collection Time: 06/05/18 12:35 AM   Result Value Ref Range    Ventricular Rate 74 BPM    Atrial Rate 74 BPM    P-R Interval 198 ms    QRS Duration 94 ms    Q-T Interval 398 ms    QTC Calculation (Bezet) 441 ms    Calculated P Axis 88 degrees    Calculated R Axis 26 degrees    Calculated T Axis 97 degrees    Diagnosis Atrial-paced rhythm  Nonspecific T wave abnormality  Abnormal ECG  When compared with ECG of 04-JUN-2018 10:54,  Electronic atrial pacemaker has replaced Sinus rhythm  T wave inversion now evident in Lateral leads         Disposition: home    Patient Instructions:   Current Discharge Medication List      START taking these medications    Details   dofetilide (TIKOSYN) 500 mcg capsule Take 1 Cap by mouth every twelve (12) hours every twelve (12) hours. Qty: 60 Cap, Refills: 6      rivaroxaban (XARELTO) 20 mg tab tablet Take 1 Tab by mouth daily (with dinner). Qty: 30 Tab, Refills: 6         CONTINUE these medications which have NOT CHANGED    Details   Magnesium Oxide 500 mg cap Take  by mouth. cholecalciferol, vitamin D3, (VITAMIN D3) 2,000 unit tab Take  by mouth daily. levothyroxine (SYNTHROID) 100 mcg tablet Take 100 mcg by mouth Daily (before breakfast). STOP taking these medications       dilTIAZem XR (DILACOR XR) 180 mg XR capsule Comments:   Reason for Stopping:         clopidogrel (PLAVIX) 75 mg tab Comments:   Reason for Stopping:         atorvastatin (LIPITOR) 10 mg tablet Comments:   Reason for Stopping:               Referenced discharge instructions provided by nursing for diet and activity. Follow-up:  Primary Cardiologist:Dr. Marta Maldonado in 2 weeks  PCP: Zara Salmeron MD) in about 4 weeks.     Signed:  Grabiel Hernandez NP  6/5/2018  9:09 AM

## 2018-06-05 NOTE — DISCHARGE INSTRUCTIONS
Atrial Fibrillation: Care Instructions  Your Care Instructions    Atrial fibrillation is an irregular and often fast heartbeat. Treating this condition is important for several reasons. It can cause blood clots, which can travel from your heart to your brain and cause a stroke. If you have a fast heartbeat, you may feel lightheaded, dizzy, and weak. An irregular heartbeat can also increase your risk for heart failure. Atrial fibrillation is often the result of another heart condition, such as high blood pressure or coronary artery disease. Making changes to improve your heart condition will help you stay healthy and active. Follow-up care is a key part of your treatment and safety. Be sure to make and go to all appointments, and call your doctor if you are having problems. It's also a good idea to know your test results and keep a list of the medicines you take. How can you care for yourself at home? Medicines  ? · Take your medicines exactly as prescribed. Call your doctor if you think you are having a problem with your medicine. You will get more details on the specific medicines your doctor prescribes. ? · If your doctor has given you a blood thinner to prevent a stroke, be sure you get instructions about how to take your medicine safely. Blood thinners can cause serious bleeding problems. ? · Do not take any vitamins, over-the-counter drugs, or herbal products without talking to your doctor first.   ? Lifestyle changes  ? · Do not smoke. Smoking can increase your chance of a stroke and heart attack. If you need help quitting, talk to your doctor about stop-smoking programs and medicines. These can increase your chances of quitting for good. ? · Eat a heart-healthy diet. ? · Stay at a healthy weight. Lose weight if you need to.   ? · Limit alcohol to 2 drinks a day for men and 1 drink a day for women. Too much alcohol can cause health problems. ? · Avoid colds and flu.  Get a pneumococcal vaccine shot. If you have had one before, ask your doctor whether you need another dose. Get a flu shot every year. If you must be around people with colds or flu, wash your hands often. Activity  ? · If your doctor recommends it, get more exercise. Walking is a good choice. Bit by bit, increase the amount you walk every day. Try for at least 30 minutes on most days of the week. You also may want to swim, bike, or do other activities. Your doctor may suggest that you join a cardiac rehabilitation program so that you can have help increasing your physical activity safely. ? · Start light exercise if your doctor says it is okay. Even a small amount will help you get stronger, have more energy, and manage stress. Walking is an easy way to get exercise. Start out by walking a little more than you did in the hospital. Gradually increase the amount you walk. ? · When you exercise, watch for signs that your heart is working too hard. You are pushing too hard if you cannot talk while you are exercising. If you become short of breath or dizzy or have chest pain, sit down and rest immediately. ? · Check your pulse regularly. Place two fingers on the artery at the palm side of your wrist, in line with your thumb. If your heartbeat seems uneven or fast, talk to your doctor. When should you call for help? Call 911 anytime you think you may need emergency care. For example, call if:  ? · You have symptoms of a heart attack. These may include:  ¨ Chest pain or pressure, or a strange feeling in the chest.  ¨ Sweating. ¨ Shortness of breath. ¨ Nausea or vomiting. ¨ Pain, pressure, or a strange feeling in the back, neck, jaw, or upper belly or in one or both shoulders or arms. ¨ Lightheadedness or sudden weakness. ¨ A fast or irregular heartbeat. After you call 911, the  may tell you to chew 1 adult-strength or 2 to 4 low-dose aspirin. Wait for an ambulance. Do not try to drive yourself.    ? · You have symptoms of a stroke. These may include:  ¨ Sudden numbness, tingling, weakness, or loss of movement in your face, arm, or leg, especially on only one side of your body. ¨ Sudden vision changes. ¨ Sudden trouble speaking. ¨ Sudden confusion or trouble understanding simple statements. ¨ Sudden problems with walking or balance. ¨ A sudden, severe headache that is different from past headaches. ? · You passed out (lost consciousness). ?Call your doctor now or seek immediate medical care if:  ? · You have new or increased shortness of breath. ? · You feel dizzy or lightheaded, or you feel like you may faint. ? · Your heart rate becomes irregular. ? · You can feel your heart flutter in your chest or skip heartbeats. Tell your doctor if these symptoms are new or worse. ? Watch closely for changes in your health, and be sure to contact your doctor if you have any problems. Where can you learn more? Go to http://agus-branden.info/. Enter U020 in the search box to learn more about \"Atrial Fibrillation: Care Instructions. \"  Current as of: September 21, 2016  Content Version: 11.4  © 3360-6911 [employer]. Care instructions adapted under license by [employer] (which disclaims liability or warranty for this information). If you have questions about a medical condition or this instruction, always ask your healthcare professional. [address] any warranty or liability for your use of this information. Heart-Healthy Diet: Care Instructions  Your Care Instructions    A heart-healthy diet has lots of vegetables, fruits, nuts, beans, and whole grains, and is low in salt. It limits foods that are high in saturated fat, such as meats, cheeses, and fried foods. It may be hard to change your diet, but even small changes can lower your risk of heart attack and heart disease. Follow-up care is a key part of your treatment and safety.  Be sure to make and go to all appointments, and call your doctor if you are having problems. It's also a good idea to know your test results and keep a list of the medicines you take. How can you care for yourself at home? Watch your portions  · Learn what a serving is. A \"serving\" and a \"portion\" are not always the same thing. Make sure that you are not eating larger portions than are recommended. For example, a serving of pasta is ½ cup. A serving size of meat is 2 to 3 ounces. A 3-ounce serving is about the size of a deck of cards. Measure serving sizes until you are good at [place]" them. Keep in mind that restaurants often serve portions that are 2 or 3 times the size of one serving. · To keep your energy level up and keep you from feeling hungry, eat often but in smaller portions. · Eat only the number of calories you need to stay at a healthy weight. If you need to lose weight, eat fewer calories than your body burns (through exercise and other physical activity). Eat more fruits and vegetables  · Eat a variety of fruit and vegetables every day. Dark green, deep orange, red, or yellow fruits and vegetables are especially good for you. Examples include spinach, carrots, peaches, and berries. · Keep carrots, celery, and other veggies handy for snacks. Buy fruit that is in season and store it where you can see it so that you will be tempted to eat it. · Cook dishes that have a lot of veggies in them, such as stir-fries and soups. Limit saturated and trans fat  · Read food labels, and try to avoid saturated and trans fats. They increase your risk of heart disease. Trans fat is found in many processed foods such as cookies and crackers. · Use olive or canola oil when you cook. Try cholesterol-lowering spreads, such as Benecol or Take Control. · Bake, broil, grill, or steam foods instead of frying them. · Choose lean meats instead of high-fat meats such as hot dogs and sausages.  Cut off all visible fat when you prepare meat.  · Eat fish, skinless poultry, and meat alternatives such as soy products instead of high-fat meats. Soy products, such as tofu, may be especially good for your heart. · Choose low-fat or fat-free milk and dairy products. Eat fish  · Eat at least two servings of fish a week. Certain fish, such as salmon and tuna, contain omega-3 fatty acids, which may help reduce your risk of heart attack. Eat foods high in fiber  · Eat a variety of grain products every day. Include whole-grain foods that have lots of fiber and nutrients. Examples of whole-grain foods include oats, whole wheat bread, and brown rice. · Buy whole-grain breads and cereals, instead of white bread or pastries. Limit salt and sodium  · Limit how much salt and sodium you eat to help lower your blood pressure. · Taste food before you salt it. Add only a little salt when you think you need it. With time, your taste buds will adjust to less salt. · Eat fewer snack items, fast foods, and other high-salt, processed foods. Check food labels for the amount of sodium in packaged foods. · Choose low-sodium versions of canned goods (such as soups, vegetables, and beans). Limit sugar  · Limit drinks and foods with added sugar. These include candy, desserts, and soda pop. Limit alcohol  · Limit alcohol to no more than 2 drinks a day for men and 1 drink a day for women. Too much alcohol can cause health problems. When should you call for help? Watch closely for changes in your health, and be sure to contact your doctor if:  ? · You would like help planning heart-healthy meals. Where can you learn more? Go to http://agus-branden.info/. Enter V137 in the search box to learn more about \"Heart-Healthy Diet: Care Instructions. \"  Current as of: September 21, 2016  Content Version: 11.4  © 6799-5306 [employer], [employer].  Care instructions adapted under license by [employer] (which disclaims liability or warranty for this information). If you have questions about a medical condition or this instruction, always ask your healthcare professional. [place] [address] any warranty or liability for your use of this information.

## 2018-06-05 NOTE — PROGRESS NOTES
[facility] CARDIOLOGY PROGRESS NOTE           6/5/2018 6:49 AM    Admit Date: 6/1/2018    Admit Diagnosis: Atrial Fib with RVR; A-fib (HCC);SYMPTOMATIC BRADYCARDIA      Subjective:   No complaints this AM, no chest pain or shortness of breath, appropriate post op device pocket pain    Interval History: (History of pertinent interval events obtained from nursing staff)  Cardiac device placed yesterday, no events overnight, no immediate complications    ROS:  GEN:  No fever or chills  Cardiovascular:  As noted above  Pulmonary:  As noted above  Neuro:  No new focal motor or sensory loss      Objective:     Vitals:    06/04/18 2305 06/05/18 0005 06/05/18 0052 06/05/18 0542   BP: 159/81 159/67 160/68 151/73   Pulse: 73 71 78 76   Resp:   17 17   Temp:   98.1 °F (36.7 °C) 97.5 °F (36.4 °C)   SpO2:   96% 96%   Weight:    70.4 kg (155 lb 1.6 oz)       Physical Exam:  General-Well Developed, Well Nourished, No Acute Distress, Alert & Oriented x 3, appropriate mood. CV- regular rate and rhythm no MRG, left infraclavicular pocket with dressing in place, no evidence of hematoma, redness, warmth or excessive drainage  Lung- clear bilaterally  Abd- soft, nontender, nondistended  Ext- no edema bilaterally.     Current Facility-Administered Medications   Medication Dose Route Frequency    naproxen (NAPROSYN) tablet 250 mg  250 mg Oral BID PRN    sodium chloride (NS) flush 5-10 mL  5-10 mL IntraVENous Q8H    sodium chloride (NS) flush 5-10 mL  5-10 mL IntraVENous PRN    ceFAZolin (ANCEF) 2 g/20 mL in sterile water IV syringe  2 g IntraVENous Q8H    HYDROmorphone (PF) (DILAUDID) injection 0.5 mg  0.5 mg IntraVENous ONCE    dofetilide (TIKOSYN) capsule 500 mcg  500 mcg Oral Q12H    atorvastatin (LIPITOR) tablet 10 mg  10 mg Oral DAILY    levothyroxine (SYNTHROID) tablet 100 mcg  100 mcg Oral ACB    magnesium oxide (MAG-OX) tablet 400 mg  400 mg Oral DAILY    sodium chloride (NS) flush 5-10 mL  5-10 mL IntraVENous Q8H    sodium chloride (NS) flush 5-10 mL  5-10 mL IntraVENous PRN    dilTIAZem (CARDIZEM) 125 mg in dextrose 5% 125 mL infusion  0-15 mg/hr IntraVENous TITRATE    traMADol (ULTRAM) tablet 50 mg  50 mg Oral Q6H PRN    acetaminophen (TYLENOL) tablet 650 mg  650 mg Oral Q4H PRN     Data Review:   Recent Results (from the past 24 hour(s))   EKG, 12 LEAD, INITIAL    Collection Time: 06/04/18 10:54 AM   Result Value Ref Range    Ventricular Rate 52 BPM    Atrial Rate 52 BPM    P-R Interval 182 ms    QRS Duration 84 ms    Q-T Interval 516 ms    QTC Calculation (Bezet) 479 ms    Calculated P Axis 66 degrees    Calculated R Axis 20 degrees    Calculated T Axis 73 degrees    Diagnosis       Sinus bradycardia  Otherwise normal ECG  When compared with ECG of 04-JUN-2018 00:03,  No significant change was found  Confirmed by ST DAMIÁN CORREIA MD (), ABBY SERRANO ([id number]) on 6/4/2018 10:47:09 PM     EKG, 12 LEAD, INITIAL    Collection Time: 06/05/18 12:35 AM   Result Value Ref Range    Ventricular Rate 74 BPM    Atrial Rate 74 BPM    P-R Interval 198 ms    QRS Duration 94 ms    Q-T Interval 398 ms    QTC Calculation (Bezet) 441 ms    Calculated P Axis 88 degrees    Calculated R Axis 26 degrees    Calculated T Axis 97 degrees    Diagnosis       Atrial-paced rhythm  Nonspecific T wave abnormality  Abnormal ECG  When compared with ECG of 04-JUN-2018 10:54,  Electronic atrial pacemaker has replaced Sinus rhythm  T wave inversion now evident in Lateral leads         EKG:  (EKG has been independently visualized by me with interpretation below) atrial pacing, rate 74, QTc 441 msec, NSST  Assessment:     Active Problems:    A-fib ([address] (6/1/2018)      Plan:   1.  Cardiac device implantation: Pt device interrogation this AM reveals normal function, excellent pacing and sensing parameters, CXR without pneumothorax with excellent device position, no recognized complications, stable for discharge home today with appropriate follow up as long as patient pain controlled and feels comfortable going home today  2. Afib: s/p tikosyn loading, restart xarelto today, QTc acceptable, received full load, plan for possible home today  3. Dispo: needs device and clinic follow up    [address] [facility] [address]. Sellers MD  Cardiology/Electrophysiology

## 2018-06-05 NOTE — PROGRESS NOTES
TRANSFER - IN REPORT:    Verbal report received from Melodie Thomason, [facility] (name) on Nia Tiwari  being received from EP lab (unit) for routine post - op      Report consisted of patients Situation, Background, Assessment and   Recommendations(SBAR). Information from the following report(s) SBAR, Procedure Summary, MAR and Cardiac Rhythm SR-paced at times was reviewed with the receiving nurse. Opportunity for questions and clarification was provided. Assessment to be completed upon patients arrival to unit and care assumed.

## 2018-06-05 NOTE — PROCEDURES
Pre-Procedure Diagnosis  1. Documented non-reversible symptomatic bradycardia due to sinus node dysfunction. 2. Tachy dusty syndrome  3. Paroxysmal atrial fibrillation    Procedure Performed  1. Insertion of a dual chamber PPM    Anesthesia: MAC     Estimated Blood Loss: Less than 10 mL     Specimens: * No specimens in log *     Patient Information and Indications: The procedure, indications, risks, benefits, and alternatives were discussed with the patient and family members, who desired to proceed after questions were answered and informed consent was documented. Procedure: After informed consent was obtained, the patient was brought to the Electrophysiology Laboratory in a fasting state and was prepped and draped in sterile fashion. Prophylactic antibiotic was administered 10 minutes prior to skin incision: refer to anesthesia procedural documentation for full details. Conscious sedation was administered by anesthesia with continuous oxygen saturation measurement and blood pressure measurement. Local anesthetic (sensorcaine) was delivered to the left pectoral region and an incision was made parallel to the deltopectoral groove. A subcutaneous pocket was created using blunt dissection and electrocautery, and adequate hemostasis was established. Access x 2 was obtained under ultrasound guidance using a modified Seldinger technique with placement of 2 wires down into the RA and advanced below the diaphragm. Next, placement of a 7Fr peel-away sheath over the first guidewire was performed. A permanent RV pm lead was then advanced under fluoroscopic guidance via the 7Fr sheath into the RV in a stable position with satisfactory sensing and pacing characteristics. The peel away sheath was removed. A 6Fr Safe-sheath was then placed in the second guidewire. A permanent pacing lead was advanced under fluoroscopic guidance and positioned in the right atrium.  Stable RA appendage position with satisfactory sensing and pacing characteristics was obtained. The sheath was peeled. The leads were sutured to the underlying pectoralis fascia using 2 non-absorbable sutures around each lead's collar. The lead slack and position was assessed under fluoroscopy while securing the lead collars to ensure proper length. Final lead testing via the pacing system analyzer (PSA) demonstrated stable sensing, impedance and pacing thresholds. The lead pins were then cleaned with antibiotic soaked gauze, dried gently, and attached to a new pacemaker generator. Pins were directly observed to pass the tip electrode, and the ring hex wrench screws were secured, and leads tug tested. The device and leads were gently positioned within the pocket after the pocket was irrigated copiously with a saline antimicrobial solution. The wound was closed with multiple layers of absorbable suture followed by skin closure with staples. Fluoroscopic images were interpreted by me, in multiple projections. Final device position was confirmed by stored fluoroscopic image from device pocket to lead tips in AP projection. The patient tolerated the procedure well and left the lab in good condition.      Complications: None     Lead Data:    Device and Lead Information  Pulse Generator Model #  Serial # Location Implant/Explant   [id number] Seg[employer] [id number] Left Pectoral Implant     Lead Model Number  Serial Number Lead position Implant/Explant   RA [id number] Biotronik [id number] RA Appendage Implant   RV [id number] Biotronik [id number] RV [place] Implant     Lead Sensitivity and Threshold  Lead R or P sensitivity (mv) Threshold (V) Threshold PW (msec) Impedance (ohms) Final output Voltage (V) Final PW (msec)   RA 2.7 0.9 0.4 565 3.6 0.4   RV 6.4 0.7 0.4 604 3.6 0.4     Bradycardia Settings  Junior Mode LRL URL Pace AVD (ms) Sense AVD (ms) Rate Response Mode Switching Mode SW Rate   DDD-CLS 60 120 300 300 On On 160     No Contrast     Fluoro Time: 5.4 min    Impression: 1. Successful implantation and testing of a Biotronik dual chamber Pacemaker. Stuart Luis MD, [facility] [address]  Clinical Cardiac Electrophysiology  6/4/2018  8:39 PM

## 2018-06-05 NOTE — PROGRESS NOTES
Discussed with patient that the cost of her Tikosyn is $355 per month and Rickey currently does not have the medication. Patient states she would discuss with her physician and will decide what she can do and also can go to another drug store for her medication. BECKA following.

## 2018-06-05 NOTE — PROGRESS NOTES
Problem: Pacer/ICD: Post-Procedure  Goal: *Hemodynamically stable  Outcome: Progressing Towards Goal  Patient has been hemodynamically stable following return to floor from the EP lab recovery. Goal: *Optimal pain control at patient's stated goal  Outcome: Not Progressing Towards Goal  Patient has been complaining of pain since arrival to floor and has refused pain medications. Multiple alternative therapies have been offered including ice, heat, elevation, and distraction. Patient states some of these have helped temporarily, but nothing has relieved the pain. Patient still continues to refuse pain medications. Goal: *Absence of signs and symptoms of infection or wound complication  Outcome: Resolved/Met Date Met: 06/05/18  Left subclavian pacer site is covered with an Aquacel dressing which is clean, dry and intact. There are no signs or symptoms of bleeding, swelling, or infection. The left arm is a sling and the patient verbalizes understanding of movement limitations of the left arm.

## 2018-06-05 NOTE — ANESTHESIA POSTPROCEDURE EVALUATION
Post-Anesthesia Evaluation and Assessment    Patient: Pravin Mckeon MRN: [id number]  SSN: [id number]    YOB: 1940  Age: [de-identified] y.o. Sex: female       Cardiovascular Function/Vital Signs  Visit Vitals    BP (!) 200/121    Pulse 72    Temp 36.5 °C (97.7 °F)    Resp 16    Wt 71.4 kg (157 lb 4.8 oz)    SpO2 97%    BMI 27 kg/m2     /87 after hydralazine. Patient is status post total IV anesthesia anesthesia for Procedure(s):  PACEMAKER INSERTION. Nausea/Vomiting: None    Postoperative hydration reviewed and adequate. Pain:  Pain Scale 1: Numeric (0 - 10) (06/04/18 2055)  Pain Intensity 1: 3 (06/04/18 2105)   Managed    Neurological Status:   Neuro  Neurologic State: Alert;Eyes open spontaneously (06/04/18 0730)  Orientation Level: Oriented X4 (06/04/18 0730)  Cognition: Follows commands; Appropriate decision making; Appropriate safety awareness (06/04/18 0730)  Speech: Clear (06/04/18 0730)  Assessment L Pupil: Brisk;Round (06/04/18 0730)  Size L Pupil (mm): 3 (06/04/18 0730)  Assessment R Pupil: Brisk;Round (06/04/18 0730)  Size R Pupil (mm): 3 (06/04/18 0730)  LUE Motor Response: Purposeful;Spontaneous  (06/04/18 0730)  LLE Motor Response: Purposeful;Spontaneous  (06/04/18 0730)  RUE Motor Response: Purposeful;Spontaneous  (06/04/18 0730)  RLE Motor Response: Purposeful;Spontaneous  (06/04/18 0730)   At baseline    Mental Status and Level of Consciousness: Awake. Pulmonary Status:   O2 Device: Room air (06/04/18 2115)   Adequate oxygenation and airway patent    Complications related to anesthesia: None    Post-anesthesia assessment completed.  No concerns    Signed By: Shemar Carter MD     June 4, 2018

## 2018-06-05 NOTE — PROGRESS NOTES
Bedside and Verbal shift change report given to Abdirashid Shelley RN (oncoming nurse) by self Ramiro singletary). Report included the following information SBAR, Kardex, ED Summary, Procedure Summary, Intake/Output, MAR, Recent Results and Cardiac Rhythm paced.

## 2018-06-05 NOTE — PROGRESS NOTES
Bedside and Verbal shift change report given to self (oncoming nurse) by Willi Quintana RN (offgoing nurse). Report included the following information SBAR, Kardex, MAR and Recent Results. Left subclavian site assessed, dressing c/d/i, sling in proper position.

## 2018-06-05 NOTE — PROGRESS NOTES
In accordance with Medicare guidelines, a copy of the Important Letter from Medicare was presented to the patient/family in anticipation of expected discharge. One copy was given to the patient, and a signed copy was placed in the patients chart.

## 2018-06-05 NOTE — PROGRESS NOTES
Bedside and Verbal shift change report given to self (oncoming nurse) by Mary Claire RN (offgoing nurse). Report included the following information SBAR, Kardex, ED Summary, Procedure Summary, Intake/Output, MAR, Recent Results and Cardiac Rhythm SB-SR. Patient currently off floor for ordered procedure.

## 2018-07-07 ENCOUNTER — APPOINTMENT (OUTPATIENT)
Dept: CT IMAGING | Age: 78
End: 2018-07-07
Attending: EMERGENCY MEDICINE
Payer: MEDICARE

## 2018-07-07 ENCOUNTER — HOSPITAL ENCOUNTER (EMERGENCY)
Age: 78
Discharge: HOME | End: 2018-07-07
Attending: EMERGENCY MEDICINE
Payer: MEDICARE

## 2018-07-07 VITALS
SYSTOLIC BLOOD PRESSURE: 166 MMHG | WEIGHT: 165 LBS | TEMPERATURE: 99.4 F | HEIGHT: 66 IN | DIASTOLIC BLOOD PRESSURE: 96 MMHG | RESPIRATION RATE: 18 BRPM | OXYGEN SATURATION: 97 % | HEART RATE: 86 BPM | BODY MASS INDEX: 26.52 KG/M2

## 2018-07-07 DIAGNOSIS — R51.9 ACUTE NONINTRACTABLE HEADACHE, UNSPECIFIED HEADACHE TYPE: Primary | ICD-10-CM

## 2018-07-07 DIAGNOSIS — I10 HYPERTENSION, UNSPECIFIED TYPE: ICD-10-CM

## 2018-07-07 LAB
ALBUMIN SERPL-MCNC: 3.4 G/DL (ref 3.2–4.6)
ALBUMIN/GLOB SERPL: 0.9 {RATIO} (ref 1.2–3.5)
ALP SERPL-CCNC: 89 U/L (ref 50–136)
ALT SERPL-CCNC: 25 U/L (ref 12–65)
ANION GAP SERPL CALC-SCNC: 5 MMOL/L (ref 7–16)
AST SERPL-CCNC: 49 U/L (ref 15–37)
BASOPHILS # BLD: 0 K/UL (ref 0–0.2)
BASOPHILS NFR BLD: 1 % (ref 0–2)
BILIRUB SERPL-MCNC: 0.6 MG/DL (ref 0.2–1.1)
BUN SERPL-MCNC: 9 MG/DL (ref 8–23)
CALCIUM SERPL-MCNC: 8.9 MG/DL (ref 8.3–10.4)
CHLORIDE SERPL-SCNC: 95 MMOL/L (ref 98–107)
CO2 SERPL-SCNC: 31 MMOL/L (ref 21–32)
CREAT SERPL-MCNC: 0.52 MG/DL (ref 0.6–1)
DIFFERENTIAL METHOD BLD: ABNORMAL
EOSINOPHIL # BLD: 0.1 K/UL (ref 0–0.8)
EOSINOPHIL NFR BLD: 1 % (ref 0.5–7.8)
ERYTHROCYTE [DISTWIDTH] IN BLOOD BY AUTOMATED COUNT: 13.8 % (ref 11.9–14.6)
GLOBULIN SER CALC-MCNC: 4 G/DL (ref 2.3–3.5)
GLUCOSE SERPL-MCNC: 111 MG/DL (ref 65–100)
HCT VFR BLD AUTO: 39.5 % (ref 35.8–46.3)
HGB BLD-MCNC: 13.4 G/DL (ref 11.7–15.4)
IMM GRANULOCYTES # BLD: 0.1 K/UL (ref 0–0.5)
IMM GRANULOCYTES NFR BLD AUTO: 1 % (ref 0–5)
LYMPHOCYTES # BLD: 1.5 K/UL (ref 0.5–4.6)
LYMPHOCYTES NFR BLD: 17 % (ref 13–44)
MCH RBC QN AUTO: 29.1 PG (ref 26.1–32.9)
MCHC RBC AUTO-ENTMCNC: 33.9 G/DL (ref 31.4–35)
MCV RBC AUTO: 85.9 FL (ref 79.6–97.8)
MONOCYTES # BLD: 0.9 K/UL (ref 0.1–1.3)
MONOCYTES NFR BLD: 10 % (ref 4–12)
NEUTS SEG # BLD: 6.2 K/UL (ref 1.7–8.2)
NEUTS SEG NFR BLD: 70 % (ref 43–78)
PLATELET # BLD AUTO: 300 K/UL (ref 150–450)
PMV BLD AUTO: 9.9 FL (ref 10.8–14.1)
POTASSIUM SERPL-SCNC: 3.6 MMOL/L (ref 3.5–5.1)
POTASSIUM SERPL-SCNC: 5.4 MMOL/L (ref 3.5–5.1)
PROT SERPL-MCNC: 7.4 G/DL (ref 6.3–8.2)
RBC # BLD AUTO: 4.6 M/UL (ref 4.05–5.25)
SODIUM SERPL-SCNC: 131 MMOL/L (ref 136–145)
WBC # BLD AUTO: 8.7 K/UL (ref 4.3–11.1)

## 2018-07-07 PROCEDURE — 74011250636 HC RX REV CODE- 250/636: Performed by: EMERGENCY MEDICINE

## 2018-07-07 PROCEDURE — 99285 EMERGENCY DEPT VISIT HI MDM: CPT | Performed by: EMERGENCY MEDICINE

## 2018-07-07 PROCEDURE — 93005 ELECTROCARDIOGRAM TRACING: CPT | Performed by: EMERGENCY MEDICINE

## 2018-07-07 PROCEDURE — 74011250637 HC RX REV CODE- 250/637: Performed by: EMERGENCY MEDICINE

## 2018-07-07 PROCEDURE — 96361 HYDRATE IV INFUSION ADD-ON: CPT | Performed by: EMERGENCY MEDICINE

## 2018-07-07 PROCEDURE — 84132 ASSAY OF SERUM POTASSIUM: CPT | Performed by: EMERGENCY MEDICINE

## 2018-07-07 PROCEDURE — 96374 THER/PROPH/DIAG INJ IV PUSH: CPT | Performed by: EMERGENCY MEDICINE

## 2018-07-07 PROCEDURE — 70450 CT HEAD/BRAIN W/O DYE: CPT

## 2018-07-07 PROCEDURE — 80053 COMPREHEN METABOLIC PANEL: CPT | Performed by: EMERGENCY MEDICINE

## 2018-07-07 PROCEDURE — 96375 TX/PRO/DX INJ NEW DRUG ADDON: CPT | Performed by: EMERGENCY MEDICINE

## 2018-07-07 PROCEDURE — 85025 COMPLETE CBC W/AUTO DIFF WBC: CPT | Performed by: EMERGENCY MEDICINE

## 2018-07-07 RX ORDER — AMLODIPINE BESYLATE 5 MG/1
5 TABLET ORAL DAILY
Qty: 30 TAB | Refills: 0 | Status: SHIPPED | OUTPATIENT
Start: 2018-07-07 | End: 2019-02-26 | Stop reason: SDUPTHER

## 2018-07-07 RX ORDER — AMLODIPINE BESYLATE 5 MG/1
5 TABLET ORAL
Status: COMPLETED | OUTPATIENT
Start: 2018-07-07 | End: 2018-07-07

## 2018-07-07 RX ORDER — DEXAMETHASONE SODIUM PHOSPHATE 100 MG/10ML
10 INJECTION INTRAMUSCULAR; INTRAVENOUS
Status: COMPLETED | OUTPATIENT
Start: 2018-07-07 | End: 2018-07-07

## 2018-07-07 RX ORDER — ONDANSETRON 2 MG/ML
4 INJECTION INTRAMUSCULAR; INTRAVENOUS
Status: COMPLETED | OUTPATIENT
Start: 2018-07-07 | End: 2018-07-07

## 2018-07-07 RX ORDER — BUTALBITAL, ACETAMINOPHEN AND CAFFEINE 50; 325; 40 MG/1; MG/1; MG/1
1 TABLET ORAL
Qty: 19 TAB | Refills: 0 | Status: SHIPPED | OUTPATIENT
Start: 2018-07-07 | End: 2019-02-14

## 2018-07-07 RX ORDER — ONDANSETRON 4 MG/1
4 TABLET, ORALLY DISINTEGRATING ORAL
Qty: 15 TAB | Refills: 0 | Status: SHIPPED | OUTPATIENT
Start: 2018-07-07 | End: 2018-09-27

## 2018-07-07 RX ADMIN — SODIUM CHLORIDE 1000 ML: 900 INJECTION, SOLUTION INTRAVENOUS at 19:18

## 2018-07-07 RX ADMIN — DEXAMETHASONE SODIUM PHOSPHATE 10 MG: 10 INJECTION INTRAMUSCULAR; INTRAVENOUS at 19:19

## 2018-07-07 RX ADMIN — ONDANSETRON 4 MG: 2 INJECTION INTRAMUSCULAR; INTRAVENOUS at 19:19

## 2018-07-07 RX ADMIN — AMLODIPINE BESYLATE 5 MG: 5 TABLET ORAL at 19:56

## 2018-07-07 NOTE — ED TRIAGE NOTES
Pt c/o headache that is on right side of head and the worst headache she has had. Pt had pacemaker put in 2 weeks ago and was taken off of HTN meds at same time as she got pacemaker.

## 2018-07-07 NOTE — ED PROVIDER NOTES
HPI Comments: 78-year-old female presents with complaints of right temporal headache. Had some headache yesterday, was better during the first part of the day today, but has returned this afternoon. States she feels a throbbing sensation Has had nausea but no vomiting No syncope No focal weakness Patient has had recent admissions for new-onset A. Fib, had pacemaker placed after her bradycardic episodes. Also diagnosed with a stroke by MRI several months ago Patient has persisted on anticoagulation and Tikosyn Previously on Norvasc but no longer prescribe this particular medicine Patient is a [de-identified] y.o. female presenting with headaches. The history is provided by the patient and a relative. Headache This is a recurrent problem. The current episode started 3 to 5 hours ago. The problem occurs constantly. The problem has not changed since onset. The headache is aggravated by an unknown factor. The pain is located in the right unilateral region. The quality of the pain is described as dull and throbbing. The pain is moderate. Associated symptoms include weakness and nausea. Pertinent negatives include no anorexia, no fever, no chest pressure, no orthopnea, no palpitations, no syncope, no shortness of breath, no tingling, no dizziness, no visual change and no vomiting. She has tried nothing for the symptoms. The treatment provided mild relief. Past Medical History:  
Diagnosis Date  Acute encephalopathy 6/27/2016  Anxiety  Arthritis  Atrial fibrillation ([address] 4/16/2018  Chronic pain   
 hip  Endocrine disease   
 hypothyroidism  Hypertension  Sepsis ([facility] [address] 6/27/2016  Thyroid disease  TIA (transient ischemic attack) 11/14/2017 Past Surgical History:  
Procedure Laterality Date  HX ORTHOPAEDIC    
 right total hip  TOTAL KNEE ARTHROPLASTY Left 7/23/15 Family History:  
Problem Relation Age of Onset  Heart Attack Father Social History Social History  Marital status:  Spouse name: N/A  
 Number of children: N/A  
 Years of education: N/A Occupational History  Not on file. Social History Main Topics  Smoking status: Never Smoker  Smokeless tobacco: Never Used  Alcohol use No  
 Drug use: No  
 Sexual activity: Not on file Other Topics Concern  Not on file Social History Narrative ALLERGIES: Ciprofloxacin; Percocet [oxycodone-acetaminophen]; Other medication; and Propofol (bulk) Review of Systems Constitutional: Negative for chills and fever. HENT: Negative for congestion, ear pain and rhinorrhea. Eyes: Negative for photophobia and discharge. Respiratory: Negative for cough and shortness of breath. Cardiovascular: Negative for chest pain, palpitations, orthopnea and syncope. Gastrointestinal: Positive for nausea. Negative for abdominal pain, anorexia, constipation, diarrhea and vomiting. Endocrine: Negative for cold intolerance and heat intolerance. Genitourinary: Negative for dysuria and flank pain. Musculoskeletal: Negative for arthralgias, myalgias and neck pain. Skin: Negative for rash and wound. Allergic/Immunologic: Negative for environmental allergies and food allergies. Neurological: Positive for weakness and headaches. Negative for dizziness, tingling and syncope. Hematological: Negative for adenopathy. Does not bruise/bleed easily. Psychiatric/Behavioral: Negative for dysphoric mood. The patient is not nervous/anxious. All other systems reviewed and are negative. Vitals:  
 07/07/18 1759 07/07/18 1811 BP: (!) 206/82 Pulse: 76 Resp: 17 Temp: 99.4 °F (37.4 °C) SpO2: 98% 98% Weight: 74.8 kg (165 lb) Height: 5' 6\" (1.676 m) Physical Exam  
Constitutional: She is oriented to person, place, and time. She appears well-developed and well-nourished. She appears distressed. HENT:  
Head: Normocephalic and atraumatic. Mouth/Throat: Oropharynx is clear and moist.  
Eyes: Conjunctivae and EOM are normal. Pupils are equal, round, and reactive to light. Right eye exhibits no discharge. Left eye exhibits no discharge. No scleral icterus. Neck: Normal range of motion. Neck supple. No thyromegaly present. Cardiovascular: Normal rate, regular rhythm, normal heart sounds and intact distal pulses. Exam reveals no gallop and no friction rub. No murmur heard. Pulmonary/Chest: Effort normal and breath sounds normal. No respiratory distress. She has no wheezes. She exhibits no tenderness. Abdominal: Soft. Bowel sounds are normal. She exhibits no distension. There is no hepatosplenomegaly. There is no tenderness. There is no rebound and no guarding. No hernia. Musculoskeletal: Normal range of motion. She exhibits no edema or tenderness. Neurological: She is alert and oriented to person, place, and time. No cranial nerve deficit. She exhibits normal muscle tone. Skin: Skin is warm and dry. No rash noted. She is not diaphoretic. No erythema. Psychiatric: Her speech is normal and behavior is normal. Judgment and thought content normal. Her mood appears anxious. She exhibits abnormal recent memory. Nursing note and vitals reviewed. MDM Number of Diagnoses or Management Options Acute nonintractable headache, unspecified headache type: new and requires workup Hypertension, unspecified type: new and requires workup Diagnosis management comments: EKG reviewed Atrial paced PVC No acute ischemic changes No interval change from prior study 9:35 PM 
Patient feeling better She remains awake, alert and oriented, no complaints at this time We discussed outpatient plan I will also refer her to primary care and she is considering changing from her current provider to someone closer to her home. Amount and/or Complexity of Data Reviewed Clinical lab tests: ordered and reviewed Tests in the radiology section of CPT®: ordered and reviewed Tests in the medicine section of CPT®: ordered and reviewed Review and summarize past medical records: yes Risk of Complications, Morbidity, and/or Mortality Presenting problems: moderate Diagnostic procedures: moderate Management options: moderate General comments: Elements of this note have been dictated via voice recognition software. Text and phrases may be limited by the accuracy of the software. The chart has been reviewed, but errors may still be present. Patient Progress Patient progress: improved ED Course Procedures

## 2018-07-08 LAB
ATRIAL RATE: 79 BPM
CALCULATED P AXIS, ECG09: 51 DEGREES
CALCULATED R AXIS, ECG10: 41 DEGREES
CALCULATED T AXIS, ECG11: 90 DEGREES
DIAGNOSIS, 93000: NORMAL
P-R INTERVAL, ECG05: 258 MS
Q-T INTERVAL, ECG07: 388 MS
QRS DURATION, ECG06: 86 MS
QTC CALCULATION (BEZET), ECG08: 444 MS
VENTRICULAR RATE, ECG03: 79 BPM

## 2018-07-08 NOTE — ED NOTES
I have reviewed discharge instructions with the patient. The patient verbalized understanding. Patient left ED via Discharge Method: ambulatory to Home with son). Opportunity for questions and clarification provided. Patient given 3 scripts. To continue your aftercare when you leave the hospital, you may receive an automated call from our care team to check in on how you are doing. This is a free service and part of our promise to provide the best care and service to meet your aftercare needs.  If you have questions, or wish to unsubscribe from this service please call [phone]. Thank you for Choosing our [facility] Department.

## 2018-07-11 PROBLEM — Z95.0 PACEMAKER: Status: ACTIVE | Noted: 2018-07-11

## 2018-08-08 NOTE — DISCHARGE INSTRUCTIONS
Headache: Care Instructions  Your Care Instructions    Headaches have many possible causes. Most headaches aren't a sign of a more serious problem, and they will get better on their own. Home treatment may help you feel better faster. The doctor has checked you carefully, but problems can develop later. If you notice any problems or new symptoms, get medical treatment right away. Follow-up care is a key part of your treatment and safety. Be sure to make and go to all appointments, and call your doctor if you are having problems. It's also a good idea to know your test results and keep a list of the medicines you take. How can you care for yourself at home? · Do not drive if you have taken a prescription pain medicine. · Rest in a quiet, dark room until your headache is gone. Close your eyes and try to relax or go to sleep. Don't watch TV or read. · Put a cold, moist cloth or cold pack on the painful area for 10 to 20 minutes at a time. Put a thin cloth between the cold pack and your skin. · Use a warm, moist towel or a heating pad set on low to relax tight shoulder and neck muscles. · Have someone gently massage your neck and shoulders. · Take pain medicines exactly as directed. ¨ If the doctor gave you a prescription medicine for pain, take it as prescribed. ¨ If you are not taking a prescription pain medicine, ask your doctor if you can take an over-the-counter medicine. · Be careful not to take pain medicine more often than the instructions allow, because you may get worse or more frequent headaches when the medicine wears off. · Do not ignore new symptoms that occur with a headache, such as a fever, weakness or numbness, vision changes, or confusion. These may be signs of a more serious problem. To prevent headaches  · Keep a headache diary so you can figure out what triggers your headaches. Avoiding triggers may help you prevent headaches.  Record when each headache began, how long it lasted, Place of Service: [facility] [address] Day Surgery    Patient: Felix Mills    Date of procedure: 8/8/2018    Surgeon: Khalif Crump MD    Primary Physician: Khushi Bryson MD    Events:   Event Tracking     Panel 1     Procedure : COLONOSCOPY      Event Time In    Procedure Start 1141    Procedure End 1211               Scope In: 1141    At Cecum: 1145   Scope Out: 1211   Scope Withdrawal Time: 26             Procedure : Colonoscopy with polypectomy    Preoperative Diagnosis: hx of sessile serrated polyposis syndrome    Anesthesia Administered: as per Anesthesia    I have discussed the risks, benefits, and alternatives to colonoscopy with the patient [who demonstrated understanding], including but not limited to the risks of bleeding, infection, pain, death, as well as the risks of anesthesia and perforation all leading to prolonged hospitalization, surgical intervention, or even death. I also specifically mentioned the miss rate of colonoscopy of 5-10% in the best of all circumstances. All questions were answered to the patient’s satisfaction. The patient elected to proceed with colonoscopy with intervention [i.e. polypectomy, stent placement, etc.] as indicated    Procedure Description: The patient was placed in the left lateral position and monitored continuously through ECG tracing, pulse oximetry monitoring and direct observations. Medications were administered incrementally over the course of the procedure to achieve an adequate level of conscious sedation. After anorectal examination was performed, the Olympus ACF-190AL was inserted into the rectum and advanced under direct vision to the cecum, which was identified by IC valve and appendiceal orifice. The procedure was considered not difficult..      During withdrawal examination, the final quality of the prep was Wayne bowel prep scale Right colon: .2- (minor amount of residual staining, small fragments of stool, and\or  and what the pain was like (throbbing, aching, stabbing, or dull). Write down any other symptoms you had with the headache, such as nausea, flashing lights or dark spots, or sensitivity to bright light or loud noise. Note if the headache occurred near your period. List anything that might have triggered the headache, such as certain foods (chocolate, cheese, wine) or odors, smoke, bright light, stress, or lack of sleep. · Find healthy ways to deal with stress. Headaches are most common during or right after stressful times. Take time to relax before and after you do something that has caused a headache in the past.  · Try to keep your muscles relaxed by keeping good posture. Check your jaw, face, neck, and shoulder muscles for tension, and try relaxing them. When sitting at a desk, change positions often, and stretch for 30 seconds each hour. · Get plenty of sleep and exercise. · Eat regularly and well. Long periods without food can trigger a headache. · Treat yourself to a massage. Some people find that regular massages are very helpful in relieving tension. · Limit caffeine by not drinking too much coffee, tea, or soda. But don't quit caffeine suddenly, because that can also give you headaches. · Reduce eyestrain from computers by blinking frequently and looking away from the computer screen every so often. Make sure you have proper eyewear and that your monitor is set up properly, about an arm's length away. · Seek help if you have depression or anxiety. Your headaches may be linked to these conditions. Treatment can both prevent headaches and help with symptoms of anxiety or depression. When should you call for help? Call 911 anytime you think you may need emergency care. For example, call if:  ? · You have signs of a stroke. These may include:  ¨ Sudden numbness, paralysis, or weakness in your face, arm, or leg, especially on only one side of your body. ¨ Sudden vision changes.   ¨ Sudden trouble opaque liquid, but mucosa of colon segment is seen well)  Transverse: 2- (minor amount of residual staining, small fragments of stool, and\or opaque liquid, but mucosa of colon segment is seen well)  Left colon:2- (minor amount of residual staining, small fragments of stool, and\or opaque liquid, but mucosa of colon segment is seen well)    A careful inspection was made as the colonoscope was withdrawn, including a retroflexed view of the rectum, findings and interventions are described below. Appropriate photo documentation was obtained.    Cecal withdrawal time :26 mins    Overall Felix Mills tolerated the procedure well, without undue discomfort, hypotension or desaturation. The patient was adequately recovered in the endoscopy suite and was transported to PACU.    Findings:  Anorectal: Internal hemorrhoids  Terminal ileum: Not examined  Cecum: Normal mucosa with preserve vascularity, absence of ulcers,erythema, or friability  Ascending colon: Normal mucosa with preserve vascularity, absence of ulcers,erythema, or friability  Transverse colon: 4 Polyps size ranging from 3-6 mm removed by cold snare and biopsy polypectomy   Descending colon: 3 Polyps size ranging from 3-6 mm removed by cold biopsy and snare polypectomy  and Diverticulosis  Sigmoid colon: 1 Polyps size ranging from 3 mm removed by cold biopsy polypectomy  and Diverticulosis  Rectum: 1 Polyps size ranging from 3 mm removed by cold biopsy polypectomy           Complications: none    Impression:  Polyp(s) , Diverticulosis and hemorroids       Recommendations  · Awaiting pathology results due to biopsy(s) performed. and Follow up colonoscopy in 1 year(s), due to hx of sessile serrated polyposis syndrome.         speaking. ¨ Sudden confusion or trouble understanding simple statements. ¨ Sudden problems with walking or balance. ¨ A sudden, severe headache that is different from past headaches. ?Call your doctor now or seek immediate medical care if:  ? · You have a new or worse headache. ? · Your headache gets much worse. Where can you learn more? Go to http://agus-branden.info/. Enter M271 in the search box to learn more about \"Headache: Care Instructions. \"  Current as of: October 14, 2016  Content Version: 11.4  © 3993-2290 [employer]. Care instructions adapted under license by [employer] (which disclaims liability or warranty for this information). If you have questions about a medical condition or this instruction, always ask your healthcare professional. [address] any warranty or liability for your use of this information. Acute High Blood Pressure: Care Instructions  Your Care Instructions    Acute high blood pressure is very high blood pressure. It's a serious problem. Very high blood pressure can damage your brain, heart, eyes, and kidneys. You may have been given medicines to lower your blood pressure. You may have gotten them as pills or through a needle in one of your veins. This is called an IV. And maybe you were given other medicines too. These can be needed when high blood pressure causes other problems. To keep your blood pressure at a lower level, you may need to make healthy lifestyle changes. And you will probably need to take medicines. Be sure to follow up with your doctor about your blood pressure and what you can do about it. Follow-up care is a key part of your treatment and safety. Be sure to make and go to all appointments, and call your doctor if you are having problems. It's also a good idea to know your test results and keep a list of the medicines you take. How can you care for yourself at home?   · See your doctor as often as he or she recommends. This is to make sure your blood pressure is under control. You will probably go at least 2 times a year. But it may be more often at first.  · Take your blood pressure medicine exactly as prescribed. You may take one or more types. They include diuretics, beta-blockers, ACE inhibitors, calcium channel blockers, and angiotensin II receptor blockers. Call your doctor if you think you are having a problem with your medicine. · If you take blood pressure medicine, talk to your doctor before you take decongestants or anti-inflammatory medicine, such as ibuprofen. These can raise blood pressure. · Learn how to check your blood pressure at home. Check it often. · Ask your doctor if it's okay to drink alcohol. · Talk to your doctor about lifestyle changes that can help blood pressure. These include being active and not smoking. When should you call for help? Call 911 anytime you think you may need emergency care. This may mean having symptoms that suggest that your blood pressure is causing a serious heart or blood vessel problem. Your blood pressure may be over 180/110. ? For example, call 911 if:  ? · You have symptoms of a heart attack. These may include:  ¨ Chest pain or pressure, or a strange feeling in the chest.  ¨ Sweating. ¨ Shortness of breath. ¨ Nausea or vomiting. ¨ Pain, pressure, or a strange feeling in the back, neck, jaw, or upper belly or in one or both shoulders or arms. ¨ Lightheadedness or sudden weakness. ¨ A fast or irregular heartbeat. ? · You have symptoms of a stroke. These may include:  ¨ Sudden numbness, tingling, weakness, or loss of movement in your face, arm, or leg, especially on only one side of your body. ¨ Sudden vision changes. ¨ Sudden trouble speaking. ¨ Sudden confusion or trouble understanding simple statements. ¨ Sudden problems with walking or balance. ¨ A sudden, severe headache that is different from past headaches. ? · You have severe back or belly pain. ?Do not wait until your blood pressure comes down on its own. Get help right away. ?Call your doctor now or seek immediate care if:  ? · Your blood pressure is much higher than normal (such as 180/110 or higher), but you don't have symptoms. ? · You think high blood pressure is causing symptoms, such as:  ¨ Severe headache. ¨ Blurry vision. ? Watch closely for changes in your health, and be sure to contact your doctor if:  ? · Your blood pressure measures 140/90 or higher at least 2 times. That means the top number is 140 or higher or the bottom number is 90 or higher, or both. ? · You think you may be having side effects from your blood pressure medicine. ? · Your blood pressure is usually normal, but it goes above normal at least 2 times. Where can you learn more? Go to http://agus-branden.info/. Enter L357 in the search box to learn more about \"Acute High Blood Pressure: Care Instructions. \"  Current as of: September 21, 2016  Content Version: 11.4  © 2326-5741 [employer]. Care instructions adapted under license by [employer] (which disclaims liability or warranty for this information). If you have questions about a medical condition or this instruction, always ask your healthcare professional. [place] [address] any warranty or liability for your use of this information.

## 2019-02-14 PROBLEM — R07.9 CHEST PAIN: Status: ACTIVE | Noted: 2019-02-14

## 2019-12-20 PROBLEM — I48.0 PAROXYSMAL ATRIAL FIBRILLATION (HCC): Status: ACTIVE | Noted: 2018-06-01

## 2020-10-19 PROBLEM — R41.3 MEMORY LOSS: Status: ACTIVE | Noted: 2019-11-12

## 2020-10-19 PROBLEM — K57.30 DIVERTICULOSIS OF LARGE INTESTINE WITHOUT HEMORRHAGE: Status: ACTIVE | Noted: 2017-10-04

## 2020-10-19 PROBLEM — M81.0 AGE RELATED OSTEOPOROSIS: Status: ACTIVE | Noted: 2017-03-09

## 2020-10-19 PROBLEM — R07.9 CHEST PAIN: Status: RESOLVED | Noted: 2019-02-14 | Resolved: 2020-10-19

## 2020-10-19 PROBLEM — R41.0 CONFUSION: Status: RESOLVED | Noted: 2018-05-14 | Resolved: 2020-10-19

## 2020-10-19 PROBLEM — I48.91 ATRIAL FIBRILLATION (HCC): Status: RESOLVED | Noted: 2018-04-16 | Resolved: 2020-10-19

## 2020-10-19 PROBLEM — I16.1 HYPERTENSIVE EMERGENCY: Status: RESOLVED | Noted: 2018-05-14 | Resolved: 2020-10-19

## 2020-10-19 PROBLEM — R47.81 SLURRED SPEECH: Status: RESOLVED | Noted: 2017-11-14 | Resolved: 2020-10-19

## 2020-10-21 ENCOUNTER — HOSPITAL ENCOUNTER (OUTPATIENT)
Dept: MAMMOGRAPHY | Age: 80
Discharge: HOME | End: 2020-10-21
Attending: NURSE PRACTITIONER
Payer: MEDICARE

## 2020-10-21 DIAGNOSIS — Z78.0 POSTMENOPAUSAL STATE: ICD-10-CM

## 2020-10-21 DIAGNOSIS — M89.9 DISORDER OF BONE AND CARTILAGE: ICD-10-CM

## 2020-10-21 DIAGNOSIS — M94.9 DISORDER OF BONE AND CARTILAGE: ICD-10-CM

## 2020-10-21 PROCEDURE — 77080 DXA BONE DENSITY AXIAL: CPT

## 2020-10-26 PROBLEM — Z86.010 HISTORY OF COLON POLYPS: Status: ACTIVE | Noted: 2020-10-26

## 2020-10-26 PROBLEM — K58.1 IRRITABLE BOWEL SYNDROME WITH CONSTIPATION: Status: ACTIVE | Noted: 2020-10-26

## 2020-10-26 PROBLEM — K59.09 CHRONIC CONSTIPATION: Status: ACTIVE | Noted: 2020-10-26

## 2020-10-30 ENCOUNTER — APPOINTMENT (OUTPATIENT)
Dept: MRI IMAGING | Age: 80
DRG: 066 | End: 2020-10-30
Attending: INTERNAL MEDICINE
Payer: MEDICARE

## 2020-10-30 ENCOUNTER — HOSPITAL ENCOUNTER (INPATIENT)
Dept: CT IMAGING | Age: 80
Discharge: HOME | DRG: 066 | End: 2020-10-30
Attending: INTERNAL MEDICINE
Payer: MEDICARE

## 2020-10-30 ENCOUNTER — APPOINTMENT (OUTPATIENT)
Dept: CT IMAGING | Age: 80
DRG: 066 | End: 2020-10-30
Attending: EMERGENCY MEDICINE
Payer: MEDICARE

## 2020-10-30 ENCOUNTER — HOSPITAL ENCOUNTER (INPATIENT)
Age: 80
LOS: 2 days | Discharge: NURSING, REHAB OR LONG-TERM CARE | DRG: 066 | End: 2020-11-01
Attending: EMERGENCY MEDICINE | Admitting: INTERNAL MEDICINE
Payer: MEDICARE

## 2020-10-30 DIAGNOSIS — I63.39 CEREBROVASCULAR ACCIDENT (CVA) DUE TO THROMBOSIS OF OTHER CEREBRAL ARTERY (HCC): ICD-10-CM

## 2020-10-30 DIAGNOSIS — R47.01 EXPRESSIVE APHASIA: Primary | ICD-10-CM

## 2020-10-30 DIAGNOSIS — I48.0 PAROXYSMAL ATRIAL FIBRILLATION (HCC): ICD-10-CM

## 2020-10-30 DIAGNOSIS — R41.3 MEMORY LOSS: ICD-10-CM

## 2020-10-30 LAB
ALBUMIN SERPL-MCNC: 3.7 G/DL (ref 3.2–4.6)
ALBUMIN/GLOB SERPL: 0.9 {RATIO} (ref 1.2–3.5)
ALP SERPL-CCNC: 99 U/L (ref 50–136)
ALT SERPL-CCNC: 16 U/L (ref 12–65)
ANION GAP SERPL CALC-SCNC: 7 MMOL/L (ref 7–16)
APPEARANCE UR: CLEAR
AST SERPL-CCNC: 25 U/L (ref 15–37)
ATRIAL RATE: 69 BPM
BASOPHILS # BLD: 0.1 K/UL (ref 0–0.2)
BASOPHILS NFR BLD: 1 % (ref 0–2)
BILIRUB SERPL-MCNC: 0.5 MG/DL (ref 0.2–1.1)
BILIRUB UR QL: NEGATIVE
BUN SERPL-MCNC: 12 MG/DL (ref 8–23)
CALCIUM SERPL-MCNC: 9.3 MG/DL (ref 8.3–10.4)
CALCULATED P AXIS, ECG09: 81 DEGREES
CALCULATED R AXIS, ECG10: 36 DEGREES
CALCULATED T AXIS, ECG11: 56 DEGREES
CHLORIDE SERPL-SCNC: 100 MMOL/L (ref 98–107)
CO2 SERPL-SCNC: 27 MMOL/L (ref 21–32)
COLOR UR: YELLOW
CREAT SERPL-MCNC: 0.74 MG/DL (ref 0.6–1)
DIAGNOSIS, 93000: NORMAL
DIFFERENTIAL METHOD BLD: ABNORMAL
EOSINOPHIL # BLD: 0.1 K/UL (ref 0–0.8)
EOSINOPHIL NFR BLD: 2 % (ref 0.5–7.8)
ERYTHROCYTE [DISTWIDTH] IN BLOOD BY AUTOMATED COUNT: 13.3 % (ref 11.9–14.6)
GLOBULIN SER CALC-MCNC: 4.1 G/DL (ref 2.3–3.5)
GLUCOSE BLD STRIP.AUTO-MCNC: 107 MG/DL (ref 65–100)
GLUCOSE SERPL-MCNC: 114 MG/DL (ref 65–100)
GLUCOSE UR STRIP.AUTO-MCNC: NEGATIVE MG/DL
HCT VFR BLD AUTO: 41.7 % (ref 35.8–46.3)
HGB BLD-MCNC: 14.3 G/DL (ref 11.7–15.4)
HGB UR QL STRIP: NEGATIVE
IMM GRANULOCYTES # BLD AUTO: 0.1 K/UL (ref 0–0.5)
IMM GRANULOCYTES NFR BLD AUTO: 1 % (ref 0–5)
INR PPP: 1.2
KETONES UR QL STRIP.AUTO: ABNORMAL MG/DL
LEUKOCYTE ESTERASE UR QL STRIP.AUTO: NEGATIVE
LYMPHOCYTES # BLD: 1 K/UL (ref 0.5–4.6)
LYMPHOCYTES NFR BLD: 12 % (ref 13–44)
MCH RBC QN AUTO: 30.2 PG (ref 26.1–32.9)
MCHC RBC AUTO-ENTMCNC: 34.3 G/DL (ref 31.4–35)
MCV RBC AUTO: 88 FL (ref 79.6–97.8)
MONOCYTES # BLD: 0.7 K/UL (ref 0.1–1.3)
MONOCYTES NFR BLD: 9 % (ref 4–12)
NEUTS SEG # BLD: 6.2 K/UL (ref 1.7–8.2)
NEUTS SEG NFR BLD: 76 % (ref 43–78)
NITRITE UR QL STRIP.AUTO: NEGATIVE
NRBC # BLD: 0 K/UL (ref 0–0.2)
P-R INTERVAL, ECG05: 158 MS
PH UR STRIP: 7.5 [PH] (ref 5–9)
PLATELET # BLD AUTO: 323 K/UL (ref 150–450)
PMV BLD AUTO: 9.7 FL (ref 9.4–12.3)
POTASSIUM SERPL-SCNC: 3.8 MMOL/L (ref 3.5–5.1)
PROT SERPL-MCNC: 7.8 G/DL (ref 6.3–8.2)
PROT UR STRIP-MCNC: NEGATIVE MG/DL
PROTHROMBIN TIME: 15.1 SEC (ref 12.5–14.7)
Q-T INTERVAL, ECG07: 414 MS
QRS DURATION, ECG06: 80 MS
QTC CALCULATION (BEZET), ECG08: 453 MS
RBC # BLD AUTO: 4.74 M/UL (ref 4.05–5.2)
SODIUM SERPL-SCNC: 134 MMOL/L (ref 136–145)
SP GR UR REFRACTOMETRY: 1.01 (ref 1–1.02)
TSH SERPL DL<=0.005 MIU/L-ACNC: 0.91 UIU/ML (ref 0.36–3.74)
UROBILINOGEN UR QL STRIP.AUTO: 0.2 EU/DL (ref 0.2–1)
VENTRICULAR RATE, ECG03: 72 BPM
VIT B12 SERPL-MCNC: 1171 PG/ML (ref 193–986)
WBC # BLD AUTO: 8.2 K/UL (ref 4.3–11.1)

## 2020-10-30 PROCEDURE — 65660000000 HC RM CCU STEPDOWN

## 2020-10-30 PROCEDURE — 74011000636 HC RX REV CODE- 636: Performed by: INTERNAL MEDICINE

## 2020-10-30 PROCEDURE — 93005 ELECTROCARDIOGRAM TRACING: CPT | Performed by: EMERGENCY MEDICINE

## 2020-10-30 PROCEDURE — 74011250637 HC RX REV CODE- 250/637: Performed by: INTERNAL MEDICINE

## 2020-10-30 PROCEDURE — 80053 COMPREHEN METABOLIC PANEL: CPT

## 2020-10-30 PROCEDURE — 82607 VITAMIN B-12: CPT

## 2020-10-30 PROCEDURE — 74011000258 HC RX REV CODE- 258: Performed by: INTERNAL MEDICINE

## 2020-10-30 PROCEDURE — 99291 CRITICAL CARE FIRST HOUR: CPT | Performed by: PSYCHIATRY & NEUROLOGY

## 2020-10-30 PROCEDURE — 70551 MRI BRAIN STEM W/O DYE: CPT

## 2020-10-30 PROCEDURE — 84443 ASSAY THYROID STIM HORMONE: CPT

## 2020-10-30 PROCEDURE — 86592 SYPHILIS TEST NON-TREP QUAL: CPT

## 2020-10-30 PROCEDURE — 85610 PROTHROMBIN TIME: CPT

## 2020-10-30 PROCEDURE — 82962 GLUCOSE BLOOD TEST: CPT

## 2020-10-30 PROCEDURE — 70450 CT HEAD/BRAIN W/O DYE: CPT

## 2020-10-30 PROCEDURE — 70498 CT ANGIOGRAPHY NECK: CPT

## 2020-10-30 PROCEDURE — 74011250636 HC RX REV CODE- 250/636: Performed by: INTERNAL MEDICINE

## 2020-10-30 PROCEDURE — 2709999900 HC NON-CHARGEABLE SUPPLY

## 2020-10-30 PROCEDURE — 81003 URINALYSIS AUTO W/O SCOPE: CPT

## 2020-10-30 PROCEDURE — 99285 EMERGENCY DEPT VISIT HI MDM: CPT

## 2020-10-30 PROCEDURE — 36415 COLL VENOUS BLD VENIPUNCTURE: CPT

## 2020-10-30 PROCEDURE — 85025 COMPLETE CBC W/AUTO DIFF WBC: CPT

## 2020-10-30 RX ORDER — LEVOTHYROXINE SODIUM 88 UG/1
88 TABLET ORAL
Status: DISCONTINUED | OUTPATIENT
Start: 2020-10-31 | End: 2020-11-01 | Stop reason: HOSPADM

## 2020-10-30 RX ORDER — SODIUM CHLORIDE 9 MG/ML
75 INJECTION, SOLUTION INTRAVENOUS CONTINUOUS
Status: DISCONTINUED | OUTPATIENT
Start: 2020-10-30 | End: 2020-10-31

## 2020-10-30 RX ORDER — ACETAMINOPHEN 325 MG/1
650 TABLET ORAL
Status: DISCONTINUED | OUTPATIENT
Start: 2020-10-30 | End: 2020-11-01 | Stop reason: HOSPADM

## 2020-10-30 RX ORDER — LOSARTAN POTASSIUM 50 MG/1
100 TABLET ORAL DAILY
Status: DISCONTINUED | OUTPATIENT
Start: 2020-10-31 | End: 2020-11-01 | Stop reason: HOSPADM

## 2020-10-30 RX ORDER — DOFETILIDE 0.25 MG/1
250 CAPSULE ORAL 2 TIMES DAILY
Status: DISCONTINUED | OUTPATIENT
Start: 2020-10-30 | End: 2020-11-01 | Stop reason: HOSPADM

## 2020-10-30 RX ORDER — SODIUM CHLORIDE 0.9 % (FLUSH) 0.9 %
5-40 SYRINGE (ML) INJECTION EVERY 8 HOURS
Status: DISCONTINUED | OUTPATIENT
Start: 2020-10-30 | End: 2020-11-01 | Stop reason: HOSPADM

## 2020-10-30 RX ORDER — SODIUM CHLORIDE 0.9 % (FLUSH) 0.9 %
5-40 SYRINGE (ML) INJECTION AS NEEDED
Status: DISCONTINUED | OUTPATIENT
Start: 2020-10-30 | End: 2020-11-01 | Stop reason: HOSPADM

## 2020-10-30 RX ORDER — GUAIFENESIN 100 MG/5ML
81 LIQUID (ML) ORAL DAILY
Status: DISCONTINUED | OUTPATIENT
Start: 2020-10-31 | End: 2020-11-01 | Stop reason: HOSPADM

## 2020-10-30 RX ORDER — ATORVASTATIN CALCIUM 40 MG/1
40 TABLET, FILM COATED ORAL
Status: DISCONTINUED | OUTPATIENT
Start: 2020-10-30 | End: 2020-11-01 | Stop reason: HOSPADM

## 2020-10-30 RX ORDER — LANOLIN ALCOHOL/MO/W.PET/CERES
400 CREAM (GRAM) TOPICAL DAILY
Status: DISCONTINUED | OUTPATIENT
Start: 2020-10-31 | End: 2020-11-01 | Stop reason: HOSPADM

## 2020-10-30 RX ORDER — AMLODIPINE BESYLATE 5 MG/1
5 TABLET ORAL DAILY
Status: DISCONTINUED | OUTPATIENT
Start: 2020-10-31 | End: 2020-11-01 | Stop reason: HOSPADM

## 2020-10-30 RX ORDER — ONDANSETRON 2 MG/ML
4 INJECTION INTRAMUSCULAR; INTRAVENOUS
Status: DISCONTINUED | OUTPATIENT
Start: 2020-10-30 | End: 2020-10-30

## 2020-10-30 RX ORDER — SODIUM CHLORIDE 0.9 % (FLUSH) 0.9 %
10 SYRINGE (ML) INJECTION
Status: COMPLETED | OUTPATIENT
Start: 2020-10-30 | End: 2020-10-30

## 2020-10-30 RX ORDER — AMOXICILLIN 250 MG
2 CAPSULE ORAL
Status: DISCONTINUED | OUTPATIENT
Start: 2020-10-30 | End: 2020-11-01 | Stop reason: HOSPADM

## 2020-10-30 RX ORDER — LABETALOL HYDROCHLORIDE 5 MG/ML
5 INJECTION, SOLUTION INTRAVENOUS
Status: DISCONTINUED | OUTPATIENT
Start: 2020-10-30 | End: 2020-11-01 | Stop reason: HOSPADM

## 2020-10-30 RX ADMIN — Medication 10 ML: at 16:59

## 2020-10-30 RX ADMIN — SODIUM CHLORIDE 75 ML/HR: 9 INJECTION, SOLUTION INTRAVENOUS at 18:49

## 2020-10-30 RX ADMIN — SENNOSIDES AND DOCUSATE SODIUM 2 TABLET: 8.6; 5 TABLET ORAL at 21:44

## 2020-10-30 RX ADMIN — ATORVASTATIN CALCIUM 40 MG: 40 TABLET, FILM COATED ORAL at 21:44

## 2020-10-30 RX ADMIN — IOPAMIDOL 50 ML: 755 INJECTION, SOLUTION INTRAVENOUS at 16:59

## 2020-10-30 RX ADMIN — SODIUM CHLORIDE 100 ML: 900 INJECTION, SOLUTION INTRAVENOUS at 16:59

## 2020-10-30 RX ADMIN — Medication 5 ML: at 21:45

## 2020-10-30 NOTE — CONSULTS
Consult    Patient: Minesh Causey MRN: [id number]     YOB: 1940  Age: 78 y.o. Sex: female      Subjective: Minesh Causey is a 78 y.o. female who is being seen for code S. AMS, speech difficulty and unsteady gait. The patient was last known normal at 1530 yesterday afternoon. PMH significant for A.fib on Xarelto and PPM, HTN, prior CVA, TIA. Patient unable to provide HPI, son at bedside. She  was last seen normal about 330 yesterday afternoon, and when his brother went to check on the patient today he found her standing in the hallway attempting to use her phone without success, with speech that was difficult to understand and the patient states she could not walk secondary to do her right leg not working correctly. The patient presented to [facility] ED. A code S was called at [phone]. Neurology arrived to the bedside at 1326. Initial NIHSS was 3. A CT of the head was obtained and negative for acute intracranial abnormality. CTA of head/neck was not performed low NIH, unclear time of LKW. She was not candidate for tPA due to being outside 4.5 hour timeframe, low NIH,  and anticoagulated with Xarelto. She was not a candidate for KIT due to low NIH, unclear LKW.   She was seen by Dr. Lei Killian 2 years ago and similar type situation no evidence of stroke at that time    Past Medical History:   Diagnosis Date    Acute encephalopathy 6/27/2016    Anxiety     Arthritis     Atrial fibrillation ([facility] [address] 4/16/2018    Chronic pain     hip    Endocrine disease     hypothyroidism    Hypertension     Sepsis ([facility] [address] 6/27/2016    Thyroid disease     TIA (transient ischemic attack) 11/14/2017    Vitamin D deficiency 9/1/2015     Past Surgical History:   Procedure Laterality Date    HX ORTHOPAEDIC      right total hip    HX PACEMAKER      TOTAL KNEE ARTHROPLASTY Left 7/23/15      Family History   Problem Relation Age of Onset    Heart Attack Father      Social History     Tobacco Use  Smoking status: Never Smoker    Smokeless tobacco: Never Used   Substance Use Topics    Alcohol use: No      Current Outpatient Medications   Medication Sig Dispense Refill    acetaminophen (TylenoL) 325 mg tablet Take  by mouth every four (4) hours as needed for Pain.  fluticasone propionate (Flonase Allergy Relief) 50 mcg/actuation nasal spray 2 Sprays by Both Nostrils route as needed.  sodium chloride (SIMPLY SALINE NA) by Nasal route as needed.  cholecalciferol (VITAMIN D3) (5000 Units/125 mcg) tab tablet Take 5,000 Units by mouth daily.  amLODIPine (NORVASC) 5 mg tablet Take 1 Tab by mouth daily. (Patient taking differently: Take 5 mg by mouth daily as needed. Ordered by Dr Refugio Solares) 90 Tab 3    dofetilide (Tikosyn) 250 mcg capsule Take 1 Cap by mouth two (2) times a day. 180 Cap 3    levothyroxine (SYNTHROID) 88 mcg tablet Take 1 Tab by mouth daily. 30 Tab 5    losartan (COZAAR) 100 mg tablet Take 1 Tab by mouth daily. 30 Tab 5    rivaroxaban (XARELTO) 20 mg tab tablet Take 1 Tab by mouth daily (with dinner). 30 Tab 11    carboxymethylcellulose sodium (REFRESH TEARS OP) Apply  to eye daily as needed.  cyanocobalamin (VITAMIN B12) 500 mcg tablet Take 500 mcg by mouth daily.  Magnesium Oxide 500 mg cap Take 500 mg by mouth daily.  cholecalciferol, vitamin D3, (VITAMIN D3) 2,000 unit tab Take  by mouth daily.           Allergies   Allergen Reactions    Ciprofloxacin Nausea and Vomiting    Percocet [Oxycodone-Acetaminophen] Other (comments)     hallucinations    Other Medication Other (comments)     I don't do well with any strong medications (poss narcotics, pt difficult to get an elaboration from)    Propofol (Bulk) Other (comments)     Red burning hands       Review of Systems:  Not obtained due to emergent situation         Objective:     Vitals:    10/30/20 1316   BP: (!) 183/86   Pulse: 77   Resp: 22   Temp: 98 °F (36.7 °C)   SpO2: 99%        Physical Exam:  General - Well developed, well nourished, in no apparent distress. Pleasant and conversent. HEENT - Normocephalic, atraumatic. Conjunctiva, tympanic membranes, and oropharynx are clear. Neck - Supple without masses, no bruits   Cardiovascular - Regular rate and rhythm. Normal S1, S2 without murmurs, rubs, or gallops. Lungs - Clear to auscultation. Abdomen - Soft, nontender with normal bowel sounds. Extremities - Peripheral pulses intact. No edema and no rashes. NIHSS   NIHSS Score: 3  1a-Level of Consciousness 0  1b-What is Month/Age 2  1c-Open/Close Eyes&Hand 0  2 -Best Gaze 0  3 -Visual Fields 0  4 -Facial Palsy 0  5a-Motor-Left Arm 0  5b-Motor-Right Arm 0  6a-Motor-Left Leg 0  6b-Motor-Right Leg 0  7 -Limb Ataxia 0  8 -Sensory 0  9 -Best Language 1  10-Dysarthria 0  11-Extinction/Inattention 0    Lab Results   Component Value Date/Time    Cholesterol, total 151 05/15/2018 03:46 AM    HDL Cholesterol 57 05/15/2018 03:46 AM    LDL, calculated 83 05/15/2018 03:46 AM    VLDL, calculated 11 05/15/2018 03:46 AM    Triglyceride 55 05/15/2018 03:46 AM    CHOL/HDL Ratio 2.6 05/15/2018 03:46 AM        Lab Results   Component Value Date/Time    Hemoglobin A1c 5.7 05/15/2018 03:46 AM        Images personally reviewed by me at 1333. CT Results (most recent):  Results from Hospital Encounter encounter on 10/30/20   CT CODE NEURO HEAD WO CONTRAST    Narrative EXAM: CT CODE NEURO HEAD WO CONTRAST    INDICATION: patient with acute neuro changes    COMPARISON: July 7, 2018. TECHNIQUE: Unenhanced CT of the head was performed using 5 mm images. Brain and  bone windows were generated. CT dose reduction was achieved through use of a  standardized protocol tailored for this examination and automatic exposure  control for dose modulation. FINDINGS:  The ventricles and sulci are within normal limits given the patient's stated  age. Chronic white matter changes. Encephalomalacia changes to both temporal  lobes. There is no intracranial hemorrhage, extra-axial collection, mass, mass  effect or midline shift. The basilar cisterns are open. No acute infarct is  identified. The bone windows demonstrate no abnormalities. The visualized  portions of the paranasal sinuses and mastoid air cells are clear. Impression IMPRESSION: There is no CT evidence of intracranial mass, acute hemorrhage or  acute infarct. Results for orders placed or performed in visit on 09/21/20   AMB POC EKG ROUTINE W/ 12 LEADS, INTER & REP    Impression    Electronic atrial pacemaker   ABNORMAL RHYTHM   Results for orders placed or performed during the hospital encounter of 07/07/18   EKG, 12 LEAD, INITIAL   Result Value Ref Range    Ventricular Rate 79 BPM    Atrial Rate 79 BPM    P-R Interval 258 ms    QRS Duration 86 ms    Q-T Interval 388 ms    QTC Calculation (Bezet) 444 ms    Calculated P Axis 51 degrees    Calculated R Axis 41 degrees    Calculated T Axis 90 degrees    Diagnosis       Atrial-paced rhythm  Right atrial enlargement  Nonspecific ST and T wave abnormality  Abnormal ECG  When compared with ECG of 05-JUN-2018 00:35,  has not changed  Confirmed by Calvin Mcgill MD (), RYAN STEWART ([id number]) on 7/8/2018 1:57:18 PM          MRI Results (most recent):   Results from Hospital Encounter encounter on 05/14/18   MRA BRAIN WO CONT    Narrative MRA of the brain, 5/14/2018    INDICATION: Stroke, complaining of frontal headache x1 day. Patient unable to  hold still. COMPARISON: None    FINDINGS: Axial images were performed from the base of the brain through the  middle cerebral artery bifurcations. Multiplanar reformatting as well as source  images were performed and reviewed. Time-of-flight imaging was utilized. There is a very large amount of motion artifact which does overall limit the  detail of the study. The internal carotid arteries appear to be patent entering the skull base.  After  the mid to distal M1 segments no significant detail is seen. On source images  there does appear to be some flow within the branches within the sylvian  fissure. The posterior circulation shows a patent the basilar artery. The posterior  cerebral arteries do appear to be patent at least proximally. On the source images there is again a large amount of motion artifact. No gross  abnormalities are seen. Impression IMPRESSION: Very limited the study secondary to motion artifact. No gross  proximal abnormalities identified. Most recent CTA:      Most recent MRA:      Most recent Echo:  No results found for this visit on 10/30/20. Assessment:     78year old female being seen as a code S with AMS, speech difficulty and unsteady gait. Hx A.fib on Xarelto and PPM, CVA. Initial NIHSS was 3. A CT of the head was obtained and negative for acute intracranial abnormality. CTA of head/neck was not performed low NIH, unclear time of LKW. She was not candidate for tPA due to being outside 4.5 hour timeframe, low NIH,  and anticoagulated with Xarelto. She was not a candidate for KIT due to low NIH, unclear LKW. Plan:     · Start ASA 81 mg daily   · Initiate high intensity statin   · Neurochecks Q4H  · MRI of brain  · CTA of head and neck   · Bedside swallow test   · Labs: A1c, FLP, TSH, Cardiac enzymes, BMP, CBC  · Telemetry and echocardiogram with bubble study  · PT/OT/ST  · DVT prophylaxis   · BP management - normotensive, with long-term goal <140/90  · Smoking cessation if applicable   · Diabetes education if applicable   · Depression Screening prior to discharge  . Signed By: Rose Wright NP     October 30, 7595    I certify I was in attendance through the entire procedure as documented above and the above note was scribed on my behalf.   Critical care time 45 minutes was provided in the patient who was acutely unstable and with the condition of potential neurologic instability with high risk of complications  Transcribed for Dr. Dalia Carlisle Nidia Boyer

## 2020-10-30 NOTE — ED PROVIDER NOTES
[facility]     Martina Delvalle is a 78 y.o. female seen on 10/30/2020 at 1:31 PM in the [facility] EMERGENCY DEPT in room Room/bed info not found. CC: Dysarthria, confusion    HPI: 58-year-old female with history of A. fib with pacemaker, hypertension, TIA, and anxiety presents the emergency department for confusion, speech difficulty, and difficulty with ambulation. According to the son who is with the patient at present, patient was last seen normal about 330 yesterday afternoon, and when his brother went to check on the patient today he found her standing in the hallway attempting to use her phone without success, with speech that was difficult to understand and the patient states she could not walk secondary to do her right leg not working correctly. According the son, since arrival to the emergency department her symptoms have improved a small amount. Patient denies any headache, visual changes, nausea or vomiting. The history is provided by the patient, a relative and the EMS personnel. The history is limited by the condition of the patient. Altered mental status    This is a new problem. Episode onset: Unclear, sometime in the last 22 hours. The problem has been gradually improving. Associated symptoms include confusion. Pertinent negatives include no somnolence, no seizures, no unresponsiveness, no weakness, no agitation, no delusions, no hallucinations, no self-injury, no violence, no tingling and no numbness. Mental status baseline is mild dementia. Risk factors include dementia. Her past medical history is significant for TIA, hypertension, dementia and heart disease. Her past medical history does not include diabetes, seizures, liver disease, CVA, AIDS, COPD, depression, psychotropic medication treatment or head trauma. REVIEW OF SYSTEMS     Review of Systems   Neurological: Negative for tingling, seizures, weakness and numbness. Psychiatric/Behavioral: Positive for confusion. Negative for agitation, hallucinations and self-injury. All other systems reviewed and are negative. PAST MEDICAL HISTORY     Past Medical History:   Diagnosis Date    Acute encephalopathy 6/27/2016    Anxiety     Arthritis     Atrial fibrillation ([facility] [address] 4/16/2018    Chronic pain     hip    Endocrine disease     hypothyroidism    Hypertension     Sepsis ([facility] [address] 6/27/2016    Thyroid disease     TIA (transient ischemic attack) 11/14/2017    Vitamin D deficiency 9/1/2015     Past Surgical History:   Procedure Laterality Date    HX ORTHOPAEDIC      right total hip    HX PACEMAKER      TOTAL KNEE ARTHROPLASTY Left 7/23/15     Social History     Socioeconomic History    Marital status:      Spouse name: Not on file    Number of children: Not on file    Years of education: Not on file    Highest education level: Not on file   Tobacco Use    Smoking status: Never Smoker    Smokeless tobacco: Never Used   Substance and Sexual Activity    Alcohol use: No    Drug use: No     Cannot display prior to admission medications because the patient has not been admitted in this contact. Allergies   Allergen Reactions    Ciprofloxacin Nausea and Vomiting    Percocet [Oxycodone-Acetaminophen] Other (comments)     hallucinations    Other Medication Other (comments)     I don't do well with any strong medications (poss narcotics, pt difficult to get an elaboration from)    Propofol (Bulk) Other (comments)     Red burning hands        PHYSICAL EXAM       Vitals:    10/30/20 1316   BP: (!) 183/86   Pulse: 77   Resp: 22   Temp: 98 °F (36.7 °C)   SpO2: 99%    Vital signs were reviewed. Physical Exam  Vitals signs and nursing note reviewed. Constitutional:       General: She is not in acute distress. Appearance: She is well-developed. HENT:      Head: Normocephalic and atraumatic.       Right Ear: External ear normal.      Left Ear: External ear normal.      Mouth/Throat:      Mouth: Mucous membranes are moist.   Eyes:      Extraocular Movements: Extraocular movements intact. Conjunctiva/sclera: Conjunctivae normal.      Pupils: Pupils are equal, round, and reactive to light. Neck:      Musculoskeletal: Normal range of motion and neck supple. Vascular: No carotid bruit. Cardiovascular:      Rate and Rhythm: Normal rate and regular rhythm. Pulses: Normal pulses. Heart sounds: Normal heart sounds. No murmur. Pulmonary:      Effort: Pulmonary effort is normal.      Breath sounds: Normal breath sounds. Abdominal:      General: Bowel sounds are normal.      Palpations: Abdomen is soft. Tenderness: There is no abdominal tenderness. There is no right CVA tenderness or left CVA tenderness. Musculoskeletal: Normal range of motion. Skin:     General: Skin is warm and dry. Capillary Refill: Capillary refill takes less than 2 seconds. Neurological:      Mental Status: She is alert and oriented to person, place, and time. Cranial Nerves: Cranial nerves are intact. Sensory: Sensation is intact. Motor: Motor function is intact. No pronator drift. Coordination: Finger-Nose-Finger Test normal.      Comments: Patient was some difficulty following simple commands. Psychiatric:         Mood and Affect: Mood and affect normal.         Cognition and Memory: Cognition normal. Memory is impaired. Judgment: Judgment normal.      Comments: Patient with varying amounts of expressive aphasia. Patient gets fairly frustrated when she cannot get out the thought she is trying to convey.           MEDICAL DECISION MAKING     MDM  Number of Diagnoses or Management Options     Amount and/or Complexity of Data Reviewed  Clinical lab tests: ordered and reviewed  Tests in the radiology section of CPT®: ordered and reviewed  Obtain history from someone other than the patient: yes  Review and summarize past medical records: yes  Discuss the patient with other providers: yes  Independent visualization of images, tracings, or specimens: yes    Risk of Complications, Morbidity, and/or Mortality  Presenting problems: high  Diagnostic procedures: high  Management options: high    Patient Progress  Patient progress: stable    Procedures    ED Course: The patient was observed in the ED. on presentation due to the fact the patient symptoms started less than 24 hours ago a code stroke was called. Patient was evaluated by the neurologic code stroke team, who felt the patient had an NIHSS score of 3, but due to low NIH SS score and minimal symptoms as well as time of onset is unclear and outside TPA window, the patient did not meet criteria for TPA. Case was discussed with the hospitalist who will admit for further work-up. Results Reviewed:      Recent Results (from the past 24 hour(s))   GLUCOSE, POC    Collection Time: 10/30/20  1:18 PM   Result Value Ref Range    Glucose (POC) 107 (H) 65 - 100 mg/dL   CBC WITH AUTOMATED DIFF    Collection Time: 10/30/20  1:20 PM   Result Value Ref Range    WBC 8.2 4.3 - 11.1 K/uL    RBC 4.74 4.05 - 5.2 M/uL    HGB 14.3 11.7 - 15.4 g/dL    HCT 41.7 35.8 - 46.3 %    MCV 88.0 79.6 - 97.8 FL    MCH 30.2 26.1 - 32.9 PG    MCHC 34.3 31.4 - 35.0 g/dL    RDW 13.3 11.9 - 14.6 %    PLATELET 390 741 - 304 K/uL    MPV 9.7 9.4 - 12.3 FL    ABSOLUTE NRBC 0.00 0.0 - 0.2 K/uL    DF AUTOMATED      NEUTROPHILS 76 43 - 78 %    LYMPHOCYTES 12 (L) 13 - 44 %    MONOCYTES 9 4.0 - 12.0 %    EOSINOPHILS 2 0.5 - 7.8 %    BASOPHILS 1 0.0 - 2.0 %    IMMATURE GRANULOCYTES 1 0.0 - 5.0 %    ABS. NEUTROPHILS 6.2 1.7 - 8.2 K/UL    ABS. LYMPHOCYTES 1.0 0.5 - 4.6 K/UL    ABS. MONOCYTES 0.7 0.1 - 1.3 K/UL    ABS. EOSINOPHILS 0.1 0.0 - 0.8 K/UL    ABS. BASOPHILS 0.1 0.0 - 0.2 K/UL    ABS. IMM.  GRANS. 0.1 0.0 - 0.5 K/UL   METABOLIC PANEL, COMPREHENSIVE    Collection Time: 10/30/20  1:20 PM   Result Value Ref Range    Sodium 134 (L) 136 - 145 mmol/L    Potassium 3.8 3.5 - 5.1 mmol/L    Chloride 100 98 - 107 mmol/L    CO2 27 21 - 32 mmol/L    Anion gap 7 7 - 16 mmol/L    Glucose 114 (H) 65 - 100 mg/dL    BUN 12 8 - 23 MG/DL    Creatinine 0.74 0.6 - 1.0 MG/DL    GFR est AA >60 >60 ml/min/1.73m2    GFR est non-AA >60 >60 ml/min/1.73m2    Calcium 9.3 8.3 - 10.4 MG/DL    Bilirubin, total 0.5 0.2 - 1.1 MG/DL    ALT (SGPT) 16 12 - 65 U/L    AST (SGOT) 25 15 - 37 U/L    Alk. phosphatase 99 50 - 136 U/L    Protein, total 7.8 6.3 - 8.2 g/dL    Albumin 3.7 3.2 - 4.6 g/dL    Globulin 4.1 (H) 2.3 - 3.5 g/dL    A-G Ratio 0.9 (L) 1.2 - 3.5     PROTHROMBIN TIME + INR    Collection Time: 10/30/20  1:20 PM   Result Value Ref Range    Prothrombin time 15.1 (H) 12.5 - 14.7 sec    INR 1.2     EKG, 12 LEAD, INITIAL    Collection Time: 10/30/20  1:53 PM   Result Value Ref Range    Ventricular Rate 72 BPM    Atrial Rate 69 BPM    P-R Interval 158 ms    QRS Duration 80 ms    Q-T Interval 414 ms    QTC Calculation (Bezet) 453 ms    Calculated P Axis 81 degrees    Calculated R Axis 36 degrees    Calculated T Axis 56 degrees    Diagnosis       !! AGE AND GENDER SPECIFIC ECG ANALYSIS !! Normal sinus rhythm  Normal ECG  When compared with ECG of 07-JUL-2018 18:20,  Sinus rhythm has replaced Electronic atrial pacemaker  Nonspecific T wave abnormality no longer evident in Lateral leads         CT CODE NEURO HEAD WO CONTRAST   Final Result   IMPRESSION: There is no CT evidence of intracranial mass, acute hemorrhage or   acute infarct. CRITICAL CARE Documentation: This patient is critically ill and there is a high probability of of imminent or life threatening deterioration in the patient's condition without immediate management.     The nature of the patient's clinical problem is: Confusion, expressive aphasia, abnormality of gait    I have spent 35 minutes in direct patient care, documentation, review of labs/xrays/old records, discussion with Family, Staff, Colleague, Nursing . The time involved in the performance of separately reportable procedures was not counted toward critical care time. Sudarshan Quintero MD; 10/30/2020 @3:13 PM         Disposition: Admit  Diagnosis: Expressive aphasia  ____________________________________________________________________  A portion of this note was generated using voice recognition dictation software. While the note has been reviewed for accuracy, please note certain words and phrases may not be transcribed as intended and some grammatical and/or typographical errors may be present.

## 2020-10-30 NOTE — ED TRIAGE NOTES
Pt arrives via EMS from home with c/o HTN. Pt family called stating they aren't sure why the pt doesn't feel well. With EMS pt has been crying and stating she needs to see a doctor. Pt answering specific questions pretty well. Pt has intermittent stutter. Pt denies pain, toileting issues, cp, HA, dizziness, SOB. Pt family noticed it when came to visit about noon today. Last time family saw her was yesterday, unknown time. Pt is having some difficulty following commands. Pt is able to answer question. Pt son present and states he talked to her yesterday at noon and pt was well. Another son saw the pt yesterday at [employer] yesterday. Speech is intermittently incomprehensible.  equal. Pt is on anticoags. Pt is anxious. Pt answers questions appropriately and suddenly speech is not clear. Pt denies any falls. . Pt states her right leg wasn't working well and she tried to call for help. Dr. Banks Poag to triage. Code S called.

## 2020-10-30 NOTE — PROGRESS NOTES
10/30/20 2871   Dual Skin Pressure Injury Assessment   Dual Skin Pressure Injury Assessment WDL   No skin abnormalities noted.

## 2020-10-30 NOTE — H&P
Hospitalist H&P Note     Admit Date:  10/30/2020  1:34 PM   Name:  Rai Vincent   Age:  78 y.o.  :  1940   MRN:  [id number]   PCP:  Lux Gonsales NP  Treatment Team: Attending Provider: Rozena Hatchet, DO; Primary Nurse: Saumya Ornelas RN    HPI:   Patient history was obtained from the ER provider prior to seeing the patient. Chief complaintdysarthria ambulatory dysfunction    78-year-old female history of A. fib with permanent pacemaker on Xarelto for CVA prophylaxis, hypertension prior CVA/TIA last known well sometime yesterday afternoon around 330. Found in hallway today trying to use her phone and clumsy with confusion. Expressive dysarthria noted and right leg weakness and inability to ambulate. Intermittent confusion per son who is at bedside. Code stroke was called 1326. Initial CT head noncontrast negative for CVA. MRI and CTA are pending. Stat CTA not done because not TPA candidate. Unclear onset and on Xarelto. Neurology consultation recommended admission for stroke work-up and evaluation by speech, PT OT physiatry coordinator. Permissive hypertension will be allowed. Unclear onset and home medications ordered but heldnot given today unless she took at home. She is alert but having frustration with expressive dysarthria. Son reports intermittent confusion he thinks related to frustration and then back to baseline with expressive dysarthria. 10 systems reviewed and negative except as noted in HPI.   Past Medical History:   Diagnosis Date    Acute encephalopathy 2016    Anxiety     Arthritis     Atrial fibrillation ([facility] [address] 2018    Chronic pain     hip    Endocrine disease     hypothyroidism    Hypertension     Sepsis ([facility] [address] 2016    Thyroid disease     TIA (transient ischemic attack) 2017    Vitamin D deficiency 2015      Past Surgical History:   Procedure Laterality Date    HX ORTHOPAEDIC      right total hip    HX PACEMAKER      TOTAL KNEE ARTHROPLASTY Left 7/23/15      Allergies   Allergen Reactions    Ciprofloxacin Nausea and Vomiting    Percocet [Oxycodone-Acetaminophen] Other (comments)     hallucinations    Other Medication Other (comments)     I don't do well with any strong medications (poss narcotics, pt difficult to get an elaboration from)    Propofol (Bulk) Other (comments)     Red burning hands      Social History     Tobacco Use    Smoking status: Never Smoker    Smokeless tobacco: Never Used   Substance Use Topics    Alcohol use: No      Family History   Problem Relation Age of Onset    Heart Attack Father       Immunization History   Administered Date(s) Administered    TB Skin Test (PPD) Intradermal 2016     PTA Medications:  Prior to Admission Medications   Prescriptions Last Dose Informant Patient Reported? Taking? Magnesium Oxide 500 mg cap   Yes No   Sig: Take 500 mg by mouth daily. acetaminophen (TylenoL) 325 mg tablet   Yes No   Sig: Take  by mouth every four (4) hours as needed for Pain. amLODIPine (NORVASC) 5 mg tablet   No No   Sig: Take 1 Tab by mouth daily. Patient taking differently: Take 5 mg by mouth daily as needed. Ordered by Dr Migueilto Dawson   carboxymethylcellulose sodium (REFRESH TEARS OP)   Yes No   Sig: Apply  to eye daily as needed. cholecalciferol (VITAMIN D3) (5000 Units/125 mcg) tab tablet   Yes No   Sig: Take 5,000 Units by mouth daily. cholecalciferol, vitamin D3, (VITAMIN D3) 2,000 unit tab   Yes No   Sig: Take  by mouth daily. cyanocobalamin (VITAMIN B12) 500 mcg tablet   Yes No   Sig: Take 500 mcg by mouth daily. dofetilide (Tikosyn) 250 mcg capsule   No No   Sig: Take 1 Cap by mouth two (2) times a day. fluticasone propionate (Flonase Allergy Relief) 50 mcg/actuation nasal spray   Yes No   Si Sprays by Both Nostrils route as needed. levothyroxine (SYNTHROID) 88 mcg tablet   No No   Sig: Take 1 Tab by mouth daily.    losartan (COZAAR) 100 mg tablet No No   Sig: Take 1 Tab by mouth daily. rivaroxaban (XARELTO) 20 mg tab tablet   No No   Sig: Take 1 Tab by mouth daily (with dinner). sodium chloride (SIMPLY SALINE NA)   Yes No   Sig: by Nasal route as needed. Facility-Administered Medications: None       Objective:     Patient Vitals for the past 24 hrs:   Temp Pulse Resp BP SpO2   10/30/20 1534  71  (!) 195/88 97 %   10/30/20 1528     98 %   10/30/20 1525  79  (!) 212/112 99 %   10/30/20 1446  71  (!) 186/86 98 %   10/30/20 1426  76  (!) 177/77    10/30/20 1406  74  (!) 180/88 97 %   10/30/20 1347  73   99 %   10/30/20 1346  75  (!) 177/85    10/30/20 1316 98 °F (36.7 °C) 77 22 (!) 183/86 99 %     Oxygen Therapy  O2 Sat (%): 97 % (10/30/20 1534)  Pulse via Oximetry: 71 beats per minute (10/30/20 1534)  O2 Device: Room air (10/30/20 1534)  No intake or output data in the 24 hours ending 10/30/20 1556    Physical Exam:  General:    Expressive dysarthria but alert. Eyes:   Normal sclera. Extraocular movements intact. ENT:  Normocephalic, atraumatic. Moist mucous membranes  Neck:  Supple, no lymphadenopathy or JVD  CV:   RRR. No m/r/g. Peripheral pulses 2+. Capillary refill <2s. Lungs:  CTAB. No wheezing, rhonchi, or rales. Abdomen: Soft, nontender, nondistended. Extremities: Warm and dry. No cyanosis or edema. Neurologic: Able to move all extremities against gravity and upper extremity  strength symmetric and strong. Mild resistance lower extremities appear symmetric with equivocal Babinski bilaterally. Skin:     No rashes or jaundice. Normal coloration  Psych:  Frustration with expressive dysarthria, appears to follow conversation properly and respond appropriately when able to express herself. Perseverates \"why cannot I get well\" frequently    I reviewed the labs, imaging, EKGs, telemetry, and other studies done this admission.   Data Review:   Recent Results (from the past 24 hour(s))   GLUCOSE, POC Collection Time: 10/30/20  1:18 PM   Result Value Ref Range    Glucose (POC) 107 (H) 65 - 100 mg/dL   CBC WITH AUTOMATED DIFF    Collection Time: 10/30/20  1:20 PM   Result Value Ref Range    WBC 8.2 4.3 - 11.1 K/uL    RBC 4.74 4.05 - 5.2 M/uL    HGB 14.3 11.7 - 15.4 g/dL    HCT 41.7 35.8 - 46.3 %    MCV 88.0 79.6 - 97.8 FL    MCH 30.2 26.1 - 32.9 PG    MCHC 34.3 31.4 - 35.0 g/dL    RDW 13.3 11.9 - 14.6 %    PLATELET 375 179 - 716 K/uL    MPV 9.7 9.4 - 12.3 FL    ABSOLUTE NRBC 0.00 0.0 - 0.2 K/uL    DF AUTOMATED      NEUTROPHILS 76 43 - 78 %    LYMPHOCYTES 12 (L) 13 - 44 %    MONOCYTES 9 4.0 - 12.0 %    EOSINOPHILS 2 0.5 - 7.8 %    BASOPHILS 1 0.0 - 2.0 %    IMMATURE GRANULOCYTES 1 0.0 - 5.0 %    ABS. NEUTROPHILS 6.2 1.7 - 8.2 K/UL    ABS. LYMPHOCYTES 1.0 0.5 - 4.6 K/UL    ABS. MONOCYTES 0.7 0.1 - 1.3 K/UL    ABS. EOSINOPHILS 0.1 0.0 - 0.8 K/UL    ABS. BASOPHILS 0.1 0.0 - 0.2 K/UL    ABS. IMM. GRANS. 0.1 0.0 - 0.5 K/UL   METABOLIC PANEL, COMPREHENSIVE    Collection Time: 10/30/20  1:20 PM   Result Value Ref Range    Sodium 134 (L) 136 - 145 mmol/L    Potassium 3.8 3.5 - 5.1 mmol/L    Chloride 100 98 - 107 mmol/L    CO2 27 21 - 32 mmol/L    Anion gap 7 7 - 16 mmol/L    Glucose 114 (H) 65 - 100 mg/dL    BUN 12 8 - 23 MG/DL    Creatinine 0.74 0.6 - 1.0 MG/DL    GFR est AA >60 >60 ml/min/1.73m2    GFR est non-AA >60 >60 ml/min/1.73m2    Calcium 9.3 8.3 - 10.4 MG/DL    Bilirubin, total 0.5 0.2 - 1.1 MG/DL    ALT (SGPT) 16 12 - 65 U/L    AST (SGOT) 25 15 - 37 U/L    Alk.  phosphatase 99 50 - 136 U/L    Protein, total 7.8 6.3 - 8.2 g/dL    Albumin 3.7 3.2 - 4.6 g/dL    Globulin 4.1 (H) 2.3 - 3.5 g/dL    A-G Ratio 0.9 (L) 1.2 - 3.5     PROTHROMBIN TIME + INR    Collection Time: 10/30/20  1:20 PM   Result Value Ref Range    Prothrombin time 15.1 (H) 12.5 - 14.7 sec    INR 1.2     EKG, 12 LEAD, INITIAL    Collection Time: 10/30/20  1:53 PM   Result Value Ref Range    Ventricular Rate 72 BPM    Atrial Rate 69 BPM    P-R Interval 158 ms    QRS Duration 80 ms    Q-T Interval 414 ms    QTC Calculation (Bezet) 453 ms    Calculated P Axis 81 degrees    Calculated R Axis 36 degrees    Calculated T Axis 56 degrees    Diagnosis       !! AGE AND GENDER SPECIFIC ECG ANALYSIS !! Normal sinus rhythm  Normal ECG  When compared with ECG of 07-JUL-2018 18:20,  Sinus rhythm has replaced Electronic atrial pacemaker  Nonspecific T wave abnormality no longer evident in Lateral leads     URINALYSIS W/ RFLX MICROSCOPIC    Collection Time: 10/30/20  3:19 PM   Result Value Ref Range    Color YELLOW      Appearance CLEAR      Specific gravity 1.007 1.001 - 1.023      pH (UA) 7.5 5.0 - 9.0      Protein Negative NEG mg/dL    Glucose Negative mg/dL    Ketone TRACE (A) NEG mg/dL    Bilirubin Negative NEG      Blood Negative NEG      Urobilinogen 0.2 0.2 - 1.0 EU/dL    Nitrites Negative NEG      Leukocyte Esterase Negative NEG         All Micro Results     None          Other Studies:  Ct Code Neuro Head Wo Contrast    Result Date: 10/30/2020  EXAM: CT CODE NEURO HEAD WO CONTRAST INDICATION: patient with acute neuro changes COMPARISON: July 7, 2018. TECHNIQUE: Unenhanced CT of the head was performed using 5 mm images. Brain and bone windows were generated. CT dose reduction was achieved through use of a standardized protocol tailored for this examination and automatic exposure control for dose modulation. FINDINGS: The ventricles and sulci are within normal limits given the patient's stated age. Chronic white matter changes. Encephalomalacia changes to both temporal lobes. There is no intracranial hemorrhage, extra-axial collection, mass, mass effect or midline shift. The basilar cisterns are open. No acute infarct is identified. The bone windows demonstrate no abnormalities. The visualized portions of the paranasal sinuses and mastoid air cells are clear.      IMPRESSION: There is no CT evidence of intracranial mass, acute hemorrhage or acute infarct. Assessment and Plan:     Hospital Problems as of 10/30/2020 Date Reviewed: 10/22/2020          Codes Class Noted - Resolved POA    Chronic constipation ICD-10-CM: K59.09  ICD-9-CM: 564.00  10/26/2020 - Present Yes        Paroxysmal atrial fibrillation ([address] ICD-10-CM: I48.0  ICD-9-CM: 427.31  6/1/2018 - Present Yes    Overview Signed 10/19/2020 11:32 AM by Alexandra Pedraza NP     Last Assessment & Plan:   Stable on current therapy. No change in treatment at this time. * (Principal) CVA (cerebral vascular accident) [facility]) ICD-10-CM: I63.9  ICD-9-CM: 434.91  5/15/2018 - Present Yes        Hypothyroidism ICD-10-CM: E03.9  ICD-9-CM: 244.9  5/14/2018 - Present Yes        HTN (hypertension) ICD-10-CM: I10  ICD-9-CM: 401.9  6/27/2016 - Present Yes        Hyperlipidemia ICD-10-CM: E78.5  ICD-9-CM: 272.4  10/3/2008 - Present Yes    Overview Signed 10/19/2020 11:29 AM by Alexandra Pedraza NP     Hyperlipidemia  Hyperlipidemia                   PLAN:  --Stroke protocol bedside swallow eval before meds with sips. Otherwise n.p.o.  MRI brain, echocardiogram with bubble study, CTA, antiplatelet and statin therapy. Telemetry monitoring. SCDs and continue Xarelto. Permissive hypertension next 24 hours with labetalol as needed systolic greater than 667 diastolic greater than 647. Possible change in mental statuscheck TSH B12 and RPR son reports he thinks she is at baseline with expressive dysarthria. Atrial fibrillation rate is currently controlled has permanent pacemaker. Continue Xarelto and home medications. If develops RVR recommend treat with diltiazem as this should cause least aggressive drop in blood pressure. Continue antiarrhythmic Tikosyn        Discharge planning: Pending clinical course likely skilled nursing facility pending therapy eval's  DVT ppx:  On Xarelto    Code status: Full CODE STATUS  Decision Maker: Self, contacts on file    Admit status: Inpatient      Risk:  high    Signed:  Magdy Westbrook DO

## 2020-10-31 ENCOUNTER — APPOINTMENT (OUTPATIENT)
Dept: CT IMAGING | Age: 80
DRG: 066 | End: 2020-10-31
Attending: INTERNAL MEDICINE
Payer: MEDICARE

## 2020-10-31 LAB
ALBUMIN SERPL-MCNC: 3.2 G/DL (ref 3.2–4.6)
ALBUMIN/GLOB SERPL: 0.9 {RATIO} (ref 1.2–3.5)
ALP SERPL-CCNC: 87 U/L (ref 50–136)
ALT SERPL-CCNC: 14 U/L (ref 12–65)
ANION GAP SERPL CALC-SCNC: 8 MMOL/L (ref 7–16)
AST SERPL-CCNC: 20 U/L (ref 15–37)
BASOPHILS # BLD: 0.1 K/UL (ref 0–0.2)
BASOPHILS NFR BLD: 1 % (ref 0–2)
BILIRUB SERPL-MCNC: 0.9 MG/DL (ref 0.2–1.1)
BUN SERPL-MCNC: 7 MG/DL (ref 8–23)
CALCIUM SERPL-MCNC: 8.7 MG/DL (ref 8.3–10.4)
CHLORIDE SERPL-SCNC: 101 MMOL/L (ref 98–107)
CHOLEST SERPL-MCNC: 176 MG/DL
CO2 SERPL-SCNC: 25 MMOL/L (ref 21–32)
CREAT SERPL-MCNC: 0.58 MG/DL (ref 0.6–1)
DIFFERENTIAL METHOD BLD: ABNORMAL
EOSINOPHIL # BLD: 0.1 K/UL (ref 0–0.8)
EOSINOPHIL NFR BLD: 1 % (ref 0.5–7.8)
ERYTHROCYTE [DISTWIDTH] IN BLOOD BY AUTOMATED COUNT: 13.3 % (ref 11.9–14.6)
EST. AVERAGE GLUCOSE BLD GHB EST-MCNC: 120 MG/DL
GLOBULIN SER CALC-MCNC: 3.4 G/DL (ref 2.3–3.5)
GLUCOSE BLD STRIP.AUTO-MCNC: 110 MG/DL (ref 65–100)
GLUCOSE BLD STRIP.AUTO-MCNC: 122 MG/DL (ref 65–100)
GLUCOSE BLD STRIP.AUTO-MCNC: 158 MG/DL (ref 65–100)
GLUCOSE SERPL-MCNC: 101 MG/DL (ref 65–100)
HBA1C MFR BLD: 5.8 % (ref 4.8–6)
HCT VFR BLD AUTO: 39.1 % (ref 35.8–46.3)
HDLC SERPL-MCNC: 64 MG/DL (ref 40–60)
HDLC SERPL: 2.8 {RATIO}
HGB BLD-MCNC: 13.1 G/DL (ref 11.7–15.4)
IMM GRANULOCYTES # BLD AUTO: 0.1 K/UL (ref 0–0.5)
IMM GRANULOCYTES NFR BLD AUTO: 1 % (ref 0–5)
LDLC SERPL CALC-MCNC: 103.2 MG/DL
LIPID PROFILE,FLP: ABNORMAL
LYMPHOCYTES # BLD: 0.9 K/UL (ref 0.5–4.6)
LYMPHOCYTES NFR BLD: 9 % (ref 13–44)
MAGNESIUM SERPL-MCNC: 2.2 MG/DL (ref 1.8–2.4)
MCH RBC QN AUTO: 29.8 PG (ref 26.1–32.9)
MCHC RBC AUTO-ENTMCNC: 33.5 G/DL (ref 31.4–35)
MCV RBC AUTO: 89.1 FL (ref 79.6–97.8)
MONOCYTES # BLD: 0.7 K/UL (ref 0.1–1.3)
MONOCYTES NFR BLD: 7 % (ref 4–12)
NEUTS SEG # BLD: 7.9 K/UL (ref 1.7–8.2)
NEUTS SEG NFR BLD: 82 % (ref 43–78)
NRBC # BLD: 0 K/UL (ref 0–0.2)
PLATELET # BLD AUTO: 296 K/UL (ref 150–450)
PMV BLD AUTO: 9.8 FL (ref 9.4–12.3)
POTASSIUM SERPL-SCNC: 3.5 MMOL/L (ref 3.5–5.1)
PROT SERPL-MCNC: 6.6 G/DL (ref 6.3–8.2)
RBC # BLD AUTO: 4.39 M/UL (ref 4.05–5.2)
RPR SER QL: NONREACTIVE
SODIUM SERPL-SCNC: 134 MMOL/L (ref 138–145)
TRIGL SERPL-MCNC: 44 MG/DL (ref 35–150)
VLDLC SERPL CALC-MCNC: 8.8 MG/DL (ref 6–23)
WBC # BLD AUTO: 9.7 K/UL (ref 4.3–11.1)

## 2020-10-31 PROCEDURE — 97162 PT EVAL MOD COMPLEX 30 MIN: CPT

## 2020-10-31 PROCEDURE — 83036 HEMOGLOBIN GLYCOSYLATED A1C: CPT

## 2020-10-31 PROCEDURE — 74011250637 HC RX REV CODE- 250/637: Performed by: INTERNAL MEDICINE

## 2020-10-31 PROCEDURE — 97165 OT EVAL LOW COMPLEX 30 MIN: CPT

## 2020-10-31 PROCEDURE — 70450 CT HEAD/BRAIN W/O DYE: CPT

## 2020-10-31 PROCEDURE — 93306 TTE W/DOPPLER COMPLETE: CPT

## 2020-10-31 PROCEDURE — 2709999900 HC NON-CHARGEABLE SUPPLY

## 2020-10-31 PROCEDURE — 65660000000 HC RM CCU STEPDOWN

## 2020-10-31 PROCEDURE — 99233 SBSQ HOSP IP/OBS HIGH 50: CPT | Performed by: PSYCHIATRY & NEUROLOGY

## 2020-10-31 PROCEDURE — 92610 EVALUATE SWALLOWING FUNCTION: CPT

## 2020-10-31 PROCEDURE — 97530 THERAPEUTIC ACTIVITIES: CPT

## 2020-10-31 PROCEDURE — 74011250636 HC RX REV CODE- 250/636: Performed by: INTERNAL MEDICINE

## 2020-10-31 PROCEDURE — 99222 1ST HOSP IP/OBS MODERATE 55: CPT | Performed by: PHYSICAL MEDICINE & REHABILITATION

## 2020-10-31 PROCEDURE — 92523 SPEECH SOUND LANG COMPREHEN: CPT

## 2020-10-31 PROCEDURE — 80061 LIPID PANEL: CPT

## 2020-10-31 PROCEDURE — 82962 GLUCOSE BLOOD TEST: CPT

## 2020-10-31 PROCEDURE — 36415 COLL VENOUS BLD VENIPUNCTURE: CPT

## 2020-10-31 PROCEDURE — 80053 COMPREHEN METABOLIC PANEL: CPT

## 2020-10-31 PROCEDURE — 85025 COMPLETE CBC W/AUTO DIFF WBC: CPT

## 2020-10-31 PROCEDURE — 97112 NEUROMUSCULAR REEDUCATION: CPT

## 2020-10-31 PROCEDURE — 83735 ASSAY OF MAGNESIUM: CPT

## 2020-10-31 PROCEDURE — 74011250636 HC RX REV CODE- 250/636: Performed by: FAMILY MEDICINE

## 2020-10-31 RX ORDER — LORAZEPAM 1 MG/1
1 TABLET ORAL
Status: DISCONTINUED | OUTPATIENT
Start: 2020-10-31 | End: 2020-11-01 | Stop reason: HOSPADM

## 2020-10-31 RX ORDER — LORAZEPAM 0.5 MG/1
1 TABLET ORAL ONCE
Status: ACTIVE | OUTPATIENT
Start: 2020-10-31 | End: 2020-11-01

## 2020-10-31 RX ORDER — SODIUM CHLORIDE 9 MG/ML
50 INJECTION, SOLUTION INTRAVENOUS CONTINUOUS
Status: DISPENSED | OUTPATIENT
Start: 2020-10-31 | End: 2020-11-01

## 2020-10-31 RX ORDER — LORAZEPAM 2 MG/ML
1 INJECTION INTRAMUSCULAR ONCE
Status: COMPLETED | OUTPATIENT
Start: 2020-10-31 | End: 2020-10-31

## 2020-10-31 RX ADMIN — POTASSIUM BICARBONATE 20 MEQ: 782 TABLET, EFFERVESCENT ORAL at 11:55

## 2020-10-31 RX ADMIN — RIVAROXABAN 20 MG: 20 TABLET, FILM COATED ORAL at 09:16

## 2020-10-31 RX ADMIN — Medication 10 ML: at 21:32

## 2020-10-31 RX ADMIN — LEVOTHYROXINE SODIUM 88 MCG: 0.09 TABLET ORAL at 05:00

## 2020-10-31 RX ADMIN — DOFETILIDE 250 MCG: 0.25 CAPSULE ORAL at 18:28

## 2020-10-31 RX ADMIN — SODIUM CHLORIDE 50 ML/HR: 900 INJECTION, SOLUTION INTRAVENOUS at 12:31

## 2020-10-31 RX ADMIN — LORAZEPAM 1 MG: 2 INJECTION INTRAMUSCULAR; INTRAVENOUS at 21:30

## 2020-10-31 RX ADMIN — Medication 5 ML: at 05:03

## 2020-10-31 RX ADMIN — ASPIRIN 81 MG: 81 TABLET, CHEWABLE ORAL at 09:16

## 2020-10-31 RX ADMIN — Medication 400 MG: at 09:16

## 2020-10-31 RX ADMIN — DOFETILIDE 250 MCG: 0.25 CAPSULE ORAL at 09:16

## 2020-10-31 RX ADMIN — Medication 5 ML: at 13:48

## 2020-10-31 NOTE — PROGRESS NOTES
Problem: Mobility Impaired (Adult and Pediatric)  Goal: *Acute Goals and Plan of Care  Outcome: Progressing Towards Goal  Acute PT Goals:  (1.) Maryuri Tate will move from supine to sit and sit to supine , scoot up and down, and roll side to side with INDEPENDENCE within 7 treatment day(s). (2.) Maryuri aTte will transfer from bed to chair and chair to bed with MODIFIED INDEPENDENCE using the least restrictive device within 7 treatment day(s). (3.) Maryuri Tate will ambulate with STAND BY ASSIST for 200 feet with the least restrictive device within 7 treatment day(s). (4.) Maryuri Tate will perform standing static and dynamic balance activities x 30 minutes with STAND BY ASSIST to improve safety, coordination, and overall balance control within 7 treatment day(s). (5.) Maryuri Tate will ascend and descend 8 stairs using bilateral hand rail(s) with CONTACT GUARD ASSIST to improve functional mobility and safety within 7 treatment day(s). (6.) Maryuri Tate will perform bilateral lower extremity exercises x 20 min for HEP with SUPERVISION to improve strength, coordination, endurance, and functional mobility within 7 treatment day(s). PHYSICAL THERAPY: Initial Assessment, Daily Note, and AM 10/31/2020  INPATIENT: PT Visit Days : 1  [place] / [place] / Product Type: Medicare /       NAME/AGE/GENDER: Maryuri Tate is a 78 y.o. female   PRIMARY DIAGNOSIS: CVA (cerebral vascular accident) ([facility] [I63.9] CVA (cerebral vascular accident) ([facility] CVA (cerebral vascular accident) ([facility]        ICD-10: Treatment Diagnosis:   · Other lack of cordination (R27.8)  · Difficulty in walking, Not elsewhere classified (R26.2)  · Other abnormalities of gait and mobility (R26.89)  · Unspecified Lack of Coordination (R27.9)   Precaution/Allergies:  Ciprofloxacin; Percocet [oxycodone-acetaminophen];  Other medication; and Propofol (bulk)      ASSESSMENT:     Ms. Brenda Ellis is an 78year old F who presents to hospital with confusion; expressive dysarthria noted, along with RLE weakness and inability to ambulate. Admitted for CVA workup. MRI indicated \"acute to early subacute small infarct in the left thalamus/internal capsule. \" Prior to hospital admission pt lives with alone, with her supportive adult children living nearby, in a split level home with a few step(s) to enter. Pt endorses no falls in past 6 months. Prior to admission Ms. Jeffery uses no DME for mobility. Pt is able to provide history; additional time required given expressive dysarthria. She communicates to this therapist that prior to this she has been active and independent, performing yard work and all necessary household tasks. Upon entering, pt resting in bed, agreeable to PT evaluation. she reports no pain at rest. She is understandably anxious, and intermittently frustrated with her difficulty communicating and expressing herself. PT provided encouragement. BLE assessment indicates sensation to light touch intact, AROM WFL, and strength diminished BLE; RLE appears to be slightly weaker than LLE upon MMT. Noted decreased coordination BLE with mobility at times resulting in increased unsteadiness throughout session. Pt performed supine > sit with Min A and additional time, sitting EOB with fair sitting balance control. Sit > stand with Min A using initially no assistive device as this is consistent with pt's prior level of function. Pt unable to safely rise or maintain standing balance without external support. Sit <> stand transfer with RW and Jose, and pt much better, still requiring constant support in order to maintain safety and steadiness. Pt performed static and dynamic standing balance activities throughout room, including ambulation on level ground x40 ft with Min A/RW. Noted decreased coordination BLE requiring cues from therapist for proper progression BLE and balance control.  At end of session pt resting in chair with all needs within reach, alarm activated for safety, RN notified. Pt presents as functioning below her baseline, with deficits in mobility including transfers, gait, coordination, strength, balance, and activity tolerance. Pt will benefit from skilled therapy services to address stated deficits to promote return to highest level of function, independence, and safety. Will continue to follow. This section established at most recent assessment   PROBLEM LIST (Impairments causing functional limitations):  1. Decreased Strength  2. Decreased ADL/Functional Activities  3. Decreased Transfer Abilities  4. Decreased Ambulation Ability/Technique  5. Decreased Balance  6. Decreased Activity Tolerance   INTERVENTIONS PLANNED: (Benefits and precautions of physical therapy have been discussed with the patient.)  1. Balance Exercise  2. Bed Mobility  3. Family Education  4. Gait Training  5. Home Exercise Program (HEP)  6. Neuromuscular Re-education/Strengthening  7. Range of Motion (ROM)  8. Therapeutic Activites  9. Therapeutic Exercise/Strengthening  10. Transfer Training     TREATMENT PLAN: Frequency/Duration: 3 times a week for duration of hospital stay  Rehabilitation Potential For Stated Goals: Good     REHAB RECOMMENDATIONS (at time of discharge pending progress):    Placement: It is my opinion, based on this patient's performance to date, that Ms. Noreen Ennis may benefit from intensive therapy at an [address] [place] [address] after discharge due to a probable need for 24 hour rehab nursing, a probable need for multiple therapy disciplines, and potential to make ongoing and sustainable functional improvement that is of practical value. Pt with speech needs, as well as coordination and mobility deficits which would require skilled intervention from SLP, OT, and PT.   Equipment:    TBD pending progress              HISTORY:   History of Present Injury/Illness (Reason for Referral):  Per Chart: [place].Dinning complaintdysarthria ambulatory dysfunction  68-year-old female history of A. fib with permanent pacemaker on Xarelto for CVA prophylaxis, hypertension prior CVA/TIA last known well sometime yesterday afternoon around 330. Found in hallway today trying to use her phone and clumsy with confusion. Expressive dysarthria noted and right leg weakness and inability to ambulate. Intermittent confusion per son who is at bedside. Code stroke was called 1326. Initial CT head noncontrast negative for CVA. MRI and CTA are pending. Stat CTA not done because not TPA candidate. Unclear onset and on Xarelto. Neurology consultation recommended admission for stroke work-up and evaluation by speech, PT OT physiatry coordinator. Permissive hypertension will be allowed. Unclear onset and home medications ordered but heldnot given today unless she took at home. She is alert but having frustration with expressive dysarthria. Son reports intermittent confusion he thinks related to frustration and then back to baseline with expressive dysarthria. 10 systems reviewed and negative except as noted in HPI. \"  Past Medical History/Comorbidities:   Ms. Scooby Fontaine  has a past medical history of Acute encephalopathy (6/27/2016), Anxiety, Arthritis, Atrial fibrillation ([address] (4/16/2018), Chronic pain, Endocrine disease, Hypertension, Sepsis ([address] (6/27/2016), Thyroid disease, TIA (transient ischemic attack) (11/14/2017), and Vitamin D deficiency (9/1/2015).  She also has no past medical history of Aneurysm ([address] Asthma, Autoimmune disease ([address] CAD (coronary artery disease), Cancer ([address] Chronic kidney disease, Coagulation disorder ([address] COPD, Dementia, Diabetes ([address] GERD (gastroesophageal reflux disease), Heart failure ([address] Ill-defined condition, Liver disease, Morbid obesity ([address] Other ill-defined conditions(799.89), Psychiatric disorder, PUD (peptic ulcer disease), Rheumatic fever, Seizures ([address] Sleep apnea, Sleep disorder, or Thromboembolus ([facility] Ms. Nanci Holley  has a past surgical history that includes hx orthopaedic; pr total knee arthroplasty (Left, 7/23/15); and hx pacemaker. Social History/Living Environment:   Home Environment: Private residence  One/Two Story Residence: Split level  Lift Chair Available: No  Living Alone: Yes  Support Systems: Family member(s)  Patient Expects to be Discharged to[de-identified] Rehabilitation facility  Current DME Used/Available at Home: None  Prior Level of Function/Work/Activity:  Prior to hospital admission pt lives with alone, with her supportive adult children living nearby, in a split level home with a few step(s) to enter. Pt endorses no falls in past 6 months. Prior to admission Ms. Jeffery uses no DME for mobility. Pt is able to provide history; additional time required given expressive dysarthria. She communicates to this therapist that prior to this she has been active and independent, performing yard work and all necessary household tasks. Number of Personal Factors/Comorbidities that affect the Plan of Care: 1-2: MODERATE COMPLEXITY   EXAMINATION:   Most Recent Physical Functioning:   Gross Assessment:  AROM: Within functional limits  Strength: Generally decreased, functional  Coordination: Generally decreased, functional  Sensation: Intact               Posture:     Balance:  Sitting: Impaired  Sitting - Static: Good (unsupported)  Sitting - Dynamic: Fair (occasional)  Standing: Impaired  Standing - Static: Fair  Standing - Dynamic : Fair;Constant support Bed Mobility:  Rolling: Contact guard assistance  Supine to Sit: Minimum assistance  Sit to Supine: Contact guard assistance  Scooting: Minimum assistance  Wheelchair Mobility:     Transfers:  Sit to Stand: Minimum assistance  Stand to Sit: Minimum assistance  Bed to Chair: Contact guard assistance;Minimum assistance  Interventions: Safety awareness training; Tactile cues; Verbal cues  Gait:     Base of Support: Center of gravity altered;Narrowed  Speed/Sil: Fluctuations  Step Length: Left shortened;Right shortened  Gait Abnormalities: Decreased step clearance;Trunk sway increased; Path deviations; Shuffling gait  Distance (ft): 40 Feet (ft)  Assistive Device: Walker, rolling;Gait belt  Ambulation - Level of Assistance: Contact guard assistance;Minimal assistance      Body Structures Involved:  1. Nerves  2. Voice/Speech  3. Bones  4. Joints  5. Muscles Body Functions Affected:  1. Sensory/Pain  2. Voice and Speech  3. Neuromusculoskeletal  4. Movement Related Activities and Participation Affected:  1. General Tasks and Demands  2. Communication  3. Mobility  4. Self Care  5. Domestic Life  6. Interpersonal Interactions and Relationships   Number of elements that affect the Plan of Care: 4+: HIGH COMPLEXITY   CLINICAL PRESENTATION:   Presentation: Evolving clinical presentation with changing clinical characteristics: MODERATE COMPLEXITY   CLINICAL DECISION MAKIN [facility] Mobility Inpatient Short Form  How much difficulty does the patient currently have. .. Unable A Lot A Little None   1. Turning over in bed (including adjusting bedclothes, sheets and blankets)? [] 1   [] 2   [x] 3   [] 4   2. Sitting down on and standing up from a chair with arms ( e.g., wheelchair, bedside commode, etc.)   [] 1   [] 2   [x] 3   [] 4   3. Moving from lying on back to sitting on the side of the bed? [] 1   [] 2   [x] 3   [] 4   How much help from another person does the patient currently need. .. Total A Lot A Little None   4. Moving to and from a bed to a chair (including a wheelchair)? [] 1   [] 2   [x] 3   [] 4   5. Need to walk in hospital room? [] 1   [] 2   [x] 3   [] 4   6. Climbing 3-5 steps with a railing? [] 1   [x] 2   [] 3   [] 4   © , Trustees of [address] [place] [address] [address] [address], under license to [employer].  All rights reserved      Score:  Initial: 17 Most Recent: X (Date: -- )    Interpretation of Tool:  Represents activities that are increasingly more difficult (i.e. Bed mobility, Transfers, Gait). Medical Necessity:     · Patient is expected to demonstrate progress in strength, balance, coordination and functional technique to increase independence with all mobility and promote return to prior level of function. Reason for Services/Other Comments:  · Patient continues to require skilled intervention due to medical complications and mobility deficits which impact her level of function, safety, and independence as indicated above. Use of outcome tool(s) and clinical judgement create a POC that gives a: Questionable prediction of patient's progress: MODERATE COMPLEXITY        TREATMENT:   (In addition to Assessment/Re-Assessment sessions the following treatments were rendered)   Pre-treatment Symptoms/Complaints:  Anxious, eager to work with therapy   Pain: Initial:   Pain Intensity 1: 0  Post Session:  0/10     Neuromuscular Re-education: ( 25 Minutes):  Static and dynamic standing balance activities throughout room, including ambulation on level ground x40 ft with Min A/RW  to improve balance, coordination and posture. Required moderate visual, verbal and manual cues to promote static and dynamic balance in standing and promote coordination of bilateral, lower extremity(s). Braces/Orthotics/Lines/Etc:   · O2 Device: Room air  Treatment/Session Assessment:    · Response to Treatment:  See above  · Interdisciplinary Collaboration:   o Physical Therapist  o Registered Nurse  o Physician  · After treatment position/precautions:   o Up in chair  o Bed alarm/tab alert on  o Bed/Chair-wheels locked  o Bed in low position  o Call light within reach  o RN notified   · Compliance with Program/Exercises: Will assess as treatment progresses  · Recommendations/Intent for next treatment session:   \"Next visit will focus on advancements to more challenging activities and reduction in assistance provided\".     Total Treatment Duration:  PT Patient Time In/Time Out  Time In: 0830  Time Out: 1030 [facility],

## 2020-10-31 NOTE — PROGRESS NOTES
Neurology Daily Progress Note     Assessment:     Acute ischemic stroke, left thalamus. Hx of RACHID.fib on xarelto. CTA head/neck negative for LVO or highgrade stenosis. MRI of brain showed acute small infarct in the left thalamus/internal capsule; remote infarcts in the bilateral anterior temporal lobes; advanced chronic small vessel disease. TTE pending. Plan:     · Continue ASA 81 mg daily   · Continue Xarelto 20 mg po daily   · Continue high intensity statin   · Neurochecks Q4H  · Telemetry and echocardiogram with bubble study  · PT/OT/ST  · DVT prophylaxis   · BP management - normotensive, with long-term goal <140/90  · Smoking cessation if applicable   · Diabetes education if applicable   · Depression Screening prior to discharge      Subjective: Interval history:    Alert and oriented to person, place. Expressive aphasia. Able to follow simple commands. ST at bedside for swallow evaluation. CTA head/neck negative for LVO or highgrade stenosis. MRI of brain showed acute small infarct in the left thalamus/internal capsule; remote infarcts in the bilateral anterior temporal lobes; advanced chronic small vessel disease. TTE pending. History:    Deirdre Villatoro is a 78 y.o. female who is being seen for stroke. Review of systems negative with exception of pertinent positives and negatives noted above.        Objective:     Vitals:    10/30/20 2000 10/31/20 0000 10/31/20 0400 10/31/20 0800   BP: (!) 192/81 (!) 157/90 (!) 156/86 (!) 145/72   Pulse: 87 80 76 72   Resp: 20 16 20 18   Temp: 98 °F (36.7 °C) 98.4 °F (36.9 °C) 98.1 °F (36.7 °C) 98.3 °F (36.8 °C)   SpO2: 97% 97% 95% 96%          Current Facility-Administered Medications:     potassium bicarb-citric acid (EFFER-K) tablet 20 mEq, 20 mEq, Oral, NOW, Jordan Mejia, DO    0.9% sodium chloride infusion, 50 mL/hr, IntraVENous, CONTINUOUS, Jordan Mejia, DO    sodium chloride (NS) flush 5-40 mL, 5-40 mL, IntraVENous, Q8H, Phillip Mejia Virgin Ao, DO, 5 mL at 10/31/20 0503    sodium chloride (NS) flush 5-40 mL, 5-40 mL, IntraVENous, PRN, Eloisa Mejia DO    aspirin chewable tablet 81 mg, 81 mg, Oral, DAILY, Eloisa Mejia DO, 81 mg at 10/31/20 9846    atorvastatin (LIPITOR) tablet 40 mg, 40 mg, Oral, QHS, Eloisa Mejia DO, 40 mg at 10/30/20 2144    acetaminophen (TYLENOL) tablet 650 mg, 650 mg, Oral, Q4H PRN, Eloisa Mejia DO    labetaloL (NORMODYNE;TRANDATE) injection 5 mg, 5 mg, IntraVENous, Q10MIN PRN, Eloisa Mejia DO    senna-docusate (PERICOLACE) 8.6-50 mg per tablet 2 Tab, 2 Tab, Oral, QHS, Eloisa Mejia DO, 2 Tab at 10/30/20 2144    amLODIPine (NORVASC) tablet 5 mg, 5 mg, Oral, DAILY, Eloisa Mejia DO    dofetilide (TIKOSYN) capsule 250 mcg, 250 mcg, Oral, BID, Laverda Ades, , 250 mcg at 10/31/20 4849    levothyroxine (SYNTHROID) tablet 88 mcg, 88 mcg, Oral, ACB, Eloisa Mejia DO, 88 mcg at 10/31/20 0500    [Held by provider] losartan (COZAAR) tablet 100 mg, 100 mg, Oral, DAILY, Eloisa Mejia DO    magnesium oxide (MAG-OX) tablet 400 mg, 400 mg, Oral, DAILY, Eloisa Mejia DO, 400 mg at 10/31/20 0760    rivaroxaban (XARELTO) tablet 20 mg, 20 mg, Oral, DAILY WITH BREAKFAST, Eloisa Mejia DO, 20 mg at 10/31/20 0301    influenza vaccine 2020-21 (6 mos+)(PF) (FLUARIX/FLULAVAL/FLUZONE QUAD) injection 0.5 mL, 0.5 mL, IntraMUSCular, PRIOR TO DISCHARGE, Mejia, Eloisa [place], DO    Recent Results (from the past 12 hour(s))   LIPID PANEL    Collection Time: 10/31/20  5:22 AM   Result Value Ref Range    LIPID PROFILE          Cholesterol, total 176 <200 MG/DL    Triglyceride 44 35 - 150 MG/DL    HDL Cholesterol 64 (H) 40 - 60 MG/DL    LDL, calculated 103.2 (H) <100 MG/DL    VLDL, calculated 8.8 6.0 - 23.0 MG/DL    CHOL/HDL Ratio 2.8     HEMOGLOBIN A1C WITH EAG    Collection Time: 10/31/20  5:22 AM   Result Value Ref Range    Hemoglobin A1c 5.8 4.8 - 6.0 %    Est. average glucose 120 mg/dL   CBC WITH AUTOMATED DIFF    Collection Time: 10/31/20  5:22 AM   Result Value Ref Range    WBC 9.7 4.3 - 11.1 K/uL    RBC 4.39 4.05 - 5.2 M/uL    HGB 13.1 11.7 - 15.4 g/dL    HCT 39.1 35.8 - 46.3 %    MCV 89.1 79.6 - 97.8 FL    MCH 29.8 26.1 - 32.9 PG    MCHC 33.5 31.4 - 35.0 g/dL    RDW 13.3 11.9 - 14.6 %    PLATELET 899 351 - 238 K/uL    MPV 9.8 9.4 - 12.3 FL    ABSOLUTE NRBC 0.00 0.0 - 0.2 K/uL    DF AUTOMATED      NEUTROPHILS 82 (H) 43 - 78 %    LYMPHOCYTES 9 (L) 13 - 44 %    MONOCYTES 7 4.0 - 12.0 %    EOSINOPHILS 1 0.5 - 7.8 %    BASOPHILS 1 0.0 - 2.0 %    IMMATURE GRANULOCYTES 1 0.0 - 5.0 %    ABS. NEUTROPHILS 7.9 1.7 - 8.2 K/UL    ABS. LYMPHOCYTES 0.9 0.5 - 4.6 K/UL    ABS. MONOCYTES 0.7 0.1 - 1.3 K/UL    ABS. EOSINOPHILS 0.1 0.0 - 0.8 K/UL    ABS. BASOPHILS 0.1 0.0 - 0.2 K/UL    ABS. IMM. GRANS. 0.1 0.0 - 0.5 K/UL   METABOLIC PANEL, COMPREHENSIVE    Collection Time: 10/31/20  5:22 AM   Result Value Ref Range    Sodium 134 (L) 138 - 145 mmol/L    Potassium 3.5 3.5 - 5.1 mmol/L    Chloride 101 98 - 107 mmol/L    CO2 25 21 - 32 mmol/L    Anion gap 8 7 - 16 mmol/L    Glucose 101 (H) 65 - 100 mg/dL    BUN 7 (L) 8 - 23 MG/DL    Creatinine 0.58 (L) 0.6 - 1.0 MG/DL    GFR est AA >60 >60 ml/min/1.73m2    GFR est non-AA >60 >60 ml/min/1.73m2    Calcium 8.7 8.3 - 10.4 MG/DL    Bilirubin, total 0.9 0.2 - 1.1 MG/DL    ALT (SGPT) 14 12 - 65 U/L    AST (SGOT) 20 15 - 37 U/L    Alk.  phosphatase 87 50 - 136 U/L    Protein, total 6.6 6.3 - 8.2 g/dL    Albumin 3.2 3.2 - 4.6 g/dL    Globulin 3.4 2.3 - 3.5 g/dL    A-G Ratio 0.9 (L) 1.2 - 3.5     MAGNESIUM    Collection Time: 10/31/20  5:22 AM   Result Value Ref Range    Magnesium 2.2 1.8 - 2.4 mg/dL   GLUCOSE, POC    Collection Time: 10/31/20  7:32 AM   Result Value Ref Range    Glucose (POC) 110 (H) 65 - 100 mg/dL     MRI Results (most recent):  Results from [place] encounter on 10/30/20   MRI BRAIN WO CONT    Narrative MRI BRAIN WITHOUT CONTRAST 10/30/2020    HISTORY: Abnormal speech and altered level of consciousness. TECHNIQUE: Sagittal and axial T1-weighted, axial T2-weighted, axial FLAIR, axial  T2-weighted gradient-echo, axial diffusion weighted images with ADC maps of the  brain. The patient wished to terminate the exam prior to completion of the  coronal FLAIR sequence. COMPARISON: Head CT October 30, 2020    FINDINGS: There is a small focus of restricted diffusion in the left thalamus  abutting the posterior limb of the internal capsule. There is no associated  hemorrhage or mass effect. Diffuse cerebral volume loss is present. On the T2-weighted and FLAIR sequences, there are extensive white matter  hyperintensities compatible with advanced chronic small vessel ischemic disease. Encephalomalacia is present in both anterior temporal lobes. There is no hydrocephalus, intra-axial mass, or extra-axial hematoma. Impression IMPRESSION:    1. Acute to early subacute small infarct in the left thalamus/internal capsule. 2. Bilateral anterior temporal encephalomalacia. 3. Advanced proximal vessel ischemic disease. Physical Exam:  General - Well developed, well nourished, in no apparent distress. Pleasant and conversent. HEENT - Normocephalic, atraumatic. Conjunctiva are clear. Neck - Supple without masses  Cardiovascular - Regular rate and rhythm. Normal S1, S2 without murmurs, rubs, or gallops. Lungs - Clear to auscultation. Abdomen - Soft, nontender with normal bowel sounds. Extremities - Peripheral pulses intact. No edema and no rashes. Neurological examination - Comprehension, attention, memory and reasoning are impaired. Language and speech are expressive aphasia. On cranial nerve examination, (II, III, IV, VI) pupils are equal, round, and reactive to light. Visual acuity is adequate. Visual fields are full to finger confrontation.  Extraocular motility is normal. (V, VII) Face is symmetric and sensation is intact to light touch. (VIII) Hearing is intact. (IX, X) Palate and uvula elevate symmetrically. Voice is normal. (XI) Shoulder shrug is strong and equal bilaterally. (XII)Tongue is midline. Motor examination - There is normal muscle tone and bulk. Power is full throughout with exception of 4/5 RLE drift. Muscle stretch reflexes are 2+ throughout. Plantar response is flexor. Sensation is intact to light touch, pinprick, vibration and proprioception in all extremities. Cerebellar examination is normal.     Signed By: Mauri Roman NP     October 31, 2020      Neurology attending    Patient seen and examined and is really improved from yesterday however she continues to have substantial expressive difficulties. MRI has been reviewed. Variety of issues from a therapeutic standpoint. The patient denies missing any dosages in terms of her Xarelto. She does actually describe through her aphasia a reasonable plan in terms of keeping her medications current and taking them as prescribed which is encouraging and hopefully she will be able to continue that    The issue of whether she should be switched over to an alternative direct oral anticoagulant was raised. There is really no literature that I am aware of that would endorse this approach addition of ASA 81 mg is probably reasonable however she fits into the category of ongoing small infarcts despite optimal medical therapy that occurs in individuals with atrial fibrillation.   I would doubt that she is a candidate for the watchman device but that could be considered

## 2020-10-31 NOTE — PROGRESS NOTES
Problem: Falls - Risk of  Goal: *Absence of Falls  Description: Document Veatrice Marker Fall Risk and appropriate interventions in the flowsheet.   Outcome: Progressing Towards Goal  Note: Fall Risk Interventions:  Mobility Interventions: Assess mobility with egress test, Bed/chair exit alarm, Communicate number of staff needed for ambulation/transfer    Mentation Interventions: Adequate sleep, hydration, pain control    Medication Interventions: Evaluate medications/consider consulting pharmacy, Bed/chair exit alarm, Assess postural VS orthostatic hypotension    Elimination Interventions: Patient to call for help with toileting needs, Elevated toilet seat, Call light in reach    History of Falls Interventions: Bed/chair exit alarm, Consult care management for discharge planning, Door open when patient unattended         Problem: Patient Education: Go to Patient Education Activity  Goal: Patient/Family Education  Outcome: Progressing Towards Goal     Problem: TIA/CVA Stroke: 0-24 hours  Goal: Off Pathway (Use only if patient is Off Pathway)  Outcome: Progressing Towards Goal

## 2020-10-31 NOTE — PROGRESS NOTES
Pt is an 79 yo female admitted due to a CVA. SW consult received to assist with dc planning. Chart reviewed. SW met with pt and son, Linnea Thornton, to discuss dc planning. Meeting was cut short so that pt could go to have CT scan. Pt did confirm that she lives alone and had family that lives nearby. Pt has a history of HH through [facility] and STR at [facility] but does not want to go back there. Physiatrist has been consulted and has evaluated the pt for acute rehab admission to [facility]. Pending final decision. SW did provide pt and son with list of subacute rehab facilities to review in the event subacute rehab is the recommendation. SW requested that PPD has be ordered and placed. SW to continue to follow to assist as needed. Care Management Interventions  PCP Verified by CM:  Yes  Last Visit to PCP: 10/19/20  Transition of Care Consult (CM Consult): Discharge Planning  Discharge Durable Medical Equipment: No  Physical Therapy Consult: Yes  Occupational Therapy Consult: Yes  Speech Therapy Consult: Yes  Current Support Network: Own Home, Lives Alone, Family Lives Nearby  Confirm Follow Up Transport: Family  Discharge Location  Discharge Placement: Unable to determine at this time

## 2020-10-31 NOTE — PROGRESS NOTES
10/31/20 1904   NIH Stroke Scale   Interval Other (comment)  (LENNOX Haidre )   LOC 0   LOC Questions 0   LOC Commands 0   Best Gaze 0   Visual 0   Facial Palsy 0   Motor Right Arm 0   Motor Left Arm 0   Motor Right Leg 0   Motor Left Leg 0   Limb Ataxia 0   Sensory 0   Best Language 2   Dysarthria 0   Extinction and Inattention 0   Total 2

## 2020-10-31 NOTE — CONSULTS
Admit Date: 10/30/20  Referring Physician: Dr. Lenny Godwin Making/Plan/Recommend: Medical chart reviewed. Patient seen and examined. Briefly, this is an 79 YO with PMH of KAYLYNN Blanc, pacemaker, on Xarelto, HTN, who was admitted on 10/30 with aphasia, confusion and right sided weakness. MRI was positive for an acute left thalamus/internal capsule CVA. Neurology consulted. Seen by therapy with current level of function: bed mobility and transfers - min A, ambulation 40' with RW and min A with unsteady gait, fair balance. Previous level of function - independent. Lives alone in a split level home with with steps to enter. Adult children live nearby. PE: aphasic, rrr, CTA, follows commands, pleasant affect, antigravity strength in all extremities. Bilateral F-N intact, no resting tremors, no facial droop, EOMI. Will follow. Making functional gains. HD # 1. Continue acute therapies. Thank you for the opportunity to participate in the care of this patient.

## 2020-10-31 NOTE — PROGRESS NOTES
Problem: Neurolinguistics Impaired (Adult)  Goal: *Speech Goal: (INSERT TEXT)  Description: LTG: Patient will improve neuro-linguistic abilities to increase safety and awareness in functional living environment. STG: Patient will participate in high level word finding tasks. With 90% accuracy   STG: Patient will identify appropriate solutions to problems with 90% accuracy. STG: Patient will verbally sequence steps to a task with 90% accuracy. STG: Patient will solve simple level mathematical problems with 90% accuracy. STG: Patient will complete reasoning tasks to improve problem solving and safety awareness with 90% accuracy. STG: Patient will demonstrate alternating attention by being able to shift focus with 90% accuracy. LTG: Patient will increase receptive/expressive language skills to communicate basic wants/needs across environments. STG: Patient will name 5 items to given category with 90% accuracy. STG: Patient will describe given picture with 90% accuracy. LTG: Patient will utilize compensatory strategies to develop functional and intelligible speech and  improve communication across environments.   STG: Patient will utilize compensatory strategies across tasks with 90% accuracy   Outcome: Progressing Towards Goal    SPEECH LANGUAGE PATHOLOGY: DYSPHAGIA AND SPEECH-LANGUAGE/COGNITION: Initial Assessment    NAME/AGE/GENDER: Deirdre Villatoro is a 78 y.o. female  DATE: 10/31/2020  PRIMARY DIAGNOSIS: [facility] (cerebral vascular accident) ([facility] [I63.9]      ICD-10: Treatment Diagnosis: R13.12 Dysphagia, Oropharyngeal Phase  R41.841 Cognitive-Communication Deficit    RECOMMENDATIONS   DIET:    continue prescribed diet   PO:  Regular   Liquids:  regular thin    MEDICATIONS: With liquid     ASPIRATION PRECAUTIONS  · Slow rate of intake  · Small bites/sips  · Upright at 90 degrees during meal     EDUCATION:  Recommendations discussed with   · Nursing  · Patient     CONTINUATION OF SKILLED SERVICES/MEDICAL NECESSITY:   No further skilled swallowing intervention currently indicated.  Patient is expected to demonstrate progress in expressive communication and cognitive ability to decrease assistance required communication, increase independence with activities of daily living and increase communication with family/caregivers.  Patient continues to require skilled intervention due to cognitive linguistic deficits. RECOMMENDATIONS for CONTINUED SPEECH THERAPY: YES: Anticipate need for ongoing speech therapy during this hospitalization and at next level of care. ASSESSMENT   Patient presents with oral and pharyngeal phase of swallow within functional limits with no overt signs or symptoms of aspiration observed with any consistency assessed. Swallows of all textures timely, clear to cervical auscultation and with adequate laryngeal excursion. Voice clear after swallow. No oral residue observed. Recommend continue current regular texture diet and thin liquids. Give meds with water. No further skilled swallow intervention currently indicated. Conversational speech characterized by frequent word-finding difficulties and occasional dysarthria, giving rise to increased frustration by patient. Strengths include receptive abilities and on specific verbal tasks (automatic speech, simple naming, repetition, patient does relatively well). But during conversation and with generative naming, patient struggles and will occasional \"just give up\". Patient would benefit from ongoing speech therapy to address cognitive/linguistic deficits. REHABILITATION POTENTIAL FOR STATED GOALS: Good    PLAN    FREQUENCY/DURATION: Continue to follow patient 3 times a week for duration of hospital stay to address above goals. - Recommendations for next treatment session: Next treatment will address cognitive tasks    SUBJECTIVE   Patient pleasant and cooperative.   History of Present Injury/Illness: Ms. Winsome Vincent  has a past medical history of Acute encephalopathy (6/27/2016), Anxiety, Arthritis, Atrial fibrillation ([address] (4/16/2018), Chronic pain, Endocrine disease, Hypertension, Sepsis ([address] (6/27/2016), Thyroid disease, TIA (transient ischemic attack) (11/14/2017), and Vitamin D deficiency (9/1/2015). She also has no past medical history of Aneurysm ([address] Asthma, Autoimmune disease ([address] CAD (coronary artery disease), Cancer ([address] Chronic kidney disease, Coagulation disorder ([address] COPD, Dementia, Diabetes ([address] GERD (gastroesophageal reflux disease), Heart failure ([address] Ill-defined condition, Liver disease, Morbid obesity ([address] Other ill-defined conditions(799.89), Psychiatric disorder, PUD (peptic ulcer disease), Rheumatic fever, Seizures ([address] Sleep apnea, Sleep disorder, or Thromboembolus ([address] . She also  has a past surgical history that includes hx orthopaedic; pr total knee arthroplasty (Left, 7/23/15); and hx pacemaker. Problem List:  (Impairments causing functional limitations):  1. Cognitive-linguistic deficits following CVA    Previous Dysphagia: NONE REPORTED  Diet Prior to Evaluation: regular textures, thin liquids    Orientation:   Person  Place  Time    Pain: Pain Scale 1: Numeric (0 - 10)  Pain Intensity 1: 0    OBJECTIVE   Oral Motor:   · Labial: No impairment  · Dentition: Intact  · Oral Hygiene: Adequate  · Lingual: No impairment    Swallow evaluation:   Patient consumed trials of thin liquids, applesauce, mixed consistency and cracker. Patient also observed taking pills with water. No overt signs or symptoms of aspiration with any consistency assessed.      Cognitive Linguistic Assessment :  BRIEF COGNITIVE/COMMUNICATION EVALUATION       Simple Y/N: 5/5       Complex Y/N: 5/5       1-step Commands: 5/5       2-step Commands: 5/5       3-step Commands: 4/5       Object Identification: 5/5       Automatic Speech Tasks: 1-10, days of week WFL - mild pause with months of year, but able to self-correct with extra time       Confrontation Namin/5       Generative Namin in 1 minute       Word Repetition: 5/5       Phrase Repetition: /5       Sentence Repetition: /       Thought Organization: impaired       Reasoning: 3/4       Problem Solving: 3/5       Calculations:     INTERDISCIPLINARY COLLABORATION: Registered Nurse  PRECAUTIONS/ALLERGIES: Ciprofloxacin; Percocet [oxycodone-acetaminophen]; Other medication; and Propofol (bulk)     Tool Used: Dysphagia Outcome and Severity Scale (BJORN)    Score Comments   Normal Diet  [] 7 With no strategies or extra time needed   Functional Swallow  [] 6 May have mild oral or pharyngeal delay   Mild Dysphagia  [] 5 Which may require one diet consistency restricted    Mild-Moderate Dysphagia  [] 4 With 1-2 diet consistencies restricted   Moderate Dysphagia  [] 3 With 2 or more diet consistencies restricted   Moderate-Severe Dysphagia  [] 2 With partial PO strategies (trials with ST only)   Severe Dysphagia  [] 1 With inability to tolerate any PO safely      Score:  Initial: 7 Most Recent: 7 (Date 10/31/20 )   Interpretation of Tool: The Dysphagia Outcome and Severity Scale (BJORN) is a simple, easy-to-use, 7-point scale developed to systematically rate the functional severity of dysphagia based on objective assessment and make recommendations for diet level, independence level, and type of nutrition. Objective Measure: Tool Used: National Outcomes Measurement System: Functional Communication Measures: SPOKEN LANGUAGE EXPRESSION  Score:  Initial: 5    Interpretation of Tool: This measure describes the change in functional communication status subsequent to speech-language pathology treatment of patients with dysphagia.  o Level 1:  The individual attempts to speak, but verbalizations are not meaningful to familiar or unfamiliar communication partners at any time. o Level 2:  The individual attempts to speak, although few attempts are accurate or appropriate.  The communication partner must assume responsibility for structuring the communication exchange, and with consistent and maximal cueing, the individual can only occasionally produce automatic and/or imitative words and phrases that are rarely meaningful in context.  o Level 3:  The communication partner must assume primary responsibility for structuring the communication exchange, and with consistent and moderate cueing, the individual can produce words and phrases that are appropriate and meaningful in context.  o Level 4:  The individual is successfully able to initiate communication using spoken language in simple, structures conversations in routine daily activities with familiar communication partners. The individual usually requires moderate cueing, but is able to demonstrate use of simple sentences, (i.e., semantics, syntax, and morphology) and rarely uses complex sentences/messages. o Level 5:  The individual is successfully able to initiate communication using spoken language in structured conversations with both familiar and unfamiliar communication partners. The individual occasionally requires minimal cueing to frame more complex sentences in messages. The individual occasionally self-cues when encountering difficulty. o Level 6:  The individual is successfully able to communicate in most activities, but some limitations in spoken language are still apparent in vocational, avocational and social activities. The individual rarely requires minimal cueing to frame complex sentences/messages. The individual usually self-cues when encountering difficulty. o Level 7: The individual's ability to successfully and independently participate in vocational, avocational, or social activities is not limited by spoken language skills. Independent functioning may occasionally include the use of self-cueing. Current Medications:   No current facility-administered medications on file prior to encounter. Current Outpatient Medications on File Prior to Encounter   Medication Sig Dispense Refill    acetaminophen (TylenoL) 325 mg tablet Take  by mouth every four (4) hours as needed for Pain.  fluticasone propionate (Flonase Allergy Relief) 50 mcg/actuation nasal spray 2 Sprays by Both Nostrils route as needed.  sodium chloride (SIMPLY SALINE NA) by Nasal route as needed.  cholecalciferol (VITAMIN D3) (5000 Units/125 mcg) tab tablet Take 5,000 Units by mouth daily.  amLODIPine (NORVASC) 5 mg tablet Take 1 Tab by mouth daily. (Patient taking differently: Take 5 mg by mouth daily as needed. Ordered by Dr Osvaldo Nogueira) 90 Tab 3    dofetilide (Tikosyn) 250 mcg capsule Take 1 Cap by mouth two (2) times a day. 180 Cap 3    levothyroxine (SYNTHROID) 88 mcg tablet Take 1 Tab by mouth daily. 30 Tab 5    losartan (COZAAR) 100 mg tablet Take 1 Tab by mouth daily. 30 Tab 5    rivaroxaban (XARELTO) 20 mg tab tablet Take 1 Tab by mouth daily (with dinner). 30 Tab 11    carboxymethylcellulose sodium (REFRESH TEARS OP) Apply  to eye daily as needed.  cyanocobalamin (VITAMIN B12) 500 mcg tablet Take 500 mcg by mouth daily.  Magnesium Oxide 500 mg cap Take 500 mg by mouth daily.  cholecalciferol, vitamin D3, (VITAMIN D3) 2,000 unit tab Take  by mouth daily.          SAFETY:  After treatment position/precautions:  · Upright in bed  · OT at bedside    Total Treatment Duration:   Time In: 1399  Time Out: JEFFERY Chávez, CCC-SLP

## 2020-10-31 NOTE — PROGRESS NOTES
Problem: Falls - Risk of  Goal: *Absence of Falls  Description: Document Forrest Carlton Fall Risk and appropriate interventions in the flowsheet. Outcome: Progressing Towards Goal  Note: Fall Risk Interventions:  Mobility Interventions: Communicate number of staff needed for ambulation/transfer, OT consult for ADLs, Patient to call before getting OOB, PT Consult for mobility concerns    Mentation Interventions: Adequate sleep, hydration, pain control, Bed/chair exit alarm, Door open when patient unattended, Increase mobility, More frequent rounding, Reorient patient    Medication Interventions: Patient to call before getting OOB, Teach patient to arise slowly    Elimination Interventions: Call light in reach, Patient to call for help with toileting needs, Stay With Me (per policy), Toilet paper/wipes in reach, Toileting schedule/hourly rounds    History of Falls Interventions: Bed/chair exit alarm, Consult care management for discharge planning, Door open when patient unattended         Problem: Patient Education: Go to Patient Education Activity  Goal: Patient/Family Education  Outcome: Progressing Towards Goal     Problem: Pressure Injury - Risk of  Goal: *Prevention of pressure injury  Description: Document Luis Eduardo Scale and appropriate interventions in the flowsheet.   Outcome: Progressing Towards Goal  Note: Pressure Injury Interventions:  Sensory Interventions: Assess changes in LOC, Check visual cues for pain, Discuss PT/OT consult with provider, Float heels, Keep linens dry and wrinkle-free, Maintain/enhance activity level, Minimize linen layers, Pressure redistribution bed/mattress (bed type)    Moisture Interventions: Absorbent underpads    Activity Interventions: Increase time out of bed, Pressure redistribution bed/mattress(bed type), PT/OT evaluation    Mobility Interventions: HOB 30 degrees or less, Pressure redistribution bed/mattress (bed type), PT/OT evaluation    Nutrition Interventions: Document food/fluid/supplement intake    Friction and Shear Interventions: Foam dressings/transparent film/skin sealants, HOB 30 degrees or less, Lift sheet, Minimize layers                Problem: Patient Education: Go to Patient Education Activity  Goal: Patient/Family Education  Outcome: Progressing Towards Goal     Problem: Patient Education: Go to Patient Education Activity  Goal: Patient/Family Education  Outcome: Progressing Towards Goal     Problem: TIA/CVA Stroke: 0-24 hours  Goal: Off Pathway (Use only if patient is Off Pathway)  Outcome: Progressing Towards Goal  Goal: Activity/Safety  Outcome: Progressing Towards Goal  Goal: Consults, if ordered  Outcome: Progressing Towards Goal  Goal: Diagnostic Test/Procedures  Outcome: Progressing Towards Goal  Goal: Nutrition/Diet  Outcome: Progressing Towards Goal  Goal: Discharge Planning  Outcome: Progressing Towards Goal  Goal: Medications  Outcome: Progressing Towards Goal  Goal: Respiratory  Outcome: Progressing Towards Goal  Goal: Treatments/Interventions/Procedures  Outcome: Progressing Towards Goal  Goal: Minimize risk of bleeding post-thrombolytic infusion  Outcome: Progressing Towards Goal  Goal: Monitor for complications post-thrombolytic infusion  Outcome: Progressing Towards Goal  Goal: Psychosocial  Outcome: Progressing Towards Goal  Goal: *Hemodynamically stable  Outcome: Progressing Towards Goal  Goal: *Neurologically stable  Description: Absence of additional neurological deficits    Outcome: Progressing Towards Goal  Goal: *Verbalizes anxiety and depression are reduced or absent  Outcome: Progressing Towards Goal  Goal: *Absence of Signs of Aspiration on Current Diet  Outcome: Progressing Towards Goal  Goal: *Absence of deep venous thrombosis signs and symptoms(Stroke Metric)  Outcome: Progressing Towards Goal  Goal: *Ability to perform ADLs and demonstrates progressive mobility and function  Outcome: Progressing Towards Goal  Goal: *Stroke education started(Stroke Metric)  Outcome: Progressing Towards Goal  Goal: *Dysphagia screen performed(Stroke Metric)  Outcome: Progressing Towards Goal  Goal: *Rehab consulted(Stroke Metric)  Outcome: Progressing Towards Goal

## 2020-10-31 NOTE — PROGRESS NOTES
Patient will perfom grooming with standby assistance within 7 visit(s). 2.   Patient will perfom upper body bathing/dressing with standby assistance within 7 visit(s). 3.   Patient will perfom lower body bathing with standby assistance within 10 visit(s)   4. Patient will complete toileting with modified independence within 7 visits. OCCUPATIONAL THERAPY: Initial Assessment and Daily Note 10/31/2020  INPATIENT: OT Visit Days: 1  [place] / [place] / Product Type: Medicare /      NAME/AGE/GENDER: Deirdre Villatoro is a 78 y.o. 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 Tati Cheema presents for the above with brain MRI stating \"acute to early subacute small infarct in the left thalamus/internal capsule. \"  She demonstrates expressive speech and some distractibility during evaluation. Upon standing she had one loss of balance with recovery with gait belt applied and therapist providing contact guard assistance. She required minimal assistance to don socks. Deirdre Villatoro presents with the following problems and would benefit from occupational therapy to maximize independence with self-care,instrumental activities of daily living, and functional transfers/mobility for activities of daily living/instrumental activities of daily living via the stated goals.     This section established at most recent assessment   PROBLEM LIST (Impairments causing functional limitations):  Decreased Strength  Decreased ADL/Functional Activities  Decreased Transfer Abilities  Decreased Activity Tolerance  Decreased Flexibility/Joint Mobility  Decreased [place] with Home Exercise Program   INTERVENTIONS PLANNED: (Benefits and precautions of occupational therapy have been discussed with the patient.)  Activities of daily living training  Neuromuscular re-eduation  Therapeutic activity  Therapeutic exercise     TREATMENT PLAN: Frequency/Duration: Follow patient 3 times/week to address above goals. Rehabilitation Potential For Stated Goals: Good     REHAB RECOMMENDATIONS (at time of discharge pending progress):    Placement: It is my opinion, based on this patient's performance to date, that Ms. Art Nye may benefit from intensive therapy at a [address] [place] [address] after discharge due to the functional deficits listed above that are likely to improve with skilled rehabilitation and concerns that he/she may be unsafe to be unsupervised at home due to decreased balance, decreased divided attention and decreased independence with functional mobility for ADL . Equipment:   None at this time              OCCUPATIONAL PROFILE AND HISTORY:   History of Present Injury/Illness (Reason for Referral): MRI Brain (10/30/20): IMPRESSION:  1. Acute to early subacute small infarct in the left thalamus/internal capsule. 2. Bilateral anterior temporal encephalomalacia. 3. Advanced proximal vessel ischemic disease. Past Medical History/Comorbidities:   Ms. Art Nye  has a past medical history of Acute encephalopathy (6/27/2016), Anxiety, Arthritis, Atrial fibrillation ([address] (4/16/2018), Chronic pain, Endocrine disease, Hypertension, Sepsis ([address] (6/27/2016), Thyroid disease, TIA (transient ischemic attack) (11/14/2017), and Vitamin D deficiency (9/1/2015).  She also has no past medical history of Aneurysm ([address] Asthma, Autoimmune disease ([address] CAD (coronary artery disease), Cancer ([address] Chronic kidney disease, Coagulation disorder ([address] COPD, Dementia, Diabetes ([address] GERD (gastroesophageal reflux disease), Heart failure ([address] Ill-defined condition, Liver disease, Morbid obesity ([address] Other ill-defined conditions(799.89), Psychiatric disorder, PUD (peptic ulcer disease), Rheumatic fever, Seizures ([facility] [address] Sleep apnea, Sleep disorder, or Thromboembolus ([facility] [address] Ms. Brynn Saez  has a past surgical history that includes hx orthopaedic; pr total knee arthroplasty (Left, 7/23/15); and hx pacemaker. Social History/Living Environment:   Home Environment: Private residence  One/Two Story Residence: Split level  Lift Chair Available: No  Living Alone: Yes  Support Systems: Family member(s)  Patient Expects to be Discharged to[de-identified] Rehabilitation facility  Current DME Used/Available at Home: None  Prior Level of Function/Work/Activity:  Mod I. Number of Personal Factors/Comorbidities that affect the Plan of Care: Brief history (0):  LOW COMPLEXITY   ASSESSMENT OF OCCUPATIONAL PERFORMANCE[de-identified]   Activities of Daily Living:   Basic ADLs (From Assessment) Complex ADLs (From Assessment)   Feeding: Supervision  Oral Facial Hygiene/Grooming: Supervision  Bathing: Minimum assistance  Upper Body Dressing: Supervision  Lower Body Dressing: Minimum assistance  Toileting: Minimum assistance Instrumental ADL  Meal Preparation: Total assistance  Homemaking:  Total assistance  Medication Management: Maximum assistance   Grooming/Bathing/Dressing Activities of Daily Living     Cognitive Retraining  Safety/Judgement: Fall prevention                     Lower Body Dressing Assistance  Socks: Minimum assistance  Leg Crossed Method Used: Yes  Position Performed: Seated in chair  Cues: Verbal cues provided Bed/Mat Mobility  Rolling: Contact guard assistance  Supine to Sit: Supervision  Sit to Supine: Supervision  Sit to Stand: Contact guard assistance  Stand to Sit: Contact guard assistance  Bed to Chair: Contact guard assistance;Minimum assistance  Scooting: Supervision     Most Recent Physical Functioning:   Gross Assessment:  AROM: Generally decreased, functional  Strength: Generally decreased, functional               Posture: Balance:  Sitting: Impaired  Sitting - Static: Good (unsupported)  Sitting - Dynamic: Fair (occasional)  Standing: Impaired  Standing - Static: Poor  Standing - Dynamic : Poor Bed Mobility:  Rolling: Contact guard assistance  Supine to Sit: Supervision  Sit to Supine: Supervision  Scooting: Supervision  Wheelchair Mobility:     Transfers:  Sit to Stand: Contact guard assistance  Stand to Sit: Contact guard assistance  Bed to Chair: Contact guard assistance;Minimum assistance  Interventions: Safety awareness training; Tactile cues; Verbal cues            Patient Vitals for the past 6 hrs:   BP BP Patient Position SpO2 Pulse   10/31/20 0800 (!) 145/72 At rest 96 % 72       Mental Status  Neurologic State: Alert  Orientation Level: Oriented to person  Cognition: Follows commands  Safety/Judgement: Fall prevention                          Physical Skills Involved:  Range of Motion  Balance  Strength  Activity Tolerance Cognitive Skills Affected (resulting in the inability to perform in a timely and safe manner):  Divided Attention  Expression Psychosocial Skills Affected:  Habits/Routines  Environmental Adaptation  Social Interaction  Social Roles   Number of elements that affect the Plan of Care: 1-3:  LOW COMPLEXITY   CLINICAL DECISION MAKING:   [employer] AM-PAC 6 Clicks   Daily Activity Inpatient Short Form  How much help from another person does the patient currently need. .. Total A Lot A Little None   1. Putting on and taking off regular lower body clothing? [] 1   [x] 2   [] 3   [] 4   2. Bathing (including washing, rinsing, drying)? [] 1   [x] 2   [] 3   [] 4   3. Toileting, which includes using toilet, bedpan or urinal?   [] 1   [x] 2   [] 3   [] 4   4. Putting on and taking off regular upper body clothing? [] 1   [] 2   [x] 3   [] 4   5. Taking care of personal grooming such as brushing teeth? [] 1   [] 2   [x] 3   [] 4   6. Eating meals?    [] 1   [] 2   [x] 3   [] 4   © 2007, Trustees of [address] [place] [id number], under license to [employer]. All rights reserved      Score:  Initial: 15 Most Recent: X (Date: -- )    Interpretation of Tool:  Represents activities that are increasingly more difficult (i.e. Bed mobility, Transfers, Gait). Medical Necessity:     Patient demonstrates   good   rehab potential due to higher previous functional level. Reason for Services/Other Comments:  Patient continues to require skilled intervention due to   Decreased independence with ADL/instrumental ADL  . Use of outcome tool(s) and clinical judgement create a POC that gives a: LOW COMPLEXITY         TREATMENT:   (In addition to Assessment/Re-Assessment sessions the following treatments were rendered)     Pre-treatment Symptoms/Complaints:    Pain: Initial:   Pain Intensity 1: 0  Post Session:  same     Therapeutic Activity: (    8 minutes): Therapeutic activities including Chair transfers and functional mobility for ADL with rolling walker and contact guard assistance in room to improve mobility, strength, balance, and coordination. Required minimal   to promote static and dynamic balance in standing. Braces/Orthotics/Lines/Etc:   IV  O2 Device: Room air  Treatment/Session Assessment:    Response to Treatment:  Tolerated well without complications. Interdisciplinary Collaboration:   Occupational Therapist  After treatment position/precautions:   Up in chair  Bed alarm/tab alert on  Bed/Chair-wheels locked  Call light within reach   Compliance with Program/Exercises: Will assess as treatment progresses. Recommendations/Intent for next treatment session: \"Next visit will focus on advancements to more challenging activities\".   Total Treatment Duration:  OT Patient Time In/Time Out  Time In: 0945  Time Out: 4[facility], located within [facility] CECILLE Ramirez, OTR/L

## 2020-10-31 NOTE — PROGRESS NOTES
Hospitalist Progress Note     Admit Date:  10/30/2020  1:34 PM   Name:  Jose Francisco Calles   Age:  78 y.o.  :  1940   MRN:  [id number]   PCP:  Bala Krause NP  Treatment Team: Attending Provider: Nano Hutson DO; Consulting Provider: Sonia Hooker MD; Occupational Therapist: Rhoda Pimentel, OTR/L; Speech Language Pathologist: Sallie Mancini, AKILA    Subjective:   HPI and or CC:  Copied from history and physical HPI:  64-year-old female history of A. fib with permanent pacemaker on Xarelto for CVA prophylaxis, hypertension prior CVA/TIA last known well sometime yesterday afternoon around 330. Found in hallway today trying to use her phone and clumsy with confusion. Expressive dysarthria noted and right leg weakness and inability to ambulate. Intermittent confusion per son who is at bedside. Code stroke was called 1326. Initial CT head noncontrast negative for CVA. MRI and CTA are pending. Stat CTA not done because not TPA candidate. Unclear onset and on Xarelto. Neurology consultation recommended admission for stroke work-up and evaluation by speech, PT OT physiatry coordinator. Permissive hypertension will be allowed. Unclear onset and home medications ordered but heldnot given today unless she took at home. She is alert but having frustration with expressive dysarthria. Son reports intermittent confusion he thinks related to frustration and then back to baseline with expressive dysarthria. 2020:  Chief complaintCVA  Speech is no better and patient just is frustrated. Explained finding of internal capsule CVA need for speech therapy, PT, OT, echocardiogram.  CTA performed with formal report pending. Heart rate controlled. Blood pressure 145/72.   Can likely slowly restart home BP meds    Objective:     Patient Vitals for the past 24 hrs:   Temp Pulse Resp BP SpO2   10/31/20 0800 98.3 °F (36.8 °C) 72 18 (!) 145/72 96 %   10/31/20 0400 98.1 °F (36.7 °C) 76 20 (!) 156/86 95 %   10/31/20 0000 98.4 °F (36.9 °C) 80 16 (!) 157/90 97 %   10/30/20 2000 98 °F (36.7 °C) 87 20 (!) 192/81 97 %   10/30/20 1726 97.6 °F (36.4 °C) 73 20 (!) 177/82 95 %   10/30/20 1626  73  (!) 174/79 96 %   10/30/20 1606  83  (!) 194/97 97 %   10/30/20 1547  77  (!) 186/81 95 %   10/30/20 1534  71  (!) 195/88 97 %   10/30/20 1528     98 %   10/30/20 1525  79  (!) 212/112 99 %   10/30/20 1446  71  (!) 186/86 98 %   10/30/20 1426  76  (!) 177/77    10/30/20 1406  74  (!) 180/88 97 %   10/30/20 1347  73   99 %   10/30/20 1346  75  (!) 177/85    10/30/20 1316 98 °F (36.7 °C) 77 22 (!) 183/86 99 %     Oxygen Therapy  O2 Sat (%): 96 % (10/31/20 0800)  Pulse via Oximetry: 73 beats per minute (10/30/20 1626)  O2 Device: Room air (10/31/20 0830)    Intake/Output Summary (Last 24 hours) at 10/31/2020 1041  Last data filed at 10/31/2020 0102  Gross per 24 hour   Intake    Output 701 ml   Net -701 ml         REVIEW OF SYSTEMS: Comprehensive ROS performed and negative except as stated in HPI. Physical Examination:  Physical Exam  Vitals signs reviewed. Constitutional:       General: She is not in acute distress. HENT:      Head: Normocephalic and atraumatic. Nose: Nose normal. No congestion. Mouth/Throat:      Mouth: Mucous membranes are moist.      Pharynx: No oropharyngeal exudate. Eyes:      Conjunctiva/sclera: Conjunctivae normal.      Pupils: Pupils are equal, round, and reactive to light. Neck:      Musculoskeletal: No neck rigidity. Cardiovascular:      Rate and Rhythm: Normal rate. Heart sounds: No gallop. Pulmonary:      Effort: No respiratory distress. Breath sounds: No wheezing. Abdominal:      General: Bowel sounds are normal. There is no distension. Musculoskeletal:         General: No tenderness or deformity. Lymphadenopathy:      Cervical: No cervical adenopathy.    Skin:     Capillary Refill: Capillary refill takes less than 2 seconds. Findings: No bruising. Neurological:      Mental Status: She is alert and oriented to person, place, and time. Sensory: No sensory deficit. Comments: Expressive dysarthria   Psychiatric:         Thought Content: Thought content normal.      Comments: Anxious/frustrated regarding speech deficit         Data Review:  I have reviewed all labs, meds, telemetry events, and studies from the last 24 hours. Recent Results (from the past 24 hour(s))   GLUCOSE, POC    Collection Time: 10/30/20  1:18 PM   Result Value Ref Range    Glucose (POC) 107 (H) 65 - 100 mg/dL   CBC WITH AUTOMATED DIFF    Collection Time: 10/30/20  1:20 PM   Result Value Ref Range    WBC 8.2 4.3 - 11.1 K/uL    RBC 4.74 4.05 - 5.2 M/uL    HGB 14.3 11.7 - 15.4 g/dL    HCT 41.7 35.8 - 46.3 %    MCV 88.0 79.6 - 97.8 FL    MCH 30.2 26.1 - 32.9 PG    MCHC 34.3 31.4 - 35.0 g/dL    RDW 13.3 11.9 - 14.6 %    PLATELET 602 142 - 073 K/uL    MPV 9.7 9.4 - 12.3 FL    ABSOLUTE NRBC 0.00 0.0 - 0.2 K/uL    DF AUTOMATED      NEUTROPHILS 76 43 - 78 %    LYMPHOCYTES 12 (L) 13 - 44 %    MONOCYTES 9 4.0 - 12.0 %    EOSINOPHILS 2 0.5 - 7.8 %    BASOPHILS 1 0.0 - 2.0 %    IMMATURE GRANULOCYTES 1 0.0 - 5.0 %    ABS. NEUTROPHILS 6.2 1.7 - 8.2 K/UL    ABS. LYMPHOCYTES 1.0 0.5 - 4.6 K/UL    ABS. MONOCYTES 0.7 0.1 - 1.3 K/UL    ABS. EOSINOPHILS 0.1 0.0 - 0.8 K/UL    ABS. BASOPHILS 0.1 0.0 - 0.2 K/UL    ABS. IMM.  GRANS. 0.1 0.0 - 0.5 K/UL   METABOLIC PANEL, COMPREHENSIVE    Collection Time: 10/30/20  1:20 PM   Result Value Ref Range    Sodium 134 (L) 136 - 145 mmol/L    Potassium 3.8 3.5 - 5.1 mmol/L    Chloride 100 98 - 107 mmol/L    CO2 27 21 - 32 mmol/L    Anion gap 7 7 - 16 mmol/L    Glucose 114 (H) 65 - 100 mg/dL    BUN 12 8 - 23 MG/DL    Creatinine 0.74 0.6 - 1.0 MG/DL    GFR est AA >60 >60 ml/min/1.73m2    GFR est non-AA >60 >60 ml/min/1.73m2    Calcium 9.3 8.3 - 10.4 MG/DL    Bilirubin, total 0.5 0.2 - 1.1 MG/DL    ALT (SGPT) 16 12 - 65 U/L    AST (SGOT) 25 15 - 37 U/L    Alk. phosphatase 99 50 - 136 U/L    Protein, total 7.8 6.3 - 8.2 g/dL    Albumin 3.7 3.2 - 4.6 g/dL    Globulin 4.1 (H) 2.3 - 3.5 g/dL    A-G Ratio 0.9 (L) 1.2 - 3.5     PROTHROMBIN TIME + INR    Collection Time: 10/30/20  1:20 PM   Result Value Ref Range    Prothrombin time 15.1 (H) 12.5 - 14.7 sec    INR 1.2     EKG, 12 LEAD, INITIAL    Collection Time: 10/30/20  1:53 PM   Result Value Ref Range    Ventricular Rate 72 BPM    Atrial Rate 69 BPM    P-R Interval 158 ms    QRS Duration 80 ms    Q-T Interval 414 ms    QTC Calculation (Bezet) 453 ms    Calculated P Axis 81 degrees    Calculated R Axis 36 degrees    Calculated T Axis 56 degrees    Diagnosis       !! AGE AND GENDER SPECIFIC ECG ANALYSIS !!   Normal sinus rhythm  Normal ECG  When compared with ECG of 07-JUL-2018 18:20,  Sinus rhythm has replaced Electronic atrial pacemaker  Nonspecific T wave abnormality no longer evident in Lateral leads  Confirmed by ELIAS DUNCAN (), Sandra Fletcher ([id number]) on 10/30/2020 4:13:15 PM     URINALYSIS W/ RFLX MICROSCOPIC    Collection Time: 10/30/20  3:19 PM   Result Value Ref Range    Color YELLOW      Appearance CLEAR      Specific gravity 1.007 1.001 - 1.023      pH (UA) 7.5 5.0 - 9.0      Protein Negative NEG mg/dL    Glucose Negative mg/dL    Ketone TRACE (A) NEG mg/dL    Bilirubin Negative NEG      Blood Negative NEG      Urobilinogen 0.2 0.2 - 1.0 EU/dL    Nitrites Negative NEG      Leukocyte Esterase Negative NEG     TSH 3RD GENERATION    Collection Time: 10/30/20  5:37 PM   Result Value Ref Range    TSH 0.911 0.358 - 3.740 uIU/mL   VITAMIN B12    Collection Time: 10/30/20  5:37 PM   Result Value Ref Range    Vitamin B12 1,171 (H) 193 - 986 pg/mL   LIPID PANEL    Collection Time: 10/31/20  5:22 AM   Result Value Ref Range    LIPID PROFILE          Cholesterol, total 176 <200 MG/DL    Triglyceride 44 35 - 150 MG/DL    HDL Cholesterol 64 (H) 40 - 60 MG/DL    LDL, calculated 103.2 (H) <100 MG/DL    VLDL, calculated 8.8 6.0 - 23.0 MG/DL    CHOL/HDL Ratio 2.8     HEMOGLOBIN A1C WITH EAG    Collection Time: 10/31/20  5:22 AM   Result Value Ref Range    Hemoglobin A1c 5.8 4.8 - 6.0 %    Est. average glucose 120 mg/dL   CBC WITH AUTOMATED DIFF    Collection Time: 10/31/20  5:22 AM   Result Value Ref Range    WBC 9.7 4.3 - 11.1 K/uL    RBC 4.39 4.05 - 5.2 M/uL    HGB 13.1 11.7 - 15.4 g/dL    HCT 39.1 35.8 - 46.3 %    MCV 89.1 79.6 - 97.8 FL    MCH 29.8 26.1 - 32.9 PG    MCHC 33.5 31.4 - 35.0 g/dL    RDW 13.3 11.9 - 14.6 %    PLATELET 277 460 - 753 K/uL    MPV 9.8 9.4 - 12.3 FL    ABSOLUTE NRBC 0.00 0.0 - 0.2 K/uL    DF AUTOMATED      NEUTROPHILS 82 (H) 43 - 78 %    LYMPHOCYTES 9 (L) 13 - 44 %    MONOCYTES 7 4.0 - 12.0 %    EOSINOPHILS 1 0.5 - 7.8 %    BASOPHILS 1 0.0 - 2.0 %    IMMATURE GRANULOCYTES 1 0.0 - 5.0 %    ABS. NEUTROPHILS 7.9 1.7 - 8.2 K/UL    ABS. LYMPHOCYTES 0.9 0.5 - 4.6 K/UL    ABS. MONOCYTES 0.7 0.1 - 1.3 K/UL    ABS. EOSINOPHILS 0.1 0.0 - 0.8 K/UL    ABS. BASOPHILS 0.1 0.0 - 0.2 K/UL    ABS. IMM. GRANS. 0.1 0.0 - 0.5 K/UL   METABOLIC PANEL, COMPREHENSIVE    Collection Time: 10/31/20  5:22 AM   Result Value Ref Range    Sodium 134 (L) 138 - 145 mmol/L    Potassium 3.5 3.5 - 5.1 mmol/L    Chloride 101 98 - 107 mmol/L    CO2 25 21 - 32 mmol/L    Anion gap 8 7 - 16 mmol/L    Glucose 101 (H) 65 - 100 mg/dL    BUN 7 (L) 8 - 23 MG/DL    Creatinine 0.58 (L) 0.6 - 1.0 MG/DL    GFR est AA >60 >60 ml/min/1.73m2    GFR est non-AA >60 >60 ml/min/1.73m2    Calcium 8.7 8.3 - 10.4 MG/DL    Bilirubin, total 0.9 0.2 - 1.1 MG/DL    ALT (SGPT) 14 12 - 65 U/L    AST (SGOT) 20 15 - 37 U/L    Alk.  phosphatase 87 50 - 136 U/L    Protein, total 6.6 6.3 - 8.2 g/dL    Albumin 3.2 3.2 - 4.6 g/dL    Globulin 3.4 2.3 - 3.5 g/dL    A-G Ratio 0.9 (L) 1.2 - 3.5     MAGNESIUM    Collection Time: 10/31/20  5:22 AM   Result Value Ref Range    Magnesium 2.2 1.8 - 2.4 mg/dL   GLUCOSE, POC    Collection Time: 10/31/20  7:32 AM   Result Value Ref Range    Glucose (POC) 110 (H) 65 - 100 mg/dL        All Micro Results     None          Current Meds:  Current Facility-Administered Medications   Medication Dose Route Frequency    sodium chloride (NS) flush 5-40 mL  5-40 mL IntraVENous Q8H    sodium chloride (NS) flush 5-40 mL  5-40 mL IntraVENous PRN    0.9% sodium chloride infusion  75 mL/hr IntraVENous CONTINUOUS    aspirin chewable tablet 81 mg  81 mg Oral DAILY    atorvastatin (LIPITOR) tablet 40 mg  40 mg Oral QHS    acetaminophen (TYLENOL) tablet 650 mg  650 mg Oral Q4H PRN    labetaloL (NORMODYNE;TRANDATE) injection 5 mg  5 mg IntraVENous Q10MIN PRN    senna-docusate (PERICOLACE) 8.6-50 mg per tablet 2 Tab  2 Tab Oral QHS    [Held by provider] amLODIPine (NORVASC) tablet 5 mg  5 mg Oral DAILY    dofetilide (TIKOSYN) capsule 250 mcg  250 mcg Oral BID    levothyroxine (SYNTHROID) tablet 88 mcg  88 mcg Oral ACB    [Held by provider] losartan (COZAAR) tablet 100 mg  100 mg Oral DAILY    magnesium oxide (MAG-OX) tablet 400 mg  400 mg Oral DAILY    rivaroxaban (XARELTO) tablet 20 mg  20 mg Oral DAILY WITH BREAKFAST    influenza vaccine 2020-21 (6 mos+)(PF) (FLUARIX/FLULAVAL/FLUZONE QUAD) injection 0.5 mL  0.5 mL IntraMUSCular PRIOR TO DISCHARGE       Diet:  DIET REGULAR    Other Studies (last 24 hours):  Mri Brain Wo Cont    Result Date: 10/30/2020  MRI BRAIN WITHOUT CONTRAST 10/30/2020 HISTORY: Abnormal speech and altered level of consciousness. TECHNIQUE: Sagittal and axial T1-weighted, axial T2-weighted, axial FLAIR, axial T2-weighted gradient-echo, axial diffusion weighted images with ADC maps of the brain. The patient wished to terminate the exam prior to completion of the coronal FLAIR sequence. COMPARISON: Head CT October 30, 2020 FINDINGS: There is a small focus of restricted diffusion in the left thalamus abutting the posterior limb of the internal capsule.  There is no associated hemorrhage or mass effect. Diffuse cerebral volume loss is present. On the T2-weighted and FLAIR sequences, there are extensive white matter hyperintensities compatible with advanced chronic small vessel ischemic disease. Encephalomalacia is present in both anterior temporal lobes. There is no hydrocephalus, intra-axial mass, or extra-axial hematoma. IMPRESSION: 1. Acute to early subacute small infarct in the left thalamus/internal capsule. 2. Bilateral anterior temporal encephalomalacia. 3. Advanced proximal vessel ischemic disease. Ct Code Neuro Head Wo Contrast    Result Date: 10/30/2020  EXAM: CT CODE NEURO HEAD WO CONTRAST INDICATION: patient with acute neuro changes COMPARISON: July 7, 2018. TECHNIQUE: Unenhanced CT of the head was performed using 5 mm images. Brain and bone windows were generated. CT dose reduction was achieved through use of a standardized protocol tailored for this examination and automatic exposure control for dose modulation. FINDINGS: The ventricles and sulci are within normal limits given the patient's stated age. Chronic white matter changes. Encephalomalacia changes to both temporal lobes. There is no intracranial hemorrhage, extra-axial collection, mass, mass effect or midline shift. The basilar cisterns are open. No acute infarct is identified. The bone windows demonstrate no abnormalities. The visualized portions of the paranasal sinuses and mastoid air cells are clear. IMPRESSION: There is no CT evidence of intracranial mass, acute hemorrhage or acute infarct.       Assessment and Plan:     Hospital Problems as of 10/31/2020 Date Reviewed: 10/22/2020          Codes Class Noted - Resolved POA    Chronic constipation ICD-10-CM: K59.09  ICD-9-CM: 564.00  10/26/2020 - Present Yes        Paroxysmal atrial fibrillation ([facility] [address] ICD-10-CM: I48.0  ICD-9-CM: 427.31  6/1/2018 - Present Yes    Overview Signed 10/19/2020 11:32 AM by Deuce Beach NP     Last Assessment & Plan:   Stable on current therapy. No change in treatment at this time. 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 Higher risk for bleed regarding anticoagulation and antiplatelet discussed with patient she appears to understand. Will restart BP meds slowly avoid overtreatment of blood pressure. Start Norvasc only and continue to hold ARB and monitor. --Chronic atrial fibrillation   Continue with antiarrhythmic Tikosyn, continue Xareltono large deficit. Patient has permanent pacemaker and rhythm fairly regular on examination    --Report of possible confusion on admission but son deniedTSH and B12 within normal limits and RPR is pending. Does not appear to be confused but frustrated due to dysarthria. DC planning/Dispo: Pending PT and OT recommendations  DVT ppx: Xarelto    Code status: Full code  Medical decision maker: Self, son's contacts on file      Signed:  Manjinder PENALOZA

## 2020-10-31 NOTE — PROGRESS NOTES
10/31/20 1446   NIH Stroke Scale   Interval Other (comment)  (LOC change )   LOC 0   LOC Questions 2  (refused)   LOC Commands 0  (refused )   Best Gaze 0  (refused )   Visual 0  (refused)   Facial Palsy 0   Motor Right Arm 0   Motor Left Arm 0   Motor Right Leg 0   Motor Left Leg 0   Limb Ataxia 0  (refused)   Sensory 0   Best Language 2   Dysarthria 0  (unable )   Extinction and Inattention 0  (unable )   Total 4    NO code S per Dr Jan Naik, order for Ativan and CT when the patient is calm down.

## 2020-11-01 ENCOUNTER — HOSPITAL ENCOUNTER (INPATIENT)
Age: 80
LOS: 6 days | Discharge: HOME HEALTH | DRG: 057 | End: 2020-11-07
Attending: PHYSICAL MEDICINE & REHABILITATION | Admitting: PHYSICAL MEDICINE & REHABILITATION
Payer: MEDICARE

## 2020-11-01 VITALS
OXYGEN SATURATION: 96 % | DIASTOLIC BLOOD PRESSURE: 70 MMHG | TEMPERATURE: 98.1 F | HEART RATE: 78 BPM | RESPIRATION RATE: 18 BRPM | SYSTOLIC BLOOD PRESSURE: 145 MMHG

## 2020-11-01 DIAGNOSIS — Z95.0 PACEMAKER: ICD-10-CM

## 2020-11-01 DIAGNOSIS — I48.0 PAROXYSMAL ATRIAL FIBRILLATION (HCC): ICD-10-CM

## 2020-11-01 DIAGNOSIS — E03.9 ACQUIRED HYPOTHYROIDISM: ICD-10-CM

## 2020-11-01 DIAGNOSIS — I10 ESSENTIAL HYPERTENSION: ICD-10-CM

## 2020-11-01 DIAGNOSIS — I63.59 CEREBROVASCULAR ACCIDENT (CVA) DUE TO OCCLUSION OF OTHER CEREBRAL ARTERY (HCC): ICD-10-CM

## 2020-11-01 LAB
ANION GAP SERPL CALC-SCNC: 6 MMOL/L (ref 7–16)
BASOPHILS # BLD: 0.1 K/UL (ref 0–0.2)
BASOPHILS NFR BLD: 1 % (ref 0–2)
BUN SERPL-MCNC: 6 MG/DL (ref 8–23)
CALCIUM SERPL-MCNC: 8.7 MG/DL (ref 8.3–10.4)
CHLORIDE SERPL-SCNC: 101 MMOL/L (ref 98–107)
CO2 SERPL-SCNC: 29 MMOL/L (ref 21–32)
CREAT SERPL-MCNC: 0.54 MG/DL (ref 0.6–1)
DIFFERENTIAL METHOD BLD: NORMAL
EOSINOPHIL # BLD: 0.2 K/UL (ref 0–0.8)
EOSINOPHIL NFR BLD: 2 % (ref 0.5–7.8)
ERYTHROCYTE [DISTWIDTH] IN BLOOD BY AUTOMATED COUNT: 13.2 % (ref 11.9–14.6)
GLUCOSE SERPL-MCNC: 100 MG/DL (ref 65–100)
HCT VFR BLD AUTO: 39.8 % (ref 35.8–46.3)
HGB BLD-MCNC: 13.7 G/DL (ref 11.7–15.4)
IMM GRANULOCYTES # BLD AUTO: 0.1 K/UL (ref 0–0.5)
IMM GRANULOCYTES NFR BLD AUTO: 1 % (ref 0–5)
LYMPHOCYTES # BLD: 1.6 K/UL (ref 0.5–4.6)
LYMPHOCYTES NFR BLD: 17 % (ref 13–44)
MCH RBC QN AUTO: 30 PG (ref 26.1–32.9)
MCHC RBC AUTO-ENTMCNC: 34.4 G/DL (ref 31.4–35)
MCV RBC AUTO: 87.3 FL (ref 79.6–97.8)
MONOCYTES # BLD: 0.9 K/UL (ref 0.1–1.3)
MONOCYTES NFR BLD: 9 % (ref 4–12)
NEUTS SEG # BLD: 6.7 K/UL (ref 1.7–8.2)
NEUTS SEG NFR BLD: 70 % (ref 43–78)
NRBC # BLD: 0 K/UL (ref 0–0.2)
PLATELET # BLD AUTO: 310 K/UL (ref 150–450)
PMV BLD AUTO: 9.6 FL (ref 9.4–12.3)
POTASSIUM SERPL-SCNC: 3.6 MMOL/L (ref 3.5–5.1)
RBC # BLD AUTO: 4.56 M/UL (ref 4.05–5.2)
SODIUM SERPL-SCNC: 136 MMOL/L (ref 136–145)
WBC # BLD AUTO: 9.6 K/UL (ref 4.3–11.1)

## 2020-11-01 PROCEDURE — 36415 COLL VENOUS BLD VENIPUNCTURE: CPT

## 2020-11-01 PROCEDURE — 74011250637 HC RX REV CODE- 250/637: Performed by: INTERNAL MEDICINE

## 2020-11-01 PROCEDURE — 85025 COMPLETE CBC W/AUTO DIFF WBC: CPT

## 2020-11-01 PROCEDURE — 2709999900 HC NON-CHARGEABLE SUPPLY

## 2020-11-01 PROCEDURE — 74011250637 HC RX REV CODE- 250/637: Performed by: PHYSICAL MEDICINE & REHABILITATION

## 2020-11-01 PROCEDURE — 65310000000 HC RM PRIVATE REHAB

## 2020-11-01 PROCEDURE — 99232 SBSQ HOSP IP/OBS MODERATE 35: CPT | Performed by: PSYCHIATRY & NEUROLOGY

## 2020-11-01 PROCEDURE — 99223 1ST HOSP IP/OBS HIGH 75: CPT | Performed by: PHYSICAL MEDICINE & REHABILITATION

## 2020-11-01 PROCEDURE — 80048 BASIC METABOLIC PNL TOTAL CA: CPT

## 2020-11-01 PROCEDURE — 87635 SARS-COV-2 COVID-19 AMP PRB: CPT

## 2020-11-01 RX ORDER — LANOLIN ALCOHOL/MO/W.PET/CERES
400 CREAM (GRAM) TOPICAL DAILY
Status: CANCELLED | OUTPATIENT
Start: 2020-11-02

## 2020-11-01 RX ORDER — AMOXICILLIN 250 MG
2 CAPSULE ORAL
Status: DISCONTINUED | OUTPATIENT
Start: 2020-11-01 | End: 2020-11-07 | Stop reason: HOSPADM

## 2020-11-01 RX ORDER — LOSARTAN POTASSIUM 50 MG/1
100 TABLET ORAL DAILY
Status: DISCONTINUED | OUTPATIENT
Start: 2020-11-02 | End: 2020-11-07 | Stop reason: HOSPADM

## 2020-11-01 RX ORDER — GUAIFENESIN 100 MG/5ML
81 LIQUID (ML) ORAL DAILY
Status: CANCELLED | OUTPATIENT
Start: 2020-11-02

## 2020-11-01 RX ORDER — LEVOTHYROXINE SODIUM 88 UG/1
88 TABLET ORAL
Status: DISCONTINUED | OUTPATIENT
Start: 2020-11-02 | End: 2020-11-07 | Stop reason: HOSPADM

## 2020-11-01 RX ORDER — DOFETILIDE 0.25 MG/1
250 CAPSULE ORAL 2 TIMES DAILY
Status: DISCONTINUED | OUTPATIENT
Start: 2020-11-01 | End: 2020-11-07 | Stop reason: HOSPADM

## 2020-11-01 RX ORDER — LANOLIN ALCOHOL/MO/W.PET/CERES
400 CREAM (GRAM) TOPICAL DAILY
Status: DISCONTINUED | OUTPATIENT
Start: 2020-11-02 | End: 2020-11-07 | Stop reason: HOSPADM

## 2020-11-01 RX ORDER — AMLODIPINE BESYLATE 5 MG/1
5 TABLET ORAL DAILY
Status: CANCELLED | OUTPATIENT
Start: 2020-11-02

## 2020-11-01 RX ORDER — ATORVASTATIN CALCIUM 40 MG/1
40 TABLET, FILM COATED ORAL
Status: CANCELLED | OUTPATIENT
Start: 2020-11-01

## 2020-11-01 RX ORDER — DOFETILIDE 0.25 MG/1
250 CAPSULE ORAL 2 TIMES DAILY
Status: CANCELLED | OUTPATIENT
Start: 2020-11-01

## 2020-11-01 RX ORDER — ATORVASTATIN CALCIUM 40 MG/1
40 TABLET, FILM COATED ORAL
Status: DISCONTINUED | OUTPATIENT
Start: 2020-11-01 | End: 2020-11-07 | Stop reason: HOSPADM

## 2020-11-01 RX ORDER — AMLODIPINE BESYLATE 5 MG/1
5 TABLET ORAL DAILY
Status: DISCONTINUED | OUTPATIENT
Start: 2020-11-02 | End: 2020-11-03

## 2020-11-01 RX ORDER — GUAIFENESIN 100 MG/5ML
81 LIQUID (ML) ORAL DAILY
Status: DISCONTINUED | OUTPATIENT
Start: 2020-11-02 | End: 2020-11-07 | Stop reason: HOSPADM

## 2020-11-01 RX ORDER — LEVOTHYROXINE SODIUM 88 UG/1
88 TABLET ORAL
Status: CANCELLED | OUTPATIENT
Start: 2020-11-02

## 2020-11-01 RX ORDER — SODIUM CHLORIDE 0.9 % (FLUSH) 0.9 %
5-40 SYRINGE (ML) INJECTION AS NEEDED
Status: DISCONTINUED | OUTPATIENT
Start: 2020-11-01 | End: 2020-11-07 | Stop reason: HOSPADM

## 2020-11-01 RX ORDER — GUAIFENESIN 100 MG/5ML
81 LIQUID (ML) ORAL DAILY
Qty: 30 TAB | Refills: 0 | Status: ON HOLD
Start: 2020-11-02 | End: 2020-11-06 | Stop reason: SDUPTHER

## 2020-11-01 RX ORDER — POTASSIUM CHLORIDE 20 MEQ/1
20 TABLET, EXTENDED RELEASE ORAL ONCE
Status: COMPLETED | OUTPATIENT
Start: 2020-11-01 | End: 2020-11-01

## 2020-11-01 RX ORDER — LOSARTAN POTASSIUM 50 MG/1
100 TABLET ORAL DAILY
Status: CANCELLED | OUTPATIENT
Start: 2020-11-02

## 2020-11-01 RX ORDER — ACETAMINOPHEN 325 MG/1
650 TABLET ORAL
Status: DISCONTINUED | OUTPATIENT
Start: 2020-11-01 | End: 2020-11-07 | Stop reason: HOSPADM

## 2020-11-01 RX ORDER — SODIUM CHLORIDE 0.9 % (FLUSH) 0.9 %
5-40 SYRINGE (ML) INJECTION AS NEEDED
Status: CANCELLED | OUTPATIENT
Start: 2020-11-01

## 2020-11-01 RX ORDER — ACETAMINOPHEN 325 MG/1
650 TABLET ORAL
Status: CANCELLED | OUTPATIENT
Start: 2020-11-01

## 2020-11-01 RX ORDER — ATORVASTATIN CALCIUM 40 MG/1
40 TABLET, FILM COATED ORAL
Qty: 30 TAB | Refills: 0 | Status: ON HOLD | OUTPATIENT
Start: 2020-11-01 | End: 2020-11-06 | Stop reason: SDUPTHER

## 2020-11-01 RX ORDER — AMOXICILLIN 250 MG
2 CAPSULE ORAL
Status: CANCELLED | OUTPATIENT
Start: 2020-11-01

## 2020-11-01 RX ADMIN — ASPIRIN 81 MG: 81 TABLET, CHEWABLE ORAL at 09:41

## 2020-11-01 RX ADMIN — AMLODIPINE BESYLATE 5 MG: 5 TABLET ORAL at 09:42

## 2020-11-01 RX ADMIN — APIXABAN 5 MG: 5 TABLET, FILM COATED ORAL at 17:46

## 2020-11-01 RX ADMIN — DOFETILIDE 250 MCG: 0.25 CAPSULE ORAL at 09:00

## 2020-11-01 RX ADMIN — ATORVASTATIN CALCIUM 40 MG: 40 TABLET, FILM COATED ORAL at 22:17

## 2020-11-01 RX ADMIN — POTASSIUM CHLORIDE 20 MEQ: 20 TABLET, EXTENDED RELEASE ORAL at 12:01

## 2020-11-01 RX ADMIN — DOFETILIDE 250 MCG: 0.25 CAPSULE ORAL at 17:52

## 2020-11-01 RX ADMIN — Medication 400 MG: at 09:41

## 2020-11-01 RX ADMIN — LEVOTHYROXINE SODIUM 88 MCG: 0.09 TABLET ORAL at 05:26

## 2020-11-01 RX ADMIN — APIXABAN 5 MG: 5 TABLET, FILM COATED ORAL at 12:01

## 2020-11-01 RX ADMIN — Medication 10 ML: at 05:27

## 2020-11-01 NOTE — PROGRESS NOTES
Patient admitted to floor under service of Dr. Lila Scanlon, A/O x 4, mild expressive aphasia noted. Posey alarm placed r/t patient being impulsive per report. Resting in bed talking on telephone, no complaints voiced. Call bell within reach.

## 2020-11-01 NOTE — PROGRESS NOTES
Care Management Interventions  PCP Verified by CM: Yes  Last Visit to PCP: 10/19/20  Transition of Care Consult (CM Consult): Other([facility] inpt rehab)  Discharge Durable Medical Equipment: No  Physical Therapy Consult: Yes  Occupational Therapy Consult: Yes  Speech Therapy Consult: Yes  Current Support Network: Own Home, Lives Alone, Family Lives Nearby  Confirm Follow Up Transport: Family  The Plan for Transition of Care is Related to the Following Treatment Goals : Inpatient rehab to increase strength and mobility. The Patient and/or Patient Representative was Provided with a Choice of Provider and Agrees with the Discharge Plan?: Yes  Freedom of Choice List was Provided with Basic Dialogue that Supports the Patient's Individualized Plan of Care/Goals, Treatment Preferences and Shares the Quality Data Associated with the Providers?: Yes  Discharge Location  Discharge Placement: Other:(Inpatient Rehab 9th floor)     Patient medically ready for discharge to [facility]. No further CM needs noted at this time. CM spoke with son at bedside; pt/son agreeable.

## 2020-11-01 NOTE — PROGRESS NOTES
Problem: Falls - Risk of  Goal: *Absence of Falls  Description: Document Veatrice Marker Fall Risk and appropriate interventions in the flowsheet. Outcome: Progressing Towards Goal  Note: Fall Risk Interventions:  Mobility Interventions: Communicate number of staff needed for ambulation/transfer    Mentation Interventions: Adequate sleep, hydration, pain control, Bed/chair exit alarm, Door open when patient unattended    Medication Interventions: Bed/chair exit alarm    Elimination Interventions: Call light in reach, Bed/chair exit alarm    History of Falls Interventions: Bed/chair exit alarm         Problem: Patient Education: Go to Patient Education Activity  Goal: Patient/Family Education  Outcome: Progressing Towards Goal     Problem: Pressure Injury - Risk of  Goal: *Prevention of pressure injury  Description: Document Luis Eduardo Scale and appropriate interventions in the flowsheet.   Outcome: Progressing Towards Goal  Note: Pressure Injury Interventions:  Sensory Interventions: Assess changes in LOC    Moisture Interventions: Absorbent underpads, Apply protective barrier, creams and emollients    Activity Interventions: Pressure redistribution bed/mattress(bed type), PT/OT evaluation    Mobility Interventions: Pressure redistribution bed/mattress (bed type)    Nutrition Interventions: Document food/fluid/supplement intake    Friction and Shear Interventions: Apply protective barrier, creams and emollients, Foam dressings/transparent film/skin sealants, HOB 30 degrees or less, Lift sheet, Minimize layers                Problem: Patient Education: Go to Patient Education Activity  Goal: Patient/Family Education  Outcome: Progressing Towards Goal     Problem: Patient Education: Go to Patient Education Activity  Goal: Patient/Family Education  Outcome: Progressing Towards Goal     Problem: TIA/CVA Stroke: 0-24 hours  Goal: Off Pathway (Use only if patient is Off Pathway)  Outcome: Progressing Towards Goal  Goal: Activity/Safety  Outcome: Progressing Towards Goal  Goal: Consults, if ordered  Outcome: Progressing Towards Goal  Goal: Diagnostic Test/Procedures  Outcome: Progressing Towards Goal  Goal: Nutrition/Diet  Outcome: Progressing Towards Goal  Goal: Discharge Planning  Outcome: Progressing Towards Goal  Goal: Medications  Outcome: Progressing Towards Goal  Goal: Respiratory  Outcome: Progressing Towards Goal  Goal: Treatments/Interventions/Procedures  Outcome: Progressing Towards Goal  Goal: Minimize risk of bleeding post-thrombolytic infusion  Outcome: Progressing Towards Goal  Goal: Monitor for complications post-thrombolytic infusion  Outcome: Progressing Towards Goal  Goal: Psychosocial  Outcome: Progressing Towards Goal  Goal: *Hemodynamically stable  Outcome: Progressing Towards Goal  Goal: *Neurologically stable  Description: Absence of additional neurological deficits    Outcome: Progressing Towards Goal  Goal: *Verbalizes anxiety and depression are reduced or absent  Outcome: Progressing Towards Goal  Goal: *Absence of Signs of Aspiration on Current Diet  Outcome: Progressing Towards Goal  Goal: *Absence of deep venous thrombosis signs and symptoms(Stroke Metric)  Outcome: Progressing Towards Goal  Goal: *Ability to perform ADLs and demonstrates progressive mobility and function  Outcome: Progressing Towards Goal  Goal: *Stroke education started(Stroke Metric)  Outcome: Progressing Towards Goal  Goal: *Dysphagia screen performed(Stroke Metric)  Outcome: Progressing Towards Goal  Goal: *Rehab consulted(Stroke Metric)  Outcome: Progressing Towards Goal     Problem: Patient Education: Go to Patient Education Activity  Goal: Patient/Family Education  Outcome: Progressing Towards Goal     Problem: Patient Education: Go to Patient Education Activity  Goal: Patient/Family Education  Outcome: Progressing Towards Goal     Problem: Patient Education: Go to Patient Education Activity  Goal: Patient/Family Education  Outcome: Progressing Towards Goal

## 2020-11-01 NOTE — PROGRESS NOTES
11/01/20 0726   NIH Stroke Scale   Interval Other (comment)   LOC 0   LOC Questions 0   LOC Commands 0   Best Gaze 0   Visual 0   Facial Palsy 0   Motor Right Arm 0   Motor Left Arm 0   Motor Right Leg 0   Motor Left Leg 0   Limb Ataxia 0   Sensory 0   Best Language 2   Dysarthria 0   Extinction and Inattention 0   Total 2   dual nih with gi HIGH

## 2020-11-01 NOTE — H&P
Physical Medicine and Rehabilitation History and Physical       Admit Date: 11/1/2020  Primary Care Physician: Luz Elena Rocha NP  Chief Complaint: Aphasic, difficulty ambulating  Admission Diagnosis: Acute ischemic left thalamus/internal capsule CVA  KAYLYNN Blanc  Pacemaker placed in 2019  Chronic anticoagulation, on Xarelto  HTN  Dyslipidemia  Prior CVA/TIA 3 years ago without residual weakness  DVT prophylaxis  Pain management    Rehabilitation Diagnosis:  Mobility impairments  Self Care impairments  Aphasia    History of Present Illness/ Hospital course: This is an 77 YO with PMH of KAYLYNN Blanc on xarelto, pacemaker, HTN, who was admitted on 10/30 with aphasia, confusion and right sided weakness. MRI was positive for an acute left thalamus/internal capsule CVA. Neurology was consulted. She became confused, agitated with follow-up head CT on 10/31 positive for left thalamic/internal capsule with possible second left frontal pole ischemic CVA. ECHO was noted to have an EF 55-60%, moderate dilation of LA and moderate dilation of RA and negative for PFO. It was suspected possible embolic CVA while anticoagulated with Xarelto. She was transitioned to Eliquis bid. Cardiology reported  possible watchman device given recurrent stroke on xarelto with an increased risk for falls. Further evaluation may be done as an outpatient vs. Inpatient. She was recently seen by [facility], Dr. Davis Dk PTA. Physical therapy was initiated and patient was starting to mobilize. She has significant mobility, self care and speech impairments. It was felt she would benefit from comprehensive acute inpatient rehabilitation, continued close medical supervision and management prior to returning home. The medical stability and these recent medical events are not expected to interfere with the patient's ability to tolerate the intensity of services in acute rehab.     Current Level of Function: (evaluated by acute therapy staff, with bed mobility, transfers, balance personally observed post-admission in the IRF setting minutes prior to submission of document) bed mobility and transfers - min A, ambulation 40' with RW and min A with unsteady gait, fair balance, toileting - moderate assistance,lower extremity dressing - moderate assistance    Previous Functional Level/Home Environment:  independent. Lives alone in a split level home with steps to enter, was driving. . Adult children live nearby. Review of Systems: + aphasia. No headache,chest pain, shortness of breath, cough, visual problems, abdominal pain,dizziness, dysurea, calf pain indicated or reported. Pertinent positives are as noted in the medical records and unremarkable otherwise. Past Medical History:   Diagnosis Date    Acute encephalopathy 6/27/2016    Anxiety     Arthritis     Atrial fibrillation ([facility] [address] 4/16/2018    Chronic pain     hip    Endocrine disease     hypothyroidism    Hypertension     Sepsis ([facility] [address] 6/27/2016    Thyroid disease     TIA (transient ischemic attack) 11/14/2017    Vitamin D deficiency 9/1/2015      Past Surgical History:   Procedure Laterality Date    HX ORTHOPAEDIC      right total hip    HX PACEMAKER      TOTAL KNEE ARTHROPLASTY Left 7/23/15     Allergies   Allergen Reactions    Ciprofloxacin Nausea and Vomiting    Percocet [Oxycodone-Acetaminophen] Other (comments)     hallucinations    Other Medication Other (comments)     I don't do well with any strong medications (poss narcotics, pt difficult to get an elaboration from)    Propofol (Bulk) Other (comments)     Red burning hands      Family History   Problem Relation Age of Onset    Heart Attack Father       Social History     Tobacco Use    Smoking status: Never Smoker    Smokeless tobacco: Never Used   Substance Use Topics    Alcohol use: No      No current facility-administered medications for this encounter. Objective: There were no vitals taken for this visit. Intake and Output:  No intake/output data recorded. Physical Exam:   Psych:  Aphasic    General:   Alert, appears stated age, No acute distress. HEENT:  Normocephalic, EOMI, facial symmetry  Intact. Oral mucosa pink and moist. No nasal discharge. Lungs:  CTA Bilaterally,Respiration even and unlabored   No apparent use of accessory muscles for respiration. No nasal alar flaring. Heart:  Regular rate and rhythm, S1, S2, No obvious murmur, click, rub or gallop. Genitourinary:  deferred   Abdomen:  Soft, non-tender to palpation in all four quadrants. Bowel sounds present. No obvious masses palpated. Neuromuscular:   expressive aphasia, follows commands, CN II - XII intact, light touch sensation intact, bilateral F-N intact, reflexes - 2+, symmetric, plantar response downgoing, no resting tremor, antigravity strength in all extremities   Skin:  Intact, dry, good skin turgor, age related changes present   Calves soft, no LE edema             Lab Review:    Recent Results (from the past 72 hour(s))   GLUCOSE, POC    Collection Time: 10/30/20  1:18 PM   Result Value Ref Range    Glucose (POC) 107 (H) 65 - 100 mg/dL   CBC WITH AUTOMATED DIFF    Collection Time: 10/30/20  1:20 PM   Result Value Ref Range    WBC 8.2 4.3 - 11.1 K/uL    RBC 4.74 4.05 - 5.2 M/uL    HGB 14.3 11.7 - 15.4 g/dL    HCT 41.7 35.8 - 46.3 %    MCV 88.0 79.6 - 97.8 FL    MCH 30.2 26.1 - 32.9 PG    MCHC 34.3 31.4 - 35.0 g/dL    RDW 13.3 11.9 - 14.6 %    PLATELET 474 549 - 926 K/uL    MPV 9.7 9.4 - 12.3 FL    ABSOLUTE NRBC 0.00 0.0 - 0.2 K/uL    DF AUTOMATED      NEUTROPHILS 76 43 - 78 %    LYMPHOCYTES 12 (L) 13 - 44 %    MONOCYTES 9 4.0 - 12.0 %    EOSINOPHILS 2 0.5 - 7.8 %    BASOPHILS 1 0.0 - 2.0 %    IMMATURE GRANULOCYTES 1 0.0 - 5.0 %    ABS. NEUTROPHILS 6.2 1.7 - 8.2 K/UL    ABS. LYMPHOCYTES 1.0 0.5 - 4.6 K/UL    ABS. MONOCYTES 0.7 0.1 - 1.3 K/UL    ABS. EOSINOPHILS 0.1 0.0 - 0.8 K/UL    ABS. BASOPHILS 0.1 0.0 - 0.2 K/UL    ABS. IMM. GRANS. 0.1 0.0 - 0.5 K/UL   METABOLIC PANEL, COMPREHENSIVE    Collection Time: 10/30/20  1:20 PM   Result Value Ref Range    Sodium 134 (L) 136 - 145 mmol/L    Potassium 3.8 3.5 - 5.1 mmol/L    Chloride 100 98 - 107 mmol/L    CO2 27 21 - 32 mmol/L    Anion gap 7 7 - 16 mmol/L    Glucose 114 (H) 65 - 100 mg/dL    BUN 12 8 - 23 MG/DL    Creatinine 0.74 0.6 - 1.0 MG/DL    GFR est AA >60 >60 ml/min/1.73m2    GFR est non-AA >60 >60 ml/min/1.73m2    Calcium 9.3 8.3 - 10.4 MG/DL    Bilirubin, total 0.5 0.2 - 1.1 MG/DL    ALT (SGPT) 16 12 - 65 U/L    AST (SGOT) 25 15 - 37 U/L    Alk. phosphatase 99 50 - 136 U/L    Protein, total 7.8 6.3 - 8.2 g/dL    Albumin 3.7 3.2 - 4.6 g/dL    Globulin 4.1 (H) 2.3 - 3.5 g/dL    A-G Ratio 0.9 (L) 1.2 - 3.5     PROTHROMBIN TIME + INR    Collection Time: 10/30/20  1:20 PM   Result Value Ref Range    Prothrombin time 15.1 (H) 12.5 - 14.7 sec    INR 1.2     EKG, 12 LEAD, INITIAL    Collection Time: 10/30/20  1:53 PM   Result Value Ref Range    Ventricular Rate 72 BPM    Atrial Rate 69 BPM    P-R Interval 158 ms    QRS Duration 80 ms    Q-T Interval 414 ms    QTC Calculation (Bezet) 453 ms    Calculated P Axis 81 degrees    Calculated R Axis 36 degrees    Calculated T Axis 56 degrees    Diagnosis       !! AGE AND GENDER SPECIFIC ECG ANALYSIS !!   Normal sinus rhythm  Normal ECG  When compared with ECG of 07-JUL-2018 18:20,  Sinus rhythm has replaced Electronic atrial pacemaker  Nonspecific T wave abnormality no longer evident in Lateral leads  Confirmed by ELIAS DUNCAN (), Jen Moore ([id number]) on 10/30/2020 4:13:15 PM     URINALYSIS W/ RFLX MICROSCOPIC    Collection Time: 10/30/20  3:19 PM   Result Value Ref Range    Color YELLOW      Appearance CLEAR      Specific gravity 1.007 1.001 - 1.023      pH (UA) 7.5 5.0 - 9.0      Protein Negative NEG mg/dL    Glucose Negative mg/dL    Ketone TRACE (A) NEG mg/dL    Bilirubin Negative NEG      Blood Negative NEG      Urobilinogen 0.2 0.2 - 1.0 EU/dL Nitrites Negative NEG      Leukocyte Esterase Negative NEG     TSH 3RD GENERATION    Collection Time: 10/30/20  5:37 PM   Result Value Ref Range    TSH 0.911 0.358 - 3.740 uIU/mL   VITAMIN B12    Collection Time: 10/30/20  5:37 PM   Result Value Ref Range    Vitamin B12 1,171 (H) 193 - 986 pg/mL   RPR    Collection Time: 10/30/20  5:37 PM   Result Value Ref Range    RPR NONREACTIVE NR     LIPID PANEL    Collection Time: 10/31/20  5:22 AM   Result Value Ref Range    LIPID PROFILE          Cholesterol, total 176 <200 MG/DL    Triglyceride 44 35 - 150 MG/DL    HDL Cholesterol 64 (H) 40 - 60 MG/DL    LDL, calculated 103.2 (H) <100 MG/DL    VLDL, calculated 8.8 6.0 - 23.0 MG/DL    CHOL/HDL Ratio 2.8     HEMOGLOBIN A1C WITH EAG    Collection Time: 10/31/20  5:22 AM   Result Value Ref Range    Hemoglobin A1c 5.8 4.8 - 6.0 %    Est. average glucose 120 mg/dL   CBC WITH AUTOMATED DIFF    Collection Time: 10/31/20  5:22 AM   Result Value Ref Range    WBC 9.7 4.3 - 11.1 K/uL    RBC 4.39 4.05 - 5.2 M/uL    HGB 13.1 11.7 - 15.4 g/dL    HCT 39.1 35.8 - 46.3 %    MCV 89.1 79.6 - 97.8 FL    MCH 29.8 26.1 - 32.9 PG    MCHC 33.5 31.4 - 35.0 g/dL    RDW 13.3 11.9 - 14.6 %    PLATELET 212 365 - 449 K/uL    MPV 9.8 9.4 - 12.3 FL    ABSOLUTE NRBC 0.00 0.0 - 0.2 K/uL    DF AUTOMATED      NEUTROPHILS 82 (H) 43 - 78 %    LYMPHOCYTES 9 (L) 13 - 44 %    MONOCYTES 7 4.0 - 12.0 %    EOSINOPHILS 1 0.5 - 7.8 %    BASOPHILS 1 0.0 - 2.0 %    IMMATURE GRANULOCYTES 1 0.0 - 5.0 %    ABS. NEUTROPHILS 7.9 1.7 - 8.2 K/UL    ABS. LYMPHOCYTES 0.9 0.5 - 4.6 K/UL    ABS. MONOCYTES 0.7 0.1 - 1.3 K/UL    ABS. EOSINOPHILS 0.1 0.0 - 0.8 K/UL    ABS. BASOPHILS 0.1 0.0 - 0.2 K/UL    ABS. IMM.  GRANS. 0.1 0.0 - 0.5 K/UL   METABOLIC PANEL, COMPREHENSIVE    Collection Time: 10/31/20  5:22 AM   Result Value Ref Range    Sodium 134 (L) 138 - 145 mmol/L    Potassium 3.5 3.5 - 5.1 mmol/L    Chloride 101 98 - 107 mmol/L    CO2 25 21 - 32 mmol/L    Anion gap 8 7 - 16 mmol/L Glucose 101 (H) 65 - 100 mg/dL    BUN 7 (L) 8 - 23 MG/DL    Creatinine 0.58 (L) 0.6 - 1.0 MG/DL    GFR est AA >60 >60 ml/min/1.73m2    GFR est non-AA >60 >60 ml/min/1.73m2    Calcium 8.7 8.3 - 10.4 MG/DL    Bilirubin, total 0.9 0.2 - 1.1 MG/DL    ALT (SGPT) 14 12 - 65 U/L    AST (SGOT) 20 15 - 37 U/L    Alk. phosphatase 87 50 - 136 U/L    Protein, total 6.6 6.3 - 8.2 g/dL    Albumin 3.2 3.2 - 4.6 g/dL    Globulin 3.4 2.3 - 3.5 g/dL    A-G Ratio 0.9 (L) 1.2 - 3.5     MAGNESIUM    Collection Time: 10/31/20  5:22 AM   Result Value Ref Range    Magnesium 2.2 1.8 - 2.4 mg/dL   GLUCOSE, POC    Collection Time: 10/31/20  7:32 AM   Result Value Ref Range    Glucose (POC) 110 (H) 65 - 100 mg/dL   GLUCOSE, POC    Collection Time: 10/31/20 12:13 PM   Result Value Ref Range    Glucose (POC) 158 (H) 65 - 100 mg/dL   GLUCOSE, POC    Collection Time: 10/31/20  3:46 PM   Result Value Ref Range    Glucose (POC) 122 (H) 65 - 100 mg/dL   CBC WITH AUTOMATED DIFF    Collection Time: 11/01/20  5:26 AM   Result Value Ref Range    WBC 9.6 4.3 - 11.1 K/uL    RBC 4.56 4.05 - 5.2 M/uL    HGB 13.7 11.7 - 15.4 g/dL    HCT 39.8 35.8 - 46.3 %    MCV 87.3 79.6 - 97.8 FL    MCH 30.0 26.1 - 32.9 PG    MCHC 34.4 31.4 - 35.0 g/dL    RDW 13.2 11.9 - 14.6 %    PLATELET 555 467 - 329 K/uL    MPV 9.6 9.4 - 12.3 FL    ABSOLUTE NRBC 0.00 0.0 - 0.2 K/uL    DF AUTOMATED      NEUTROPHILS 70 43 - 78 %    LYMPHOCYTES 17 13 - 44 %    MONOCYTES 9 4.0 - 12.0 %    EOSINOPHILS 2 0.5 - 7.8 %    BASOPHILS 1 0.0 - 2.0 %    IMMATURE GRANULOCYTES 1 0.0 - 5.0 %    ABS. NEUTROPHILS 6.7 1.7 - 8.2 K/UL    ABS. LYMPHOCYTES 1.6 0.5 - 4.6 K/UL    ABS. MONOCYTES 0.9 0.1 - 1.3 K/UL    ABS. EOSINOPHILS 0.2 0.0 - 0.8 K/UL    ABS. BASOPHILS 0.1 0.0 - 0.2 K/UL    ABS. IMM.  GRANS. 0.1 0.0 - 0.5 K/UL   METABOLIC PANEL, BASIC    Collection Time: 11/01/20  5:26 AM   Result Value Ref Range    Sodium 136 136 - 145 mmol/L    Potassium 3.6 3.5 - 5.1 mmol/L    Chloride 101 98 - 107 mmol/L CO2 29 21 - 32 mmol/L    Anion gap 6 (L) 7 - 16 mmol/L    Glucose 100 65 - 100 mg/dL    BUN 6 (L) 8 - 23 MG/DL    Creatinine 0.54 (L) 0.6 - 1.0 MG/DL    GFR est AA >60 >60 ml/min/1.73m2    GFR est non-AA >60 >60 ml/min/1.73m2    Calcium 8.7 8.3 - 10.4 MG/DL   SARS-COV-2    Collection Time: 11/01/20 12:36 PM   Result Value Ref Range    Specimen source NP SWAB     COVID-19 rapid test Not detected NOTD      SARS CoV-2 PENDING        Assessment:    The Post Assessment Physician Evaluation (SAMY) found the current functional status to be comparable with the Pre-admission Screening. The Patient is a good candidate for acute inpatient rehabilitation. Nothing since the Pre-admission screen has changed that determination. Rehabilitation Plan  The patient has shown the ability to tolerate and benefit from 3 hours of therapy daily and is being admitted to a comprehensive acute inpatient rehabilitation program consisting of at least 3 hours of combined physical, occupational and speech therapies. Begin intensive Physical Therapy for a minimum of 1.5 hours a day, at least 5 out of 7 days per week to address bed mobility, transfers, ambulation, strengthening, balance, and endurance. Begin intensive Occupational Therapy for a minimum of 1.5 hours a day, at least 5 out of 7 days per week to address ADL (feeding, grooming, bathing, UE and LE dressing, toileting); instrumental ADLs; cognitive function; safety; energy conservations; community reintegration; and adaptive equipment as needed. Cognitive function  Begin ST evaluation and management as needed at least for a minimum of 3-5 out of 7 days a week for: higher level-cognitive function, impaired communication/language skills with compensatory strategies as indicated.     The patient will also require 24-hour skilled rehabilitation nursing for bowel and bladder management, skin care for decubitus ulcer prevention , pain management and ongoing medication administration The patient will also require close supervision of a rehabilitation physician for  medical management of complications/comorbidities:  Acute ischemic left thalamus/internal capsule CVA  HTN/A. Fib/pacemaker - on losartan, norvasc, tikosyn, continues on telemetry, avoid hypotension, bmp ordered randee. am    Chronic anticoagulation management/DVT prophylaxis - on newly begun eliquis bid, asa 81mg every day, cbc ordered randee. am    The patient's prognosis for significant practical improvement within a reasonable period of time appears good and the estimated length of stay is 14 days and she is expected to return home with family and continued rehabilitation with [facility]. Given the patient's complex neurologic/medical condition and the risk of further medical complications including: DVT, PE, skin breakdown, pneumonia due to decreased mobility and CVA, MI, cardiac arrhythmias due to HTN/arrhythmias. Increased energy expenditure in a patient with known A. Fib, pacemaker, previous CVA, chronic anticoagulation and new CVA with oral anticoagulation modifications  could potentially impact the IRF program with decreased cardiovascular efficiency, postural hypotension, cardiac arrhythmias, seizure and hemorrhagic conversion CVA and/or CVA extension. For these ongoing medical issues,rehabilitation services could not be safely provided at a lower level of care such as a skilled nursing facility or nursing home.       Signed By: Aamir Avelar MD     November 1, 2020

## 2020-11-01 NOTE — PROGRESS NOTES
Neurology Daily Progress Note     Assessment:     Acute ischemic stroke, left thalamus. Hx of A.fib on xarelto. CTA head/neck negative for LVO or highgrade stenosis. MRI of brain showed acute small infarct in the left thalamus/internal capsule; remote infarcts in the bilateral anterior temporal lobes; advanced chronic small vessel disease. TTE ER 55-60%, moderate/marked dilation of LA and moderate dilation of RA, negative for PFO. Discussed case with Cardiology  for possible watchman device given recurrent stroke on [facility] and increased risk for falls, evaluation will be done as outpatient. No additional neurological workup is needed at this time. She can follow up in the clinic as an outpatient with Dr. Elaine Lake after discharge. Will signoff. Plan:     · Continue ASA 81 mg daily   · Continue Xarelto 20 mg po daily   · Continue high intensity statin   · Neurochecks Q4H  · Telemetry and echocardiogram with bubble study  · PT/OT/ST  · DVT prophylaxis   · BP management - normotensive, with long-term goal <140/90  · Smoking cessation if applicable   · Diabetes education if applicable   · Depression Screening prior to discharge      Subjective: Interval history:    Alert and oriented to person, place. Expressive aphasia. Able to follow commands. TTE ER 55-60%, moderate/marked dilation of LA and moderate dilation of RA, negative for PFO. History:    Antoinette Pimentel is a 78 y.o. female who is being seen for stroke. Review of systems negative with exception of pertinent positives and negatives noted above.        Objective:     Vitals:    10/31/20 1600 10/31/20 2000 11/01/20 0000 11/01/20 0835   BP: (!) 155/74 (!) 169/67 (!) 149/71 128/63   Pulse: 72 79 73 70   Resp: 20 20 18 18   Temp: 98.3 °F (36.8 °C)  97.9 °F (36.6 °C) 97.5 °F (36.4 °C)   SpO2: 97% 96% 94% 97%          Current Facility-Administered Medications:     apixaban (ELIQUIS) tablet 5 mg, 5 mg, Oral, BID, Marta Mejia DO Judee Exon LORazepam (ATIVAN) tablet 1 mg, 1 mg, Oral, Q6H PRN, Sara Mejia,     sodium chloride (NS) flush 5-40 mL, 5-40 mL, IntraVENous, Q8H, Sara Mejia, DO, 10 mL at 11/01/20 0527    sodium chloride (NS) flush 5-40 mL, 5-40 mL, IntraVENous, PRN, Sara Mejia,     aspirin chewable tablet 81 mg, 81 mg, Oral, DAILY, Sara Mejia, DO, 81 mg at 11/01/20 0941    atorvastatin (LIPITOR) tablet 40 mg, 40 mg, Oral, QHS, Sara Mejia, DO, 40 mg at 10/30/20 2144    acetaminophen (TYLENOL) tablet 650 mg, 650 mg, Oral, Q4H PRN, Chelaalyrasheed Kaspert, DO    labetaloL (NORMODYNE;TRANDATE) injection 5 mg, 5 mg, IntraVENous, Q10MIN PRN, Sara Mejia DO    senna-docusate (PERICOLACE) 8.6-50 mg per tablet 2 Tab, 2 Tab, Oral, QHS, Sara Mejia, DO, 2 Tab at 10/30/20 2144    amLODIPine (NORVASC) tablet 5 mg, 5 mg, Oral, DAILY, Sara Mejia, DO, 5 mg at 11/01/20 3183    dofetilide (TIKOSYN) capsule 250 mcg, 250 mcg, Oral, BID, Chelaalyn Majot, DO, 250 mcg at 11/01/20 0900    levothyroxine (SYNTHROID) tablet 88 mcg, 88 mcg, Oral, ACB, Sara Mejia, DO, 88 mcg at 11/01/20 0526    [Held by provider] losartan (COZAAR) tablet 100 mg, 100 mg, Oral, DAILY, Sara Mejia,     magnesium oxide (MAG-OX) tablet 400 mg, 400 mg, Oral, DAILY, Sara Mejia, DO, 400 mg at 11/01/20 0941    influenza vaccine 2020-21 (6 mos+)(PF) (FLUARIX/FLULAVAL/FLUZONE QUAD) injection 0.5 mL, 0.5 mL, IntraMUSCular, PRIOR TO DISCHARGE, Sara Mejia DO    Recent Results (from the past 12 hour(s))   CBC WITH AUTOMATED DIFF    Collection Time: 11/01/20  5:26 AM   Result Value Ref Range    WBC 9.6 4.3 - 11.1 K/uL    RBC 4.56 4.05 - 5.2 M/uL    HGB 13.7 11.7 - 15.4 g/dL    HCT 39.8 35.8 - 46.3 %    MCV 87.3 79.6 - 97.8 FL    MCH 30.0 26.1 - 32.9 PG    MCHC 34.4 31.4 - 35.0 g/dL    RDW 13.2 11.9 - 14.6 %    PLATELET 369 536 - 160 K/uL    MPV 9.6 9.4 - 12.3 FL    ABSOLUTE NRBC 0.00 0.0 - 0.2 K/uL    DF AUTOMATED      NEUTROPHILS 70 43 - 78 %    LYMPHOCYTES 17 13 - 44 %    MONOCYTES 9 4.0 - 12.0 %    EOSINOPHILS 2 0.5 - 7.8 %    BASOPHILS 1 0.0 - 2.0 %    IMMATURE GRANULOCYTES 1 0.0 - 5.0 %    ABS. NEUTROPHILS 6.7 1.7 - 8.2 K/UL    ABS. LYMPHOCYTES 1.6 0.5 - 4.6 K/UL    ABS. MONOCYTES 0.9 0.1 - 1.3 K/UL    ABS. EOSINOPHILS 0.2 0.0 - 0.8 K/UL    ABS. BASOPHILS 0.1 0.0 - 0.2 K/UL    ABS. IMM. GRANS. 0.1 0.0 - 0.5 K/UL   METABOLIC PANEL, BASIC    Collection Time: 11/01/20  5:26 AM   Result Value Ref Range    Sodium 136 136 - 145 mmol/L    Potassium 3.6 3.5 - 5.1 mmol/L    Chloride 101 98 - 107 mmol/L    CO2 29 21 - 32 mmol/L    Anion gap 6 (L) 7 - 16 mmol/L    Glucose 100 65 - 100 mg/dL    BUN 6 (L) 8 - 23 MG/DL    Creatinine 0.54 (L) 0.6 - 1.0 MG/DL    GFR est AA >60 >60 ml/min/1.73m2    GFR est non-AA >60 >60 ml/min/1.73m2    Calcium 8.7 8.3 - 10.4 MG/DL     MRI Results (most recent):  Results from Hospital Encounter encounter on 10/30/20   MRI BRAIN WO CONT    Narrative MRI BRAIN WITHOUT CONTRAST 10/30/2020    HISTORY: Abnormal speech and altered level of consciousness. TECHNIQUE: Sagittal and axial T1-weighted, axial T2-weighted, axial FLAIR, axial  T2-weighted gradient-echo, axial diffusion weighted images with ADC maps of the  brain. The patient wished to terminate the exam prior to completion of the  coronal FLAIR sequence. COMPARISON: Head CT October 30, 2020    FINDINGS: There is a small focus of restricted diffusion in the left thalamus  abutting the posterior limb of the internal capsule. There is no associated  hemorrhage or mass effect. Diffuse cerebral volume loss is present. On the T2-weighted and FLAIR sequences, there are extensive white matter  hyperintensities compatible with advanced chronic small vessel ischemic disease. Encephalomalacia is present in both anterior temporal lobes. There is no hydrocephalus, intra-axial mass, or extra-axial hematoma.           Impression IMPRESSION:    1. Acute to early subacute small infarct in the left thalamus/internal capsule. 2. Bilateral anterior temporal encephalomalacia. 3. Advanced proximal vessel ischemic disease. Physical Exam:  General - Well developed, well nourished, in no apparent distress. Pleasant and conversent. HEENT - Normocephalic, atraumatic. Conjunctiva are clear. Neck - Supple without masses  Cardiovascular - Regular rate and rhythm. Normal S1, S2 without murmurs, rubs, or gallops. Lungs - Clear to auscultation. Abdomen - Soft, nontender with normal bowel sounds. Extremities - Peripheral pulses intact. No edema and no rashes. Neurological examination - Comprehension, attention, memory and reasoning are impaired. Language and speech are expressive aphasia. On cranial nerve examination, (II, III, IV, VI) pupils are equal, round, and reactive to light. Visual acuity is adequate. Visual fields are full to finger confrontation. Extraocular motility is normal. (V, VII) Face is symmetric and sensation is intact to light touch. (VIII) Hearing is intact. (IX, X) Palate and uvula elevate symmetrically. Voice is normal. (XI) Shoulder shrug is strong and equal bilaterally. (XII)Tongue is midline. Motor examination - There is normal muscle tone and bulk. Power is full throughout with exception of 4/5 RLE drift. Muscle stretch reflexes are 2+ throughout. Plantar response is flexor. Sensation is intact to light touch, pinprick, vibration and proprioception in all extremities. Cerebellar examination is normal.     Signed By: Joy Rawls NP     November 1, 2020      Neurology attending    Seen and examined. Continues to have potential degree of expressive aphasia and some frustration as result  Discussion in terms of ongoing management.   From the standpoint of stroke prophylaxis this is mainly from a cardiac standpoint will only require limited neurologic input after discharge

## 2020-11-01 NOTE — PROGRESS NOTES
Hospitalist Progress Note     Admit Date:  10/30/2020  1:34 PM   Name:  Jose Francisco Calles   Age:  78 y.o.  :  1940   MRN:  [id number]   PCP:  Bala Krause NP  Treatment Team: Attending Provider: Nano Htuson DO; Consulting Provider: Sonia Hooker MD; Utilization Review: Scott Tam; Care Manager: Mitali Feliciano LMSW; Charge Nurse: Amy Leal    Subjective:   HPI and or CC:  Copied from history and physical HPI:  64-year-old female history of A. fib with permanent pacemaker on Xarelto for CVA prophylaxis, hypertension prior CVA/TIA last known well sometime yesterday afternoon around 330. Found in hallway today trying to use her phone and clumsy with confusion. Expressive dysarthria noted and right leg weakness and inability to ambulate. Intermittent confusion per son who is at bedside. Code stroke was called 1326. Initial CT head noncontrast negative for CVA. MRI and CTA are pending. Stat CTA not done because not TPA candidate. Unclear onset and on Xarelto. Neurology consultation recommended admission for stroke work-up and evaluation by speech, PT OT physiatry coordinator. Permissive hypertension will be allowed. Unclear onset and home medications ordered but heldnot given today unless she took at home. She is alert but having frustration with expressive dysarthria. Son reports intermittent confusion he thinks related to frustration and then back to baseline with expressive dysarthria. 2020:  Chief complaintCVA    Episode of severe agitation/delerium 10/31 and stat ct head with possible new small frontal findings--suggests possible recurrent embolic cva. Now improved/resolved with speech improving but still expressive dysarthria.   Son present at bedside and we discussed change Xarelto to Eliquis but not clear that this will have any significant benefit and they are aware of increased risk of bleeding with addition of antiplatelet to oral anticoagulant. They agree with cardiology consult for opinion regarding embolization with atrial fibrillation on appropriate anticoagulation therapy. Objective:     Patient Vitals for the past 24 hrs:   Temp Pulse Resp BP SpO2   11/01/20 0835 97.5 °F (36.4 °C) 70 18 128/63 97 %   11/01/20 0000 97.9 °F (36.6 °C) 73 18 (!) 149/71 94 %   10/31/20 2000  79 20 (!) 169/67 96 %   10/31/20 1600 98.3 °F (36.8 °C) 72 20 (!) 155/74 97 %   10/31/20 1200 98.1 °F (36.7 °C) 81 20 (!) 151/77 94 %     Oxygen Therapy  O2 Sat (%): 97 % (11/01/20 0835)  Pulse via Oximetry: 73 beats per minute (10/30/20 1626)  O2 Device: Room air (10/31/20 0830)  No intake or output data in the 24 hours ending 11/01/20 1117      REVIEW OF SYSTEMS: Comprehensive ROS performed and negative except as stated in HPI. Physical Examination:  Physical Exam  Vitals signs and nursing note reviewed. Constitutional:       Appearance: She is not toxic-appearing or diaphoretic. HENT:      Nose: No rhinorrhea. Mouth/Throat:      Mouth: Mucous membranes are dry. Pharynx: Oropharynx is clear. No posterior oropharyngeal erythema. Eyes:      General: No scleral icterus. Extraocular Movements: Extraocular movements intact. Neck:      Musculoskeletal: Neck supple. No muscular tenderness. Cardiovascular:      Rate and Rhythm: Regular rhythm. Pulses: Normal pulses. Heart sounds: No friction rub. Pulmonary:      Breath sounds: Normal breath sounds. No stridor. No rhonchi. Abdominal:      Palpations: Abdomen is soft. Tenderness: There is no guarding or rebound. Musculoskeletal: Normal range of motion. Right lower leg: No edema. Left lower leg: No edema. Skin:     General: Skin is warm. Capillary Refill: Capillary refill takes less than 2 seconds. Coloration: Skin is not jaundiced. Findings: No erythema. Neurological:      Sensory: No sensory deficit.       Comments: Expressive dysarthria--improving   Psychiatric:      Comments: Improved today-- agitation / delerium resolved         Data Review:  I have reviewed all labs, meds, telemetry events, and studies from the last 24 hours. Recent Results (from the past 24 hour(s))   GLUCOSE, POC    Collection Time: 10/31/20 12:13 PM   Result Value Ref Range    Glucose (POC) 158 (H) 65 - 100 mg/dL   GLUCOSE, POC    Collection Time: 10/31/20  3:46 PM   Result Value Ref Range    Glucose (POC) 122 (H) 65 - 100 mg/dL   CBC WITH AUTOMATED DIFF    Collection Time: 11/01/20  5:26 AM   Result Value Ref Range    WBC 9.6 4.3 - 11.1 K/uL    RBC 4.56 4.05 - 5.2 M/uL    HGB 13.7 11.7 - 15.4 g/dL    HCT 39.8 35.8 - 46.3 %    MCV 87.3 79.6 - 97.8 FL    MCH 30.0 26.1 - 32.9 PG    MCHC 34.4 31.4 - 35.0 g/dL    RDW 13.2 11.9 - 14.6 %    PLATELET 563 860 - 484 K/uL    MPV 9.6 9.4 - 12.3 FL    ABSOLUTE NRBC 0.00 0.0 - 0.2 K/uL    DF AUTOMATED      NEUTROPHILS 70 43 - 78 %    LYMPHOCYTES 17 13 - 44 %    MONOCYTES 9 4.0 - 12.0 %    EOSINOPHILS 2 0.5 - 7.8 %    BASOPHILS 1 0.0 - 2.0 %    IMMATURE GRANULOCYTES 1 0.0 - 5.0 %    ABS. NEUTROPHILS 6.7 1.7 - 8.2 K/UL    ABS. LYMPHOCYTES 1.6 0.5 - 4.6 K/UL    ABS. MONOCYTES 0.9 0.1 - 1.3 K/UL    ABS. EOSINOPHILS 0.2 0.0 - 0.8 K/UL    ABS. BASOPHILS 0.1 0.0 - 0.2 K/UL    ABS. IMM.  GRANS. 0.1 0.0 - 0.5 K/UL   METABOLIC PANEL, BASIC    Collection Time: 11/01/20  5:26 AM   Result Value Ref Range    Sodium 136 136 - 145 mmol/L    Potassium 3.6 3.5 - 5.1 mmol/L    Chloride 101 98 - 107 mmol/L    CO2 29 21 - 32 mmol/L    Anion gap 6 (L) 7 - 16 mmol/L    Glucose 100 65 - 100 mg/dL    BUN 6 (L) 8 - 23 MG/DL    Creatinine 0.54 (L) 0.6 - 1.0 MG/DL    GFR est AA >60 >60 ml/min/1.73m2    GFR est non-AA >60 >60 ml/min/1.73m2    Calcium 8.7 8.3 - 10.4 MG/DL        All Micro Results     None          Current Meds:  Current Facility-Administered Medications   Medication Dose Route Frequency    apixaban (ELIQUIS) tablet 5 mg  5 mg Oral BID    LORazepam (ATIVAN) tablet 1 mg  1 mg Oral Q6H PRN    sodium chloride (NS) flush 5-40 mL  5-40 mL IntraVENous Q8H    sodium chloride (NS) flush 5-40 mL  5-40 mL IntraVENous PRN    aspirin chewable tablet 81 mg  81 mg Oral DAILY    atorvastatin (LIPITOR) tablet 40 mg  40 mg Oral QHS    acetaminophen (TYLENOL) tablet 650 mg  650 mg Oral Q4H PRN    labetaloL (NORMODYNE;TRANDATE) injection 5 mg  5 mg IntraVENous Q10MIN PRN    senna-docusate (PERICOLACE) 8.6-50 mg per tablet 2 Tab  2 Tab Oral QHS    amLODIPine (NORVASC) tablet 5 mg  5 mg Oral DAILY    dofetilide (TIKOSYN) capsule 250 mcg  250 mcg Oral BID    levothyroxine (SYNTHROID) tablet 88 mcg  88 mcg Oral ACB    [Held by provider] losartan (COZAAR) tablet 100 mg  100 mg Oral DAILY    magnesium oxide (MAG-OX) tablet 400 mg  400 mg Oral DAILY    influenza vaccine 2020-21 (6 mos+)(PF) (FLUARIX/FLULAVAL/FLUZONE QUAD) injection 0.5 mL  0.5 mL IntraMUSCular PRIOR TO DISCHARGE       Diet:  DIET REGULAR    Other Studies (last 24 hours):  Ct Head Wo Cont    Result Date: 10/31/2020  NONCONTRAST HEAD CT CLINICAL HISTORY:  Decreased level of consciousness. TECHNIQUE:  Axial images were obtained with spiral technique. Radiation dose reduction was achieved using one or all of the following techniques: automated exposure control, weight-based dosing, iterative reconstruction. COMPARISON:  CT, CTA, and MRI yesterday. REPORT:   Standard noncontrast head CT demonstrates no definite intracranial mass effect or hemorrhage. There is subtle hypodensity in the left thalamus and adjacent internal capsule which is new from CT yesterday and is associated with subacute ischemic infarction demonstrated by MRI. Subtle indistinctness of the gray-white matter junction inferiorly in the left frontal pole compared with the right suggests the possibility of another early ischemic infarct.   Extensive white matter hypodensities elsewhere are most consistent with small vessel ischemic disease. The ventricles are normal in size and configuration, accounting for the patient's age. Orbits  and paranasal sinuses are clear where imaged. Bone windows demonstrate no definite fracture or destruction. IMPRESSION:     EXPECTED APPEARANCE OF THE LEFT THALAMIC/INTERNAL CAPSULE SUBACUTE ISCHEMIC INFARCT DEMONSTRATED BY MRI YESTERDAY WITH POSSIBLE SECOND EARLY SUBACUTE ISCHEMIC INFARCT INFERIORLY IN THE LEFT FRONTAL POLE. NO INTRACRANIAL HEMORRHAGE OR OTHER SIGNIFICANT CHANGE IS IDENTIFIED. Assessment and Plan:     Hospital Problems as of 11/1/2020 Date Reviewed: 10/22/2020          Codes Class Noted - Resolved POA    Chronic constipation ICD-10-CM: K59.09  ICD-9-CM: 564.00  10/26/2020 - Present Yes        [facility] ICD-10-CM: I48.0  ICD-9-CM: 427.31  6/1/2018 - Present Yes    Overview Signed 10/19/2020 11:32 AM by Ashley Tam NP     Last Assessment & Plan:   Stable on current therapy. No change in treatment at this time. * (Principal) CVA (cerebral vascular accident) ([facility] ICD-10-CM: I63.9  ICD-9-CM: 434.91  5/15/2018 - Present Yes        Hypothyroidism ICD-10-CM: E03.9  ICD-9-CM: 244.9  5/14/2018 - Present Yes        HTN (hypertension) ICD-10-CM: I10  ICD-9-CM: 401.9  6/27/2016 - Present Yes        Hyperlipidemia ICD-10-CM: E78.5  ICD-9-CM: 272.4  10/3/2008 - Present Yes    Overview Signed 10/19/2020 11:29 AM by Ashley Tam NP     Hyperlipidemia  Hyperlipidemia                   A/P:    --Acute left thalamic CVA involving posterior limb of internal capsule, and possible new frontal small CVA noted on repeat CT scan stat yesterday October 31 ordered due to change in mental status   Continue antiplatelet and anticoagulation but discussed with family and patient and will change to Eliquis 5 mg twice daily starting now and stop Xarelto.   Cardiology consulted regarding suspected embolic CVAs despite appropriate oral anticoagulation therapy. Continue Norvasc only and continue to hold ARB and monitor. Current blood pressure 128/63. Speech improving. Disposition about discharge pending cardiology consult and OT and PT formal recommendations but primary deficit appears to be speech. Concerned because episode of altered mental status yesterday may have been new embolic CVA frontalplease see CT scan head report from October 31. --Chronic atrial fibrillation   Continue with antiarrhythmic Tikosyn, continue oral anticoagulation but change Xarelto to 5 mg twice daily Eliquis starting now. Cardiology opinion. Patient and family aware of increased risk of bleed with addition of baby aspirin to oral anticoagulant. --Report of possible confusion on admission but son deniedsimilar episode yesterday and stat CT head showed possible new frontal CVA suggesting possible recurrent embolization? Continue to observe. Await cardiology opinion about future further intervention does not appear to be a candidate in setting of acute CVA for any procedurebut await opinion      DC planning/Dispo: Pending PT and OT recommendations  DVT ppx: Eliquis    Code status: Full code  Medical decision maker: Self, son's contacts on file      Signed:  Manjinder PENALOZA

## 2020-11-01 NOTE — PROGRESS NOTES
Notified Dr Yolanda Hung that pt is becoming increasingly more agitated and trying to get out of bed. She refuses help from staff. Received order for 1 mg IV ativan once.

## 2020-11-01 NOTE — PROGRESS NOTES
Notified son víctor that patient has been progressively more agitated with staff and trying to get out of bed without assistance.

## 2020-11-01 NOTE — PROGRESS NOTES
Sitting up eating supper, no distress noted. Son at bedside. Call bell within reach, hourly rounds completed this shift.

## 2020-11-01 NOTE — PROGRESS NOTES
CM received call from Juan Ventura. [facility] 9th floor can take patient today if medically stable; pending negative COVID. COVID order has been placed. Dr. Jared Deleon and primary RN notified.

## 2020-11-01 NOTE — DISCHARGE SUMMARY
Hospitalist Discharge Summary     Admit Date:  10/30/2020  1:34 PM   Name:  Janee Titus   Age:  78 y.o.  :  1940   MRN:  [id number]   PCP:  Kodak Ferguson NP  Treatment Team: Attending Provider: Rylie English DO; Consulting Provider: Mylene Morales MD; Utilization Review: Darrel Quintana; Care Manager: Camryn Rivera LMSW; Charge Nurse: Bernardo Berrios    Problem List for this Hospitalization:  Hospital Problems as of 2020 Date Reviewed: 10/22/2020          Codes Class Noted - Resolved POA    Chronic constipation ICD-10-CM: K59.09  ICD-9-CM: 564.00  10/26/2020 - Present Yes        Paroxysmal atrial fibrillation ([facility].) ICD-10-CM: I48.0  ICD-9-CM: 427.31  2018 - Present Yes    Overview Signed 10/19/2020 11:32 AM by Kodak Ferguson NP     Last Assessment & Plan:   Stable on current therapy. No change in treatment at this time.              * (Principal) CVA (cerebral vascular accident) ([facility] ICD-10-CM: I63.9  ICD-9-CM: 434.91  5/15/2018 - Present Yes        Hypothyroidism ICD-10-CM: E03.9  ICD-9-CM: 244.9  2018 - Present Yes        HTN (hypertension) ICD-10-CM: I10  ICD-9-CM: 401.9  2016 - Present Yes        Hyperlipidemia ICD-10-CM: E78.5  ICD-9-CM: 272.4  10/3/2008 - Present Yes    Overview Signed 10/19/2020 11:29 AM by Kodak Ferguson NP     Hyperlipidemia  Hyperlipidemia                     Admission HPI from 10/30/2020:    52-year-old female history of A. fib with permanent pacemaker on Xarelto for CVA prophylaxis, hypertension prior CVA/TIA last known well sometime yesterday afternoon around 330.  Found in hallway today trying to use her phone and clumsy with confusion.  Expressive dysarthria noted and right leg weakness and inability to ambulate.  Intermittent confusion per son who is at bedside.  Code stroke was called 1326.  Initial CT head noncontrast negative for CVA.  MRI and CTA are pending.  Stat CTA not done because not TPA candidate.  Unclear onset and on Xarelto.  Neurology consultation recommended admission for stroke work-up and evaluation by speech, PT OT physiatry coordinator.  Permissive hypertension will be allowed. [place] onset and home medications ordered but heldnot given today unless she took at home. Dung Quigley is alert but having frustration with expressive dysarthria.  Son reports intermittent confusion he thinks related to frustration and then back to baseline with expressive dysarthria. Hospital Course:  Patient admitted to the hospital with suspected CVA negative noncontrast CT head but proven CVA by MRI thalamus encroaching on internal capsule. Baby aspirin was added to Xarelto and high-dose statin. There was report prior to admission of combative confused behavior but not witnessed at time of my admission or initially after. Afternoon October 31 patient became significantly agitated and near delirious unable to be consoled by nurses, pulled out IVs.  Stat noncontrast CT head showed possible new small CVA frontal.  Suspected embolic despite anticoagulation with Xarelto. Discussed with family day of discharge change to Eliquis 5 mg twice daily and continue baby aspirin. Neurology opinion consultation noted. Cardiology consultation pending regarding  Opinion-  ? Candidate  possible eventual watchman procedure? She was seen by physiatry and has been accepted to the rehabilitation floor. We are awaiting cardiology consultation and may need to replace consult after transition of care to rehabilitation floor. Initially permissive hypertension now blood pressure mid 190W systolic with Norvasc home medication. Her home ARB losartan has been held and I will discontinue at time of discharge. Her atrial fibrillation is controlled she takes Tikosyn long-term and anticoagulation for prophylaxis CVA as above. She is hypothyroid and thyroid replacement therapy from home continued. TSH was just under one. At time of discharge her speech deficit is improving and hopefully she will continue to improve and not have any further embolic events. Family aware of risk of bleed with addition of antiplatelet to anticoagulation    Follow up instructions and discharge meds at bottom of this note. Plan was discussed with patient and son and staff. All questions answered. Patient was stable at time of discharge. 10 systems reviewed and negative except as noted in HPI. Diagnostic Imaging/Tests:   Mri Brain Wo Cont    Result Date: 10/30/2020  MRI BRAIN WITHOUT CONTRAST 10/30/2020 HISTORY: Abnormal speech and altered level of consciousness. TECHNIQUE: Sagittal and axial T1-weighted, axial T2-weighted, axial FLAIR, axial T2-weighted gradient-echo, axial diffusion weighted images with ADC maps of the brain. The patient wished to terminate the exam prior to completion of the coronal FLAIR sequence. COMPARISON: Head CT October 30, 2020 FINDINGS: There is a small focus of restricted diffusion in the left thalamus abutting the posterior limb of the internal capsule. There is no associated hemorrhage or mass effect. Diffuse cerebral volume loss is present. On the T2-weighted and FLAIR sequences, there are extensive white matter hyperintensities compatible with advanced chronic small vessel ischemic disease. Encephalomalacia is present in both anterior temporal lobes. There is no hydrocephalus, intra-axial mass, or extra-axial hematoma. IMPRESSION: 1. Acute to early subacute small infarct in the left thalamus/internal capsule. 2. Bilateral anterior temporal encephalomalacia. 3. Advanced proximal vessel ischemic disease. Ct Head Wo Cont    Result Date: 10/31/2020  NONCONTRAST HEAD CT CLINICAL HISTORY:  Decreased level of consciousness. TECHNIQUE:  Axial images were obtained with spiral technique.   Radiation dose reduction was achieved using one or all of the following techniques: automated exposure control, weight-based dosing, iterative reconstruction. COMPARISON:  CT, CTA, and MRI yesterday. REPORT:   Standard noncontrast head CT demonstrates no definite intracranial mass effect or hemorrhage. There is subtle hypodensity in the left thalamus and adjacent internal capsule which is new from CT yesterday and is associated with subacute ischemic infarction demonstrated by MRI. Subtle indistinctness of the gray-white matter junction inferiorly in the left frontal pole compared with the right suggests the possibility of another early ischemic infarct. Extensive white matter hypodensities elsewhere are most consistent with small vessel ischemic disease. The ventricles are normal in size and configuration, accounting for the patient's age. Orbits  and paranasal sinuses are clear where imaged. Bone windows demonstrate no definite fracture or destruction. IMPRESSION:     EXPECTED APPEARANCE OF THE LEFT THALAMIC/INTERNAL CAPSULE SUBACUTE ISCHEMIC INFARCT DEMONSTRATED BY MRI YESTERDAY WITH POSSIBLE SECOND EARLY SUBACUTE ISCHEMIC INFARCT INFERIORLY IN THE LEFT FRONTAL POLE. NO INTRACRANIAL HEMORRHAGE OR OTHER SIGNIFICANT CHANGE IS IDENTIFIED. Cta Head Neck W Wo Cont    Result Date: 10/31/2020  History: CVA. Acute neuro changes. Comments: CT ANGIOGRAM OF THE NECK AND CT ANGIOGRAM OF THE [place] was obtained following the administration of IV contrast. IV contrast was administered to evaluate the arterial vasculature. Reformatted images in the coronal and sagittal planes as well as 3-D imaging was obtained and reviewed on a dedicated PACS and [address]. Radiation reduction dose techniques were used for the study. Our CT scanner use one or all of the following- Automated exposure control, adjustment of the mA and/or KV according the patient size, iterative reconstruction. All measurements are based upon NASCET criteria if appropriate.  Findings: CT ANGIOGRAM OF THE NECK: The arch and proximal great vessels are patent. Right left carotid bulbs are patent with mild eccentric calcified plaque disease. Degree of stenosis is less than 50%. The proximal vertebral arteries are patent. The lung apices are clear. The right thyroid gland is unremarkable. Patient appears to be status post left thyroidectomy. CT ANGIOGRAM OF [place]: The petrous, cavernous, and supraclinoid internal carotid arteries are patent. The anterior and middle cerebral arteries are patent. The distal vertebral arteries, basilar artery and posterior cerebral arteries are patent. Stenosis is noted in the mid posterior cerebral arteries bilaterally left greater than right. Stenosis on the left is significant. The dural venous sinuses are patent. IMPRESSION: 1. Bilateral posterior cerebral artery stenoses. The stenosis on the left is significant short segment. This is best seen on image number 465 of series 301    Ct Code Neuro Head Wo Contrast    Result Date: 10/30/2020  EXAM: CT CODE NEURO HEAD WO CONTRAST INDICATION: patient with acute neuro changes COMPARISON: July 7, 2018. TECHNIQUE: Unenhanced CT of the head was performed using 5 mm images. Brain and bone windows were generated. CT dose reduction was achieved through use of a standardized protocol tailored for this examination and automatic exposure control for dose modulation. FINDINGS: The ventricles and sulci are within normal limits given the patient's stated age. Chronic white matter changes. Encephalomalacia changes to both temporal lobes. There is no intracranial hemorrhage, extra-axial collection, mass, mass effect or midline shift. The basilar cisterns are open. No acute infarct is identified. The bone windows demonstrate no abnormalities. The visualized portions of the paranasal sinuses and mastoid air cells are clear. IMPRESSION: There is no CT evidence of intracranial mass, acute hemorrhage or acute infarct.       Echocardiogram results:  Results for orders placed or performed during the hospital encounter of 10/30/20   2D ECHO COMPLETE ADULT (TTE) W OR WO CONTR    Narrative    Claudia Moore [address], [address] [facility]  [phone]    Transthoracic Echocardiogram  2D, M-mode, Doppler, and Color Doppler    Patient: Troy Fajardo  MR #: [id number]  : 1940  Age: 78 years  Gender: Female  Study date: 31-Oct-2020  Account #: [de-identified]  Height: 66 in  Weight: 147.6 lb  BSA: 1.76 mï¾²  Status:Routine  Location: 708  BP: 195/ 88    Allergies: CIPROFLOXACIN, OXYCODONE-ACETAMINOPHEN, OTHER MEDICATION, PROPOFOL  (BULK)    Sonographer:  MIGUELINA Black  Group:  [facility]  Referring Physician:  Silvia Lala DO  Reading Physician:  Diana Celeste MD    INDICATIONS: CVA    PROCEDURE: This was a routine study. A transthoracic echocardiogram was  performed. The study included complete 2D imaging, M-mode, complete spectral  Doppler, and color Doppler. Intravenous contrast (agitated saline) was  administered. Image quality was adequate. LEFT VENTRICLE: Size was normal. Systolic function was normal. Ejection  fraction was estimated in the range of 55 % to 60 %. There were no regional  wall motion abnormalities. Wall thickness was normal. Doppler parameters were  consistent with diastolic dysfunction. Avg E/e': 14.5. RIGHT VENTRICLE: The size was normal. Systolic function was normal. A pacing  wire was present. Estimated peak pressure was in the range of 20-25 mmHg. LEFT ATRIUM: The atrium was moderately to markedly dilated. ATRIAL SEPTUM: Agitated saline contrast injection (bubble study) was   performed. There was no right-to-left shunt, at rest or induced by the Valsalva   maneuver. RIGHT ATRIUM: The atrium was moderately dilated. A pacing wire was present. SYSTEMIC VEINS: IVC: The inferior vena cava was normal in size and course. The  respirophasic change in diameter was more than 50%.     AORTIC VALVE: The valve was structurally normal, tri-commissural. There was   no  evidence for stenosis. There was no insufficiency. MITRAL VALVE: There was mild annular calcification. There was no evidence for  stenosis. There was mild regurgitation. TRICUSPID VALVE: The valve structure was normal. There was no evidence for  stenosis. There was mild regurgitation. PULMONIC VALVE: Not well visualized. There was no evidence for stenosis. There  was no insufficiency. PERICARDIUM: There was no pericardial effusion. AORTA: The root exhibited normal size. SUMMARY:    -  Left ventricle: Systolic function was normal. Ejection fraction was  estimated in the range of 55 % to 60 %. There were no regional wall motion  abnormalities. -  Left atrium: The atrium was moderately to markedly dilated. -  Atrial septum: Agitated saline contrast injection (bubble study) was  performed. There was no right-to-left shunt, at rest or induced by the   Valsalva  maneuver. -  Right atrium: The atrium was moderately dilated. -  Inferior vena cava, hepatic veins: The respirophasic change in diameter   was  more than 50%. -  Mitral valve: There was mild annular calcification. There was mild  regurgitation.    -  Tricuspid valve: There was mild regurgitation.     SYSTEM MEASUREMENT TABLES    2D  Ao Diam: 2.5 cm  LA Diam: 3.8 cm  LAEDV Index (A-L): 43.3 ml/m2  %FS: 43.4 %  IVSd: 0.9 cm  LVIDd: 4.7 cm  LVIDs: 2.7 cm  LVOT Diam: 1.6 cm  LVPWd: 0.8 cm    CW  TR Vmax: 2.1 m/s  TR maxP.5 mmHg    Prepared and signed by    Freedom Colbert MD  Signed 31-Oct-2020 13:38:37           All Micro Results     None          Labs: Results:       BMP, Mg, Phos Recent Labs     11/01/20  0526 10/31/20  0522 10/30/20  1320    134* 134*   K 3.6 3.5 3.8    101 100   CO2 29 25 27   AGAP 6* 8 7   BUN 6* 7* 12   CREA 0.54* 0.58* 0.74   CA 8.7 8.7 9.3    101* 114*   MG  --  2.2  --       CBC Recent Labs     11/01/20  0526 10/31/20  0522 10/30/20  1320   WBC 9.6 9.7 8.2   RBC 4.56 4.39 4.74   HGB 13.7 13.1 14.3   HCT 39.8 39.1 41.7    296 323   GRANS 70 82* 76   LYMPH 17 9* 12*   EOS 2 1 2   MONOS 9 7 9   BASOS 1 1 1   IG 1 1 1   ANEU 6.7 7.9 6.2   ABL 1.6 0.9 1.0   ROXANN 0.2 0.1 0.1   ABM 0.9 0.7 0.7   ABB 0.1 0.1 0.1   AIG 0.1 0.1 0.1      LFT Recent Labs     10/31/20  0522 10/30/20  1320   ALT 14 16   AP 87 99   TP 6.6 7.8   ALB 3.2 3.7   GLOB 3.4 4.1*   AGRAT 0.9* 0.9*      Cardiac Testing Lab Results   Component Value Date/Time     02/24/2016 09:15 AM    Troponin-I, Qt. <0.04 05/14/2018 06:30 AM      Coagulation Tests Lab Results   Component Value Date/Time    Prothrombin time 15.1 (H) 10/30/2020 01:20 PM    Prothrombin time 13.4 05/14/2018 06:30 AM    Prothrombin time 10.7 11/13/2017 10:31 PM    INR 1.2 10/30/2020 01:20 PM    INR 1.0 05/14/2018 06:30 AM    INR 1.0 11/13/2017 10:31 PM    aPTT 26.2 11/13/2017 10:31 PM    aPTT 23.4 (L) 07/24/2016 12:40 AM      A1c Lab Results   Component Value Date/Time    Hemoglobin A1c 5.8 10/31/2020 05:22 AM    Hemoglobin A1c 5.7 05/15/2018 03:46 AM    Hemoglobin A1c 5.5 11/13/2017 10:31 PM      Lipid Panel Lab Results   Component Value Date/Time    Cholesterol, total 176 10/31/2020 05:22 AM    HDL Cholesterol 64 (H) 10/31/2020 05:22 AM    LDL, calculated 103.2 (H) 10/31/2020 05:22 AM    VLDL, calculated 8.8 10/31/2020 05:22 AM    Triglyceride 44 10/31/2020 05:22 AM    CHOL/HDL Ratio 2.8 10/31/2020 05:22 AM      Thyroid Panel Lab Results   Component Value Date/Time    TSH 0.911 10/30/2020 05:37 PM    TSH 1.197 05/14/2018 06:30 AM    T4, Free 1.5 06/27/2016 07:16 AM        Most Recent UA Lab Results   Component Value Date/Time    Color YELLOW 10/30/2020 03:19 PM    Appearance CLEAR 10/30/2020 03:19 PM    Specific gravity 1.007 10/30/2020 03:19 PM    pH (UA) 7.5 10/30/2020 03:19 PM    Protein Negative 10/30/2020 03:19 PM    Glucose Negative 10/30/2020 03:19 PM    Ketone TRACE (A) 10/30/2020 03:19 PM Bilirubin Negative 10/30/2020 03:19 PM    Blood Negative 10/30/2020 03:19 PM    Urobilinogen 0.2 10/30/2020 03:19 PM    Nitrites Negative 10/30/2020 03:19 PM    Leukocyte Esterase Negative 10/30/2020 03:19 PM        Allergies   Allergen Reactions    Ciprofloxacin Nausea and Vomiting    Percocet [Oxycodone-Acetaminophen] Other (comments)     hallucinations    Other Medication Other (comments)     I don't do well with any strong medications (poss narcotics, pt difficult to get an elaboration from)    Propofol (Bulk) Other (comments)     Red burning hands     Immunization History   Administered Date(s) Administered    TB Skin Test (PPD) Intradermal 06/27/2016       All Labs from Last 24 Hrs:  Recent Results (from the past 24 hour(s))   GLUCOSE, POC    Collection Time: 10/31/20  3:46 PM   Result Value Ref Range    Glucose (POC) 122 (H) 65 - 100 mg/dL   CBC WITH AUTOMATED DIFF    Collection Time: 11/01/20  5:26 AM   Result Value Ref Range    WBC 9.6 4.3 - 11.1 K/uL    RBC 4.56 4.05 - 5.2 M/uL    HGB 13.7 11.7 - 15.4 g/dL    HCT 39.8 35.8 - 46.3 %    MCV 87.3 79.6 - 97.8 FL    MCH 30.0 26.1 - 32.9 PG    MCHC 34.4 31.4 - 35.0 g/dL    RDW 13.2 11.9 - 14.6 %    PLATELET 667 148 - 865 K/uL    MPV 9.6 9.4 - 12.3 FL    ABSOLUTE NRBC 0.00 0.0 - 0.2 K/uL    DF AUTOMATED      NEUTROPHILS 70 43 - 78 %    LYMPHOCYTES 17 13 - 44 %    MONOCYTES 9 4.0 - 12.0 %    EOSINOPHILS 2 0.5 - 7.8 %    BASOPHILS 1 0.0 - 2.0 %    IMMATURE GRANULOCYTES 1 0.0 - 5.0 %    ABS. NEUTROPHILS 6.7 1.7 - 8.2 K/UL    ABS. LYMPHOCYTES 1.6 0.5 - 4.6 K/UL    ABS. MONOCYTES 0.9 0.1 - 1.3 K/UL    ABS. EOSINOPHILS 0.2 0.0 - 0.8 K/UL    ABS. BASOPHILS 0.1 0.0 - 0.2 K/UL    ABS. IMM.  GRANS. 0.1 0.0 - 0.5 K/UL   METABOLIC PANEL, BASIC    Collection Time: 11/01/20  5:26 AM   Result Value Ref Range    Sodium 136 136 - 145 mmol/L    Potassium 3.6 3.5 - 5.1 mmol/L    Chloride 101 98 - 107 mmol/L    CO2 29 21 - 32 mmol/L    Anion gap 6 (L) 7 - 16 mmol/L    Glucose 100 65 - 100 mg/dL    BUN 6 (L) 8 - 23 MG/DL    Creatinine 0.54 (L) 0.6 - 1.0 MG/DL    GFR est AA >60 >60 ml/min/1.73m2    GFR est non-AA >60 >60 ml/min/1.73m2    Calcium 8.7 8.3 - 10.4 MG/DL       Discharge Exam:  Patient Vitals for the past 24 hrs:   Temp Pulse Resp BP SpO2   11/01/20 0835 97.5 °F (36.4 °C) 70 18 128/63 97 %   11/01/20 0000 97.9 °F (36.6 °C) 73 18 (!) 149/71 94 %   10/31/20 2000  79 20 (!) 169/67 96 %   10/31/20 1600 98.3 °F (36.8 °C) 72 20 (!) 155/74 97 %     Oxygen Therapy  O2 Sat (%): 97 % (11/01/20 0835)  Pulse via Oximetry: 73 beats per minute (10/30/20 1626)  O2 Device: Room air (10/31/20 0830)  No intake or output data in the 24 hours ending 11/01/20 1234      Physical exam:  General:    Mental status improved todayno distress. Eyes:   Normal sclera. Extraocular movements intact. ENT:  Normocephalic, atraumatic. Moist mucous membranes  CV:   History of atrial fibrillation on Tikosyn rate fairly regularno murmur, rub, or gallop. Lungs:  Clear to auscultation bilaterally. No wheezing, rhonchi, or rales. Abdomen: Soft, nontender, nondistended. Bowel sounds normal.   Extremities: Warm and dry. No cyanosis or edema. Neurologic: Speech more fluent but still expressive dysarthria especially when becomes excited  Skin:     No rashes or jaundice. Psych:  No significant delirium or agitation at time of my exam    Discharge Info:   Current Discharge Medication List      START taking these medications    Details   apixaban (ELIQUIS) 5 mg tablet Take 1 Tab by mouth two (2) times a day. Qty: 60 Tab, Refills: 0      aspirin 81 mg chewable tablet Take 1 Tab by mouth daily. Qty: 30 Tab, Refills: 0      atorvastatin (LIPITOR) 40 mg tablet Take 1 Tab by mouth nightly. Qty: 30 Tab, Refills: 0         CONTINUE these medications which have NOT CHANGED    Details   fluticasone propionate (Flonase Allergy Relief) 50 mcg/actuation nasal spray 2 Sprays by Both Nostrils route as needed. dofetilide (Tikosyn) 250 mcg capsule Take 1 Cap by mouth two (2) times a day. Qty: 180 Cap, Refills: 3      levothyroxine (SYNTHROID) 88 mcg tablet Take 1 Tab by mouth daily. Qty: 30 Tab, Refills: 5    Associated Diagnoses: Acquired hypothyroidism      carboxymethylcellulose sodium (REFRESH TEARS OP) Apply  to eye daily as needed. cyanocobalamin (VITAMIN B12) 500 mcg tablet Take 500 mcg by mouth daily. Magnesium Oxide 500 mg cap Take 500 mg by mouth daily. acetaminophen (TylenoL) 325 mg tablet Take  by mouth every four (4) hours as needed for Pain.      sodium chloride (SIMPLY SALINE NA) by Nasal route as needed. amLODIPine (NORVASC) 5 mg tablet Take 1 Tab by mouth daily. Qty: 90 Tab, Refills: 3      cholecalciferol, vitamin D3, (VITAMIN D3) 2,000 unit tab Take  by mouth daily. STOP taking these medications       losartan (COZAAR) 100 mg tablet Comments:   Reason for Stopping:         rivaroxaban (XARELTO) 20 mg tab tablet Comments:   Reason for Stopping:                 Disposition: SNF-- rehabilitation floor  Activity: PT/OT Eval and Treat  Diet: DIET REGULAR    Follow-up Appointments   Procedures    FOLLOW UP VISIT Appointment in: 3 - 5 Days Follow-up as soon as possible with physiatry/house physician. Follow-up with cardiology as directedconsult placed     Follow-up as soon as possible with physiatry/house physician. Follow-up with cardiology as directedconsult placed     Standing Status:   Standing     Number of Occurrences:   1     Order Specific Question:   Appointment in     Answer:   3 - 5 Days         Follow-up Information     Follow up With Specialties Details Why Contact Jose Oviedo NP Nurse Practitioner   [facility]rea [address] [place] [address]  [phone]                Time spent in patient discharge planning and coordination 44 minutes.     Signed:  Jeremy Porras

## 2020-11-02 LAB
COVID-19 RAPID TEST, COVR: NOT DETECTED
SARS COV-2, XPGCVT: NEGATIVE
SOURCE, COVRS: NORMAL

## 2020-11-02 PROCEDURE — 97110 THERAPEUTIC EXERCISES: CPT

## 2020-11-02 PROCEDURE — 97165 OT EVAL LOW COMPLEX 30 MIN: CPT

## 2020-11-02 PROCEDURE — 97530 THERAPEUTIC ACTIVITIES: CPT

## 2020-11-02 PROCEDURE — 99232 SBSQ HOSP IP/OBS MODERATE 35: CPT | Performed by: PHYSICAL MEDICINE & REHABILITATION

## 2020-11-02 PROCEDURE — 2709999900 HC NON-CHARGEABLE SUPPLY

## 2020-11-02 PROCEDURE — 97116 GAIT TRAINING THERAPY: CPT

## 2020-11-02 PROCEDURE — 92610 EVALUATE SWALLOWING FUNCTION: CPT

## 2020-11-02 PROCEDURE — 97161 PT EVAL LOW COMPLEX 20 MIN: CPT

## 2020-11-02 PROCEDURE — 92523 SPEECH SOUND LANG COMPREHEN: CPT

## 2020-11-02 PROCEDURE — 97535 SELF CARE MNGMENT TRAINING: CPT

## 2020-11-02 PROCEDURE — 74011250637 HC RX REV CODE- 250/637: Performed by: PHYSICAL MEDICINE & REHABILITATION

## 2020-11-02 PROCEDURE — 65310000000 HC RM PRIVATE REHAB

## 2020-11-02 RX ADMIN — MAGNESIUM GLUCONATE 500 MG ORAL TABLET 400 MG: 500 TABLET ORAL at 08:31

## 2020-11-02 RX ADMIN — LOSARTAN POTASSIUM 100 MG: 50 TABLET, FILM COATED ORAL at 11:25

## 2020-11-02 RX ADMIN — AMLODIPINE BESYLATE 5 MG: 5 TABLET ORAL at 08:31

## 2020-11-02 RX ADMIN — LEVOTHYROXINE SODIUM 88 MCG: 0.09 TABLET ORAL at 05:32

## 2020-11-02 RX ADMIN — APIXABAN 5 MG: 5 TABLET, FILM COATED ORAL at 08:31

## 2020-11-02 RX ADMIN — APIXABAN 5 MG: 5 TABLET, FILM COATED ORAL at 20:14

## 2020-11-02 RX ADMIN — DOFETILIDE 250 MCG: 0.25 CAPSULE ORAL at 18:15

## 2020-11-02 RX ADMIN — ATORVASTATIN CALCIUM 40 MG: 40 TABLET, FILM COATED ORAL at 20:14

## 2020-11-02 RX ADMIN — DOFETILIDE 250 MCG: 0.25 CAPSULE ORAL at 08:32

## 2020-11-02 RX ADMIN — ASPIRIN 81 MG: 81 TABLET, CHEWABLE ORAL at 08:31

## 2020-11-02 NOTE — PROGRESS NOTES
OT Daily Note  Time In 1345   Time Out 1432     Subjective: \"I think my memory has changed since coming here. \"  Pain: pt noting that she sometimes get pain in her left jaw  Education: hand strengthening and coordination activities, memory and attention deficits observed   Interdisciplinary Communication: with PT during handoff   Precautions: fall risk   Patient Vitals for the past 12 hrs:   Temp Pulse Resp BP SpO2   11/02/20 0831  87  (!) 143/63    11/02/20 0717 97.7 °F (36.5 °C) 76 14 (!) 165/84 96 %   11/02/20 0406 98.3 °F (36.8 °C) 77 16 (!) 145/73 98 %         Mobility   Score Comments   Sit to Stand 4: Supervision or touching A Min assist   Transfer Assist 4: Supervision or touching A Transfer Type: SPT   Equipment: Rolling Walker   Comments: CGA     Coordination Daily Assessment    Left Right   9 Hole Peg Test 32 1.11    seconds minute    Strength 30 30    lbs lbs           Vision/ Visual perceptual  Daily Assessment   Completed vision screen with the following results:  Tracking in all fields: appears intact, cues for attention to directions and not moving head  Peripheral fields: grossly decreased, especially decreased vision to left with pt reporting object ~45 degrees from center    Pt completed visual perceptual and standing balance activity while utilizing rubber band board and copying simple visual design. Pt required initial re-explanation of directions and demonstration for initiation of task, however, once initiated, copied simple design onto board with 0 errors. Pt with 4 to 5 dropping of rubber bands due to deficits in R hand. Pt stood for 4.18 minutes at raised table and was able to complete visual perceptual task before seated rest break. Cognition Daily Assessment   Pt completed IRF-KORY with a score of 9 out of 15. Pt with deficits in immediate working memory, attention, and STM. Assessment: Pt would benefit from cognitive/attention/and memory re-training.  Recommend ST evaluation. Pt with deficits in R hand coordination and global strength, will continue to address in OT. Plan: Continue OT POC with focus on ADL/IADL skills, functional transfers, functional mobility, coordination, strength, static and dynamic balance, and activity tolerance to maximize safety and independence with ADLs and functional transfers.      Khurram Landrum, OTR/L  11/2/2020

## 2020-11-02 NOTE — PROGRESS NOTES
German Manley MD  Medical Director  [address] [facility], [address] [facility]  Tel: [phone] [facility] PROGRESS NOTE    Martina Delvalle  Admit Date: 11/1/2020  Admit Diagnosis:   CVA (cerebral vascular accident) [facility]) [I63.9]    Subjective     Patient seen and examined. Depressed, tearful. Doesn't feel as if she will get any better. Encouraged pt. Stroke education. No cp, sob, n. Slept on and off. Appetite fair at best    Objective:     Current Facility-Administered Medications   Medication Dose Route Frequency    acetaminophen (TYLENOL) tablet 650 mg  650 mg Oral Q4H PRN    aspirin chewable tablet 81 mg  81 mg Oral DAILY    atorvastatin (LIPITOR) tablet 40 mg  40 mg Oral QHS    senna-docusate (PERICOLACE) 8.6-50 mg per tablet 2 Tab  2 Tab Oral QHS    sodium chloride (NS) flush 5-40 mL  5-40 mL IntraVENous PRN    amLODIPine (NORVASC) tablet 5 mg  5 mg Oral DAILY    apixaban (ELIQUIS) tablet 5 mg  5 mg Oral BID    dofetilide (TIKOSYN) capsule 250 mcg  250 mcg Oral BID    influenza vaccine 2020-21 (6 mos+)(PF) (FLUARIX/FLULAVAL/FLUZONE QUAD) injection 0.5 mL  0.5 mL IntraMUSCular PRIOR TO DISCHARGE    levothyroxine (SYNTHROID) tablet 88 mcg  88 mcg Oral ACB    losartan (COZAAR) tablet 100 mg  100 mg Oral DAILY    magnesium oxide (MAG-OX) tablet 400 mg  400 mg Oral DAILY       Review of Systems:   Denies chest pain, shortness of breath, cough, headache, visual problems, abdominal pain, dysuria, calf pain. Pertinent positives are as noted in the HPI, ROS unremarkable otherwise. Visit Vitals  BP (!) 143/63   Pulse 87   Temp 97.7 °F (36.5 °C)   Resp 14   SpO2 96%        Physical Exam:   General: Alert and age appropriately oriented. No acute cardiorespiratory distress. HEENT: Normocephalic, no scleral icterus. Oral mucosa moist without cyanosis. Lungs: Clear to auscultation bilaterally. Respiration even and unlabored. Heart: Regular rate and rhythm, S1, S2. No murmurs, clicks, rub or gallops. Abdomen: Soft, non-tender, not distended. Bowel sounds normoactive. No organomegaly. Genitourinary: Deferred. Neuromuscular:      Expressive aphasia. Follows commands  Generalized prox>distal non focal weakness  O x 3, sensation intact. No drift  Plantar response down bilaterally   Skin/extremity: No rashes, no erythema. No calf tenderness B LE. No edema                                                                              Functional Assessment:          Balance  Sitting - Static: Good (unsupported) (11/02/20 1200)  Sitting - Dynamic: Fair (occasional) (11/02/20 1200)  Standing - Static: Fair (11/02/20 1200)                     Robinson Bernal Fall Risk Assessment:  Robinson Bernal Fall Risk  Mobility: Ambulates or transfers with assist devices or assistance (11/02/20 0747)  Mobility Interventions: Communicate number of staff needed for ambulation/transfer;Patient to call before getting OOB;Utilize walker, cane, or other assistive device (11/02/20 0747)  Mentation: Periodic confusion (11/02/20 0747)  Mentation Interventions: Adequate sleep, hydration, pain control;Bed/chair exit alarm; Door open when patient unattended;Update white board (11/02/20 0747)  Medication: Patient receiving anticonvulsants, sedatives(tranquilizers), psychotropics or hypnotics, hypoglycemics, narcotics, sleep aids, antihypertensives, laxatives, or diuretics (11/02/20 0747)  Medication Interventions: Patient to call before getting OOB; Teach patient to arise slowly (11/02/20 0747)  Elimination: Needs assistance with toileting (11/02/20 0747)  Elimination Interventions: Bed/chair exit alarm;Call light in reach; Patient to call for help with toileting needs;Stay With Me (per policy) (84/16/39 1446)  Prior Fall History: Unknown (11/02/20 0747)  History of Falls Interventions: Bed/chair exit alarm; Consult care management for discharge planning (11/02/20 0747)  Total Score: 4 (11/02/20 0747)  High Fall Risk: Yes (11/02/20 0747)     Speech Assessment:         Ambulation:  Gait  Distance (ft): 80 Feet (ft) (11/02/20 1200)  Assistive Device: Gait belt;Walker, rolling (11/02/20 1200)     Labs/Studies:  Recent Results (from the past 72 hour(s))   GLUCOSE, POC    Collection Time: 10/30/20  1:18 PM   Result Value Ref Range    Glucose (POC) 107 (H) 65 - 100 mg/dL   CBC WITH AUTOMATED DIFF    Collection Time: 10/30/20  1:20 PM   Result Value Ref Range    WBC 8.2 4.3 - 11.1 K/uL    RBC 4.74 4.05 - 5.2 M/uL    HGB 14.3 11.7 - 15.4 g/dL    HCT 41.7 35.8 - 46.3 %    MCV 88.0 79.6 - 97.8 FL    MCH 30.2 26.1 - 32.9 PG    MCHC 34.3 31.4 - 35.0 g/dL    RDW 13.3 11.9 - 14.6 %    PLATELET 195 722 - 849 K/uL    MPV 9.7 9.4 - 12.3 FL    ABSOLUTE NRBC 0.00 0.0 - 0.2 K/uL    DF AUTOMATED      NEUTROPHILS 76 43 - 78 %    LYMPHOCYTES 12 (L) 13 - 44 %    MONOCYTES 9 4.0 - 12.0 %    EOSINOPHILS 2 0.5 - 7.8 %    BASOPHILS 1 0.0 - 2.0 %    IMMATURE GRANULOCYTES 1 0.0 - 5.0 %    ABS. NEUTROPHILS 6.2 1.7 - 8.2 K/UL    ABS. LYMPHOCYTES 1.0 0.5 - 4.6 K/UL    ABS. MONOCYTES 0.7 0.1 - 1.3 K/UL    ABS. EOSINOPHILS 0.1 0.0 - 0.8 K/UL    ABS. BASOPHILS 0.1 0.0 - 0.2 K/UL    ABS. IMM. GRANS. 0.1 0.0 - 0.5 K/UL   METABOLIC PANEL, COMPREHENSIVE    Collection Time: 10/30/20  1:20 PM   Result Value Ref Range    Sodium 134 (L) 136 - 145 mmol/L    Potassium 3.8 3.5 - 5.1 mmol/L    Chloride 100 98 - 107 mmol/L    CO2 27 21 - 32 mmol/L    Anion gap 7 7 - 16 mmol/L    Glucose 114 (H) 65 - 100 mg/dL    BUN 12 8 - 23 MG/DL    Creatinine 0.74 0.6 - 1.0 MG/DL    GFR est AA >60 >60 ml/min/1.73m2    GFR est non-AA >60 >60 ml/min/1.73m2    Calcium 9.3 8.3 - 10.4 MG/DL    Bilirubin, total 0.5 0.2 - 1.1 MG/DL    ALT (SGPT) 16 12 - 65 U/L    AST (SGOT) 25 15 - 37 U/L    Alk.  phosphatase 99 50 - 136 U/L    Protein, total 7.8 6.3 - 8.2 g/dL    Albumin 3.7 3.2 - 4.6 g/dL    Globulin 4.1 (H) 2.3 - 3.5 g/dL    A-G Ratio 0.9 (L) 1.2 - 3.5 PROTHROMBIN TIME + INR    Collection Time: 10/30/20  1:20 PM   Result Value Ref Range    Prothrombin time 15.1 (H) 12.5 - 14.7 sec    INR 1.2     EKG, 12 LEAD, INITIAL    Collection Time: 10/30/20  1:53 PM   Result Value Ref Range    Ventricular Rate 72 BPM    Atrial Rate 69 BPM    P-R Interval 158 ms    QRS Duration 80 ms    Q-T Interval 414 ms    QTC Calculation (Bezet) 453 ms    Calculated P Axis 81 degrees    Calculated R Axis 36 degrees    Calculated T Axis 56 degrees    Diagnosis       !! AGE AND GENDER SPECIFIC ECG ANALYSIS !!   Normal sinus rhythm  Normal ECG  When compared with ECG of 07-JUL-2018 18:20,  Sinus rhythm has replaced Electronic atrial pacemaker  Nonspecific T wave abnormality no longer evident in Lateral leads  Confirmed by ELIAS DUNCAN (), Miguel Angel Solomon ([id number]) on 10/30/2020 4:13:15 PM     URINALYSIS W/ RFLX MICROSCOPIC    Collection Time: 10/30/20  3:19 PM   Result Value Ref Range    Color YELLOW      Appearance CLEAR      Specific gravity 1.007 1.001 - 1.023      pH (UA) 7.5 5.0 - 9.0      Protein Negative NEG mg/dL    Glucose Negative mg/dL    Ketone TRACE (A) NEG mg/dL    Bilirubin Negative NEG      Blood Negative NEG      Urobilinogen 0.2 0.2 - 1.0 EU/dL    Nitrites Negative NEG      Leukocyte Esterase Negative NEG     TSH 3RD GENERATION    Collection Time: 10/30/20  5:37 PM   Result Value Ref Range    TSH 0.911 0.358 - 3.740 uIU/mL   VITAMIN B12    Collection Time: 10/30/20  5:37 PM   Result Value Ref Range    Vitamin B12 1,171 (H) 193 - 986 pg/mL   RPR    Collection Time: 10/30/20  5:37 PM   Result Value Ref Range    RPR NONREACTIVE NR     LIPID PANEL    Collection Time: 10/31/20  5:22 AM   Result Value Ref Range    LIPID PROFILE          Cholesterol, total 176 <200 MG/DL    Triglyceride 44 35 - 150 MG/DL    HDL Cholesterol 64 (H) 40 - 60 MG/DL    LDL, calculated 103.2 (H) <100 MG/DL    VLDL, calculated 8.8 6.0 - 23.0 MG/DL    CHOL/HDL Ratio 2.8     HEMOGLOBIN A1C WITH EAG    Collection Time: 10/31/20  5:22 AM   Result Value Ref Range    Hemoglobin A1c 5.8 4.8 - 6.0 %    Est. average glucose 120 mg/dL   CBC WITH AUTOMATED DIFF    Collection Time: 10/31/20  5:22 AM   Result Value Ref Range    WBC 9.7 4.3 - 11.1 K/uL    RBC 4.39 4.05 - 5.2 M/uL    HGB 13.1 11.7 - 15.4 g/dL    HCT 39.1 35.8 - 46.3 %    MCV 89.1 79.6 - 97.8 FL    MCH 29.8 26.1 - 32.9 PG    MCHC 33.5 31.4 - 35.0 g/dL    RDW 13.3 11.9 - 14.6 %    PLATELET 790 014 - 633 K/uL    MPV 9.8 9.4 - 12.3 FL    ABSOLUTE NRBC 0.00 0.0 - 0.2 K/uL    DF AUTOMATED      NEUTROPHILS 82 (H) 43 - 78 %    LYMPHOCYTES 9 (L) 13 - 44 %    MONOCYTES 7 4.0 - 12.0 %    EOSINOPHILS 1 0.5 - 7.8 %    BASOPHILS 1 0.0 - 2.0 %    IMMATURE GRANULOCYTES 1 0.0 - 5.0 %    ABS. NEUTROPHILS 7.9 1.7 - 8.2 K/UL    ABS. LYMPHOCYTES 0.9 0.5 - 4.6 K/UL    ABS. MONOCYTES 0.7 0.1 - 1.3 K/UL    ABS. EOSINOPHILS 0.1 0.0 - 0.8 K/UL    ABS. BASOPHILS 0.1 0.0 - 0.2 K/UL    ABS. IMM. GRANS. 0.1 0.0 - 0.5 K/UL   METABOLIC PANEL, COMPREHENSIVE    Collection Time: 10/31/20  5:22 AM   Result Value Ref Range    Sodium 134 (L) 138 - 145 mmol/L    Potassium 3.5 3.5 - 5.1 mmol/L    Chloride 101 98 - 107 mmol/L    CO2 25 21 - 32 mmol/L    Anion gap 8 7 - 16 mmol/L    Glucose 101 (H) 65 - 100 mg/dL    BUN 7 (L) 8 - 23 MG/DL    Creatinine 0.58 (L) 0.6 - 1.0 MG/DL    GFR est AA >60 >60 ml/min/1.73m2    GFR est non-AA >60 >60 ml/min/1.73m2    Calcium 8.7 8.3 - 10.4 MG/DL    Bilirubin, total 0.9 0.2 - 1.1 MG/DL    ALT (SGPT) 14 12 - 65 U/L    AST (SGOT) 20 15 - 37 U/L    Alk.  phosphatase 87 50 - 136 U/L    Protein, total 6.6 6.3 - 8.2 g/dL    Albumin 3.2 3.2 - 4.6 g/dL    Globulin 3.4 2.3 - 3.5 g/dL    A-G Ratio 0.9 (L) 1.2 - 3.5     MAGNESIUM    Collection Time: 10/31/20  5:22 AM   Result Value Ref Range    Magnesium 2.2 1.8 - 2.4 mg/dL   GLUCOSE, POC    Collection Time: 10/31/20  7:32 AM   Result Value Ref Range    Glucose (POC) 110 (H) 65 - 100 mg/dL   GLUCOSE, POC    Collection Time: 10/31/20 12:13 PM Result Value Ref Range    Glucose (POC) 158 (H) 65 - 100 mg/dL   GLUCOSE, POC    Collection Time: 10/31/20  3:46 PM   Result Value Ref Range    Glucose (POC) 122 (H) 65 - 100 mg/dL   CBC WITH AUTOMATED DIFF    Collection Time: 11/01/20  5:26 AM   Result Value Ref Range    WBC 9.6 4.3 - 11.1 K/uL    RBC 4.56 4.05 - 5.2 M/uL    HGB 13.7 11.7 - 15.4 g/dL    HCT 39.8 35.8 - 46.3 %    MCV 87.3 79.6 - 97.8 FL    MCH 30.0 26.1 - 32.9 PG    MCHC 34.4 31.4 - 35.0 g/dL    RDW 13.2 11.9 - 14.6 %    PLATELET 920 775 - 477 K/uL    MPV 9.6 9.4 - 12.3 FL    ABSOLUTE NRBC 0.00 0.0 - 0.2 K/uL    DF AUTOMATED      NEUTROPHILS 70 43 - 78 %    LYMPHOCYTES 17 13 - 44 %    MONOCYTES 9 4.0 - 12.0 %    EOSINOPHILS 2 0.5 - 7.8 %    BASOPHILS 1 0.0 - 2.0 %    IMMATURE GRANULOCYTES 1 0.0 - 5.0 %    ABS. NEUTROPHILS 6.7 1.7 - 8.2 K/UL    ABS. LYMPHOCYTES 1.6 0.5 - 4.6 K/UL    ABS. MONOCYTES 0.9 0.1 - 1.3 K/UL    ABS. EOSINOPHILS 0.2 0.0 - 0.8 K/UL    ABS. BASOPHILS 0.1 0.0 - 0.2 K/UL    ABS. IMM.  GRANS. 0.1 0.0 - 0.5 K/UL   METABOLIC PANEL, BASIC    Collection Time: 11/01/20  5:26 AM   Result Value Ref Range    Sodium 136 136 - 145 mmol/L    Potassium 3.6 3.5 - 5.1 mmol/L    Chloride 101 98 - 107 mmol/L    CO2 29 21 - 32 mmol/L    Anion gap 6 (L) 7 - 16 mmol/L    Glucose 100 65 - 100 mg/dL    BUN 6 (L) 8 - 23 MG/DL    Creatinine 0.54 (L) 0.6 - 1.0 MG/DL    GFR est AA >60 >60 ml/min/1.73m2    GFR est non-AA >60 >60 ml/min/1.73m2    Calcium 8.7 8.3 - 10.4 MG/DL   SARS-COV-2    Collection Time: 11/01/20 12:36 PM   Result Value Ref Range    Specimen source NP SWAB     COVID-19 rapid test Not detected NOTD      SARS CoV-2 PENDING        Assessment:     Problem List as of 11/2/2020 Date Reviewed: 10/22/2020          Codes Class Noted - Resolved    Irritable bowel syndrome with constipation ICD-10-CM: K58.1  ICD-9-CM: 564.1  10/26/2020 - Present        History of colon polyps ICD-10-CM: Z86.010  ICD-9-CM: V12.72  10/26/2020 - Present    Overview Signed 10/26/2020  4:51 PM by Lux Gonsales NP     Tubular adenoma 2014             Chronic constipation ICD-10-CM: K59.09  ICD-9-CM: 564.00  10/26/2020 - Present        Memory loss ICD-10-CM: R41.3  ICD-9-CM: 780.93  11/12/2019 - Present    Overview Signed 10/19/2020 11:29 AM by Lux Gonsales NP     Last Assessment & Plan:   Patient complains of memory loss and confusion. Will get an MRI of brain, further treatment pending results. Pacemaker ICD-10-CM: Z95.0  ICD-9-CM: V45.01  7/11/2018 - Present        Paroxysmal atrial fibrillation (HCC) ICD-10-CM: I48.0  ICD-9-CM: 427.31  6/1/2018 - Present    Overview Signed 10/19/2020 11:32 AM by Lux Gonsales NP     Last Assessment & Plan:   Stable on current therapy. No change in treatment at this time. CVA (cerebral vascular accident) [facility]) ICD-10-CM: I63.9  ICD-9-CM: 434.91  5/15/2018 - Present        Hypothyroidism ICD-10-CM: E03.9  ICD-9-CM: 244.9  5/14/2018 - Present        TIA (transient ischemic attack) ICD-10-CM: G45.9  ICD-9-CM: 435.9  11/14/2017 - Present        Hyponatremia ICD-10-CM: E87.1  ICD-9-CM: 276.1  11/14/2017 - Present        Diverticulosis of large intestine without hemorrhage ICD-10-CM: K57.30  ICD-9-CM: 562.10  10/4/2017 - Present        Age related osteoporosis ICD-10-CM: M81.0  ICD-9-CM: 733.01  3/9/2017 - Present    Overview Signed 10/19/2020 11:29 AM by Lux Gonsales NP     Last Assessment & Plan:   Stable on current therapy. No change in treatment at this time.              History of subdural hematoma ICD-10-CM: Z86.79  ICD-9-CM: V12.59  9/13/2016 - Present        HTN (hypertension) ICD-10-CM: I10  ICD-9-CM: 401.9  6/27/2016 - Present        Vitamin D deficiency ICD-10-CM: E55.9  ICD-9-CM: 268.9  9/1/2015 - Present        Hyperlipidemia ICD-10-CM: E78.5  ICD-9-CM: 272.4  10/3/2008 - Present    Overview Signed 10/19/2020 11:29 AM by Lux Gonsales NP Hyperlipidemia  Hyperlipidemia             RESOLVED: Chest pain ICD-10-CM: R07.9  ICD-9-CM: 786.50  2/14/2019 - 10/19/2020        RESOLVED: Confusion ICD-10-CM: R41.0  ICD-9-CM: 298.9  5/14/2018 - 10/19/2020        RESOLVED: Hypertensive emergency ICD-10-CM: I16.1  ICD-9-CM: 401.9  5/14/2018 - 10/19/2020        RESOLVED: Atrial fibrillation ([facility] ICD-10-CM: I48.91  ICD-9-CM: 427.31  4/16/2018 - 10/19/2020        RESOLVED: Slurred speech ICD-10-CM: R47.81  ICD-9-CM: 784.59  11/14/2017 - 10/19/2020        RESOLVED: Acute encephalopathy ICD-10-CM: G93.40  ICD-9-CM: 348.30  6/27/2016 - 10/19/2020        RESOLVED: Sepsis ([facility] ICD-10-CM: A41.9  ICD-9-CM: 038.9, 995.91  6/27/2016 - 10/19/2020        RESOLVED: Brain mass ICD-10-CM: G93.89  ICD-9-CM: 348.89  6/27/2016 - 11/14/2017        RESOLVED: Leukocytosis ICD-10-CM: Z91.268  ICD-9-CM: 288.60  6/27/2016 - 11/14/2017        RESOLVED: Hypothyroidism due to acquired atrophy of thyroid ICD-10-CM: E03.4  ICD-9-CM: 244.8, 246.8  9/1/2015 - 10/19/2020    Overview Signed 10/19/2020 11:29 AM by Edith Ovalle NP     Last Assessment & Plan:   Stable on current therapy. No change in treatment at this time. Acute ischemic left thalamus/internal capsule CVA    HTN/A. Fib/pacemaker - on losartan, norvasc, tikosyn, continues on telemetry, avoid hypotension, bmp ordered randee. Am  -11/2 tele remains NSR; can dc tele. Bmp wnl. HR 70-87. bp 143/63     Chronic anticoagulation management/DVT prophylaxis - on newly begun eliquis bid, asa 81mg every day, cbc ordered randee. Am  -11/2 hgb 13.7    HLD; cont Lipitor    Hypothyroidism; cont synthroid    Hyponatremia; stable at 134     Hx IBS/constipation; cont pericolace with prn bowel program    At risk of post stroke depression; affect blunted, emotional lability. Consider antidepressant. Time spent was 25 minutes with over 1/2 in direct patient care/examination, consultation and coordination of care. Signed By: Janes Gil MD     November 2, 2020

## 2020-11-02 NOTE — PROGRESS NOTES
PHYSICAL THERAPY EXAMINATION  Time: 2212-9736    Patient Name: Klever Royal  Patient Age: 78 y.o. Past Medical History:   Past Medical History:   Diagnosis Date    Acute encephalopathy 6/27/2016    Anxiety     Arthritis     Atrial fibrillation ([facility] 4/16/2018    Chronic pain     hip    Endocrine disease     hypothyroidism    Hypertension     Sepsis ([facility] 6/27/2016    Thyroid disease     TIA (transient ischemic attack) 11/14/2017    Vitamin D deficiency 9/1/2015       Medical Diagnosis: CVA (cerebral vascular accident) ([facility] [I63.9] <principal problem not specified>    Precautions at Admission: Other (comment)(fall risk)    Therapy Diagnosis:   Difficulty with bed mobility  [x]     Difficulty with functional transfers  [x]     Difficulty with ambulation  [x]     Difficulty with stair negotiations  [x]       Problem List:    Decreased strength B LE  [x]     Decreased strength trunk/core  [x]     Decreased AROM   []     Decreased PROM  []    Decreased endurance  [x]     Decreased balance sitting  [x]     Decreased balance standing  [x]     Pain   [x]     Slow ambulation velocity  [x]    Decreased coordination  [x]    Decreased safety awareness  [x]      Functional Limitations:   Decreased independence with bed mobility  [x]     Decreased independence with functional transfers  [x]     Decreased independence with ambulation  [x]     Decreased independence with stair negotiation  [x]       Previous Functional Level:  Independent with functional mobility without use of AD    Home Environment:   Home Environment: Private residence  # Steps to Enter: 0  Rails to Enter: No  One/Two Story Residence: Split level  # of Interior Steps: 14(7 x 2)  Living Alone: Yes  Support Systems: Child(vivi), Family member(s)  Patient Expects to be Discharged to[de-identified] Private residence  Current DME Used/Available at Home: None         Outcome Measures:     BP (!) 143/63   Pulse 87   Temp 97.7 °F (36.5 °C)   Resp 14   SpO2 96% Pain level: 5/10  Pain location: shins  Pain interventions: rest    Patient education: Pt educated on role of PT and oriented to [facility]. Interdisciplinary Communication: Collaborated with OT regarding pt's anticipated plan of care    Pt left in recliner with call bell & needs in reach. Pt in NAD and safety alarm activated. Cognition: Pt is alert & oriented x 3 and follows commands appropriately.     MMT Initial Asssessment   Right Lower Extremity Left Lower Extremity   Hip Flexion 4 4   Knee Extension 4 4   Knee Flexion 4- 4-   Ankle Dorsiflexion 5 5   0/5 No palpable muscle contraction  1/5 Palpable muscle contraction, no joint movement  2-/5 Less than full range of motion in gravity eliminated position  2/5 Able to complete full range of motion in gravity eliminated position  2+/5 Able to initiate movement against gravity  3-/5 More than half but not full range of motion against gravity  3/5 Able to complete full range of motion against gravity  3+/5 Completes full range of motion against gravity with minimal resistance  4-/5 Completes full range of motion against gravity with minimal-moderate resistance  4/5 Completes full range of motion against gravity with moderate resistance  4+/5 Completes full range of motion against gravity with moderate-maximum resistance  5/5 Completes full range of motion against gravity with maximum resistance    AROM: WNL    PRIMARY MODE OF LOCOMOTION: ambulation    BED/CHAIR/WHEELCHAIR TRANSFERS Initial Assessment   Rolling Right 5 (Stand-by assistance)   Rolling Left 5 (Stand-by assistance)   Supine to Sit 4 (Contact guard assistance)   Sit to Stand Contact guard assistance   Sit to Supine 5 (Supervision)   Transfer Type SPT with walker   Comments Increased time & effort with cues to use arm rests   Car Transfer Not tested   Car Type         WHEELCHAIR MOBILITY/MANAGEMENT Initial Assessment   Able to Propel     W/C Assistance     Curbs/ramps assistance required 0 (Not tested) Wheelchair set up assistance required 0 (Not tested)   Wheelchair management         WALKING INDEPENDENCE Initial Assessment   Assistive device Gait belt, Walker, rolling   Ambulation assistance - level surface 4 (Minimal assistance)   Distance 80 Feet (ft)   Comments Decreased jonatan, decreased step length, downward gaze, decreased trunk rotation, shuffling gait pattern   Ambulation assistance - unlevel surface 0 (Not tested)       STEPS/STAIRS Initial Assessment   Steps/Stairs ambulated 0   Stairs Assistance 0 (Not tested)   Rail Use     Comments Not tested due to time constraints   Curbs/Ramps 0 (Not tested)       QUALITY INDICATOR ASSIST COMMENTS   Roll right (& return to back) 4: Supervision or touching A    Roll left (& return to back) 4: Supervision or touching A    Supine to sit 4: Supervision or touching A    Sit to stand 4: Supervision or touching A    Chair/bed-to-chair transfer 4: Supervision or touching A    Walk 10 feet 3: Partial/Moderate A    Walk 50 feet with 2 turns 3: Partial/Moderate A    Walk 150 feet Not Tested: Not attempted due to safety concerns    Walk 10 feet on uneven  Not Tested: Not attempted due to safety concerns    1 step/curb Not Tested: Not attempted due to safety concerns    4 steps Not Tested: Not attempted due to environmental concerns: Time    12 steps Not Tested: Not attempted due to environmental concerns: Time     object Not Tested: Not attempted due to environmental concerns: Time    Wheel 48' with 2 turns Not Tested: Not applicable secondary to pt not completing activity previously    Wheel 150' Not Tested: Not applicable secondary to pt not completing activity previously    Car Transfer Not Tested: Not attempted due to safety concerns           PHYSICAL THERAPY PLAN OF CARE    Therapy Diagnosis: Please see table above. Order received from MD for physical therapy services and chart reviewed.  Pt to be seen at least 5 times per week for at least 1.5 hours of physical therapy per day for 10 days. Thank you for the referral.    LTGs: Please see Care Plan for goals. Pt would benefit from skilled physical therapy in order to improve independent functional mobility within the home. Interventions may include range of motion (AROM, PROM B LE/trunk), motor function (B LE/trunk strengthening/coordination), activity tolerance (vitals, oxygen saturation levels), bed mobility training, balance activities, gait training (progressive ambulation program), and functional transfer training. Please see IRC; Interdisciplinary Eval, Care Plan, and Patient Education for further information regarding physical therapy examination and plan of care.      Neal Tillman, PT, DPT  11/2/2020

## 2020-11-02 NOTE — PROGRESS NOTES
Problem: Falls - Risk of  Goal: *Absence of Falls  Description: Document Imelda Hyman Fall Risk and appropriate interventions in the flowsheet.   Outcome: Progressing Towards Goal  Note: Fall Risk Interventions:  Mobility Interventions: Utilize walker, cane, or other assistive device    Mentation Interventions: Bed/chair exit alarm, Door open when patient unattended    Medication Interventions: Bed/chair exit alarm, Patient to call before getting OOB    Elimination Interventions: Call light in reach    History of Falls Interventions: Bed/chair exit alarm, Door open when patient unattended         Problem: Patient Education: Go to Patient Education Activity  Goal: Patient/Family Education  Outcome: Progressing Towards Goal

## 2020-11-02 NOTE — PROGRESS NOTES
STG: Patient will participate in high level word finding tasks. With 90% accuracy   STG: Patient will identify appropriate solutions to problems with 90% accuracy. STG: Patient will verbally sequence steps to a task with 90% accuracy. STG: Patient will solve simple level mathematical problems with 90% accuracy. STG: Patient will complete reasoning tasks to improve problem solving and safety awareness with 90% accuracy. STG: Patient will demonstrate alternating attention by being able to shift focus with 90% accuracy. STG: Patient will name 10 items to given category with 90% accuracy. STG: Patient will describe given picture with 90% accuracy. LTG: Patient will increase cognitive-linguistic skills to communicate wants/needs across environments. LTG: Patient will improve neuro-linguistic abilities to increase safety and awareness in functional living environment. INITIAL BEDSIDE SWALLOWING AND COGNITIVE ASSESSMENT     11/02/20 1408   Time With Patient   Time In 1215   Time Out 1240   Expression (Native Language)   Primary Mode of Expression Verbal   Score 4   Social Interaction/Pragmatics   Score 5   Problem Solving   Score 3   Memory   Score 3        11/02/20 1410   Verbal Expression   Initiation No impairment   Automatic Speech Task No impairment   Naming Impaired   Divergent (%) 60 %   Speech Characteristics Word retrieval   Effective Techniques Word retrieval strategies   Neuro-Linguistics/Cognitive Function   Reasoning  Deductive reasoning; Abstract reasoning;Mental flexibility   Organizational Simple functional tasks; Sequencing   Problem Solving Simple; Functional   Mathematics Subtraction, simple   Memory  Short-term        11/02/20 1449   PO Trials   Assessment Method(s) Observation   Vocal Quality No impairment   Consistency Presented Solid; Thin liquid   How Presented Self-fed/presented;Straw;Cup/sip   Bolus Acceptance No impairment   Bolus Formation/Control No impairment   Propulsion No impairment   Oral Residue None   Laryngeal Elevation Functional   Aspiration Signs/Symptoms None   Pharyngeal Phase Characteristics No impairment, issues, or problems      Patient participated with a bedside swallowing assessment and initiated cognitive-linguistic assessment. Oropharyngeal swallow within functional limits with no overt signs or symptoms of aspiration observed with any consistency. Normal mastication and initiation of the swallow but with limited appetite with lunch. Recommend continue current regular texture diet and thin liquids. Medications one at a time with a liquid wash. Receptive language grossly intact with patient able to follow 1-2 step directions. Mild difficulty with multi-step appeared more related to immediate recall. Automatic speech and confrontational naming for higher frequency words intact but difficulty with lower frequency words and difficulty noted with word retrieval in conversational speech. Stated [employer] was the word for my diagnosis again\" and could not recall the name of a restaurant she frequently would visit near her house. Patient aware of difficulty with word retrieval stating \"this is not right for me to not think of these things\". [place] divergent naming completed with 5-7 items named per general category given one minute. Demonstrated moderate difficulty with basic verbal abstract reasoning task and decreased visual recall of pictures named at the beginning of the session recalling 1/3 independently and 2/3 with a semantic cue.   Patient would benefit from ongoing speech therapy to address cognitive/linguistic deficits.       Sina Pascual MS, CCC-SLP

## 2020-11-02 NOTE — PROGRESS NOTES
PHYSICAL THERAPY DAILY NOTE  Time In: 7058  Time Out: 2109  Patient Seen For: PM;Gait training;Patient education; Therapeutic exercise;Transfer training    Subjective: Pt agreeable to therapy this afternoon. Pt stating that OT was tiring for her. Objective: Other (comment)(fall risk)    COGNITION Daily Assessment    Pt is alert and oriented and following commands appropriately 80% of the time. Pt communicates verbally. Pt has a flat affect. Pt is willing to participate in therapy. Pt demonstrates poor carryover and recall of education. Pt requires frequent cueing to adhere to task. Pt demonstrates expressive aphasia and difficulty with word finding.        BED/MAT MOBILITY Daily Assessment    Rolling Right : 5 (Stand-by assistance)  Rolling Left : 5 (Stand-by assistance)  Supine to Sit : 4 (Contact guard assistance)  Sit to Supine : 5 (Supervision)       TRANSFERS Daily Assessment   Increased time and effort to complete; encouraging pt to use arm rests for safe SPT and sit <> stand transfers rather than falling in the chair; mod carryover Transfer Type: SPT with walker  Transfer Assistance : 5 (Stand-by assistance)  Sit to Stand Assistance: Contact guard assistance  Car Transfers: Not tested       GAIT Daily Assessment    Amount of Assistance: 0 (Not tested)  Distance (ft): 80 Feet (ft)  Assistive Device: Gait belt;Walker, rolling       STEPS or STAIRS Daily Assessment   Pt ambulating with reciprocal gait up/down 4 stairs x 3 with one sitting rest break throughout trials Steps/Stairs Ambulated (#): 4(x3)  Level of Assist : 5 (Stand-by assistance)  Rail Use: Both       BALANCE Daily Assessment    Sitting - Static: Good (unsupported)  Sitting - Dynamic: Fair (occasional)  Standing - Static: Fair  Standing - Dynamic : Impaired       WHEELCHAIR MOBILITY Daily Assessment    Curbs/Ramps Assist Required (FIM Score): 0 (Not tested)  Wheelchair Setup Assist Required : 0 (Not tested)     Therapeutic Interventions:  Stair management  MotoMed level 2 x 8 min  Isometric hip ADD 2 x 10 x 3  Seated marches with 2# weight 2 x 10    Vital Signs:  BP (!) 143/63   Pulse 87   Temp 97.7 °F (36.5 °C)   Resp 14   SpO2 96%      Pain level: no complaints  Pain location: N/A  Pain interventions: N/A    Patient education: Pt educated on how to perform exercises with appropriate technique. Pt educated on how to maneuver stairs with appropriate gait pattern. Interdisciplinary Communication: Collaborated with OT regarding pt's performance     Pt left in bedside chair with call bell and needs in reach. Pt in NAD and safety alarm activated. Assessment: Pt tolerated therapy fairly well today. Pt demonstrates increased fatigue levels and complained of being tired for the majority of the session. Pt demonstrates emotional lability and waxes & wanes with her affect. When prompted with the next task, pt would audibly grumble and indicate her distaste for having to do another activity. Pt demonstrates better than anticipated independence with stair management; initially stating that she couldn't do it, but then performed well with little to no cues or assistance from PT. Pt encouraged to keep a positive attitude and try her best during rehab sessions to maximize gains and potential of returning to PLOF. Pt would benefit from skilled physical therapy to maximize independence, increase activity tolerance, and improve functional mobility. Plan of Care: Continue with POC and progress as tolerated.       Christiano Chery, PT, DPT  11/2/2020

## 2020-11-02 NOTE — PROGRESS NOTES
[facility] OCCUPATIONAL THERAPY INITIAL EVALUATION    Time In: 0840  Time Out: 8713    Precautions: Falls, Poor Safety Awareness, Confused and Impulsive     Pain: Pt perseverating on pain in RUE (small scrape pt reports from trying to get out of bed over the weekend). Pt also reports pain in left jaw but did not quantify on pain scale. History of Presenting Illness (per previous reports): This is an 79 YO with PMH of A. Fib on xarelto, pacemaker, HTN, who was admitted on 10/30 with aphasia, confusion and right sided weakness. MRI was positive for an acute left thalamus/internal capsule CVA. Neurology was consulted. She became confused, agitated with follow-up head CT on 10/31 positive for left thalamic/internal capsule with possible second left frontal pole ischemic CVA. ECHO was noted to have an EF 55-60%, moderate dilation of LA and moderate dilation of RA and negative for PFO. It was suspected possible embolic CVA while anticoagulated with Xarelto. She was transitioned to Eliquis bid. Cardiology reported  possible watchman device given recurrent stroke on xarelto with an increased risk for falls. Further evaluation may be done as an outpatient vs. Inpatient. She was recently seen by [facility], Dr. Fco Prabhakar PTA. Physical therapy was initiated and patient was starting to mobilize. She has significant mobility, self care and speech impairments. It was felt she would benefit from comprehensive acute inpatient rehabilitation, continued close medical supervision and management prior to returning home. The medical stability and these recent medical events are not expected to interfere with the patient's ability to tolerate the intensity of services in acute rehab.     Past Medical History/ Co-morbidities:   Past Medical History:   Diagnosis Date    Acute encephalopathy 6/27/2016    Anxiety     Arthritis     Atrial fibrillation ([facility] [address] 4/16/2018    Chronic pain     hip    Endocrine disease     hypothyroidism    Hypertension     Sepsis ([address] 6/27/2016    Thyroid disease     TIA (transient ischemic attack) 11/14/2017    Vitamin D deficiency 9/1/2015       Patient's Goal: \"Return home. \"    Previous Level of Function: Pt reporting previous independence in ADLs, IADLs, and functional mobility without a device    [address] [facility] residence   Lives Alone Yes   Support Systems Child(vivi), Family member(s)   Shower Situation Walk in shower   Current DME None   Lift Chair No   Stairs to Enter 0      Rails        W/C Ramp     Interior Steps 14 (flat level > 7 steps to kitchen/LR > 7 steps to bedroom and bathroom)     Upper Extremity Function   LUE RUE   FMC Decreased, but functional Decreased, but functional   GMC Intact Intact   Light Touch       Sharp/Dull Discrimination       Hot/Cold       Proprioception       Stereognosis       9 Hole Peg Test  32 seconds  1.11 minutes      JUDEE JEANETTEE   General Evalutaion  WFL  WFL   Shoulder Flexion    3+    3+   Shoulder Extension     3+    3+   Shoulder Abduction       Shoulder Adduction       Elbow Flexion    3+    3+   Elbow Extension     3+  3+       3+    3+   0/5 No palpable muscle contraction  1/5 Palpable muscle contraction, no joint movement  2-/5 Less than full range of motion in gravity eliminated position  2/5 Able to complete full range of motion in gravity eliminated position  2+/5 Able to initiate movement against gravity  3-/5 More than half but not full range of motion against gravity  3/5 Able to complete full range of motion against gravity  3+/5 Completes full range of motion against gravity with minimal resistance  4-/5 Completes full range of motion against gravity with minimal-moderate resistance  4/5 Completes full range of motion against gravity with moderate resistance  4+/5 Completes full range of motion against gravity with moderate-maximum resistance  5/5 Completes full range of motion against gravity with maximum resistance      Functional Mobility   Score Comments   Rolling 4: Supervision or touching A Side: Bilateral  Supervision    Supine to Sit 4: Supervision or touching A Min assist   Sit to Supine 4: Supervision or touching A    Sit to Stand 3: Partial/Moderate A    Transfer Assist 4: Supervision or touching A Transfer Type: SPT   Equipment: Rolling Walker   Comments: CGA     Activities of Daily Living    Score Comments   Eating 6: Independent    Oral Hyigene 5: S/U or clean-up assist    Bathing 3: Partial/Moderate A Type of Shower: Shower  Position: Standing PRN and Unsupported Sitting   Adaptive  Equipment: Tub Transfer Bench and Grab Bars  Comments:    Upper Body  Dressing 4: Supervision or touching A Items Applied: Pullover  Position: Supported Sitting  Comments:    Lower Body Dressing 2: Substantial/Maximal A Items Applied: Underwear and Elastic pants  Position: Supported sitting and Standing PRN  Adaptive Equipment: RW  Comments:    Donning/[place] Footwear 2: Substantial/Maximal A Items Applied: Socks  Adaptive Equipment: N/A  Comments: Toilet Transfer 3: Partial/Moderate A Transfer Type: SPT   Equipment: grab bars   Comments: Toileting Hygiene 3: Partial/Moderate A Output: urine and BM  Comments:      Cognition: Pt was alert and agreeable to OT session. Will complete IRF-KORY next session. Pt with labile emotions but easily redirected. Vision/Perception: Formal testing needs to be completed. Session: Pt presents supine in bed and alert. At baseline pt lives alone and reports independence with ADLs. Pt does not own a shower chair nor grab bars in shower. Shower is a walk-in shower with a glass door. Pt has a strong towel rack on wall lateral to toilet. Pt has 3 children that live very close nearby. Upon arrival pt alert and agreeable to OT evaluation and treatment. Pt completed morning ADL with quality indicators reflected in chart above. Pt additionally engaged in informal OT interview.  Pt left seated in recliner with call bell within reach. Pt with deficits in strength, endurance, balance, and mobility impacting ADLs. Pt is functioning below baseline and would benefit from continued OT to increase safety and independence. Interdisciplinary Communication: Collaborated with PT and nursing for current level of function, plan of care, and measures to assure safety during their stay. Updated board with current level of function to increase carryover between disciplines. Patient/Family Education: Patient was/were educated On the role of OT, On POC and On IRC expectations. Problem List: [address], Activity Tolerance, Visual Perceptual , Safety Awareness, Strength, Pain, Sitting Balance, Standing Balance and Cognition    Functional Limitations: ADL, IADL, Functional Transfers and Functional Mobility    Goals: Please see Care Plan    OT order received and chart reviewed. OT orders have been acknowledged. Patient will benefit from skilled OT services to address ADL, functional transfers, UE strength, UE coordination, balance, cognition and activity tolerance to maximize functional performance with daily self-care tasks and functional mobility. Treatment is likely to include ADL, Balance, Strength, Activity tolerance, Neuro-muscular re-education, Visual perceptual, Cognitive, DME, AE, Family  and Safety awareness training to increase independence with self-care. Patient will be seen for 1.5-2 hours of skilled OT services 5-6 days a week as appropriate. Initate POC.      Khurram Hurtado OTR/L  11/2/2020

## 2020-11-02 NOTE — PROGRESS NOTES
BSN, CM met with pt at bedside. Pt sitting in bedside chair. Pt alert and oriented to person, place, time, and situation. Pt had some word finding difficulty but aware and voiced this issue. Pt verified demographic information. Pt gave home address and updated in chart. PCP verified as Alan Engel NP and pt was last seen by PCP about 2 weeks ago. Pt lives in split 3 level home alone, with about 14 total steps inside, 7 each floor and none to enter home. Pt bedroom on top level but there is a bedroom on main level pt can use as needed to avoid stairs. Pt reports being independent with ADLs and driving prior to admission. Pt reports having DMEs such as grab bars in bathroom. Cane and walker. Pt family able transport to doctor apt,  medications, and get groceries. Pt primary pharmacy is [facility] [phone]. Pt reports being able to afford medications. Pt reports availability for family to be at home with pt 24 hours as needed. Pt has 3 sons that live in her neighborhood. Pt reports plans to discharge home and unsure if [place] will be needed. Pt has used HH briefly in past but unsure of company and has not used SNF in the past. Pt has HCPOA and reports providing copy in the past. Pt aware of Wednesday Team meetings and would like family updated about meeting. CM spoke to son, TERRANCE, via telephone. All information confirmed. CM to continue to follow and monitor for any needs that may occur. Care Management Interventions  PCP Verified by CM: Yes(Denny Mcdonald NP)  Mode of Transport at Discharge:  Other (see comment)(Family to tranport )  Transition of Care Consult (CM Consult): Discharge Planning  Discharge Durable Medical Equipment: No  Physical Therapy Consult: Yes  Occupational Therapy Consult: Yes  Speech Therapy Consult: Yes  Current Support Network: Own Home, Lives Alone, Family Lives Nearby  Confirm Follow Up Transport: Family  The Plan for Transition of Care is Related to the Following Treatment Goals : Return to baseline  [employer] Provided?: No  Discharge Location  Discharge Placement: Home with home health(anticipated plan)

## 2020-11-02 NOTE — PROGRESS NOTES
Watchman workup is done as outpatient  Patient has been scheduled to see Dr Samantha Alford at [address] Cardiology on 12/2/2020 at 3:30 pm at [facility].

## 2020-11-03 PROCEDURE — 97116 GAIT TRAINING THERAPY: CPT

## 2020-11-03 PROCEDURE — 97150 GROUP THERAPEUTIC PROCEDURES: CPT

## 2020-11-03 PROCEDURE — 97530 THERAPEUTIC ACTIVITIES: CPT

## 2020-11-03 PROCEDURE — 97535 SELF CARE MNGMENT TRAINING: CPT

## 2020-11-03 PROCEDURE — 92507 TX SP LANG VOICE COMM INDIV: CPT

## 2020-11-03 PROCEDURE — 97110 THERAPEUTIC EXERCISES: CPT

## 2020-11-03 PROCEDURE — 99232 SBSQ HOSP IP/OBS MODERATE 35: CPT | Performed by: PHYSICAL MEDICINE & REHABILITATION

## 2020-11-03 PROCEDURE — 74011250637 HC RX REV CODE- 250/637: Performed by: PHYSICAL MEDICINE & REHABILITATION

## 2020-11-03 PROCEDURE — 65310000000 HC RM PRIVATE REHAB

## 2020-11-03 RX ORDER — AMLODIPINE BESYLATE 5 MG/1
7.5 TABLET ORAL DAILY
Status: DISCONTINUED | OUTPATIENT
Start: 2020-11-04 | End: 2020-11-07 | Stop reason: HOSPADM

## 2020-11-03 RX ADMIN — ATORVASTATIN CALCIUM 40 MG: 40 TABLET, FILM COATED ORAL at 21:30

## 2020-11-03 RX ADMIN — DOFETILIDE 250 MCG: 0.25 CAPSULE ORAL at 17:13

## 2020-11-03 RX ADMIN — APIXABAN 5 MG: 5 TABLET, FILM COATED ORAL at 08:46

## 2020-11-03 RX ADMIN — ASPIRIN 81 MG: 81 TABLET, CHEWABLE ORAL at 08:45

## 2020-11-03 RX ADMIN — DOFETILIDE 250 MCG: 0.25 CAPSULE ORAL at 08:46

## 2020-11-03 RX ADMIN — LEVOTHYROXINE SODIUM 88 MCG: 0.09 TABLET ORAL at 05:58

## 2020-11-03 RX ADMIN — MAGNESIUM GLUCONATE 500 MG ORAL TABLET 400 MG: 500 TABLET ORAL at 08:46

## 2020-11-03 RX ADMIN — AMLODIPINE BESYLATE 5 MG: 5 TABLET ORAL at 08:46

## 2020-11-03 RX ADMIN — APIXABAN 5 MG: 5 TABLET, FILM COATED ORAL at 21:30

## 2020-11-03 RX ADMIN — SENNOSIDES AND DOCUSATE SODIUM 2 TABLET: 8.6; 5 TABLET ORAL at 21:30

## 2020-11-03 RX ADMIN — LOSARTAN POTASSIUM 100 MG: 50 TABLET, FILM COATED ORAL at 08:44

## 2020-11-03 NOTE — PROGRESS NOTES
Problem: Falls - Risk of  Goal: *Absence of Falls  Description: Document Jackeline Lee Fall Risk and appropriate interventions in the flowsheet.   Outcome: Progressing Towards Goal  Note: Fall Risk Interventions:  Mobility Interventions: Utilize walker, cane, or other assistive device    Mentation Interventions: Bed/chair exit alarm, Door open when patient unattended    Medication Interventions: Patient to call before getting OOB    Elimination Interventions: Bed/chair exit alarm, Call light in reach    History of Falls Interventions: Bed/chair exit alarm, Consult care management for discharge planning         Problem: Patient Education: Go to Patient Education Activity  Goal: Patient/Family Education  Outcome: Progressing Towards Goal

## 2020-11-03 NOTE — PROGRESS NOTES
PHYSICAL THERAPY DAILY NOTE  Time In: (P) 1347  Time Out: (P) 1434  Patient Seen For: (P) PM;Other (see progress notes); Therapeutic exercise;Transfer training    Subjective: Patient complained of fatigue. Objective: No pain noted. Other (comment)(fall risk)  GROSS ASSESSMENT Daily Assessment            COGNITION Daily Assessment           BED/MAT MOBILITY Daily Assessment    Rolling Right : 5 (Stand-by assistance)  Rolling Left : 5 (Stand-by assistance)  Supine to Sit : (P) 4 (Minimal assistance)  Sit to Supine : (P) 4 (Minimal assistance)       TRANSFERS Daily Assessment    Transfer Type: (P) SPT without device  Transfer Assistance : (P) 3 (Moderate assistance )  Sit to Stand Assistance: (P) Contact guard assistance  Car Transfers: (P) Not tested       GAIT Daily Assessment    Amount of Assistance: (P) 0 (Not tested)  Distance (ft): 250 Feet (ft)(x1; 150 ft x 2)  Assistive Device: Gait belt;Walker, rolling       STEPS or STAIRS Daily Assessment    Steps/Stairs Ambulated (#): 0  Level of Assist : (P) 0 (Not tested)       BALANCE Daily Assessment    Sitting - Static: Good (unsupported)  Sitting - Dynamic: Fair (occasional)  Standing - Static: Fair  Standing - Dynamic : Impaired       WHEELCHAIR MOBILITY Daily Assessment    Curbs/Ramps Assist Required (FIM Score): 0 (Not tested)  Wheelchair Setup Assist Required : 0 (Not tested)       LOWER EXTREMITY EXERCISES Daily Assessment    Extremity: (P) Both  Exercise Type #1: (P) Supine lower extremity strengthening  Sets Performed: (P) 2  Reps Performed: (P) 10  Level of Assist: (P) Minimal assistance  Exercise Type #2: (P) Other (comment)(motomed x 10 minutes)  Sets Performed: (P) 1  Reps Performed: (P) 10  Level of Assist: (P) Stand-by assistance          Assessment: Patient making progress but doesn't seem to know her deficits. Plan of Care: Continue with plan of care.     Helen Pearson PTA  11/3/2020

## 2020-11-03 NOTE — PROGRESS NOTES
11/03/20 1439   Time Spent With Patient   Time In 1236   Time Out 1301   Patient Seen For: PM    Mental Status   Neurologic State Alert   Cognition Memory loss; Impaired decision making;Decreased attention/concentration   Safety/Judgement Home safety;Good awareness of safety precautions        11/03/20 1440   Verbal Expression   Naming Impaired   Confrontation (%) 35/60 on Canon Test (BNT) - moderate impairment  Semantic paraphasias   Naming cueing amount Mod-max   Speech Characteristics Word retrieval;Paraphasias   Effective Techniques Word retrieval strategies; circumlocution to provide intent  Only minimally assisted by phonemic cues provided   Exercise/Activities Tolerance   Exercise Tolerance/Response Cueing required      Patient participated with further expressive language assessment with the [employer].  Aware of difficulty with word retrieval stating \"I know all of these things that are a part of what I do and like but can't always think of their names\". Discussed word finding strategies and encouraged patient to attempt circumlocution to provide the context and intent to help her communication partner narrow down the topic instead of fixating on the word itself which can increase frustration. Patient completed the BNT with a score of 35/60 (moderate impairment range). Occasional semantic paraphasias (rat for [place] and guitar for harp). Phonemic cues were not as beneficial to determine the word as semantic cues. Patient beginning to recognize some of her deficits stating \"I don't think I'd be able to play Bridge or do a lot of the things I was doing right now\". However, also stated that she is not easily discouraged and is motivated to improve. Recommend continued ST to address cognitive-linguistic impairments.     Cookie Martinez MS, CCC-SLP

## 2020-11-03 NOTE — PROGRESS NOTES
PHYSICAL THERAPY DAILY NOTE  Time In: 5596  Time Out: 7092  Patient Seen For: AM;Gait training;Patient education; Therapeutic exercise    Subjective: Pt hesitant, but agreeable to participate in therapy this morning. Says her upper lip has been bothering her. Denies reports of pain. Complaining of feeling cold. Objective: Other (comment)(fall risk)    COGNITION Daily Assessment   Demonstrates difficulty with word finding and expressive aphasia Pt is alert and oriented and following commands appropriately 75% of the time. Pt communicates verbally. Pt has a flat affect. Pt is willing to participate in therapy. Pt demonstrates the need for repeated cueing and commands for follow through and carryover. BED/MAT MOBILITY Daily Assessment    Rolling Right : 5 (Stand-by assistance)  Rolling Left : 5 (Stand-by assistance)  Supine to Sit : 4 (Contact guard assistance)  Sit to Supine : 5 (Supervision)       TRANSFERS Daily Assessment   Performed 2 x 5 sit <> stands from recliner in pt room with SBA and cueing from PT to use arm rests and not pull on RW handles Transfer Type: SPT with walker  Transfer Assistance : 5 (Stand-by assistance)  Sit to Stand Assistance: Contact guard assistance  Car Transfers: Not tested       GAIT Daily Assessment   Ambulated 250 feet x 1 and 150 feet x 2 with RW and CGA from PT    Pt demonstrates decreased step height, shuffling gait pattern, and decreased jonatan    Pt ambulating up/down ramp over carpeted surface with RW.  Pt requiring cues to not push RW too far in front of her with min carryover Amount of Assistance: 4 (Contact guard assistance)  Distance (ft): 250 Feet (ft)(x1; 150 ft x 2)  Assistive Device: Gait belt;Walker, rolling       STEPS or STAIRS Daily Assessment    Steps/Stairs Ambulated (#): 0  Level of Assist : 0 (Not tested)       BALANCE Daily Assessment    Sitting - Static: Good (unsupported)  Sitting - Dynamic: Fair (occasional)  Standing - Static: Fair  Standing - Dynamic : Impaired       WHEELCHAIR MOBILITY Daily Assessment    Curbs/Ramps Assist Required (FIM Score): 0 (Not tested)  Wheelchair Setup Assist Required : 0 (Not tested)     Therapeutic Interventions:  Gait training  NuStep level 1 x 7 min  Ramp training  Sit <> stands 2 x 5 from recliner    Vital Signs:  BP (!) 171/91   Pulse 79   Temp 98.4 °F (36.9 °C)   Resp 16   SpO2 95%      Pain level: no complaints  Pain location: N/A  Pain interventions: N/A    Patient education: Pt educated on how to perform gait training over ramp and level surfaces with RW. Interdisciplinary Communication: Collaborated with SLP regarding pt's cognition     Pt left in bedside chair with call bell and needs in reach. Pt in NAD and safety alarm activated. Assessment: Pt tolerated therapy well today. Pt demonstrates poor initiation and engagement with PT today. Pt demonstrates little desire or motivation to participate in therapy, but is willing to participate with PT encouragement. Pt demonstrates improvement with activity tolerance and endurance with walking as evident by walking further with less assistance and rest breaks compared to previous sessions. Pt continuously reporting fatigue throughout session, but complains about sitting around and not doing anything for the majority of the day. Pt stating that she won't be able to hang out with her friends after discharge because she wouldn't be able to participate in conversations due to her impairments. Pt would benefit from skilled physical therapy to maximize independence, increase activity tolerance, and improve functional mobility. Plan of Care: Continue with POC and progress as tolerated.       Joselito Christianson, PT, DPT  11/3/2020

## 2020-11-03 NOTE — PROGRESS NOTES
Carmine Galindo MD  Medical Director  [address] [facility], [address] [facility]  Tel: [phone] PROGRESS NOTE    Janee Titus  Admit Date: 11/1/2020  Admit Diagnosis:   CVA (cerebral vascular accident) [facility]) [I63.9]    Subjective     Patient seen and examined. Affect blunted. C/o upper lip feeling swollen. Reports it started after a.m. meds. No difficulty swallowing no swelling of tongue, no sob    Objective:     Current Facility-Administered Medications   Medication Dose Route Frequency    acetaminophen (TYLENOL) tablet 650 mg  650 mg Oral Q4H PRN    aspirin chewable tablet 81 mg  81 mg Oral DAILY    atorvastatin (LIPITOR) tablet 40 mg  40 mg Oral QHS    senna-docusate (PERICOLACE) 8.6-50 mg per tablet 2 Tab  2 Tab Oral QHS    sodium chloride (NS) flush 5-40 mL  5-40 mL IntraVENous PRN    amLODIPine (NORVASC) tablet 5 mg  5 mg Oral DAILY    apixaban (ELIQUIS) tablet 5 mg  5 mg Oral BID    dofetilide (TIKOSYN) capsule 250 mcg  250 mcg Oral BID    influenza vaccine 2020-21 (6 mos+)(PF) (FLUARIX/FLULAVAL/FLUZONE QUAD) injection 0.5 mL  0.5 mL IntraMUSCular PRIOR TO DISCHARGE    levothyroxine (SYNTHROID) tablet 88 mcg  88 mcg Oral ACB    losartan (COZAAR) tablet 100 mg  100 mg Oral DAILY    magnesium oxide (MAG-OX) tablet 400 mg  400 mg Oral DAILY       Review of Systems:   Denies chest pain, shortness of breath, cough, headache, visual problems, abdominal pain, dysuria, calf pain. Pertinent positives are as noted in the HPI, ROS unremarkable otherwise. Visit Vitals  BP (!) 171/91   Pulse 79   Temp 98.4 °F (36.9 °C)   Resp 16   SpO2 95%        Physical Exam:   General: Alert and age appropriately oriented. No acute cardiorespiratory distress. HEENT: Normocephalic, no scleral icterus. Oral mucosa moist without cyanosis. no observed swelling of tongue or lips   Lungs: Clear to auscultation bilaterally.   Respiration even and unlabored. Heart: irreg irreg, S1, S2. No murmurs, clicks, rub or gallops. Abdomen: Soft, non-tender, not distended. Bowel sounds normoactive. No organomegaly. Genitourinary: Deferred. Neuromuscular:      Expressive aphasia, makes needs known  No dysarthria, fcs well  Generalized prox>distal weakness  Plantar responseds down   Skin/extremity: No rashes, no erythema. No calf tenderness B LE. No edema                                                                              Functional Assessment:          Balance  Sitting - Static: Good (unsupported) (11/02/20 1500)  Sitting - Dynamic: Fair (occasional) (11/02/20 1500)  Standing - Static: Fair (11/02/20 1500)  Standing - Dynamic : Impaired (11/02/20 1500)                     Jackeline Lee Fall Risk Assessment:  Jackeline Lee Fall Risk  Mobility: Ambulates or transfers with assist devices or assistance (11/03/20 0716)  Mobility Interventions: Patient to call before getting OOB;Utilize walker, cane, or other assistive device (11/03/20 0716)  Mentation: Periodic confusion (11/03/20 0716)  Mentation Interventions: Adequate sleep, hydration, pain control;Bed/chair exit alarm; Door open when patient unattended;Evaluate medications/consider consulting pharmacy (11/03/20 2734)  Medication: Patient receiving anticonvulsants, sedatives(tranquilizers), psychotropics or hypnotics, hypoglycemics, narcotics, sleep aids, antihypertensives, laxatives, or diuretics (11/03/20 0716)  Medication Interventions: Assess postural VS orthostatic hypotension;Bed/chair exit alarm;Evaluate medications/consider consulting pharmacy; Patient to call before getting OOB (11/03/20 9343)  Elimination: Needs assistance with toileting (11/03/20 0716)  Elimination Interventions: Bed/chair exit alarm;Call light in reach; Patient to call for help with toileting needs (11/03/20 0716)  Prior Fall History: Unknown (11/03/20 0716)  History of Falls Interventions: Bed/chair exit alarm; Consult care management for discharge planning (11/02/20 0732)  Total Score: 4 (11/03/20 0716)  High Fall Risk: Yes (11/03/20 0716)     Speech Assessment:  Aspiration Signs/Symptoms: None (11/02/20 1449)      Ambulation:  Gait  Distance (ft): 80 Feet (ft) (11/02/20 1200)  Assistive Device: Gait belt;Walker, rolling (11/02/20 1200)  Rail Use: Both (11/02/20 1500)     Labs/Studies:  Recent Results (from the past 72 hour(s))   GLUCOSE, POC    Collection Time: 10/31/20 12:13 PM   Result Value Ref Range    Glucose (POC) 158 (H) 65 - 100 mg/dL   GLUCOSE, POC    Collection Time: 10/31/20  3:46 PM   Result Value Ref Range    Glucose (POC) 122 (H) 65 - 100 mg/dL   CBC WITH AUTOMATED DIFF    Collection Time: 11/01/20  5:26 AM   Result Value Ref Range    WBC 9.6 4.3 - 11.1 K/uL    RBC 4.56 4.05 - 5.2 M/uL    HGB 13.7 11.7 - 15.4 g/dL    HCT 39.8 35.8 - 46.3 %    MCV 87.3 79.6 - 97.8 FL    MCH 30.0 26.1 - 32.9 PG    MCHC 34.4 31.4 - 35.0 g/dL    RDW 13.2 11.9 - 14.6 %    PLATELET 971 186 - 363 K/uL    MPV 9.6 9.4 - 12.3 FL    ABSOLUTE NRBC 0.00 0.0 - 0.2 K/uL    DF AUTOMATED      NEUTROPHILS 70 43 - 78 %    LYMPHOCYTES 17 13 - 44 %    MONOCYTES 9 4.0 - 12.0 %    EOSINOPHILS 2 0.5 - 7.8 %    BASOPHILS 1 0.0 - 2.0 %    IMMATURE GRANULOCYTES 1 0.0 - 5.0 %    ABS. NEUTROPHILS 6.7 1.7 - 8.2 K/UL    ABS. LYMPHOCYTES 1.6 0.5 - 4.6 K/UL    ABS. MONOCYTES 0.9 0.1 - 1.3 K/UL    ABS. EOSINOPHILS 0.2 0.0 - 0.8 K/UL    ABS. BASOPHILS 0.1 0.0 - 0.2 K/UL    ABS. IMM.  GRANS. 0.1 0.0 - 0.5 K/UL   METABOLIC PANEL, BASIC    Collection Time: 11/01/20  5:26 AM   Result Value Ref Range    Sodium 136 136 - 145 mmol/L    Potassium 3.6 3.5 - 5.1 mmol/L    Chloride 101 98 - 107 mmol/L    CO2 29 21 - 32 mmol/L    Anion gap 6 (L) 7 - 16 mmol/L    Glucose 100 65 - 100 mg/dL    BUN 6 (L) 8 - 23 MG/DL    Creatinine 0.54 (L) 0.6 - 1.0 MG/DL    GFR est AA >60 >60 ml/min/1.73m2    GFR est non-AA >60 >60 ml/min/1.73m2    Calcium 8.7 8.3 - 10.4 MG/DL   SARS-COV-2 Collection Time: 11/01/20 12:36 PM   Result Value Ref Range    Specimen source NP SWAB     COVID-19 rapid test Not detected NOTD      SARS CoV-2 Negative Negative         Assessment:     Problem List as of 11/3/2020 Date Reviewed: 10/22/2020          Codes Class Noted - Resolved    Irritable bowel syndrome with constipation ICD-10-CM: K58.1  ICD-9-CM: 564.1  10/26/2020 - Present        History of colon polyps ICD-10-CM: Z86.010  ICD-9-CM: V12.72  10/26/2020 - Present    Overview Signed 10/26/2020  4:51 PM by Bala Krause NP     Tubular adenoma 2014             Chronic constipation ICD-10-CM: K59.09  ICD-9-CM: 564.00  10/26/2020 - Present        Memory loss ICD-10-CM: R41.3  ICD-9-CM: 780.93  11/12/2019 - Present    Overview Signed 10/19/2020 11:29 AM by Bala Krause NP     Last Assessment & Plan:   Patient complains of memory loss and confusion. Will get an MRI of brain, further treatment pending results. Pacemaker ICD-10-CM: Z95.0  ICD-9-CM: V45.01  7/11/2018 - Present        Paroxysmal atrial fibrillation (HCC) ICD-10-CM: I48.0  ICD-9-CM: 427.31  6/1/2018 - Present    Overview Signed 10/19/2020 11:32 AM by Bala Krause NP     Last Assessment & Plan:   Stable on current therapy. No change in treatment at this time.              CVA (cerebral vascular accident) [facility]) ICD-10-CM: I63.9  ICD-9-CM: 434.91  5/15/2018 - Present        Hypothyroidism ICD-10-CM: E03.9  ICD-9-CM: 244.9  5/14/2018 - Present        TIA (transient ischemic attack) ICD-10-CM: G45.9  ICD-9-CM: 435.9  11/14/2017 - Present        Hyponatremia ICD-10-CM: E87.1  ICD-9-CM: 276.1  11/14/2017 - Present        Diverticulosis of large intestine without hemorrhage ICD-10-CM: K57.30  ICD-9-CM: 562.10  10/4/2017 - Present        Age related osteoporosis ICD-10-CM: M81.0  ICD-9-CM: 733.01  3/9/2017 - Present    Overview Signed 10/19/2020 11:29 AM by Bala Krause NP     Last Assessment & Plan: Stable on current therapy. No change in treatment at this time. History of subdural hematoma ICD-10-CM: Z86.79  ICD-9-CM: V12.59  9/13/2016 - Present        HTN (hypertension) ICD-10-CM: I10  ICD-9-CM: 401.9  6/27/2016 - Present        Vitamin D deficiency ICD-10-CM: E55.9  ICD-9-CM: 268.9  9/1/2015 - Present        Hyperlipidemia ICD-10-CM: E78.5  ICD-9-CM: 272.4  10/3/2008 - Present    Overview Signed 10/19/2020 11:29 AM by Nikhil Caballeor NP     Hyperlipidemia  Hyperlipidemia             RESOLVED: Chest pain ICD-10-CM: R07.9  ICD-9-CM: 786.50  2/14/2019 - 10/19/2020        RESOLVED: Confusion ICD-10-CM: R41.0  ICD-9-CM: 298.9  5/14/2018 - 10/19/2020        RESOLVED: Hypertensive emergency ICD-10-CM: I16.1  ICD-9-CM: 401.9  5/14/2018 - 10/19/2020        RESOLVED: Atrial fibrillation ([facility] ICD-10-CM: I48.91  ICD-9-CM: 427.31  4/16/2018 - 10/19/2020        RESOLVED: Slurred speech ICD-10-CM: R47.81  ICD-9-CM: 784.59  11/14/2017 - 10/19/2020        RESOLVED: Acute encephalopathy ICD-10-CM: G93.40  ICD-9-CM: 348.30  6/27/2016 - 10/19/2020        RESOLVED: Sepsis ([facility] ICD-10-CM: A41.9  ICD-9-CM: 038.9, 995.91  6/27/2016 - 10/19/2020        RESOLVED: Brain mass ICD-10-CM: G93.89  ICD-9-CM: 348.89  6/27/2016 - 11/14/2017        RESOLVED: Leukocytosis ICD-10-CM: U58.939  ICD-9-CM: 288.60  6/27/2016 - 11/14/2017        RESOLVED: Hypothyroidism due to acquired atrophy of thyroid ICD-10-CM: E03.4  ICD-9-CM: 244.8, 246.8  9/1/2015 - 10/19/2020    Overview Signed 10/19/2020 11:29 AM by Nikhil Caballero NP     Last Assessment & Plan:   Stable on current therapy. No change in treatment at this time.                       Acute ischemic left thalamus/internal capsule CVA     HTN/A. Fib/pacemaker - on losartan, norvasc, tikosyn, continues on telemetry, avoid hypotension, bmp ordered randee. Am  -11/2 tele remains NSR; can dc tele. Bmp wnl. HR 70-87.  bp 143/63  -11/3 scheduled to see Dr Sri Garner at [facility] on 12/2/2020 at 3:30 pm at [facility] for possible Watchman procedure; bp elevated this a.m. 171/91; inc norvasc to 7.5 mg for persistent SBP> 140s     Chronic anticoagulation management/DVT prophylaxis - on newly begun eliquis bid, asa 81mg every day, cbc ordered randee. Am  -11/2 hgb 13.7    11/3 new onset perceived swelling of upper lip. ? 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 Consider antidepressant.                 Time spent was 25 minutes with over 1/2 in direct patient care/examination, consultation and coordination of care.      Signed By: Brett Sánchez MD     November 3, 2020

## 2020-11-03 NOTE — PROGRESS NOTES
Time In 0915   Time Out 1000     Mobility   Score Comments   Supine to Sit 4: Supervision or touching A Supervision    Sit to Stand 4: Supervision or touching A Min assist   Transfer Assist 4: Supervision or touching A Transfer Type: SPT   Equipment: Rolling Walker   Comments: CGA     Activities of Daily Living    Score Comments   Eating 6: Independent    Oral Hyigene 5: S/U or clean-up assist    Bathing 4: Supervision or touching A Type of Shower: Shower  Position: Standing PRN and Unsupported Sitting   Adaptive  Equipment: Tub Transfer Bench and Grab Bars  Comments: CGA   Upper Body  Dressing 4: Supervision or touching A Items Applied: Pullover and Bra  Position: Supported Sitting  Comments: assist clasping bra   Lower Body Dressing 3: Partial/Moderate A Items Applied: Underwear and Elastic pants  Position: Supported sitting and Standing PRN  Adaptive Equipment: RW  Comments:    Donning/[place] Footwear 4: Supervision or touching A Items Applied: Socks  Adaptive Equipment: N/A  Comments: steady assist for dynamic balance   Toilet Transfer 4: Supervision or touching A Transfer Type: SPT   Equipment: Rolling Walker   Comments: [address] [facility] Hy[facility] 4: Supervision or touching A Output: urine   Comments: discussion of calling to go to the bathroom vs urinating purposefully in diaper, discussed bladder schedule with RN   Education  Transfer training techniques        S: \"I'm worn out from that [referring to shower]. \" Agreeable to therapy. Patient was able to ambulate ~10 feet using a RW with CGA. Pt completed morning ADLs with quality indicators reflected above. Pain not expressed. Patient Vitals for the past 12 hrs:   Temp Pulse Resp BP SpO2   11/03/20 0802 98.4 °F (36.9 °C) 79 16 (!) 171/91 95 %       Collaborated with RN regarding patient performance and POC.    Patient tolerated session well, but activity tolerance, balance, functional mobility, strength, coordination, cognition are still below baseline and require skilled facilitation to successfully and safely complete ADLs and transfers. Patient ended session in recliner with call remote and phone within reach.        Khurram Landrum, OTR/L  11/3/2020

## 2020-11-03 NOTE — PROGRESS NOTES
OT Daily Note  Time In 1300   Time Out 1347     Subjective: \"This is hard using my right hand. \"  Pain: not expressed  Education: purpose and benefits of use of affected dominant right hand   Interdisciplinary Communication: with PTA during handoff   Precautions: fall risk   Patient Vitals for the past 12 hrs:   Temp Pulse Resp BP SpO2   11/03/20 0802 98.4 °F (36.9 °C) 79 16 (!) 171/91 95 %         Coordination/Cognition Daily Assessment   Pt completed R hand coordination, problem solving, direction following, visual perceptual, and activity tolerance promoting task working on coloring holiday print out according to visual and verbal directions. Pt with initial difficulty assuming grasp on crayon, faciliated tripod grasp using affected R hand. Pt with moderate deviations coloring outside of boundary lines. Pt used L hand to stabilize paper throughout task while dominant R hand acted as fine motor assist. Decreased coordination and precision in right hand with pt also exhibiting frustration during task due to noticing deficits. Pt then cut out drawing using R hand with appropriate scissor grasp and L hand managing/rotating paper. Pt with fair cutting skills using dominant hand, with deviations cutting round objects >1/4 inch. Pt with decreased direction following when cutting out drawing with clear boundaries. Assist to help apply glue to paper with pt placing objects on glue appropriately according to directions. Assessment: Motor planning and coordination deficits noted during task with moderate verbal encouragement required to continue engagement in task working on functional right hand use. Pt with lower frustration tolerance during task but able to complete with prompting.    Plan: Continue OT POC with focus on ADL/IADL skills, functional transfers, functional mobility, coordination, strength, static and dynamic balance, and activity tolerance to maximize safety and independence with ADLs and functional transfers.      Khurram Landrum, OTR/L  11/3/2020

## 2020-11-04 PROCEDURE — 74011250637 HC RX REV CODE- 250/637: Performed by: PHYSICAL MEDICINE & REHABILITATION

## 2020-11-04 PROCEDURE — 99232 SBSQ HOSP IP/OBS MODERATE 35: CPT | Performed by: PHYSICAL MEDICINE & REHABILITATION

## 2020-11-04 PROCEDURE — 65310000000 HC RM PRIVATE REHAB

## 2020-11-04 PROCEDURE — 2709999900 HC NON-CHARGEABLE SUPPLY

## 2020-11-04 PROCEDURE — 97112 NEUROMUSCULAR REEDUCATION: CPT

## 2020-11-04 PROCEDURE — 97530 THERAPEUTIC ACTIVITIES: CPT

## 2020-11-04 PROCEDURE — 97116 GAIT TRAINING THERAPY: CPT

## 2020-11-04 PROCEDURE — 92507 TX SP LANG VOICE COMM INDIV: CPT

## 2020-11-04 PROCEDURE — 97110 THERAPEUTIC EXERCISES: CPT

## 2020-11-04 PROCEDURE — 97535 SELF CARE MNGMENT TRAINING: CPT

## 2020-11-04 RX ORDER — CARBOXYMETHYLCELLULOSE SODIUM 10 MG/ML
1 GEL OPHTHALMIC AS NEEDED
Status: DISCONTINUED | OUTPATIENT
Start: 2020-11-04 | End: 2020-11-07 | Stop reason: HOSPADM

## 2020-11-04 RX ADMIN — LOSARTAN POTASSIUM 100 MG: 50 TABLET, FILM COATED ORAL at 11:09

## 2020-11-04 RX ADMIN — MAGNESIUM GLUCONATE 500 MG ORAL TABLET 400 MG: 500 TABLET ORAL at 10:16

## 2020-11-04 RX ADMIN — DOFETILIDE 250 MCG: 0.25 CAPSULE ORAL at 10:15

## 2020-11-04 RX ADMIN — AMLODIPINE BESYLATE 7.5 MG: 5 TABLET ORAL at 10:15

## 2020-11-04 RX ADMIN — SENNOSIDES AND DOCUSATE SODIUM 2 TABLET: 8.6; 5 TABLET ORAL at 20:33

## 2020-11-04 RX ADMIN — ASPIRIN 81 MG: 81 TABLET, CHEWABLE ORAL at 10:15

## 2020-11-04 RX ADMIN — LEVOTHYROXINE SODIUM 88 MCG: 0.09 TABLET ORAL at 05:01

## 2020-11-04 RX ADMIN — ATORVASTATIN CALCIUM 40 MG: 40 TABLET, FILM COATED ORAL at 20:33

## 2020-11-04 RX ADMIN — APIXABAN 5 MG: 5 TABLET, FILM COATED ORAL at 10:15

## 2020-11-04 RX ADMIN — APIXABAN 5 MG: 5 TABLET, FILM COATED ORAL at 20:33

## 2020-11-04 RX ADMIN — DOFETILIDE 250 MCG: 0.25 CAPSULE ORAL at 17:36

## 2020-11-04 NOTE — PROGRESS NOTES
OT Daily Note  Time In 1256   Time Out 1347     Subjective: \"Don't trust anything I say, my memory is not working. \"  Pain: not expressed, pt indicating earlier that gum/mouth/upper lip feels swollen  Education: hand strengthening and FMCs, importance of getting up and going to the bathroom vs going in clothing   Interdisciplinary Communication: with PTA regarding home steps  Precautions: fall risk  Patient Vitals for the past 12 hrs:   Temp Pulse Resp BP SpO2   11/04/20 1015  72  (!) 140/62    11/04/20 0740 97.9 °F (36.6 °C) 81 16 (!) 144/70 95 %         Mobility   Score Comments   Sit to Stand 4: Supervision or touching A Min assist   Transfer Assist 4: Supervision or touching A Transfer Type: SPT   Equipment: Rolling Walker   Comments: CGA     Strengthening Daily Assessment   Pt completed the following exercises [id number] each using the medium heavy resistance therasponge in bilateral hands in order to improve hand strength: full fist grasp, pincer grasp, three jaw mitali, thumb flex, digital flex, and lateral key pinch. Pt with fair motor planning and grasping patterns noted with minimal cues for setup of exercises. Coordination Daily Assessment   Pt used large pegboard to copy a design from a model to promote improvement with coordination skills and visual perceptual skills. Pt completed task in 30 minutes with x3 errors. Pt was able to self correct 1 error independently and additional 2 errors with prompting. Progressing RUE coordination during peg board activity      Self-Care Daily Assessment    Score Comments   Toilet Transfer 4: Supervision or touching A Transfer Type: SPT   Equipment: BarkBox Solo   Comments: [place] [address] 4: Supervision or touching A Output: attempt to have BM  Comments: CGA             Assessment: Pt with slowly progressing coordination and hand strength in R hand, will benefit from continued strengthening and NMRE tasks.    Plan: Continue OT POC with focus on ADL/IADL skills, functional transfers, functional mobility, coordination, strength, static and dynamic balance, and activity tolerance to maximize safety and independence with ADLs and functional transfers.      Khurram Landrum OTR/DELMI  11/4/2020

## 2020-11-04 NOTE — PROGRESS NOTES
PHYSICAL THERAPY DAILY NOTE  Time In: (P) 4913  Time Out: (P) 8478  Patient Seen For: (P) AM;Transfer training;Gait training; Therapeutic exercise; Other (see progress notes)    Subjective: Patient complains of pain every time someone touches her shins on both legs. Complaints of top lip burning. Objective: When patient touches her own legs it doesn't hurt. She seems to get frustrated when she cant figure out the words she wants to say. She does realize some of her deficits. Other (comment)(fall risk)  GROSS ASSESSMENT Daily Assessment            COGNITION Daily Assessment           BED/MAT MOBILITY Daily Assessment    Supine to Sit : (P) 4 (Contact guard assistance)       TRANSFERS Daily Assessment    Transfer Type: (P) SPT with walker  Transfer Assistance : (P) 4 (Minimal assistance)  Sit to Stand Assistance: (P) Minimal assistance  Car Transfers: (P) Not tested       GAIT Daily Assessment    Amount of Assistance: (P) 4 (Contact guard assistance)  Distance (ft): (P) 60 Feet (ft)  Assistive Device: (P) Walker, rolling;Gait belt       STEPS or STAIRS Daily Assessment    Level of Assist : (P) 0 (Not tested)       BALANCE Daily Assessment            WHEELCHAIR MOBILITY Daily Assessment            LOWER EXTREMITY EXERCISES Daily Assessment    Extremity: (P) Both  Exercise Type #1: (P) Supine lower extremity strengthening  Sets Performed: (P) 2  Reps Performed: (P) 10  Level of Assist: (P) Contact guard assistance          Assessment: Patient making progress but will need assistance at home to initiate tasks of MRADLs. Plan of Care: Continue with plan of care.     Ancelmo Mccollum, PTA  11/4/2020

## 2020-11-04 NOTE — PROGRESS NOTES
PHYSICAL THERAPY DAILY NOTE  Time In: (P) 1116  Time Out: (P) 1200  Patient Seen For: (P) AM;Transfer training;Gait training; Therapeutic exercise; Other (see progress notes)    Subjective:Patient still complaining of burning to her top lip. Objective: minimal pain. Other (comment)(fall risk)  GROSS ASSESSMENT Daily Assessment            COGNITION Daily Assessment    Seems confused at times on why she is in hospital.       BED/MAT MOBILITY Daily Assessment    Rolling Right : (P) 0 (Not tested)  Rolling Left : (P) 0 (Not tested)  Supine to Sit : (P) 0 (Not tested)  Sit to Supine : (P) 0 (Not tested)       TRANSFERS Daily Assessment    Transfer Type: (P) SPT with walker  Transfer Assistance : (P) 4 (Minimal assistance)  Sit to Stand Assistance: (P) Contact guard assistance  Car Transfers: (P) Not tested       GAIT Daily Assessment    Amount of Assistance: (P) 4 (Minimal assistance)  Distance (ft): (P) 60 Feet (ft)  Assistive Device: (P) Walker, rolling       STEPS or STAIRS Daily Assessment    Level of Assist : (P) 0 (Not tested)       BALANCE Daily Assessment            WHEELCHAIR MOBILITY Daily Assessment            LOWER EXTREMITY EXERCISES Daily Assessment    Extremity: (P) Both  Exercise Type #1: (P) Other (comment)(motomed x 10 minutes)  Sets Performed: (P) 1  Reps Performed: (P) 10  Level of Assist: (P) Supervision  Exercise Type #2: (P) Seated lower extremity strengthening  Sets Performed: (P) 2  Reps Performed: (P) 10  Level of Assist: (P) Contact guard assistance          Assessment: Patient will require assistance at home with all mobility for safety. Plan of Care: Continue with plan of care.     Mike Ridgewood, PTA  11/4/2020

## 2020-11-04 NOTE — PROGRESS NOTES
Team conference today with discharge set for 11/11. Patient in agreement and verbalized understanding of recommendation that she have 24/7 supervision with assist as needed for safety. Called son Jessika Lopez who stated between him, his wife, his brother Tania Lewis, and hired sitters if needed, they will make sure the patient has the help she needs. He stated about 3 years ago she had a similar incident and required help for a time but she recovered completely and was independent including driving just before this admission. Educated him on her main cognitive and safety deficits and he verbalized good understanding of her need for supervision and some assist.  He and other family members who will be helping patient at discharge will come in for training on Tues Nov 10 at 1 for OT/PT/ST. Jessika Lopez stated they did not have a preference for home health agencies and he is not sure if the patient has a walker and will try and confirm before Tuesday. All questions were addressed.    Anjel Rose, ANGELITOR/L  [facility] Therapy Supervisor

## 2020-11-04 NOTE — PROGRESS NOTES
11/04/20 1111   Time Spent With Patient   Time In 0928   Time Out 1011   Patient Seen For: AM   Mental Status   Neurologic State Alert   Cognition Memory loss; Impaired decision making;Decreased attention/concentration   Safety/Judgement Home safety;Good awareness of safety precautions        11/04/20 1111   Verbal Expression   Naming Impaired   Divergent (%) 5/10 items per category   Naming cueing amount Mod   Speech Characteristics Word retrieval;Paraphasias   Effective Techniques Word retrieval strategies; circumlocution to provide intent  Only minimally assisted by phonemic cues provided   Exercise/Activities Tolerance   Exercise Tolerance/Response Cueing required      Patient participated with cognitive linguistic therapy. She was able to name 5 items per given category relatively quickly (within 15 seconds) but then unable to name further items, despite cues. 100% accurate for convergent naming tasks and opposites. For naming synonyms, patient consistently would name opposite and required max cue for task each time word given to name synonym with 80% accuracy. 90% accurate for cause/effect tasks. Patient given written sequencing task where she had to number the items in order. She was 100% accurate, but became upset at her handwriting. Asked to write her name and copy a sentence. 75% legibility and patient started to cry, stating \"My handwriting was so nice. I can't write. \" Discussed with patient that goal for handwriting will be legibility and to focus on that. Aware of difficulty with word retrieval stating \"I know all of these things that are a part of what I do and like but can't always think of their names\". Patient still states she is motivated to improve, but reports she is frustrated by her limitations. Recommend continued ST to address cognitive-linguistic impairments.     Kayla Boxer, MA, CCC-SLP

## 2020-11-04 NOTE — PROGRESS NOTES
Time In 0747   Time Out 0831     Mobility   Score Comments   Supine to Sit 4: Supervision or touching A    Sit to Stand 4: Supervision or touching A    Transfer Assist 4: Supervision or touching A Transfer Type: SPT   Equipment: Rolling Walker   Comments: CGA     Activities of Daily Living    Score Comments   Eating 6: Independent    Oral Hyigene 5: S/U or clean-up assist    Bathing 4: Supervision or touching A Type of Shower: Shower  Position: Standing PRN and Unsupported Sitting   Adaptive  Equipment: Tub Transfer Bench and Grab Bars  Comments: CGA in stance   Upper Body  Dressing 4: Supervision or touching A Items Applied: Pullover and Bra  Position: Supported Sitting  Comments: assist clasping bra   Lower Body Dressing 3: Partial/Moderate A Items Applied: Underwear and Elastic pants  Position: Standing PRN and Unsupported Sitting  Adaptive Equipment: RW  Comments:    Donning/[place] Footwear 5: S/U or clean-up assist Items Applied: Socks  Adaptive Equipment: N/A  Comments:    Education  Self care techniques and transfer training        S: \"I usually wake up at 6 am.\" Agreeable to therapy. Patient was able to ambulate ~10 feet using a RW with CGA. Pain not expressed. Patient Vitals for the past 12 hrs:   Temp Pulse Resp BP SpO2   11/04/20 1015  72  (!) 140/62    11/04/20 0740 97.9 °F (36.6 °C) 81 16 (!) 144/70 95 %       Collaborated with RN regarding patient performance and POC. Patient tolerated session well, but activity tolerance, balance, functional mobility, strength, coordination, cognition are still below baseline and require skilled facilitation to successfully and safely complete ADLs and transfers. Patient ended session in wc with PT assuming care.         MARIS Perera/DELMI  11/4/2020

## 2020-11-04 NOTE — PROGRESS NOTES
Jordin Mao MD  Medical Director  [address] [facility], [address] [facility]  Tel: [phone] [facility] PROGRESS NOTE    Brian Givens  Admit Date: 11/1/2020  Admit Diagnosis:   CVA (cerebral vascular accident) [facility]) [I63.9]    Subjective     Patient seen and examined. Requesting artificial tears and nasal spray. Had slt epistaxis; very slight per nursing. Pt reports that Upper lip numbness has not reoccurred     Objective:     Current Facility-Administered Medications   Medication Dose Route Frequency    amLODIPine (NORVASC) tablet 7.5 mg  7.5 mg Oral DAILY    acetaminophen (TYLENOL) tablet 650 mg  650 mg Oral Q4H PRN    aspirin chewable tablet 81 mg  81 mg Oral DAILY    atorvastatin (LIPITOR) tablet 40 mg  40 mg Oral QHS    senna-docusate (PERICOLACE) 8.6-50 mg per tablet 2 Tab  2 Tab Oral QHS    sodium chloride (NS) flush 5-40 mL  5-40 mL IntraVENous PRN    apixaban (ELIQUIS) tablet 5 mg  5 mg Oral BID    dofetilide (TIKOSYN) capsule 250 mcg  250 mcg Oral BID    influenza vaccine 2020-21 (6 mos+)(PF) (FLUARIX/FLULAVAL/FLUZONE QUAD) injection 0.5 mL  0.5 mL IntraMUSCular PRIOR TO DISCHARGE    levothyroxine (SYNTHROID) tablet 88 mcg  88 mcg Oral ACB    losartan (COZAAR) tablet 100 mg  100 mg Oral DAILY    magnesium oxide (MAG-OX) tablet 400 mg  400 mg Oral DAILY       Review of Systems:   Denies chest pain, shortness of breath, cough, headache, visual problems, abdominal pain, dysuria, calf pain. Pertinent positives are as noted in the HPI, ROS unremarkable otherwise. Visit Vitals  BP (!) 140/62   Pulse 72   Temp 97.9 °F (36.6 °C)   Resp 16   SpO2 95%        Physical Exam:   General: Alert and age appropriately oriented. No acute cardiorespiratory distress. HEENT: Normocephalic, no scleral icterus. Oral mucosa moist without cyanosis. Lungs: Clear to auscultation bilaterally. Respiration even and unlabored. Heart: Regular rate and rhythm, S1, S2. No murmurs, clicks, rub or gallops. Abdomen: Soft, non-tender, not distended. Bowel sounds normoactive. No organomegaly. Genitourinary: Deferred. Neuromuscular:      Decreased awareness of deficits  Expressive aphasia, no dysarthria  Generalized weakness   Skin/extremity: No rashes, no erythema. No calf tenderness B LE. No edema                                                                              Functional Assessment:          Balance  Sitting - Static: Good (unsupported) (11/03/20 1200)  Sitting - Dynamic: Fair (occasional) (11/03/20 1200)  Standing - Static: Fair (11/03/20 1200)  Standing - Dynamic : Impaired (11/03/20 1200)                     Elizabeth Arteaga Fall Risk Assessment:  Elizabeth Arteaga Fall Risk  Mobility: Ambulates or transfers with assist devices or assistance (11/04/20 0714)  Mobility Interventions: Bed/chair exit alarm; Patient to call before getting OOB;Utilize walker, cane, or other assistive device (11/04/20 0714)  Mentation: Periodic confusion (11/04/20 0714)  Mentation Interventions: Adequate sleep, hydration, pain control;Bed/chair exit alarm; Door open when patient unattended (11/04/20 7309)  Medication: Patient receiving anticonvulsants, sedatives(tranquilizers), psychotropics or hypnotics, hypoglycemics, narcotics, sleep aids, antihypertensives, laxatives, or diuretics (11/04/20 1320)  Medication Interventions: Evaluate medications/consider consulting pharmacy; Patient to call before getting OOB (11/04/20 6562)  Elimination: Needs assistance with toileting (11/04/20 0714)  Elimination Interventions: Bed/chair exit alarm;Call light in reach; Patient to call for help with toileting needs (11/04/20 1318)  Prior Fall History: Unknown (11/04/20 0714)  History of Falls Interventions: Bed/chair exit alarm; Consult care management for discharge planning;Door open when patient unattended (11/04/20 0714)  Total Score: 4 (11/04/20 0714)  High Fall Risk: Yes (11/04/20 4638)     Speech Assessment:  Aspiration Signs/Symptoms: None (11/02/20 1449)      Ambulation:  Gait  Distance (ft): 250 Feet (ft)(x1; 150 ft x 2) (11/03/20 1200)  Assistive Device: Gait belt;Walker, rolling (11/03/20 1200)  Rail Use: Both (11/02/20 1500)     Labs/Studies:  Recent Results (from the past 72 hour(s))   SARS-COV-2    Collection Time: 11/01/20 12:36 PM   Result Value Ref Range    Specimen source NP SWAB     COVID-19 rapid test Not detected NOTD      SARS CoV-2 Negative Negative         Assessment:     Problem List as of 11/4/2020 Date Reviewed: 10/22/2020          Codes Class Noted - Resolved    Irritable bowel syndrome with constipation ICD-10-CM: K58.1  ICD-9-CM: 564.1  10/26/2020 - Present        History of colon polyps ICD-10-CM: Z86.010  ICD-9-CM: V12.72  10/26/2020 - Present    Overview Signed 10/26/2020  4:51 PM by Marlene Busby NP     Tubular adenoma 2014             Chronic constipation ICD-10-CM: K59.09  ICD-9-CM: 564.00  10/26/2020 - Present        Memory loss ICD-10-CM: R41.3  ICD-9-CM: 780.93  11/12/2019 - Present    Overview Signed 10/19/2020 11:29 AM by Marlene Busby NP     Last Assessment & Plan:   Patient complains of memory loss and confusion. Will get an MRI of brain, further treatment pending results. Pacemaker ICD-10-CM: Z95.0  ICD-9-CM: V45.01  7/11/2018 - Present        Paroxysmal atrial fibrillation (HCC) ICD-10-CM: I48.0  ICD-9-CM: 427.31  6/1/2018 - Present    Overview Signed 10/19/2020 11:32 AM by Marlene Busby NP     Last Assessment & Plan:   Stable on current therapy. No change in treatment at this time.              CVA (cerebral vascular accident) [facility]) ICD-10-CM: I63.9  ICD-9-CM: 434.91  5/15/2018 - Present        Hypothyroidism ICD-10-CM: E03.9  ICD-9-CM: 244.9  5/14/2018 - Present        TIA (transient ischemic attack) ICD-10-CM: G45.9  ICD-9-CM: 435.9  11/14/2017 - Present        Hyponatremia ICD-10-CM: E87.1  ICD-9-CM: 276.1  11/14/2017 - Present        Diverticulosis of large intestine without hemorrhage ICD-10-CM: K57.30  ICD-9-CM: 562.10  10/4/2017 - Present        Age related osteoporosis ICD-10-CM: M81.0  ICD-9-CM: 733.01  3/9/2017 - Present    Overview Signed 10/19/2020 11:29 AM by Sharon Mays NP     Last Assessment & Plan:   Stable on current therapy. No change in treatment at this time.              History of subdural hematoma ICD-10-CM: Z86.79  ICD-9-CM: V12.59  9/13/2016 - Present        HTN (hypertension) ICD-10-CM: I10  ICD-9-CM: 401.9  6/27/2016 - Present        Vitamin D deficiency ICD-10-CM: E55.9  ICD-9-CM: 268.9  9/1/2015 - Present        Hyperlipidemia ICD-10-CM: E78.5  ICD-9-CM: 272.4  10/3/2008 - Present    Overview Signed 10/19/2020 11:29 AM by Sharon Mays NP     Hyperlipidemia  Hyperlipidemia             RESOLVED: Chest pain ICD-10-CM: R07.9  ICD-9-CM: 786.50  2/14/2019 - 10/19/2020        RESOLVED: Confusion ICD-10-CM: R41.0  ICD-9-CM: 298.9  5/14/2018 - 10/19/2020        RESOLVED: Hypertensive emergency ICD-10-CM: I16.1  ICD-9-CM: 401.9  5/14/2018 - 10/19/2020        RESOLVED: Atrial fibrillation ([facility] [place] [facility] ICD-10-CM: I48.91  ICD-9-CM: 427.31  4/16/2018 - 10/19/2020        RESOLVED: Slurred speech ICD-10-CM: R47.81  ICD-9-CM: 784.59  11/14/2017 - 10/19/2020        RESOLVED: Acute encephalopathy ICD-10-CM: G93.40  ICD-9-CM: 348.30  6/27/2016 - 10/19/2020        RESOLVED: Sepsis ([facility] [place] [facility] ICD-10-CM: A41.9  ICD-9-CM: 038.9, 995.91  6/27/2016 - 10/19/2020        RESOLVED: Brain mass ICD-10-CM: G93.89  ICD-9-CM: 348.89  6/27/2016 - 11/14/2017        RESOLVED: Leukocytosis ICD-10-CM: B65.968  ICD-9-CM: 288.60  6/27/2016 - 11/14/2017        RESOLVED: Hypothyroidism due to acquired atrophy of thyroid ICD-10-CM: E03.4  ICD-9-CM: 244.8, 246.8  9/1/2015 - 10/19/2020    Overview Signed 10/19/2020 11:29 AM by Sharon Mays NP     Last Assessment & Plan:   Stable on current therapy. No change in treatment at this time.                       Acute ischemic left thalamus/internal capsule CVA     HTN/A. Fib/pacemaker - on losartan, norvasc, tikosyn, continues on telemetry, avoid hypotension, bmp ordered randee. Am  -11/2 tele remains NSR; can dc tele. Bmp wnl. HR 70-87. bp 143/63  -11/3 scheduled to see Dr Grace Phelan at [facility] on 12/2/2020 at 3:30 pm at [facility] for possible Watchman procedure; bp elevated this a.m. 171/91; inc norvasc to 7.5 mg for persistent SBP> 140s  -11/4 140/62 improving, cont NSR, rate 72     Chronic anticoagulation management/DVT prophylaxis - on newly begun eliquis bid, asa 81mg every day, cbc ordered randee. Am  -11/2 hgb 13.7     11/3 new onset perceived swelling of upper lip. ? Meds; Eliquis is newest drug added; will see if this reoccurs  -11/4 resolved spontaneously. Now describing as lips being dry. ??     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 Consider antidepressant.          Time spent was 25 minutes with over 1/2 in direct patient care/examination, consultation and coordination of care.      Signed By: Elias Jasso MD     November 4, 2020

## 2020-11-04 NOTE — PROGRESS NOTES
PHYSICAL THERAPY DAILY NOTE  Time In: (P) 1347  Time Out: (P) 1436  Patient Seen For: (P) PM;Transfer training;Gait training; Therapeutic exercise; Other (see progress notes)    Subjective: \" I'm just not thinking right. \"         Objective: No pain noted. Other (comment)(fall risk)  GROSS ASSESSMENT Daily Assessment            COGNITION Daily Assessment           BED/MAT MOBILITY Daily Assessment    Rolling Right : 0 (Not tested)  Rolling Left : 0 (Not tested)  Supine to Sit : (P) 0 (Not tested)  Sit to Supine : (P) 0 (Not tested)       TRANSFERS Daily Assessment    Transfer Type: (P) SPT with walker  Transfer Assistance : (P) 4 (Contact guard assistance)  Sit to Stand Assistance: (P) Contact guard assistance  Car Transfers: (P) Not tested       GAIT Daily Assessment    Amount of Assistance: (P) 4 (Minimal assistance)  Distance (ft): (P) 80 Feet (ft)  Assistive Device: (P) Walker, rolling       STEPS or STAIRS Daily Assessment    Steps/Stairs Ambulated (#): (P) 4  Level of Assist : (P) 4 (Minimal assistance)  Rail Use: (P) Both       BALANCE Daily Assessment            WHEELCHAIR MOBILITY Daily Assessment            LOWER EXTREMITY EXERCISES Daily Assessment    Extremity: (P) Both  Exercise Type #1: (P) Seated lower extremity strengthening  Sets Performed: (P) 2  Reps Performed: (P) 10  Level of Assist: (P) Minimal assistance  Exercise Type #2: Seated lower extremity strengthening  Sets Performed: 2  Reps Performed: 10  Level of Assist: Contact guard assistance          Assessment: Patient will require 24 hour supervision at discharge. Plan of Care: Continue with plan of care.     Jorje Riddle, ANG  11/4/2020

## 2020-11-05 PROCEDURE — 2709999900 HC NON-CHARGEABLE SUPPLY

## 2020-11-05 PROCEDURE — 97116 GAIT TRAINING THERAPY: CPT

## 2020-11-05 PROCEDURE — 92507 TX SP LANG VOICE COMM INDIV: CPT

## 2020-11-05 PROCEDURE — 97530 THERAPEUTIC ACTIVITIES: CPT

## 2020-11-05 PROCEDURE — 65310000000 HC RM PRIVATE REHAB

## 2020-11-05 PROCEDURE — 99232 SBSQ HOSP IP/OBS MODERATE 35: CPT | Performed by: PHYSICAL MEDICINE & REHABILITATION

## 2020-11-05 PROCEDURE — 74011250637 HC RX REV CODE- 250/637: Performed by: PHYSICAL MEDICINE & REHABILITATION

## 2020-11-05 PROCEDURE — 97535 SELF CARE MNGMENT TRAINING: CPT

## 2020-11-05 PROCEDURE — 97110 THERAPEUTIC EXERCISES: CPT

## 2020-11-05 RX ADMIN — ATORVASTATIN CALCIUM 40 MG: 40 TABLET, FILM COATED ORAL at 20:14

## 2020-11-05 RX ADMIN — AMLODIPINE BESYLATE 7.5 MG: 5 TABLET ORAL at 09:07

## 2020-11-05 RX ADMIN — ASPIRIN 81 MG: 81 TABLET, CHEWABLE ORAL at 09:11

## 2020-11-05 RX ADMIN — LEVOTHYROXINE SODIUM 88 MCG: 0.09 TABLET ORAL at 06:08

## 2020-11-05 RX ADMIN — Medication 2 SPRAY: at 06:08

## 2020-11-05 RX ADMIN — SENNOSIDES AND DOCUSATE SODIUM 2 TABLET: 8.6; 5 TABLET ORAL at 20:14

## 2020-11-05 RX ADMIN — APIXABAN 5 MG: 5 TABLET, FILM COATED ORAL at 20:14

## 2020-11-05 RX ADMIN — APIXABAN 5 MG: 5 TABLET, FILM COATED ORAL at 09:10

## 2020-11-05 RX ADMIN — DOFETILIDE 250 MCG: 0.25 CAPSULE ORAL at 17:18

## 2020-11-05 RX ADMIN — DOFETILIDE 250 MCG: 0.25 CAPSULE ORAL at 09:12

## 2020-11-05 RX ADMIN — LOSARTAN POTASSIUM 100 MG: 50 TABLET, FILM COATED ORAL at 09:13

## 2020-11-05 RX ADMIN — MAGNESIUM GLUCONATE 500 MG ORAL TABLET 400 MG: 500 TABLET ORAL at 09:13

## 2020-11-05 NOTE — PROGRESS NOTES
Time In 0738   Time Out 0823     Mobility   Score Comments   Supine to Sit 4: Supervision or touching A    Sit to Stand 4: Supervision or touching A    Transfer Assist 4: Supervision or touching A Transfer Type: SPT   Equipment: Rolling Walker   Comments: CGA     Activities of Daily Living    Score Comments   Eating 6: Independent    Oral Hyigene 5: S/U or clean-up assist    Bathing 4: Supervision or touching A Type of Shower: Shower  Position: Standing PRN and Unsupported Sitting   Adaptive  Equipment: Tub Transfer Bench and Grab Bars  Comments: Supervision   Upper Body  Dressing 4: Supervision or touching A Items Applied: Pullover and Bra  Position: Supported Sitting  Comments: assist to clasp bra   Lower Body Dressing 4: Supervision or touching A Items Applied: Underwear and Elastic pants  Position: Supported sitting and Standing PRN  Adaptive Equipment: RW  Comments: CGA   Donning/[place] Footwear 5: S/U or clean-up assist Items Applied: Socks  Adaptive Equipment: N/A  Comments:    Education  Transfer training and balance techniques during ADLs       S: \"I have a hard time with my words, especially when talk too much. \" Agreeable to therapy. Patient was able to ambulate ~10 feet using a RW with CGA. Pt completed morning ADLs with quality indicators reflected in chart above. Pain not expressed. Patient Vitals for the past 12 hrs:   Temp Pulse Resp BP SpO2   11/05/20 0649 97.4 °F (36.3 °C) 78 18 (!) 143/75 97 %       Collaborated with RN regarding patient performance and POC. Patient tolerated session well, but activity tolerance, balance, functional mobility, strength, coordination, cognition are still below baseline and require skilled facilitation to successfully and safely complete ADLs and transfers. Patient ended session in wc with PT assuming care.         MARIS Perera/DELMI  11/5/2020

## 2020-11-05 NOTE — PROGRESS NOTES
OT Daily Note  Time In 1118   Time Out 1205     Subjective: \"My handwriting is terrible. \"  Pain: not expressed  Education: hand strengthening and coordination activity   Interdisciplinary Communication: with SLP regarding ST session and handwriting   Precautions: fall risk   Patient Vitals for the past 12 hrs:   Temp Pulse Resp BP SpO2   11/05/20 0649 97.4 °F (36.3 °C) 78 18 (!) 143/75 97 %         Mobility   Score Comments   Sit to Stand 4: Supervision or touching A    Transfer Assist 4: Supervision or touching A Transfer Type: SPT   Equipment: Rolling Walker   Comments: CGA     Coordination Daily Assessment   Medium resistance theraputty used to find 20 out of 20 beads in various methods to increase bilateral UE/ strength and activity tolerance. Min verbal cues required throughout for use of R hand to  beads and place in container. Required increased time and effort but progressing in [employer] and hand strength. Self-Care Daily Assessment    Score Comments   Toilet Transfer 4: Supervision or touching A Transfer Type: SPT   Equipment: Linnea Lauren   Comments: [place] [address] 4: Supervision or touching A Output: urine   Comments: steady assist, assist with pant fastener               Assessment: Progressing use of R hand in coordination and strength task. Plan: Continue OT POC with focus on ADL/IADL skills, functional transfers, functional mobility, coordination, strength, static and dynamic balance, and activity tolerance to maximize safety and independence with ADLs and functional transfers.      MARIS Perera/DELMI  11/5/2020

## 2020-11-05 NOTE — PROGRESS NOTES
Dameon Zamudio MD  Medical Director  [address] [facility], [address] [facility]  Tel: [phone] PROGRESS NOTE    Marquis Hood  Admit Date: 11/1/2020  Admit Diagnosis:   CVA (cerebral vascular accident) [facility]) [I63.9]    Subjective   Doing well. Better spirits. Denies numbness in lips. No oral paresthesias. Frustrated with exp aphasia but can appreciate improvements  Objective:     Current Facility-Administered Medications   Medication Dose Route Frequency    carboxymethylcellulose sodium (REFRESH LIQUIGEL) 1 % ophthalmic solution 1 Drop  1 Drop Both Eyes PRN    sodium chloride (OCEAN) 0.65 % nasal squeeze bottle 2 Spray  2 Spray Both Nostrils Q2H PRN    lip protectant (BLISTEX) ointment 1 Each  1 Each Topical PRN    amLODIPine (NORVASC) tablet 7.5 mg  7.5 mg Oral DAILY    acetaminophen (TYLENOL) tablet 650 mg  650 mg Oral Q4H PRN    aspirin chewable tablet 81 mg  81 mg Oral DAILY    atorvastatin (LIPITOR) tablet 40 mg  40 mg Oral QHS    senna-docusate (PERICOLACE) 8.6-50 mg per tablet 2 Tab  2 Tab Oral QHS    sodium chloride (NS) flush 5-40 mL  5-40 mL IntraVENous PRN    apixaban (ELIQUIS) tablet 5 mg  5 mg Oral BID    dofetilide (TIKOSYN) capsule 250 mcg  250 mcg Oral BID    influenza vaccine 2020-21 (6 mos+)(PF) (FLUARIX/FLULAVAL/FLUZONE QUAD) injection 0.5 mL  0.5 mL IntraMUSCular PRIOR TO DISCHARGE    levothyroxine (SYNTHROID) tablet 88 mcg  88 mcg Oral ACB    losartan (COZAAR) tablet 100 mg  100 mg Oral DAILY    magnesium oxide (MAG-OX) tablet 400 mg  400 mg Oral DAILY       Review of Systems:   Denies chest pain, shortness of breath, cough, headache, visual problems, abdominal pain, dysuria, calf pain. Pertinent positives are as noted in the HPI, ROS unremarkable otherwise.      Visit Vitals  BP (!) 143/75 (BP 1 Location: Left arm, BP Patient Position: Sitting)   Pulse 78   Temp 97.4 °F (36.3 °C)   Resp 18 SpO2 97%        Physical Exam:   General: Alert and orientated  No acute cardiorespiratory distress. Frail appearing   HEENT: Normocephalic, no scleral icterus. Oral mucosa moist without cyanosis. Lungs: Clear to auscultation bilaterally. Respiration even and unlabored. Heart: Regular rate and rhythm, S1, S2. No murmurs, clicks, rub or gallops. Abdomen: Soft, non-tender, not distended. Bowel sounds normoactive. No organomegaly. Genitourinary: Deferred. Neuromuscular:      Exp aphasia improving , fcs  Generalized weakness   Skin/extremity: No rashes, no erythema. No calf tenderness B LE. No edema                                                                                Functional Assessment:          Balance  Sitting - Static: Good (unsupported) (11/03/20 1200)  Sitting - Dynamic: Fair (occasional) (11/03/20 1200)  Standing - Static: Fair (11/03/20 1200)  Standing - Dynamic : Impaired (11/03/20 1200)                     Imelda Hyman Fall Risk Assessment:  Imelda Hyman Fall Risk  Mobility: Ambulates or transfers with assist devices or assistance (11/05/20 0710)  Mobility Interventions: Patient to call before getting OOB; Strengthening exercises (ROM-active/passive); Utilize walker, cane, or other assistive device (11/04/20 2004)  Mentation: Periodic confusion (11/05/20 0710)  Mentation Interventions: Adequate sleep, hydration, pain control;Door open when patient unattended; Increase mobility (11/04/20 2004)  Medication: Patient receiving anticonvulsants, sedatives(tranquilizers), psychotropics or hypnotics, hypoglycemics, narcotics, sleep aids, antihypertensives, laxatives, or diuretics (11/05/20 0710)  Medication Interventions: Evaluate medications/consider consulting pharmacy; Patient to call before getting OOB; Teach patient to arise slowly (11/04/20 2004)  Elimination: Needs assistance with toileting (11/05/20 0710)  Elimination Interventions: Toilet paper/wipes in reach; Toileting schedule/hourly rounds;Call light in reach (11/04/20 2004)  Prior Fall History: Unknown (11/05/20 0710)  History of Falls Interventions: Bed/chair exit alarm; Door open when patient unattended; Investigate reason for fall;Room close to nurse's station (11/04/20 2004)  Total Score: 4 (11/05/20 0710)  High Fall Risk: Yes (11/05/20 0710)     Speech Assessment:  Aspiration Signs/Symptoms: None (11/02/20 1449)      Ambulation:  Gait  Distance (ft): 80 Feet (ft) (11/04/20 1542)  Assistive Device: Walker, rolling (11/04/20 1542)  Rail Use: Both (11/04/20 1542)     Labs/Studies:  No results found for this or any previous visit (from the past 67 hour(s)). Assessment:     Problem List as of 11/5/2020 Date Reviewed: 10/22/2020          Codes Class Noted - Resolved    Irritable bowel syndrome with constipation ICD-10-CM: K58.1  ICD-9-CM: 564.1  10/26/2020 - Present        History of colon polyps ICD-10-CM: Z86.010  ICD-9-CM: V12.72  10/26/2020 - Present    Overview Signed 10/26/2020  4:51 PM by Jimenez Hunt NP     Tubular adenoma 2014             Chronic constipation ICD-10-CM: K59.09  ICD-9-CM: 564.00  10/26/2020 - Present        Memory loss ICD-10-CM: R41.3  ICD-9-CM: 780.93  11/12/2019 - Present    Overview Signed 10/19/2020 11:29 AM by Jimenez Hunt NP     Last Assessment & Plan:   Patient complains of memory loss and confusion. Will get an MRI of brain, further treatment pending results. Pacemaker ICD-10-CM: Z95.0  ICD-9-CM: V45.01  7/11/2018 - Present        Paroxysmal atrial fibrillation (HCC) ICD-10-CM: I48.0  ICD-9-CM: 427.31  6/1/2018 - Present    Overview Signed 10/19/2020 11:32 AM by Jimenez Hunt NP     Last Assessment & Plan:   Stable on current therapy. No change in treatment at this time.              CVA (cerebral vascular accident) [facility]) ICD-10-CM: I63.9  ICD-9-CM: 434.91  5/15/2018 - Present        Hypothyroidism ICD-10-CM: E03.9  ICD-9-CM: 244.9  5/14/2018 - Present        TIA (transient ischemic attack) ICD-10-CM: G45.9  ICD-9-CM: 435.9  11/14/2017 - Present        Hyponatremia ICD-10-CM: E87.1  ICD-9-CM: 276.1  11/14/2017 - Present        Diverticulosis of large intestine without hemorrhage ICD-10-CM: K57.30  ICD-9-CM: 562.10  10/4/2017 - Present        Age related osteoporosis ICD-10-CM: M81.0  ICD-9-CM: 733.01  3/9/2017 - Present    Overview Signed 10/19/2020 11:29 AM by Svitlana Baird NP     Last Assessment & Plan:   Stable on current therapy. No change in treatment at this time.              History of subdural hematoma ICD-10-CM: Z86.79  ICD-9-CM: V12.59  9/13/2016 - Present        HTN (hypertension) ICD-10-CM: I10  ICD-9-CM: 401.9  6/27/2016 - Present        Vitamin D deficiency ICD-10-CM: E55.9  ICD-9-CM: 268.9  9/1/2015 - Present        Hyperlipidemia ICD-10-CM: E78.5  ICD-9-CM: 272.4  10/3/2008 - Present    Overview Signed 10/19/2020 11:29 AM by Svitlana Baird NP     Hyperlipidemia  Hyperlipidemia             RESOLVED: Chest pain ICD-10-CM: R07.9  ICD-9-CM: 786.50  2/14/2019 - 10/19/2020        RESOLVED: Confusion ICD-10-CM: R41.0  ICD-9-CM: 298.9  5/14/2018 - 10/19/2020        RESOLVED: Hypertensive emergency ICD-10-CM: I16.1  ICD-9-CM: 401.9  5/14/2018 - 10/19/2020        RESOLVED: Atrial fibrillation ([address] ICD-10-CM: I48.91  ICD-9-CM: 427.31  4/16/2018 - 10/19/2020        RESOLVED: Slurred speech ICD-10-CM: R47.81  ICD-9-CM: 784.59  11/14/2017 - 10/19/2020        RESOLVED: Acute encephalopathy ICD-10-CM: G93.40  ICD-9-CM: 348.30  6/27/2016 - 10/19/2020        RESOLVED: Sepsis ([facility] [address] ICD-10-CM: A41.9  ICD-9-CM: 038.9, 995.91  6/27/2016 - 10/19/2020        RESOLVED: Brain mass ICD-10-CM: G93.89  ICD-9-CM: 348.89  6/27/2016 - 11/14/2017        RESOLVED: Leukocytosis ICD-10-CM: G64.907  ICD-9-CM: 288.60  6/27/2016 - 11/14/2017        RESOLVED: Hypothyroidism due to acquired atrophy of thyroid ICD-10-CM: E03.4  ICD-9-CM: 244.8, 246.8  9/1/2015 - 10/19/2020 Overview Signed 10/19/2020 11:29 AM by Ashley Tam NP     Last Assessment & Plan:   Stable on current therapy. No change in treatment at this time.                       Acute ischemic left thalamus/internal capsule CVA     HTN/A. Fib/pacemaker - on losartan, norvasc, tikosyn, continues on telemetry, avoid hypotension, bmp ordered randee. Am  -11/2 tele remains NSR; can dc tele. Bmp wnl. HR 70-87. bp 143/63  -11/3 scheduled to see Dr Mali Quezada at [facility] on 12/2/2020 at 3:30 pm at [facility] possible Watchman procedure; bp elevated this a.m. 171/91; inc norvasc to 7.5 mg for persistent SBP> 140s  -11/4 140/62 improving, cont NSR, rate 72  -11/5 143/75 (102/58)      Chronic anticoagulation management/DVT prophylaxis - on newly begun eliquis bid, asa 81mg every day, cbc ordered randee. Am  -11/2 hgb 13.7     11/3 new onset perceived swelling of upper lip. ? Meds; Eliquis is newest drug added; will see if this reoccurs  -11/4 resolved spontaneously. Now describing as lips being dry. ??     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 Consider antidepressant.        Time spent was 25 minutes with over 1/2 in direct patient care/examination, consultation and coordination of care.      Signed By: Latoya Welsh MD     November 5, 2020

## 2020-11-05 NOTE — PROGRESS NOTES
11/05/20 1252   Time Spent With Patient   Time In 1035   Time Out 1116   Patient Seen For: AM   Mental Status   Neurologic State Alert   Perception Appears intact   Perseveration Perseverates during conversation   Safety/Judgement Home safety        11/05/20 1254   Verbal Expression   Primary Mode of Expression Verbal   Naming Impaired   Divergent Word finding to provide associated words completed with an average of 2 items named independently given additional time; able to name additional 2-3 words when provided with a direct cue   Exercise/Activities Tolerance   Exercise Tolerance/Response Cueing required; mod-max        11/05/20 1257   Written Expression   Legibility  Decreased legibility;Uses dominant hand   Formulation Impaired; cues for spelling required x2     Patient participated with cognitive-linguistic treatment. Word retrieval activity to provide associated words with a given object completed with an average of 2 items named independently given additional time; able to name additional 2-3 words when provided with a direct cue. Patient practiced writing the associated words with cues for spelling required x2 of 20 words. Encouraged to print due to improved legibility. She stated that she was diligent about keeping notes at home and is anxious for handwriting to improve. Recommend continued ST to address anomia and cognitive function.     Niraj Travis MS, CCC-SLP

## 2020-11-05 NOTE — PROGRESS NOTES
PHYSICAL THERAPY DAILY NOTE  Time In: (P) 0830  Time Out: (P) 1002  Patient Seen For: (P) AM;Transfer training;Gait training; Therapeutic exercise; Other (see progress notes)    Subjective: Patient had no complaints today. Objective: No pain noted thru out treatment. Patient smiling today. Other (comment)(fall risk)  GROSS ASSESSMENT Daily Assessment            COGNITION Daily Assessment    Confusion off and on. BED/MAT MOBILITY Daily Assessment    Supine to Sit : (P) 5 (Stand-by assistance)  Sit to Supine : (P) 5 (Supervision)       TRANSFERS Daily Assessment    Transfer Type: (P) SPT with walker  Transfer Assistance : (P) 5 (Stand-by assistance)  Sit to Stand Assistance: (P) Contact guard assistance  Car Transfers: (P) Not tested       GAIT Daily Assessment   Better gait pattern and speed increased today. Amount of Assistance: (P) 4 (Contact guard assistance)  Distance (ft): (P) 120 Feet (ft)  Assistive Device: (P) Walker, rolling       STEPS or STAIRS Daily Assessment    Steps/Stairs Ambulated (#): (P) 4  Level of Assist : (P) 4 (Contact guard assistance)  Rail Use: (P) Both       BALANCE Daily Assessment            WHEELCHAIR MOBILITY Daily Assessment            LOWER EXTREMITY EXERCISES Daily Assessment    Extremity: (P) Both  Exercise Type #1: (P) Seated lower extremity strengthening  Sets Performed: (P) 2  Reps Performed: (P) 10  Level of Assist: (P) Minimal assistance  Exercise Type #2: (P) Other (comment)(motomed x 10 minutes)  Sets Performed: (P) 1  Reps Performed: (P) 10  Level of Assist: (P) Supervision  Exercise Type #3: (P) Other (comment)(worked on ambulating up and down ramp with rolling walker)  Sets Performed: (P) 2  Reps Performed: (P) 0  Level of Assist: (P) Contact guard assistance          Assessment: Patient making progress with mobility today. Plan of Care: Continue with plan of care.     Roberto Carlos Mae, PTA  11/5/2020

## 2020-11-06 PROCEDURE — 92507 TX SP LANG VOICE COMM INDIV: CPT

## 2020-11-06 PROCEDURE — 74011250637 HC RX REV CODE- 250/637: Performed by: PHYSICAL MEDICINE & REHABILITATION

## 2020-11-06 PROCEDURE — 97116 GAIT TRAINING THERAPY: CPT

## 2020-11-06 PROCEDURE — 99232 SBSQ HOSP IP/OBS MODERATE 35: CPT | Performed by: PHYSICAL MEDICINE & REHABILITATION

## 2020-11-06 PROCEDURE — 97530 THERAPEUTIC ACTIVITIES: CPT

## 2020-11-06 PROCEDURE — 97112 NEUROMUSCULAR REEDUCATION: CPT

## 2020-11-06 PROCEDURE — 65310000000 HC RM PRIVATE REHAB

## 2020-11-06 PROCEDURE — 97535 SELF CARE MNGMENT TRAINING: CPT

## 2020-11-06 PROCEDURE — 97110 THERAPEUTIC EXERCISES: CPT

## 2020-11-06 RX ORDER — GUAIFENESIN 100 MG/5ML
81 LIQUID (ML) ORAL DAILY
Qty: 30 TAB | Refills: 0 | Status: SHIPPED | OUTPATIENT
Start: 2020-11-06 | End: 2021-01-05

## 2020-11-06 RX ORDER — LEVOTHYROXINE SODIUM 88 UG/1
88 TABLET ORAL DAILY
Qty: 30 TAB | Refills: 5 | Status: SHIPPED | OUTPATIENT
Start: 2020-11-06 | End: 2020-11-24 | Stop reason: SDUPTHER

## 2020-11-06 RX ORDER — DOFETILIDE 0.25 MG/1
250 CAPSULE ORAL 2 TIMES DAILY
Qty: 180 CAP | Refills: 3 | Status: SHIPPED | OUTPATIENT
Start: 2020-11-06 | End: 2020-12-02 | Stop reason: SDUPTHER

## 2020-11-06 RX ORDER — ATORVASTATIN CALCIUM 40 MG/1
40 TABLET, FILM COATED ORAL
Qty: 30 TAB | Refills: 3 | Status: SHIPPED | OUTPATIENT
Start: 2020-11-06 | End: 2020-11-24 | Stop reason: SDUPTHER

## 2020-11-06 RX ORDER — AMLODIPINE BESYLATE 2.5 MG/1
7.5 TABLET ORAL DAILY
Qty: 90 TAB | Refills: 3 | Status: SHIPPED | OUTPATIENT
Start: 2020-11-07 | End: 2020-11-24 | Stop reason: SDUPTHER

## 2020-11-06 RX ADMIN — ASPIRIN 81 MG: 81 TABLET, CHEWABLE ORAL at 08:24

## 2020-11-06 RX ADMIN — APIXABAN 5 MG: 5 TABLET, FILM COATED ORAL at 08:25

## 2020-11-06 RX ADMIN — MAGNESIUM GLUCONATE 500 MG ORAL TABLET 400 MG: 500 TABLET ORAL at 08:30

## 2020-11-06 RX ADMIN — APIXABAN 5 MG: 5 TABLET, FILM COATED ORAL at 19:43

## 2020-11-06 RX ADMIN — AMLODIPINE BESYLATE 7.5 MG: 5 TABLET ORAL at 08:24

## 2020-11-06 RX ADMIN — DOFETILIDE 250 MCG: 0.25 CAPSULE ORAL at 17:20

## 2020-11-06 RX ADMIN — DOFETILIDE 250 MCG: 0.25 CAPSULE ORAL at 08:24

## 2020-11-06 RX ADMIN — ATORVASTATIN CALCIUM 40 MG: 40 TABLET, FILM COATED ORAL at 19:42

## 2020-11-06 RX ADMIN — LEVOTHYROXINE SODIUM 88 MCG: 0.09 TABLET ORAL at 04:47

## 2020-11-06 RX ADMIN — LOSARTAN POTASSIUM 100 MG: 50 TABLET, FILM COATED ORAL at 08:25

## 2020-11-06 NOTE — PROGRESS NOTES
Omi Ferreira MD  Medical Director  [address] [facility], [address] [facility]  Tel: [phone] [facility] PROGRESS NOTE    Akua Morales  Admit Date: 11/1/2020  Admit Diagnosis:   CVA (cerebral vascular accident) [facility]) [I63.9]    Subjective     Patient seen and examined. Anxiety overnoc per nursing, pt confirms. She denies any hx of anxiety issues. Seemed confused per RN and nothing she did made pt happy. This morning, pt reports she is \" better \". No specific complaints overnoc other than \"not feeling right\"     Objective:     Current Facility-Administered Medications   Medication Dose Route Frequency    carboxymethylcellulose sodium (REFRESH LIQUIGEL) 1 % ophthalmic solution 1 Drop  1 Drop Both Eyes PRN    sodium chloride (OCEAN) 0.65 % nasal squeeze bottle 2 Spray  2 Spray Both Nostrils Q2H PRN    lip protectant (BLISTEX) ointment 1 Each  1 Each Topical PRN    amLODIPine (NORVASC) tablet 7.5 mg  7.5 mg Oral DAILY    acetaminophen (TYLENOL) tablet 650 mg  650 mg Oral Q4H PRN    aspirin chewable tablet 81 mg  81 mg Oral DAILY    atorvastatin (LIPITOR) tablet 40 mg  40 mg Oral QHS    senna-docusate (PERICOLACE) 8.6-50 mg per tablet 2 Tab  2 Tab Oral QHS    sodium chloride (NS) flush 5-40 mL  5-40 mL IntraVENous PRN    apixaban (ELIQUIS) tablet 5 mg  5 mg Oral BID    dofetilide (TIKOSYN) capsule 250 mcg  250 mcg Oral BID    influenza vaccine 2020-21 (6 mos+)(PF) (FLUARIX/FLULAVAL/FLUZONE QUAD) injection 0.5 mL  0.5 mL IntraMUSCular PRIOR TO DISCHARGE    levothyroxine (SYNTHROID) tablet 88 mcg  88 mcg Oral ACB    losartan (COZAAR) tablet 100 mg  100 mg Oral DAILY    magnesium oxide (MAG-OX) tablet 400 mg  400 mg Oral DAILY       Review of Systems:   Denies chest pain, shortness of breath, cough, headache, visual problems, abdominal pain, dysuria, calf pain.  Pertinent positives are as noted in the HPI, ROS unremarkable otherwise. Visit Vitals  /67   Pulse 78   Temp 98.1 °F (36.7 °C)   Resp 14   SpO2 96%        Physical Exam:   General: Alert and age appropriately oriented. No acute cardiorespiratory distress. HEENT: Normocephalic, no scleral icterus. Oral mucosa moist without cyanosis. Lungs: Clear to auscultation bilaterally. Respiration even and unlabored. Heart: Regular rate and rhythm, S1, S2. No murmurs, clicks, rub or gallops. Abdomen: Soft, non-tender, not distended. Bowel sounds normoactive. No organomegaly. Genitourinary: Deferred. Neuromuscular:      Mild inattn RUE. Exp aphasia with word finding deficits  Perseverates in conversation  Dec fine motor coord right hand     Skin/extremity: No rashes, no erythema. No calf tenderness B LE. No edema                                                                              Functional Assessment:          Balance  Sitting - Static: Good (unsupported) (11/03/20 1200)  Sitting - Dynamic: Fair (occasional) (11/03/20 1200)  Standing - Static: Fair (11/03/20 1200)  Standing - Dynamic : Impaired (11/03/20 1200)                     Lydia Hopper Fall Risk Assessment:  Lydia Hopper Fall Risk  Mobility: Ambulates or transfers with assist devices or assistance (11/06/20 0710)  Mobility Interventions: Communicate number of staff needed for ambulation/transfer;Patient to call before getting OOB;Utilize walker, cane, or other assistive device (11/06/20 0326)  Mentation: Periodic confusion (11/06/20 0710)  Mentation Interventions: Adequate sleep, hydration, pain control;Bed/chair exit alarm; Door open when patient unattended;Evaluate medications/consider consulting pharmacy; Increase mobility; Reorient patient (11/06/20 0326)  Medication: Patient receiving anticonvulsants, sedatives(tranquilizers), psychotropics or hypnotics, hypoglycemics, narcotics, sleep aids, antihypertensives, laxatives, or diuretics (11/06/20 0710)  Medication Interventions: Bed/chair exit alarm;Evaluate medications/consider consulting pharmacy; Patient to call before getting OOB; Teach patient to arise slowly (11/06/20 0326)  Elimination: Needs assistance with toileting (11/06/20 0710)  Elimination Interventions: Call light in reach; Patient to call for help with toileting needs; Toilet paper/wipes in reach;Urinal in reach (11/06/20 0326)  Prior Fall History: Unknown (11/06/20 0710)  History of Falls Interventions: Bed/chair exit alarm; Consult care management for discharge planning;Door open when patient unattended;Evaluate medications/consider consulting pharmacy (11/06/20 0326)  Total Score: 4 (11/06/20 0710)  High Fall Risk: Yes (11/06/20 0710)     Speech Assessment:  Aspiration Signs/Symptoms: None (11/02/20 1449)      Ambulation:  Gait  Distance (ft): 120 Feet (ft) (11/05/20 1159)  Assistive Device: Walker, rolling (11/05/20 1159)  Rail Use: Both (11/05/20 1159)     Labs/Studies:  No results found for this or any previous visit (from the past 67 hour(s)). Assessment:     Problem List as of 11/6/2020 Date Reviewed: 10/22/2020          Codes Class Noted - Resolved    Irritable bowel syndrome with constipation ICD-10-CM: K58.1  ICD-9-CM: 564.1  10/26/2020 - Present        History of colon polyps ICD-10-CM: Z86.010  ICD-9-CM: V12.72  10/26/2020 - Present    Overview Signed 10/26/2020  4:51 PM by Sal Ledezma NP     Tubular adenoma 2014             Chronic constipation ICD-10-CM: K59.09  ICD-9-CM: 564.00  10/26/2020 - Present        Memory loss ICD-10-CM: R41.3  ICD-9-CM: 780.93  11/12/2019 - Present    Overview Signed 10/19/2020 11:29 AM by Sal Ledezma NP     Last Assessment & Plan:   Patient complains of memory loss and confusion. Will get an MRI of brain, further treatment pending results.              Pacemaker ICD-10-CM: Z95.0  ICD-9-CM: V45.01  7/11/2018 - Present        Paroxysmal atrial fibrillation (HCC) ICD-10-CM: I48.0  ICD-9-CM: 427.31  6/1/2018 - Present    Overview Signed 10/19/2020 11:32 AM by Edith Ovalle NP     Last Assessment & Plan:   Stable on current therapy. No change in treatment at this time. CVA (cerebral vascular accident) [facility]) ICD-10-CM: I63.9  ICD-9-CM: 434.91  5/15/2018 - Present        Hypothyroidism ICD-10-CM: E03.9  ICD-9-CM: 244.9  5/14/2018 - Present        TIA (transient ischemic attack) ICD-10-CM: G45.9  ICD-9-CM: 435.9  11/14/2017 - Present        Hyponatremia ICD-10-CM: E87.1  ICD-9-CM: 276.1  11/14/2017 - Present        Diverticulosis of large intestine without hemorrhage ICD-10-CM: K57.30  ICD-9-CM: 562.10  10/4/2017 - Present        Age related osteoporosis ICD-10-CM: M81.0  ICD-9-CM: 733.01  3/9/2017 - Present    Overview Signed 10/19/2020 11:29 AM by Edith Ovalle NP     Last Assessment & Plan:   Stable on current therapy. No change in treatment at this time.              History of subdural hematoma ICD-10-CM: Z86.79  ICD-9-CM: V12.59  9/13/2016 - Present        HTN (hypertension) ICD-10-CM: I10  ICD-9-CM: 401.9  6/27/2016 - Present        Vitamin D deficiency ICD-10-CM: E55.9  ICD-9-CM: 268.9  9/1/2015 - Present        Hyperlipidemia ICD-10-CM: E78.5  ICD-9-CM: 272.4  10/3/2008 - Present    Overview Signed 10/19/2020 11:29 AM by Edith Ovalle NP     Hyperlipidemia  Hyperlipidemia             RESOLVED: Chest pain ICD-10-CM: R07.9  ICD-9-CM: 786.50  2/14/2019 - 10/19/2020        RESOLVED: Confusion ICD-10-CM: R41.0  ICD-9-CM: 298.9  5/14/2018 - 10/19/2020        RESOLVED: Hypertensive emergency ICD-10-CM: I16.1  ICD-9-CM: 401.9  5/14/2018 - 10/19/2020        RESOLVED: Atrial fibrillation ([address] ICD-10-CM: I48.91  ICD-9-CM: 427.31  4/16/2018 - 10/19/2020        RESOLVED: Slurred speech ICD-10-CM: R47.81  ICD-9-CM: 784.59  11/14/2017 - 10/19/2020        RESOLVED: Acute encephalopathy ICD-10-CM: G93.40  ICD-9-CM: 348.30  6/27/2016 - 10/19/2020        RESOLVED: Sepsis ([facility] ICD-10-CM: A41.9  ICD-9-CM: 038.9, 995.91  6/27/2016 - 10/19/2020        RESOLVED: Brain mass ICD-10-CM: G93.89  ICD-9-CM: 348.89  6/27/2016 - 11/14/2017        RESOLVED: Leukocytosis ICD-10-CM: H23.165  ICD-9-CM: 288.60  6/27/2016 - 11/14/2017        RESOLVED: Hypothyroidism due to acquired atrophy of thyroid ICD-10-CM: E03.4  ICD-9-CM: 244.8, 246.8  9/1/2015 - 10/19/2020    Overview Signed 10/19/2020 11:29 AM by Lux Gonsales NP     Last Assessment & Plan:   Stable on current therapy. No change in treatment at this time.                    Acute ischemic left thalamus/internal capsule CVA     HTN/A. Fib/pacemaker - on losartan, norvasc, tikosyn, continues on telemetry, avoid hypotension, bmp ordered randee. Am  -11/2 tele remains NSR; can dc tele. Bmp wnl. HR 70-87. bp 143/63  -11/3 scheduled to see Dr Daja Villalta at [address] [place] [address] [address] [facility] on 12/2/2020 at 3:30 pm at [facility] possible Watchman procedure; bp elevated this a.m. 171/91; inc norvasc to 7.5 mg for persistent SBP> 140s  -11/4 140/62 improving, cont NSR, rate 72  -11/5 143/75 (102/58) ; 11/6 VSS     Chronic anticoagulation management/DVT prophylaxis - on newly begun eliquis bid, asa 81mg every day, cbc ordered randee. Am  -11/2 hgb 13.7     11/3 new onset perceived swelling of upper lip. ? Meds; Eliquis is newest drug added; will see if this reoccurs  -11/4 resolved spontaneously. Now describing as lips being dry. ??     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 Consider antidepressant.   -11/6 increasing anxiety overnoc. Doesn't want any med treatment of symptoms.                Time spent was 25 minutes with over 1/2 in direct patient care/examination, consultation and coordination of care.      Signed By: Sabrina Alba MD     November 6, 2020

## 2020-11-06 NOTE — PROGRESS NOTES
PHYSICAL THERAPY DAILY NOTE  Time In: 1032  Time Out: 1116  Patient Seen For: AM;Therapeutic exercise;Gait training;Balance activities    Subjective: \"My stomach is not acting right and I am just not too good today\"         Objective: Vital Signs:    Patient Vitals for the past 12 hrs:   Temp Pulse Resp BP SpO2   11/06/20 0641 98.1 °F (36.7 °C) 78 14 126/67 96 %   11/06/20 0446 98.5 °F (36.9 °C) 75 18 133/62      Pain level:  Patient has numerous complaints during therapy, headache, stomach issues, lack of sleep etc..... Tonye Cogan Interdisciplinary Communication: Discussed with OT the numerous issues the patient tends to focus on    Other (comment)(fall risk)    COGNITION Daily Assessment    Alert, oriented to name and place but tends to get lost at times in conversation. Follows commands with repetition. Needs cues to stay on task and she tends to focus on several issues that she has daily. BED/MAT MOBILITY Daily Assessment    Supine to Sit : 5 (Stand-by assistance)  Sit to Supine : 5 (Supervision)     TRANSFERS Daily Assessment   Verbal cues to push up to stand Transfer Type: SPT with walker  Transfer Assistance : 5 (Stand-by assistance)  Sit to Stand Assistance: Contact guard assistance  Car Transfers: Not tested     GAIT Daily Assessment   Gait speed tends to be quick and verbal cues are needed to control speed. She becomes anxious quickly when trying new standing activities. She does well with automatic tasks but becomes anxious with new tasks. Amount of Assistance: 4 (Contact guard assistance)  Distance (ft): 150 Feet (ft)  Assistive Device: Walker, rolling     BALANCE Daily Assessment    Sitting - Static: Good (unsupported)  Sitting - Dynamic: Good (unsupported)  Standing - Static: Good;Constant support  Standing - Dynamic : Impaired     LOWER EXTREMITY EXERCISES Daily Assessment   Had increased difficulty with step exercise coordinating up/down and alternating legs.  More of a cognitive challenge rather than motor. Exercise Type #1: Seated lower extremity strengthening(Motomed x 10 level 3)  Level of Assist: Supervision  Exercise Type #2: Standing lower extremity strengthening(step exercises up/down endurance and coordination)  Sets Performed: 2  Reps Performed: 10  Level of Assist: Minimal assistance          Assessment: Patient participated and tolerated therapy today but she remains focused on many physical issues and needs TLC more during the treatment. She appears to be making some progress with more automatic functional activities, but she can get anxious about new tasks introduced that require more cognitive focus. Patient was taken over to the OT table after PT this morning and was left seated in the w/c at the OT table. Plan of Care: Continue to treat and progress as indicated and tolerated.       Van Roach Mai  11/6/2020

## 2020-11-06 NOTE — PROGRESS NOTES
Time In 0742   Time Out 0821     Mobility   Score Comments   Supine to Sit 4: Supervision or touching A supervision   Sit to Stand 4: Supervision or touching A SBA   Transfer Assist 4: Supervision or touching A Transfer Type: SPT   Equipment: Rolling Walker   Comments: close SBA > CGA     Activities of Daily Living    Score Comments   Eating 6: Independent    Oral Hyigene 4: Supervision or touching A Cues for use of R hand throughout task   Bathing 4: Supervision or touching A Type of Shower: Shower  Position: Supported sitting and Standing PRN   Adaptive  Equipment: Tub Transfer Bench and Grab Bars  Comments: Supervision   Upper Body  Dressing 5: S/U or clean-up assist Items Applied: Pullover and Bra  Position: Supported Sitting  Comments: able to clasp bra on this date   Lower Body Dressing 4: Supervision or touching A Items Applied: Underwear and Elastic pants  Position: Supported sitting and Standing PRN  Adaptive Equipment: RW  Comments: assist to fasten button on pants   Donning/[place] Footwear 5: S/U or clean-up assist Items Applied: Socks  Adaptive Equipment: N/A  Comments: Toilet Transfer 4: Supervision or touching A Transfer Type: SPT   Equipment: [place] [employer]   Comments: [place] [address] 4: Supervision or touching A Output: urine   Comments: CGA   Education  Energy conservation techniques, use of R hand throughout tasks working on [employer]        S: \"I didn't sleep well last night. \" Agreeable to therapy. Patient was able to ambulate ~10 feet using a RW with CGA. Pain not expressed. Patient Vitals for the past 12 hrs:   Temp Pulse Resp BP SpO2   11/06/20 0641 98.1 °F (36.7 °C) 78 14 126/67 96 %   11/06/20 0446 98.5 °F (36.9 °C) 75 18 133/62        Collaborated with RN regarding patient performance and POC.    Patient tolerated session well, but activity tolerance, balance, functional mobility, strength, coordination, cognition are still below baseline and require skilled facilitation to successfully and safely complete ADLs and transfers. Patient ended session in recliner with breakfast, call remote, and phone within reach.        Khurram Landrum, OTR/L  11/6/2020

## 2020-11-06 NOTE — PROGRESS NOTES
OT Daily Note  Time In 1115   Time Out 1206     Subjective: \"I don't feel well. No I don't want any medicine from the nurse. \"  Pain: discomfort in stomach  Education: [facility]  Interdisciplinary Communication: with PT regarding pt's complaints during PT and OT session   Precautions: fall risk   Patient Vitals for the past 12 hrs:   Temp Pulse Resp BP SpO2   11/06/20 0641 98.1 °F (36.7 °C) 78 14 126/67 96 %   11/06/20 0446 98.5 °F (36.9 °C) 75 18 133/62          Mobility   Score Comments   Sit to Stand 4: Supervision or touching A    Transfer Assist 4: Supervision or touching A Transfer Type: SPT   Equipment: Rolling Walker   Comments: CCGA     Neuro-Muscular Re-Education Daily Assessment   Pt completed a 20 piece jigsaw puzzle to increase UE strength, visual perceptual skills, coordination, and problem solving. Pt completed puzzle with no assistance required. Improving reaching and grasping skills noted in R hand throughout task. Pt engaged with animal lacing activity to address sequencing, coordination, and visual perceptual skills. Pt able to self correct errors with occasional min verbal prompting needed. Pt engaged in game of [employer] while standing at table top to increase coordination, initiation, attention to task, problem solving, and activity tolerance. Pt loaded ring onto launcher and used one hand to push down on launcher to release ring into game board. Fair tolerance however required max assist for motivation to engage in task due to pt reporting increased fatigue. Patient completed x 5 minutes UBE on medium resistance using BUEs seated in wheelchair. Patient completed x 5 minutes in forward rotation working on BUE strengthening and activity tolerance. Assessment: Pt with fluctuating perseveration on her fatigue requiring mod prompting for engagement in therapeutic tasks as well as education on benefits of therapy.    Plan: Continue OT POC with focus on ADL/IADL skills, functional transfers, functional mobility, coordination, strength, static and dynamic balance, and activity tolerance to maximize safety and independence with ADLs and functional transfers.      Khurram Landrum, OTR/DELMI  11/6/2020

## 2020-11-06 NOTE — PROGRESS NOTES
11/06/20 1258   Time Spent With Patient   Time In 1008   Time Out 1040   Patient Seen For: AM   Mental Status   Neurologic State Alert   Perception Visual   Perseveration Perseverates during conversation   Safety/Judgement Home safety; Fall prevention        11/06/20 1304   Verbal Expression   Naming Impaired   Confrontation (%) 65 %  (improved to 80% with use of semantic features analysis and 90% with additional sentence completion format with phonemic cue   Naming cueing type Multi modality   Exercise/Activities Tolerance   Exercise Tolerance/Response Cueing required     Patient participated with cognitive-linguistic treatment. Reported not feeling as well this morning with upset stomach but declined medications. Appeared to have eaten more of her breakfast.  Confrontational naming at 65% accuracy. Improved to 80% accuracy with use of semantic features analysis. Reviewed overall goal to communicate intent via any means possible and how describing associated words can help make connections for improved word retrieval and/or at least convey meaning to communication partner. Improved further with sentence completion format and initial phoneme provided to 90% accuracy. Recommend continued ST to address anomia and cognitive function.     Oscar Mena MS, CCC-SLP

## 2020-11-06 NOTE — PROGRESS NOTES
PHYSICAL THERAPY DISCHARGE SUMMARY     Precautions at discharge: Other (comment)(Fall)    Problem List:    Decreased strength B LE  [x]     Decreased strength trunk/core  []     Decreased AROM   []     Decreased PROM  []     Decreased balance sitting  []     Decreased balance standing  [x]     Decreased endurance  [x]     Pain  [x]       Functional Limitations:   Decreased independence with bed mobility  []     Decreased independence with functional transfers  [x]     Decreased independence with ambulation  [x]     Decreased independence with stair negotiation  [x]            Outcome Measures: Vital Signs:   Patient Vitals for the past 12 hrs:   Temp Pulse Resp BP SpO2   11/06/20 0641 98.1 °F (36.7 °C) 78 14 126/67 96 %   11/06/20 0446 98.5 °F (36.9 °C) 75 18 133/62      Pain level:  Patient has numerous complaints of discomfort and has many excuses for why she cannot participate    Patient education:  Fall prevention, patient lacks insight into her deficits or need for assistance    Interdisciplinary Communication:  Discussed with nursing and OT regarding patients negative affect and desire to go home. Cognition: Alert, oriented to her name but has no real insight to her deficits. She does not see the need for assistance with her mobility and states \"I can do what I need for myself\"  She can follow commands but much of the treatment is spent on encouraging her to participate or listening to her numerous physical issues.        MMT Initial Asssessment   Right Lower Extremity Left Lower Extremity   Hip Flexion 4 4   Knee Extension 4 4   Knee Flexion 4- 4-   Ankle Dorsiflexion 5 5      MMT Discharge Assessment   Right Lower Extremity Left Lower Extremity   Hip Flexion 4 4   Knee Extension 4 4   Knee Flexion 4- 4-   Ankle Dorsiflexion 5 5   0/5 No palpable muscle contraction  1/5 Palpable muscle contraction, no joint movement  2-/5 Less than full range of motion in gravity eliminated position  2/5 Able to complete full range of motion in gravity eliminated position  2+/5 Able to initiate movement against gravity  3-/5 More than half but not full range of motion against gravity  3/5 Able to complete full range of motion against gravity  3+/5 Completes full range of motion against gravity with minimal resistance  4-/5 Completes full range of motion against gravity with minimal-moderate resistance  4/5 Completes full range of motion against gravity with moderate resistance  4+/5 Completes full range of motion against gravity with moderate-maximum resistance  5/5 Completes full range of motion against gravity with maximum resistance     AROM: BLE AROM is WFLs    PRIMARY MODE OF LOCOMOTION: Ambulatory with a RW  Please see [facility] Interdisciplinary Eval: Coordination/Balance Section for details regarding FIM score description.     BED/CHAIR/WHEELCHAIR TRANSFERS Initial Assessment Discharge Assessment   Rolling Right 5 (Stand-by assistance) 6 (Modified independent)   Rolling Left 5 (Stand-by assistance) 6 (Modified independent)   Supine to Sit 4 (Contact guard assistance) 5 (Stand-by assistance)   Sit to Stand Contact guard assistance Contact guard assistance   Sit to Supine 5 (Supervision) 5 (Supervision)   Transfer Type SPT with walker SPT with walker   Comments Increased time & effort with cues to use arm rests  verbal cues for safety   Car Transfer Not tested Not tested   Car Type           WHEELCHAIR MOBILITY/MANAGEMENT Initial Assessment Discharge Assessment   Able to Propel 0 feet 0 feet   Functional Level       Curbs/ramps assistance required 0 (Not tested)  0 (NT)   Wheelchair set up assistance required 0 (Not tested)  0(NT)   Wheelchair management           WALKING INDEPENDENCE Initial Assessment Discharge Assessment   Assistive device Gait belt, Walker, rolling Walker, rolling   Ambulation assistance - level surface 4 (Minimal assistance) 4 (Contact guard assistance)   Distance 80 Feet (ft) 150 Feet (ft)   Comments Decreased jonatan, decreased step length, downward gaze, decreased trunk rotation, shuffling gait pattern  Improved gait speed with a normal gait pattern using the RW with verbal cues for safety awareness and directions on the unit. Ambulation assistance - unlevel surface 0 (Not tested)  NT       STEPS/STAIRS Initial Assessment Discharge Assessment   Steps/Stairs ambulated 0 4(CGA)   Rail Use Both Both   Functional Level       Comments Not tested due to time constraints  Verbal cues for safety and attention   Curbs/Ramps 0 (Not tested)  NT       QUALITY INDICATOR ASSIST COMMENTS   Roll right (&return to back) 6: Independent    Roll left (& return to back) 6: Independent    Supine to sit 6: Independent    Sit to stand 4: Supervision or touching A    Chair/bed-to-chair transfer 4: Supervision or touching A    Walk 10 feet 4: Supervision or touching A    Walk 50 feet with 2 turns 4: Supervision or touching A    Walk 150 feet 4: Supervision or touching A    Walk 10 feet on uneven  Not Tested: Not attempted due to medical concerns and safety concerns    1 step/curb 4: Supervision or touching A    4 steps 4: Supervision or touching A    12 steps Not Tested: Not attempted due to medical concerns and safety concerns     object Not Tested: Not attempted due to environmental concerns: Time    Wheel 48' w/2 turns Not Tested: Not applicable secondary to pt not completing activity previously    Wheel 150' Not Tested: Not applicable secondary to pt not completing activity previously    Car Transfer Not Tested: Not attempted due to environmental concerns: Time             PHYSICAL THERAPY PLAN OF CARE    LTGs: Only supine to sit goal met as patient continues to need CGA and verbal cues for safety with mobility secondary to limited insight to her deficits or need for assistance. She is being discharged before the 10 day goal period secondary to low participation.     Pt would benefit from continued skilled physical therapy in order to improve independent functional mobility within the home with use of least restrictive device. Interventions may include range of motion (AROM, PROM B LE/trunk), motor function (B LE/trunk strengthening/coordination), activity tolerance (vitals, oxygen saturation levels), bed mobility training, balance activities, gait training (progressive ambulation program), and functional transfer training. Therapy Recommendations upon discharge:  [address] needs at discharge:    No HEP provided secondary to patient refusing and not participating. Please see [facility]; Interdisciplinary Eval, Care Plan, and Patient Education for further information regarding physical therapy discharge summary and plan of care.      Reyes Killbuck, [place]  11/6/2020

## 2020-11-06 NOTE — PROGRESS NOTES
Patient will be d/c home today with [facility]. CM made contact with Shane Jimenez at [facility] and informed her of the referral.  Information faxed. No DME's needed. Family will transport patient home. Patient has met all treatment goals / milestones. CM will continue to monitor and remain available for any concerns or needs that may occur. Care Management Interventions  PCP Verified by CM: Yes(Denny Mcdonald NP)  Mode of Transport at Discharge:  Other (see comment)(Family to tranport )  Transition of Care Consult (CM Consult): Discharge Planning  Discharge Durable Medical Equipment: No  Physical Therapy Consult: Yes  Occupational Therapy Consult: Yes  Speech Therapy Consult: Yes  Current Support Network: Own Home, Lives Alone, Family Lives Nearby  Confirm Follow Up Transport: Family  The Plan for Transition of Care is Related to the Following Treatment Goals : Return to baseline  [employer] Provided?: No  Discharge Location  Discharge Placement: Home with home health(anticipated plan)

## 2020-11-07 VITALS
HEART RATE: 80 BPM | RESPIRATION RATE: 18 BRPM | SYSTOLIC BLOOD PRESSURE: 128 MMHG | OXYGEN SATURATION: 98 % | DIASTOLIC BLOOD PRESSURE: 57 MMHG | TEMPERATURE: 97.5 F

## 2020-11-07 PROCEDURE — 74011250637 HC RX REV CODE- 250/637: Performed by: PHYSICAL MEDICINE & REHABILITATION

## 2020-11-07 PROCEDURE — 99239 HOSP IP/OBS DSCHRG MGMT >30: CPT | Performed by: PHYSICAL MEDICINE & REHABILITATION

## 2020-11-07 RX ADMIN — MAGNESIUM GLUCONATE 500 MG ORAL TABLET 400 MG: 500 TABLET ORAL at 08:41

## 2020-11-07 RX ADMIN — AMLODIPINE BESYLATE 7.5 MG: 5 TABLET ORAL at 08:41

## 2020-11-07 RX ADMIN — APIXABAN 5 MG: 5 TABLET, FILM COATED ORAL at 08:41

## 2020-11-07 RX ADMIN — ASPIRIN 81 MG: 81 TABLET, CHEWABLE ORAL at 08:41

## 2020-11-07 RX ADMIN — LEVOTHYROXINE SODIUM 88 MCG: 0.09 TABLET ORAL at 06:01

## 2020-11-07 RX ADMIN — LOSARTAN POTASSIUM 100 MG: 50 TABLET, FILM COATED ORAL at 08:41

## 2020-11-07 RX ADMIN — DOFETILIDE 250 MCG: 0.25 CAPSULE ORAL at 08:41

## 2020-11-07 NOTE — DISCHARGE SUMMARY
Rafaela Osgood, MD  Medical Director  [address] [place] [address], [address] [facility]  Tel: [phone]       PHYSICAL MEDICINE & REHABILITATION DISCHARGE SUMMARY     Date: 11/7/2020  Admission Date: 11/1/2020  Discharge Date: 11/7/2020      Primary Care Provider: Deuce Beach NP    Admission Condition: good  Discharged Condition: good    Admission Diagnosis: Acute ischemic left thalamus/internal capsule CVA  KAYLYNN Blanc  Pacemaker placed in 2019  Chronic anticoagulation, on Xarelto  HTN  Dyslipidemia  Prior CVA/TIA 3 years ago without residual weakness  DVT prophylaxis  Pain management     Rehabilitation Diagnosis:  Mobility impairments  Self Care impairments  Aphasia      Hospital Course: The patient was admitted to [address] [place] [address] by Lachelle Hook MD on 11/1/2020 s/p CVA    History of Present Illness/ Hospital course: This is an 79 YO with PMH of KAYLYNN Blanc on xarelto, pacemaker, HTN, who was admitted on 10/30 with aphasia, confusion and right sided weakness. MRI was positive for an acute left thalamus/internal capsule CVA. Neurology was consulted. She became confused, agitated with follow-up head CT on 10/31 positive for left thalamic/internal capsule with possible second left frontal pole ischemic CVA. ECHO was noted to have an EF 55-60%, moderate dilation of LA and moderate dilation of RA and negative for PFO. It was suspected possible embolic CVA while anticoagulated with Xarelto. She was transitioned to Eliquis bid. Cardiology reported  possible watchman device given recurrent stroke on xarelto with an increased risk for falls. Further evaluation may be done as an outpatient vs. Inpatient. She was recently seen by [address] [place] [address] [address] Cardiology, Dr. Tri Godfrey [facility]. Physical therapy was initiated and patient was starting to mobilize. She has significant mobility, self care and speech impairments.   It was felt she would benefit from comprehensive acute inpatient rehabilitation, continued close medical supervision and management prior to returning home. The medical stability and these recent medical events are not expected to interfere with the patient's ability to tolerate the intensity of services in acute rehab. Rehabilitation Course:      Acute ischemic left thalamus/internal capsule CVA     HTN/A. Fib/pacemaker - on losartan, norvasc, tikosyn, continues on telemetry, avoid hypotension, bmp ordered randee. Am  -11/2 tele remains NSR; can dc tele. Bmp wnl. HR 70-87. bp 143/63  -11/3 scheduled to see Dr Dale Weber at [address] [facility] [address] [facility] on 12/2/2020 at 3:30 pm at [facility] possible Watchman procedure; bp elevated this a.m. 171/91; inc norvasc to 7.5 mg for persistent SBP> 140s  -11/4 140/62 improving, cont NSR, rate 72  -11/5 143/75 (102/58) ; 11/6 VSS; 11/7 medically cleared for dc. Will f/u with PCP, Cardiology and Neurology     Chronic anticoagulation management/DVT prophylaxis - on newly begun eliquis bid, asa 81mg every day, cbc ordered randee. Am  -11/2 hgb 13.7     11/3 new onset perceived swelling of upper lip. ? Meds; Eliquis is newest drug added; will see if this reoccurs  -11/4 resolved spontaneously. Now describing as lips being dry. ??     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 Consider antidepressant.   -11/6 increasing anxiety overnoc. Doesn't want any med treatment of symptoms.   --11/7 met with pt and son; pt adamant about going home. Doesn't feel a need to be here. Recommending 24hr supervision. Son, Usman Peraza, is putting this in place; She appears to be making some progress with more automatic functional activities, but she can get anxious about new tasks introduced that require more cognitive focus.  Pt being dc due to refusal to participate at this point. Stressed compliance with meds and follow up        Functional Level On Admission:   bed mobility and transfers - min A, ambulation 40' with RW and min A with unsteady gait, fair balance, toileting - moderate assistance,lower extremity dressing - moderate assistance    Functional Level At Discharge:  PHYSICAL THERAPY DISCHARGE SUMMARY      Precautions at discharge: Other (comment)(Fall)     Problem List:    Decreased strength B LE  [x]? Decreased strength trunk/core  []? Decreased AROM   []? Decreased PROM  []? Decreased balance sitting  []? Decreased balance standing  [x]? Decreased endurance  [x]? Pain  [x]?        Functional Limitations:   Decreased independence with bed mobility  []? Decreased independence with functional transfers  [x]? Decreased independence with ambulation  [x]? Decreased independence with stair negotiation  [x]?           Outcome Measures: Vital Signs:   Patient Vitals for the past 12 hrs:    Temp Pulse Resp BP SpO2   11/06/20 0641 98.1 °F (36.7 °C) 78 14 126/67 96 %   11/06/20 0446 98.5 °F (36.9 °C) 75 18 133/62       Pain level:  Patient has numerous complaints of discomfort and has many excuses for why she cannot participate     Patient education:  Fall prevention, patient lacks insight into her deficits or need for assistance     Interdisciplinary Communication:  Discussed with nursing and OT regarding patients negative affect and desire to go home.     Cognition: Alert, oriented to her name but has no real insight to her deficits.   She does not see the need for assistance with her mobility and states \"I can do what I need for myself\"  She can follow commands but much of the treatment is spent on encouraging her to participate or listening to her numerous physical issues.                   MMT Initial Asssessment    Right Lower Extremity Left Lower Extremity   Hip Flexion 4 4   Knee Extension 4 4   Knee Flexion 4- 4-   Ankle Dorsiflexion 5 5                   MMT Discharge Assessment    Right Lower Extremity Left Lower Extremity   Hip Flexion 4 4   Knee Extension 4 4   Knee Flexion 4- 4-   Ankle Dorsiflexion 5 5   0/5            No palpable muscle contraction  1/5            Palpable muscle contraction, no joint movement  2-/5          Less than full range of motion in gravity eliminated position  2/5            Able to complete full range of motion in gravity eliminated position  2+/5          Able to initiate movement against gravity  3-/5          More than half but not full range of motion against gravity  3/5            Able to complete full range of motion against gravity  3+/5          Completes full range of motion against gravity with minimal resistance  4-/5          Completes full range of motion against gravity with minimal-moderate resistance  4/5            Completes full range of motion against gravity with moderate resistance  4+/5          Completes full range of motion against gravity with moderate-maximum resistance  5/5            Completes full range of motion against gravity with maximum resistance                 AROM: BLE AROM is WFLs     PRIMARY MODE OF LOCOMOTION: Ambulatory with a RW  Please see [facility] Interdisciplinary Eval: Coordination/Balance Section for details regarding FIM score description.     BED/CHAIR/WHEELCHAIR TRANSFERS Initial Assessment Discharge Assessment   Rolling Right 5 (Stand-by assistance) 6 (Modified independent)   Rolling Left 5 (Stand-by assistance) 6 (Modified independent)   Supine to Sit 4 (Contact guard assistance) 5 (Stand-by assistance)   Sit to Stand Contact guard assistance Contact guard assistance   Sit to Supine 5 (Supervision) 5 (Supervision)   Transfer Type SPT with walker SPT with walker   Comments Increased time & effort with cues to use arm rests  verbal cues for safety   Car Transfer Not tested Not tested   Car Type           WHEELCHAIR MOBILITY/MANAGEMENT Initial Assessment Discharge Assessment   Able to Propel 0 feet 0 feet   Functional Level     Curbs/ramps assistance required 0 (Not tested)  0 (NT)   Wheelchair set up assistance required 0 (Not tested)  0(NT)   Wheelchair management           WALKING INDEPENDENCE Initial Assessment Discharge Assessment   Assistive device Gait belt, Walker, rolling Walker, rolling   Ambulation assistance - level surface 4 (Minimal assistance) 4 (Contact guard assistance)   Distance 80 Feet (ft) 150 Feet (ft)   Comments Decreased jonatan, decreased step length, downward gaze, decreased trunk rotation, shuffling gait pattern  Improved gait speed with a normal gait pattern using the RW with verbal cues for safety awareness and directions on the unit. Ambulation assistance - unlevel surface 0 (Not tested)  NT         STEPS/STAIRS Initial Assessment Discharge Assessment   Steps/Stairs ambulated 0 4(CGA)   Rail Use Both Both   Functional Level     Comments Not tested due to time constraints  Verbal cues for safety and attention   Curbs/Ramps 0 (Not tested)  NT         QUALITY INDICATOR ASSIST COMMENTS   Roll right (&return to back) 6: Independent     Roll left (& return to back) 6:  Independent     Supine to sit 6: Independent     Sit to stand 4: Supervision or touching A     Chair/bed-to-chair transfer 4: Supervision or touching A     Walk 10 feet 4: Supervision or touching A     Walk 50 feet with 2 turns 4: Supervision or touching A     Walk 150 feet 4: Supervision or touching A     Walk 10 feet on uneven  Not Tested: Not attempted due to medical concerns and safety concerns     1 step/curb 4: Supervision or touching A     4 steps 4: Supervision or touching A     12 steps Not Tested: Not attempted due to medical concerns and safety concerns      object Not Tested: Not attempted due to environmental concerns: Time     Wheel 48' w/2 turns Not Tested: Not applicable secondary to pt not completing activity previously     Wheel 150' Not Tested: Not applicable secondary to pt not completing activity previously     Car Transfer Not Tested: Not attempted due to environmental concerns: Time                PHYSICAL THERAPY PLAN OF CARE     LTGs: Only supine to sit goal met as patient continues to need CGA and verbal cues for safety with mobility secondary to limited insight to her deficits or need for assistance. She is being discharged before the 10 day goal period secondary to low participation.     Pt would benefit from continued skilled physical therapy in order to improve independent functional mobility within the home with use of least restrictive device. Interventions may include range of motion (AROM, PROM B LE/trunk), motor function (B LE/trunk strengthening/coordination), activity tolerance (vitals, oxygen saturation levels), bed mobility training, balance activities, gait training (progressive ambulation program), and functional transfer training.      Therapy Recommendations upon discharge:  [address] needs at discharge:    No HEP provided secondary to patient refusing and not participating.       Please see IRC; Interdisciplinary Eval, Care Plan, and Patient Education for further information regarding physical therapy discharge summary and plan of care.      Jay Laurent, PT  11/6/2020    Speech therapy    11/06/20 1258   Time Spent With Patient   Time In 1008   Time Out 0320   Patient Seen For: AM   Mental Status   Neurologic State Alert   Perception Visual   Perseveration Perseverates during conversation   Safety/Judgement Home safety; Fall prevention           11/06/20 1304   Verbal Expression   Naming Impaired   Confrontation (%) 65 %  (improved to 80% with use of semantic features analysis and 90% with additional sentence completion format with phonemic cue   Naming cueing type Multi modality   Exercise/Activities Tolerance   Exercise Tolerance/Response Cueing required      Patient participated with cognitive-linguistic treatment. Reported not feeling as well this morning with upset stomach but declined medications. Appeared to have eaten more of her breakfast.  Confrontational naming at 65% accuracy. Improved to 80% accuracy with use of semantic features analysis. Reviewed overall goal to communicate intent via any means possible and how describing associated words can help make connections for improved word retrieval and/or at least convey meaning to communication partner. Improved further with sentence completion format and initial phoneme provided to 90% accuracy. Recommend continued ST to address anomia and cognitive function.     Beth Jarquin MS, CCC-SLP        Occupational Therapy    Mobility    Score Comments   Supine to Sit 4: Supervision or touching A supervision   Sit to Stand 4: Supervision or touching A SBA   Transfer Assist 4: Supervision or touching A Transfer Type: SPT   Equipment: Rolling Walker   Comments: close SBA > CGA      Activities of Daily Living     Score Comments   Eating 6: Independent     Oral Hyigene 4: Supervision or touching A Cues for use of R hand throughout task   Bathing 4: Supervision or touching A Type of Shower: Shower  Position: Supported sitting and Standing PRN   Adaptive  Equipment: Tub Transfer Bench and Grab Bars  Comments: Supervision   Upper Body  Dressing 5: S/U or clean-up assist Items Applied: Pullover and Bra  Position: Supported Sitting  Comments: able to clasp bra on this date   Lower Body Dressing 4: Supervision or touching A Items Applied: Underwear and Elastic pants  Position: Supported sitting and Standing PRN  Adaptive Equipment: RW  Comments: assist to fasten button on pants   Donning/Watchung Footwear 5: S/U or clean-up assist Items Applied: Socks  Adaptive Equipment: N/A  Comments:     Toilet Transfer 4: Supervision or touching A Transfer Type: SPT   Equipment: Farhat Castanon   Comments: [address] 4: Supervision or touching A Output: urine   Comments: CGA   Education   Energy conservation techniques, use of R hand throughout tasks working on [address] skills          S: \"I didn't sleep well last night. \" Agreeable to therapy. Patient was able to ambulate ~10 feet using a RW with CGA. Pain not expressed. Patient Vitals for the past 12 hrs:    Temp Pulse Resp BP SpO2   11/06/20 0641 98.1 °F (36.7 °C) 78 14 126/67 96 %   11/06/20 0446 98.5 °F (36.9 °C) 75 18 133/62          Collaborated with RN regarding patient performance and POC. Patient tolerated session well, but activity tolerance, balance, functional mobility, strength, coordination, cognition are still below baseline and require skilled facilitation to successfully and safely complete ADLs and transfers. Patient ended session in recliner with breakfast, call remote, and phone within reach.         Khurram Landrum OTR/DELMI  11/6/2020              Home Architecture:   independent. Lives alone in a split level home with steps to enter, was driving. . Adult children live nearby. Home Environment: Private residence  One/Two Story Residence: Split level  Lift Chair Available: No  Living Alone: Yes  Support Systems: Family member(s)  Patient Expects to be Discharged to[de-identified] Rehabilitation facility  Current DME Used/Available at Home: None  Prior Level of Function/Work/Activity:  Mod I.    Past Medical History:   Diagnosis Date    Acute encephalopathy 6/27/2016    Anxiety     Arthritis     Atrial fibrillation ([facility] [address] 4/16/2018    Chronic pain     hip    Endocrine disease     hypothyroidism    Hypertension     Sepsis ([facility] [address] 6/27/2016    Thyroid disease     TIA (transient ischemic attack) 11/14/2017    Vitamin D deficiency 9/1/2015      Past Surgical History:   Procedure Laterality Date    HX ORTHOPAEDIC      right total hip    HX PACEMAKER      TOTAL KNEE ARTHROPLASTY Left 7/23/15      Family History   Problem Relation Age of Onset    Heart Attack Father       Social History     Tobacco Use    Smoking status: Never Smoker    Smokeless tobacco: Never Used   Substance Use Topics    Alcohol use: No     Allergies   Allergen Reactions    Ciprofloxacin Nausea and Vomiting    Percocet [Oxycodone-Acetaminophen] Other (comments)     hallucinations    Other Medication Other (comments)     I don't do well with any strong medications (poss narcotics, pt difficult to get an elaboration from)    Propofol (Bulk) Other (comments)     Red burning hands      Prior to Admission medications    Medication Sig Start Date End Date Taking? Authorizing Provider   apixaban (ELIQUIS) 5 mg tablet Take 1 Tab by mouth two (2) times a day. 11/6/20  Yes Rina OfficerLachelle MD   aspirin 81 mg chewable tablet Take 1 Tab by mouth daily. 11/6/20  Yes Rina TorresrLachelle MD   atorvastatin (LIPITOR) 40 mg tablet Take 1 Tab by mouth nightly. 11/6/20  Yes Lachelle Membreno MD   amLODIPine (NORVASC) 2.5 mg tablet Take 3 Tabs by mouth daily. 11/7/20  Yes Lachelle Membreno MD   dofetilide (Tikosyn) 250 mcg capsule Take 1 Cap by mouth two (2) times a day. 11/6/20  Yes Lachelle Membreno MD   levothyroxine (SYNTHROID) 88 mcg tablet Take 1 Tab by mouth daily. 11/6/20 11/7/21 Yes Rina TorresrLachelle MD   acetaminophen (TylenoL) 325 mg tablet Take  by mouth every four (4) hours as needed for Pain. Provider, Historical   fluticasone propionate (Flonase Allergy Relief) 50 mcg/actuation nasal spray 2 Sprays by Both Nostrils route as needed. Provider, Historical   sodium chloride (SIMPLY SALINE NA) by Nasal route as needed. Provider, Historical   amLODIPine (NORVASC) 5 mg tablet Take 1 Tab by mouth daily. Patient taking differently: Take 5 mg by mouth daily as needed. Ordered by Dr Susan Jones 9/25/20   Alex Wang MD   carboxymethylcellulose sodium (REFRESH TEARS OP) Apply  to eye daily as needed. Provider, Historical   cyanocobalamin (VITAMIN B12) 500 mcg tablet Take 500 mcg by mouth daily. Provider, Historical   Magnesium Oxide 500 mg cap Take 500 mg by mouth daily. Provider, Historical   cholecalciferol, vitamin D3, (VITAMIN D3) 2,000 unit tab Take  by mouth daily. Provider, Historical       Lab Review: No results found for this or any previous visit (from the past 72 hour(s)). Functional Assessment:          Balance  Sitting - Static: Good (unsupported) (11/06/20 1600)  Sitting - Dynamic: Good (unsupported) (11/06/20 1600)  Standing - Static: Good;Constant support (11/06/20 1600)  Standing - Dynamic : Impaired (11/06/20 1200)                     Albino Messing Fall Risk Assessment:  Albino Messing Fall Risk  Mobility: Ambulates or transfers with assist devices or assistance (11/07/20 0700)  Mobility Interventions: Communicate number of staff needed for ambulation/transfer;Patient to call before getting OOB;PT Consult for mobility concerns;Strengthening exercises (ROM-active/passive); Utilize walker, cane, or other assistive device (11/07/20 0309)  Mentation: Periodic confusion (11/07/20 0700)  Mentation Interventions: Adequate sleep, hydration, pain control;Bed/chair exit alarm; Door open when patient unattended;Evaluate medications/consider consulting pharmacy; Increase mobility (11/07/20 0309)  Medication: Patient receiving anticonvulsants, sedatives(tranquilizers), psychotropics or hypnotics, hypoglycemics, narcotics, sleep aids, antihypertensives, laxatives, or diuretics (11/07/20 0700)  Medication Interventions: Bed/chair exit alarm;Evaluate medications/consider consulting pharmacy; Patient to call before getting OOB; Teach patient to arise slowly (11/07/20 0309)  Elimination: Needs assistance with toileting (11/07/20 0700)  Elimination Interventions: Bed/chair exit alarm;Call light in reach; Patient to call for help with toileting needs; Toilet paper/wipes in reach (11/07/20 0309)  Prior Fall History: Unknown (11/07/20 0700)  History of Falls Interventions: Bed/chair exit alarm; Consult care management for discharge planning;Door open when patient unattended;Evaluate medications/consider consulting pharmacy (11/07/20 0309)  Total Score: 4 (11/07/20 0700)  High Fall Risk: Yes (11/07/20 0700)     Speech Assessment:  Aspiration Signs/Symptoms: None (11/02/20 1449)      Ambulation:  Gait  Distance (ft): 150 Feet (ft) (11/06/20 1600)  Assistive Device: Walker, rolling (11/06/20 1600)  Rail Use: Both (11/06/20 1600)     Visit Vitals  BP (!) 153/72   Pulse 85   Temp 97.8 °F (36.6 °C)   Resp 18   SpO2 97%      Intake and Output:    11/05 1901 - 11/07 0700  In: 480 [P.O.:480]  Out: -     Discharge Exam:  General: Alert and age appropriately oriented. No acute cardiorespiratory distress. HEENT: Normocephalic, no scleral icterus. Oral mucosa moist without cyanosis. Lungs: Clear to auscultation bilaterally. Respiration even and unlabored. Heart: Regular rate and rhythm, S1, S2. No murmurs, clicks, rub or gallops. Abdomen: Soft, non-tender, not distended. Bowel sounds normoactive. No organomegaly. Genitourinary: Deferred. Neuromuscular:        Mild inattn RUE. Exp aphasia with word finding deficits  Perseverates in conversation  Dec fine motor coord right hand  Poor insight into deficits      Skin/extremity: No rashes, no erythema. No calf tenderness B LE.   No edema         Problem List as of 11/7/2020 Date Reviewed: 10/22/2020          Codes Class Noted - Resolved    Irritable bowel syndrome with constipation ICD-10-CM: K58.1  ICD-9-CM: 564.1  10/26/2020 - Present        History of colon polyps ICD-10-CM: Z86.010  ICD-9-CM: V12.72  10/26/2020 - Present    Overview Signed 10/26/2020  4:51 PM by Abeba Monique NP     Tubular adenoma 2014             Chronic constipation ICD-10-CM: K59.09  ICD-9-CM: 564.00  10/26/2020 - Present        Memory loss ICD-10-CM: R41.3  ICD-9-CM: 780.93  11/12/2019 - Present    Overview Signed 10/19/2020 11:29 AM by Abeba Monique NP     Last Assessment & Plan: Patient complains of memory loss and confusion. Will get an MRI of brain, further treatment pending results. Pacemaker ICD-10-CM: Z95.0  ICD-9-CM: V45.01  7/11/2018 - Present        Paroxysmal atrial fibrillation (HCC) ICD-10-CM: I48.0  ICD-9-CM: 427.31  6/1/2018 - Present    Overview Signed 10/19/2020 11:32 AM by Brittanie Jj NP     Last Assessment & Plan:   Stable on current therapy. No change in treatment at this time. CVA (cerebral vascular accident) [facility]) ICD-10-CM: I63.9  ICD-9-CM: 434.91  5/15/2018 - Present        Hypothyroidism ICD-10-CM: E03.9  ICD-9-CM: 244.9  5/14/2018 - Present        TIA (transient ischemic attack) ICD-10-CM: G45.9  ICD-9-CM: 435.9  11/14/2017 - Present        Hyponatremia ICD-10-CM: E87.1  ICD-9-CM: 276.1  11/14/2017 - Present        Diverticulosis of large intestine without hemorrhage ICD-10-CM: K57.30  ICD-9-CM: 562.10  10/4/2017 - Present        Age related osteoporosis ICD-10-CM: M81.0  ICD-9-CM: 733.01  3/9/2017 - Present    Overview Signed 10/19/2020 11:29 AM by Brittanie Jj NP     Last Assessment & Plan:   Stable on current therapy. No change in treatment at this time.              History of subdural hematoma ICD-10-CM: Z86.79  ICD-9-CM: V12.59  9/13/2016 - Present        HTN (hypertension) ICD-10-CM: I10  ICD-9-CM: 401.9  6/27/2016 - Present        Vitamin D deficiency ICD-10-CM: E55.9  ICD-9-CM: 268.9  9/1/2015 - Present        Hyperlipidemia ICD-10-CM: E78.5  ICD-9-CM: 272.4  10/3/2008 - Present    Overview Signed 10/19/2020 11:29 AM by Brittanie Jj NP     Hyperlipidemia  Hyperlipidemia             RESOLVED: Chest pain ICD-10-CM: R07.9  ICD-9-CM: 786.50  2/14/2019 - 10/19/2020        RESOLVED: Confusion ICD-10-CM: R41.0  ICD-9-CM: 298.9  5/14/2018 - 10/19/2020        RESOLVED: Hypertensive emergency ICD-10-CM: I16.1  ICD-9-CM: 401.9  5/14/2018 - 10/19/2020        RESOLVED: At[facility] fibrillation ([facility] ICD-10-CM: I48.91  ICD-9-CM: 427.31  4/16/2018 - 10/19/2020        RESOLVED: Slurred speech ICD-10-CM: R47.81  ICD-9-CM: 784.59  11/14/2017 - 10/19/2020        RESOLVED: Acute encephalopathy ICD-10-CM: G93.40  ICD-9-CM: 348.30  6/27/2016 - 10/19/2020        RESOLVED: Sepsis ([address] ICD-10-CM: A41.9  ICD-9-CM: 038.9, 995.91  6/27/2016 - 10/19/2020        RESOLVED: Brain mass ICD-10-CM: G93.89  ICD-9-CM: 348.89  6/27/2016 - 11/14/2017        RESOLVED: Leukocytosis ICD-10-CM: E48.709  ICD-9-CM: 288.60  6/27/2016 - 11/14/2017        RESOLVED: Hypothyroidism due to acquired atrophy of thyroid ICD-10-CM: E03.4  ICD-9-CM: 244.8, 246.8  9/1/2015 - 10/19/2020    Overview Signed 10/19/2020 11:29 AM by Maria L Walker NP     Last Assessment & Plan:   Stable on current therapy. No change in treatment at this time. Discharge Instructions:   1. Diet: Regular Diet  2. Activity: Activity as tolerated and no driving; supervision required      Current Discharge Medication List      CONTINUE these medications which have CHANGED    Details   apixaban (ELIQUIS) 5 mg tablet Take 1 Tab by mouth two (2) times a day. Qty: 60 Tab, Refills: 0      aspirin 81 mg chewable tablet Take 1 Tab by mouth daily. Qty: 30 Tab, Refills: 0      atorvastatin (LIPITOR) 40 mg tablet Take 1 Tab by mouth nightly. Qty: 30 Tab, Refills: 3      amLODIPine (NORVASC) 2.5 mg tablet Take 3 Tabs by mouth daily. Qty: 90 Tab, Refills: 3      dofetilide (Tikosyn) 250 mcg capsule Take 1 Cap by mouth two (2) times a day. Qty: 180 Cap, Refills: 3      levothyroxine (SYNTHROID) 88 mcg tablet Take 1 Tab by mouth daily. Qty: 30 Tab, Refills: 5    Associated Diagnoses: Acquired hypothyroidism         CONTINUE these medications which have NOT CHANGED    Details   fluticasone propionate (Flonase Allergy Relief) 50 mcg/actuation nasal spray 2 Sprays by Both Nostrils route as needed. sodium chloride (SIMPLY SALINE NA) by Nasal route as needed. cyanocobalamin (VITAMIN B12) 500 mcg tablet Take 500 mcg by mouth daily. Magnesium Oxide 500 mg cap Take 500 mg by mouth daily. cholecalciferol, vitamin D3, (VITAMIN D3) 2,000 unit tab Take  by mouth daily. STOP taking these medications       acetaminophen (TylenoL) 325 mg tablet Comments:   Reason for Stopping:         losartan (COZAAR) 100 mg tablet Comments:   Reason for Stopping:         rivaroxaban (XARELTO) 20 mg tab tablet Comments:   Reason for Stopping:         carboxymethylcellulose sodium (REFRESH TEARS OP) Comments:   Reason for Stopping:               I have reviewed the patients controlled substance prescription history as maintained in the [place] prescription monitoring program () so that prescription(s) for controlled substance, if any provided, could be given. Rehabilitation Management:   1. Devices: RW  2. Consult: PT/OT/ST/CM    Disposition: home with [facility] PT/OT/ST    Follow-up Information     Follow up With Specialties Details Why Contact Info    [facility] CARDIOLOGY  On 12/2/2020 Dr Darwin Aguilar on Wednesday 12/2 at 3:30 pm at [address] [place] [address] [facility]    Lloyd Caller, NP Nurse Practitioner In 1 week post hospital follow up [address] [facility],[address] [facility] 121 [address] [facility] Veronica Appiah MD Neurology In 3 weeks  [address]  [phone]          Time spent in patient discharge planning and coordination: >35 minutes. Lachelle Tang MD, Medical Director  [address] [facility]

## 2020-11-09 NOTE — PROGRESS NOTES
Problem: Self Care Deficits Care Plan (Adult)  Goal: *Therapy Goal (Edit Goal, Insert Text)  Description: LTG 1: Patient will complete dressing with independence using AE/DME PRN within 10 days. Progressing 11/6/2020 11/9/2020 Goal not met. Pt is S with Dressing. Outcome: Resolved/Not Met  Goal: *Therapy Goal (Edit Goal, Insert Text)  Description: LTG 2: Patient will complete bathing with independence using AE/DME PRN within 10 days. Progressing 11/6/2020 11/9/2020 Goal Not Met. Pt is S with bathing. Outcome: Resolved/Not Met  Goal: *Therapy Goal (Edit Goal, Insert Text)  Description: LTG 3: Patient will complete toileting with independence using AE/DME PRN within 10 days. Progressing 11/6/2020 11/9/2020 Goal Not Met. Pt is CGA with toileting. Outcome: Resolved/Not Met  Goal: *Therapy Goal (Edit Goal, Insert Text)  Description: LTG 4: Patient will complete toilet transfer with independence using AE/DME PRN within 10 days. Progressing 11/6/2020 11/9/2020 Goal Not Met. Pt is CGA with toilet transfer    Outcome: Resolved/Not Met  Goal: *Therapy Goal (Edit Goal, Insert Text)  Description: LTG 5: Patient will complete simple IADL such as medication management task or folding laundry with independence using AE/DME PRN within 10 days. Progressing 11/6/2020 11/9/2020 Goal Not Met. Outcome: Resolved/Not Met     Problem: Falls - Risk of  Goal: *Absence of Falls  Description: Document Yue Romo Fall Risk and appropriate interventions in the flowsheet.   Outcome: Resolved/Met  Note: Fall Risk Interventions:  Mobility Interventions: Communicate number of staff needed for ambulation/transfer, Patient to call before getting OOB, PT Consult for mobility concerns, Strengthening exercises (ROM-active/passive), Utilize walker, cane, or other assistive device    Mentation Interventions: Adequate sleep, hydration, pain control, Bed/chair exit alarm, Door open when patient unattended, Evaluate medications/consider consulting pharmacy, Increase mobility    Medication Interventions: Bed/chair exit alarm, Evaluate medications/consider consulting pharmacy, Patient to call before getting OOB, Teach patient to arise slowly    Elimination Interventions: Bed/chair exit alarm, Call light in reach, Patient to call for help with toileting needs, Toilet paper/wipes in reach    History of Falls Interventions: Bed/chair exit alarm, Consult care management for discharge planning, Door open when patient unattended, Evaluate medications/consider consulting pharmacy         Problem: Self Care Deficits Care Plan (Adult)  Goal: Interventions  Outcome: Resolved/Met     José Izaguirre MS OTR/L  11/9/2020

## 2020-11-17 NOTE — PROGRESS NOTES
11/17/2020 at 1044: In this pt chart as son, Jeanie Lucio, contacted this CM with concerns that pt has not had therapy. This CM did not set up d/c needs but after review of notes, [facility] set up for therapy needs. Pt son reported he declined services at he thought this was private sitter service. Education provided on services. Son reported understanding and plans to reach out to [facility], contact information provided. If son has any issues this CM available to assist but son plans to contact [facility] to start services.

## 2021-01-13 ENCOUNTER — HOSPITAL ENCOUNTER (OUTPATIENT)
Dept: CT IMAGING | Age: 81
Discharge: HOME | End: 2021-01-13
Attending: NURSE PRACTITIONER

## 2021-01-13 DIAGNOSIS — R19.8 CHANGE IN BOWEL MOVEMENT: ICD-10-CM

## 2021-01-13 DIAGNOSIS — R63.4 WEIGHT LOSS: ICD-10-CM

## 2021-01-13 RX ORDER — SODIUM CHLORIDE 0.9 % (FLUSH) 0.9 %
10 SYRINGE (ML) INJECTION
Status: COMPLETED | OUTPATIENT
Start: 2021-01-13 | End: 2021-01-13

## 2021-01-13 RX ADMIN — Medication 10 ML: at 11:06

## 2021-01-15 NOTE — PROGRESS NOTES
Pt's son was made aware of his mom's CT and pt already has gibran set with Dr Zulema Palafox on 1/21/21 [address] [facility]

## 2021-02-25 ENCOUNTER — HOSPITAL ENCOUNTER (OUTPATIENT)
Dept: GENERAL RADIOLOGY | Age: 81
Discharge: HOME | End: 2021-02-25

## 2021-02-25 DIAGNOSIS — M54.50 ACUTE LEFT-SIDED LOW BACK PAIN WITHOUT SCIATICA: ICD-10-CM

## 2021-02-25 DIAGNOSIS — M46.1 SACROILIITIS (HCC): ICD-10-CM

## 2021-02-26 NOTE — PROGRESS NOTES
Some scoliosis and diffuse degenerative changes. Can we get her an appointment with POA for back pain?  Left low back/SI pain and tenderness, exacerbated by physical therapy

## 2022-03-18 PROBLEM — M81.0 AGE RELATED OSTEOPOROSIS: Status: ACTIVE | Noted: 2017-03-09

## 2022-03-18 PROBLEM — E87.1 HYPONATREMIA: Status: ACTIVE | Noted: 2017-11-14

## 2022-03-19 PROBLEM — K58.1 IRRITABLE BOWEL SYNDROME WITH CONSTIPATION: Status: ACTIVE | Noted: 2020-10-26

## 2022-03-19 PROBLEM — G45.9 TIA (TRANSIENT ISCHEMIC ATTACK): Status: ACTIVE | Noted: 2017-11-14

## 2022-03-19 PROBLEM — Z86.010 HISTORY OF COLON POLYPS: Status: ACTIVE | Noted: 2020-10-26

## 2022-03-19 PROBLEM — R41.3 MEMORY LOSS: Status: ACTIVE | Noted: 2019-11-12

## 2022-03-19 PROBLEM — Z86.0100 HISTORY OF COLON POLYPS: Status: ACTIVE | Noted: 2020-10-26

## 2022-03-19 PROBLEM — I48.0 PAROXYSMAL ATRIAL FIBRILLATION (HCC): Status: ACTIVE | Noted: 2018-06-01

## 2022-03-19 PROBLEM — E03.9 HYPOTHYROIDISM: Status: ACTIVE | Noted: 2018-05-14

## 2022-03-19 PROBLEM — K57.30 DIVERTICULOSIS OF LARGE INTESTINE WITHOUT HEMORRHAGE: Status: ACTIVE | Noted: 2017-10-04

## 2022-03-19 PROBLEM — I63.9 CVA (CEREBRAL VASCULAR ACCIDENT) (HCC): Status: ACTIVE | Noted: 2018-05-15

## 2022-03-19 PROBLEM — Z95.0 PACEMAKER: Status: ACTIVE | Noted: 2018-07-11

## 2022-03-20 PROBLEM — K59.09 CHRONIC CONSTIPATION: Status: ACTIVE | Noted: 2020-10-26

## 2022-08-02 PROCEDURE — 93296 REM INTERROG EVL PM/IDS: CPT | Performed by: INTERNAL MEDICINE

## 2022-08-02 PROCEDURE — 93294 REM INTERROG EVL PM/LDLS PM: CPT | Performed by: INTERNAL MEDICINE

## 2022-08-29 ENCOUNTER — OFFICE VISIT (OUTPATIENT)
Dept: CARDIOLOGY CLINIC | Age: 82
End: 2022-08-29
Payer: MEDICARE

## 2022-08-29 VITALS
HEIGHT: 66 IN | BODY MASS INDEX: 24.11 KG/M2 | SYSTOLIC BLOOD PRESSURE: 130 MMHG | DIASTOLIC BLOOD PRESSURE: 70 MMHG | HEART RATE: 81 BPM | WEIGHT: 150 LBS

## 2022-08-29 DIAGNOSIS — Z95.0 PACEMAKER: ICD-10-CM

## 2022-08-29 DIAGNOSIS — R06.09 DOE (DYSPNEA ON EXERTION): ICD-10-CM

## 2022-08-29 DIAGNOSIS — I10 PRIMARY HYPERTENSION: ICD-10-CM

## 2022-08-29 DIAGNOSIS — I48.0 PAROXYSMAL ATRIAL FIBRILLATION (HCC): Primary | ICD-10-CM

## 2022-08-29 PROCEDURE — 93000 ELECTROCARDIOGRAM COMPLETE: CPT | Performed by: INTERNAL MEDICINE

## 2022-08-29 PROCEDURE — 99214 OFFICE O/P EST MOD 30 MIN: CPT | Performed by: INTERNAL MEDICINE

## 2022-08-29 PROCEDURE — G8420 CALC BMI NORM PARAMETERS: HCPCS | Performed by: INTERNAL MEDICINE

## 2022-08-29 PROCEDURE — 1036F TOBACCO NON-USER: CPT | Performed by: INTERNAL MEDICINE

## 2022-08-29 PROCEDURE — G8399 PT W/DXA RESULTS DOCUMENT: HCPCS | Performed by: INTERNAL MEDICINE

## 2022-08-29 PROCEDURE — 1123F ACP DISCUSS/DSCN MKR DOCD: CPT | Performed by: INTERNAL MEDICINE

## 2022-08-29 PROCEDURE — 1090F PRES/ABSN URINE INCON ASSESS: CPT | Performed by: INTERNAL MEDICINE

## 2022-08-29 PROCEDURE — G8427 DOCREV CUR MEDS BY ELIG CLIN: HCPCS | Performed by: INTERNAL MEDICINE

## 2022-08-29 RX ORDER — LOSARTAN POTASSIUM 50 MG/1
50 TABLET ORAL DAILY
Qty: 30 TABLET | Refills: 5 | Status: SHIPPED | OUTPATIENT
Start: 2022-08-29

## 2022-08-29 RX ORDER — BACILLUS COAGULANS/INULIN 1B-250 MG
1 CAPSULE ORAL DAILY
COMMUNITY

## 2022-08-29 ASSESSMENT — ENCOUNTER SYMPTOMS
RHINORRHEA: 0
VOMITING: 0
COLOR CHANGE: 0
DIARRHEA: 0
SHORTNESS OF BREATH: 1
HEMATEMESIS: 0
WHEEZING: 0
BLURRED VISION: 0
SWOLLEN GLANDS: 0
HEMOPTYSIS: 0
BOWEL INCONTINENCE: 0
HOARSE VOICE: 0
SPUTUM PRODUCTION: 0
ABDOMINAL PAIN: 0
ORTHOPNEA: 0
HEMATOCHEZIA: 0
SORE THROAT: 0

## 2022-08-29 NOTE — PROGRESS NOTES
[facility] CARDIOLOGY  [address], [address] [facility], [address] [place] [address]  PHONE: [phone]        22        NAME:  Alex Hinds  : 1940  MRN: [id number]       SUBJECTIVE:   Alex Hinds is a 78 y.o. female seen for a follow up visit regarding the following: The patient has a hx of PAF,Primary hypertension,and SSS with pacemaker. She returns for follow up. She complains of worsening pedal edema,REYES,and \"bad taste\" in her mouth. Chief Complaint   Patient presents with    Atrial Fibrillation    Shortness of Breath     1 month, when climbing stairs       HPI:    Shortness of Breath  This is a new problem. Episode onset: 4-6 weeks ago. The problem occurs daily. The problem has been gradually worsening. Associated symptoms include leg swelling. Pertinent negatives include no abdominal pain, chest pain, claudication, coryza, ear pain, fever, headaches, hemoptysis, leg pain, neck pain, orthopnea, PND, rash, rhinorrhea, sore throat, sputum production, swollen glands, syncope, vomiting or wheezing. Past Medical History, Past Surgical History, Family history, Social History, and Medications were all reviewed with the patient today and updated as necessary.          Current Outpatient Medications:     Bacillus Coagulans-Inulin (PROBIOTIC) 1-250 BILLION-MG CAPS, Take 1 tablet by mouth daily, Disp: , Rfl:     losartan (COZAAR) 50 MG tablet, Take 1 tablet by mouth daily, Disp: 30 tablet, Rfl: 5    apixaban (ELIQUIS) 5 MG TABS tablet, Take 5 mg by mouth 2 times daily, Disp: , Rfl:     aspirin 81 MG EC tablet, Take 81 mg by mouth daily, Disp: , Rfl:     atorvastatin (LIPITOR) 40 MG tablet, TAKE ONE TABLET BY MOUTH AT BEDTIME, Disp: , Rfl:     Cholecalciferol 50 MCG ( UT) TABS, Take 2,000 Units by mouth every other day, Disp: , Rfl:     cyanocobalamin 1000 MCG tablet, Take 1,000 mcg by mouth daily, Disp: , Rfl:     dofetilide (TIKOSYN) 250 MCG capsule, Take 250 mcg by mouth 2 times daily, Disp: , Rfl:     levothyroxine (SYNTHROID) 88 MCG tablet, TAKE ONE TABLET BY MOUTH ONE TIME DAILY, Disp: , Rfl:   Allergies   Allergen Reactions    Ciprofloxacin Nausea And Vomiting    Oxycodone-Acetaminophen Other (See Comments)     hallucinations    Propofol Other (See Comments)     Red burning hands     Past Medical History:   Diagnosis Date    Acute encephalopathy 6/27/2016    Anxiety     Arthritis     Atrial fibrillation ([facility] 4/16/2018    Chronic pain     hip    REYES (dyspnea on exertion) 8/29/2022    Endocrine disease     hypothyroidism    Hypertension     Sepsis ([facility] 6/27/2016    Thyroid disease     TIA (transient ischemic attack) 11/14/2017    Vitamin D deficiency 9/1/2015     Past Surgical History:   Procedure Laterality Date    ORTHOPEDIC SURGERY      right total hip    PACEMAKER      TOTAL KNEE ARTHROPLASTY Left 7/23/15     Family History   Problem Relation Age of Onset    Heart Attack Father       Social History     Tobacco Use    Smoking status: Never    Smokeless tobacco: Never   Substance Use Topics    Alcohol use: No       ROS:    Review of Systems   Constitutional: Negative for chills, decreased appetite, diaphoresis, fever and malaise/fatigue. HENT:  Negative for congestion, ear pain, hearing loss, hoarse voice, nosebleeds, rhinorrhea and sore throat. Eyes:  Negative for blurred vision. Cardiovascular:  Positive for dyspnea on exertion and leg swelling. Negative for chest pain, claudication, cyanosis, irregular heartbeat, near-syncope, orthopnea, palpitations, paroxysmal nocturnal dyspnea and syncope. Respiratory:  Positive for shortness of breath. Negative for hemoptysis, sputum production and wheezing. Endocrine: Negative for polydipsia, polyphagia and polyuria. Skin:  Negative for color change and rash. Musculoskeletal:  Negative for neck pain. Gastrointestinal:  Negative for abdominal pain, bowel incontinence, diarrhea, hematemesis, hematochezia and vomiting.    Genitourinary: Negative for dysuria, frequency and hematuria. Neurological:  Negative for focal weakness, headaches, light-headedness, loss of balance, numbness, sensory change and weakness. Psychiatric/Behavioral:  Negative for altered mental status and memory loss. PHYSICAL EXAM:   /70   Pulse 81   Ht 5' 6\" (1.676 m)   Wt 150 lb (68 kg)   BMI 24.21 kg/m²      Physical Exam  Constitutional:       Appearance: Normal appearance. HENT:      Head: Normocephalic and atraumatic. Nose: Nose normal.   Eyes:      Extraocular Movements: Extraocular movements intact. Pupils: Pupils are equal, round, and reactive to light. Neck:      Vascular: No carotid bruit. Cardiovascular:      Rate and Rhythm: Regular rhythm. Pulses: Normal pulses. Heart sounds: Murmur (1/6 RITCHIE) heard. Pulmonary:      Effort: Pulmonary effort is normal.      Breath sounds: Normal breath sounds. Abdominal:      General: Abdomen is flat. Bowel sounds are normal.      Palpations: Abdomen is soft. Musculoskeletal:         General: Swelling (1+ bilateral LE edema) present. Normal range of motion. Cervical back: Normal range of motion and neck supple. Skin:     General: Skin is warm and dry. Neurological:      General: No focal deficit present. Mental Status: She is alert and oriented to person, place, and time. Psychiatric:         Mood and Affect: Mood normal.       Medical problems and test results were reviewed with the patient today. No results found for this or any previous visit (from the past 672 hour(s)). Lab Results   Component Value Date/Time    CHOL 176 10/31/2020 05:22 AM    HDL 64 10/31/2020 05:22 AM     Results for orders placed or performed in visit on 08/29/22   EKG 12 lead    Impression    Electronic atrial pacemaker   -RSR(V1) -nondiagnostic. ABNORMAL RHYTHM       ASSESSMENT and Gino Rae was seen today for atrial fibrillation and shortness of breath.     Diagnoses and all orders for this visit:    Paroxysmal atrial fibrillation (HCC)  -     EKG 12 lead  -     Transthoracic echocardiogram (TTE) complete with contrast, bubble, strain, and 3D PRN; Future    Primary hypertension:She complains of leg edema. Switch Norvasc 7.5 mg daily to Losartan 50 mg daily to see if edema improves. -     losartan (COZAAR) 50 MG tablet; Take 1 tablet by mouth daily  -     Transthoracic echocardiogram (TTE) complete with contrast, bubble, strain, and 3D PRN; Future    Pacemaker:Device clinic as scheduled. REYES (dyspnea on exertion): Check an echo to assess LV function with worsening dyspnea. -     Transthoracic echocardiogram (TTE) complete with contrast, bubble, strain, and 3D PRN; Future        Disposition:    Return in about 4 weeks (around 9/26/2022) for Follow up after Echo, Follow up after Med change.               Rosario Vaughn MD  8/29/2022  9:04 AM

## 2022-08-31 NOTE — PROGRESS NOTES
the best treatment option long-term and will consider alternative for the CPAP such as oral appliance if she agreeable to do so. The Trail score was 0 out of 24. Past Medical History:   Diagnosis Date    Acute encephalopathy 6/27/2016    Anxiety     Arthritis     Atrial fibrillation ([facility] [address] 4/16/2018    Chronic pain     hip    REYES (dyspnea on exertion) 8/29/2022    Endocrine disease     hypothyroidism    Hypertension     JACKI (obstructive sleep apnea) 9/1/2022    Sepsis ([facility] 6/27/2016    Thyroid disease     TIA (transient ischemic attack) 11/14/2017    Vitamin D deficiency 9/1/2015       Patient Active Problem List   Diagnosis    Age related osteoporosis    Hyponatremia    HTN (hypertension)    TIA (transient ischemic attack)    Diverticulosis of large intestine without hemorrhage    Irritable bowel syndrome with constipation    Paroxysmal atrial fibrillation (HCC)    History of subdural hematoma    History of colon polyps    CVA (cerebral vascular accident) ([facility]    Hypothyroidism    Memory loss    Vitamin D deficiency    Pacemaker    Hyperlipidemia    Chronic constipation    REYES (dyspnea on exertion)    JACKI (obstructive sleep apnea)    Nocturnal hypoxemia       Past Surgical History:   Procedure Laterality Date    ORTHOPEDIC SURGERY      right total hip    PACEMAKER      TOTAL KNEE ARTHROPLASTY Left 7/23/15           Social History     Socioeconomic History    Marital status:       Spouse name: Not on file    Number of children: Not on file    Years of education: Not on file    Highest education level: Not on file   Occupational History    Not on file   Tobacco Use    Smoking status: Never    Smokeless tobacco: Never   Substance and Sexual Activity    Alcohol use: No    Drug use: No    Sexual activity: Not on file   Other Topics Concern    Not on file   Social History Narrative    Not on file     Social Determinants of Health     Financial Resource Strain: Not on file   Food Insecurity: Not on file Transportation Needs: Not on file   Physical Activity: Not on file   Stress: Not on file   Social Connections: Not on file   Intimate Partner Violence: Not on file   Housing Stability: Not on file         Family History   Problem Relation Age of Onset    Heart Attack Father          Allergies   Allergen Reactions    Ciprofloxacin Nausea And Vomiting    Oxycodone-Acetaminophen Other (See Comments)     hallucinations    Propofol Other (See Comments)     Red burning hands         Current Outpatient Medications   Medication Sig    fluticasone (FLONASE) 50 MCG/ACT nasal spray 1 spray by Each Nostril route daily    amLODIPine (NORVASC) 2.5 MG tablet Take 2.5 mg by mouth daily    Bacillus Coagulans-Inulin (PROBIOTIC) 1-250 BILLION-MG CAPS Take 1 tablet by mouth daily    apixaban (ELIQUIS) 5 MG TABS tablet Take 5 mg by mouth 2 times daily    aspirin 81 MG EC tablet Take 81 mg by mouth daily    atorvastatin (LIPITOR) 40 MG tablet TAKE ONE TABLET BY MOUTH AT BEDTIME    Cholecalciferol 50 MCG (2000 UT) TABS Take 2,000 Units by mouth every other day    cyanocobalamin 1000 MCG tablet Take 1,000 mcg by mouth daily    dofetilide (TIKOSYN) 250 MCG capsule Take 250 mcg by mouth 2 times daily    levothyroxine (SYNTHROID) 88 MCG tablet TAKE ONE TABLET BY MOUTH ONE TIME DAILY    losartan (COZAAR) 50 MG tablet Take 1 tablet by mouth daily (Patient not taking: Reported on 9/1/2022)     No current facility-administered medications for this visit. REVIEW OF SYSTEMS:   CONSTITUTIONAL:   There is no history of fever, chills, night sweats, weight loss, weight gain, persistent fatigue, or lethargy/hypersomnolence. EYES:   Denies problems with eye pain, erythema, blurred vision, or visual field loss. ENTM:   Denies history of tinnitus, epistaxis, sore throat, hoarseness, or dysphonia. LYMPH:   Denies swollen glands. CARDIAC:   No chest pain, pressure, discomfort, palpitations, orthopnea, murmurs, or edema.    GI:   No dysphagia, heartburn reflux, nausea/vomiting, diarrhea, abdominal pain, or bleeding. :   Denies history of dysuria, hematuria, polyuria, or decreased urine output. MS:   No history of myalgias, arthralgias, bone pain, or muscle cramps. SKIN:   No history of rashes, jaundice, cyanosis, nodules, or ulcers. ENDO:   Negative for heat or cold intolerance. No history of DM. PSYCH:   Negative for anxiety, depression, insomnia, hallucinations. NEURO:   There is no history of AMS, persistent headache, decreased level of consciousness, seizures, or motor or sensory deficits. PHYSICAL EXAM:    Vitals:    09/01/22 1430   BP: 122/72   Pulse: 78   Resp: 14   Temp: 97.6 °F (36.4 °C)   SpO2: 97%        GENERAL APPEARANCE:   The patient is normal weight and in no respiratory distress. HEENT:   PERRL. Conjunctivae unremarkable. Nasal mucosa is without epistaxis, exudate, or polyps. Gums and dentition are unremarkable. There is moderate to severe oropharyngeal narrowing. She is Mallampati 3   NECK/LYMPHATIC:   Symmetrical with no elevation of jugular venous pulsation. Trachea midline. No thyroid enlargement. No cervical adenopathy. LUNGS:   Normal respiratory effort with symmetrical lung expansion. Breath sounds are diminished in the bases. HEART:   There is a regular rate and rhythm. No murmur, rub, or gallop. There is no edema in the lower extremities. ABDOMEN:   Soft and non-tender. No hepatosplenomegaly. Bowel sounds are normal.     SKIN:   There are no rashes, cyanosis, jaundice, or ecchymosis present. EXTREMITIES:   The extremities are unremarkable without clubbing, cyanosis, joint inflammation, degenerative, or ischemic change. MUSCULOSKELETAL:   There is no abnormal tone, muscle atrophy, or abnormal movement present. NEURO:   The patient is alert and oriented to person, place, and time.   Memory appears intact and mood is normal.  No gross sensorimotor deficits are present. DIAGNOSTIC TESTS:     SLEEP STUDY HST-2/19/22        ASSESSMENT:  (Medical Decision Making)         ICD-10-CM    1. JACKI (obstructive sleep apnea), with AHI 15/hour based on HST. The pathophysiology of obstructive sleep apnea was reviewed with the patient and her son. It's potential to promote severe neurologic, cardiac, pulmonary, and gastrointestinal problems was discussed. Therapeutic options including CPAP, positional therapy, oral appliances,  were also reviewed. G47.33 Ambulatory Referral to Sleep Studies      2. Nocturnal hypoxemia, related to her sleep apnea G47.34       3. Memory loss, may be aggravated by sleep apnea and history of stroke previously along with a head trauma. R41.3              PLAN:    The patient will be scheduled for repeat home sleep study with elevation of head of the bed and positional therapy    Proper sleep hygiene are strongly recommended    Appropriate handout regarding sleep hygiene and sleep education given to the patient and her son for further review    She will continue her current recommendation by her primary care physician    Suggested to change to Flonase to another nasal spray to diminish the effect of the dryness in her nasal passages since Flonase is alcohol based medication    She will be started on Nasonex daily    She will return to the sleep center in 4 months or sooner if needed  Orders Placed This Encounter   Procedures    Ambulatory Referral to Sleep Studies     Referral Priority:   Urgent     Referral Type:   Consult for Advice and Opinion     Referral Reason:   Specialty Services Required     Number of Visits Requested:   1               No orders of the defined types were placed in this encounter.         Over 50% of today's office visit was spent in face to face time reviewing test results, prognosis, importance of compliance, education about disease process, benefits of medications, instructions for management of acute flare-ups, and follow up plans. Total face to face time spent with patient and charting was 45 minutes.     Ivy Saini MD  Electronically signed

## 2022-09-01 ENCOUNTER — OFFICE VISIT (OUTPATIENT)
Dept: SLEEP MEDICINE | Age: 82
End: 2022-09-01
Payer: MEDICARE

## 2022-09-01 VITALS
WEIGHT: 150.5 LBS | RESPIRATION RATE: 14 BRPM | HEART RATE: 78 BPM | BODY MASS INDEX: 24.19 KG/M2 | TEMPERATURE: 97.6 F | DIASTOLIC BLOOD PRESSURE: 72 MMHG | HEIGHT: 66 IN | OXYGEN SATURATION: 97 % | SYSTOLIC BLOOD PRESSURE: 122 MMHG

## 2022-09-01 DIAGNOSIS — G47.34 NOCTURNAL HYPOXEMIA: ICD-10-CM

## 2022-09-01 DIAGNOSIS — R41.3 MEMORY LOSS: ICD-10-CM

## 2022-09-01 DIAGNOSIS — G47.33 OSA (OBSTRUCTIVE SLEEP APNEA): Primary | ICD-10-CM

## 2022-09-01 PROCEDURE — 1123F ACP DISCUSS/DSCN MKR DOCD: CPT | Performed by: INTERNAL MEDICINE

## 2022-09-01 PROCEDURE — G8399 PT W/DXA RESULTS DOCUMENT: HCPCS | Performed by: INTERNAL MEDICINE

## 2022-09-01 PROCEDURE — G8420 CALC BMI NORM PARAMETERS: HCPCS | Performed by: INTERNAL MEDICINE

## 2022-09-01 PROCEDURE — 99204 OFFICE O/P NEW MOD 45 MIN: CPT | Performed by: INTERNAL MEDICINE

## 2022-09-01 PROCEDURE — 1036F TOBACCO NON-USER: CPT | Performed by: INTERNAL MEDICINE

## 2022-09-01 PROCEDURE — G8427 DOCREV CUR MEDS BY ELIG CLIN: HCPCS | Performed by: INTERNAL MEDICINE

## 2022-09-01 PROCEDURE — 1090F PRES/ABSN URINE INCON ASSESS: CPT | Performed by: INTERNAL MEDICINE

## 2022-09-01 RX ORDER — AMLODIPINE BESYLATE 2.5 MG/1
2.5 TABLET ORAL DAILY
COMMUNITY
End: 2022-09-29 | Stop reason: DRUGHIGH

## 2022-09-01 RX ORDER — FLUTICASONE PROPIONATE 50 MCG
1 SPRAY, SUSPENSION (ML) NASAL DAILY
COMMUNITY

## 2022-09-01 ASSESSMENT — SLEEP AND FATIGUE QUESTIONNAIRES
HOW LIKELY ARE YOU TO NOD OFF OR FALL ASLEEP WHILE LYING DOWN TO REST IN THE AFTERNOON WHEN CIRCUMSTANCES PERMIT: 0
HOW LIKELY ARE YOU TO NOD OFF OR FALL ASLEEP IN A CAR, WHILE STOPPED FOR A FEW MINUTES IN TRAFFIC: 0
HOW LIKELY ARE YOU TO NOD OFF OR FALL ASLEEP WHILE SITTING AND READING: 0
HOW LIKELY ARE YOU TO NOD OFF OR FALL ASLEEP WHILE WATCHING TV: 0
HOW LIKELY ARE YOU TO NOD OFF OR FALL ASLEEP WHILE SITTING AND TALKING TO SOMEONE: 0
HOW LIKELY ARE YOU TO NOD OFF OR FALL ASLEEP WHEN YOU ARE A PASSENGER IN A CAR FOR AN HOUR WITHOUT A BREAK: 0
ESS TOTAL SCORE: 0
HOW LIKELY ARE YOU TO NOD OFF OR FALL ASLEEP WHILE SITTING INACTIVE IN A PUBLIC PLACE: 0
HOW LIKELY ARE YOU TO NOD OFF OR FALL ASLEEP WHILE SITTING QUIETLY AFTER LUNCH WITHOUT ALCOHOL: 0

## 2022-09-01 NOTE — PATIENT INSTRUCTIONS
What is a Home Sleep Test?    A home sleep apnea test is just as it sounds; a sleep test your patients can take from their own home to determine if they have sleep apnea. Your patient may come into the sleep center to  equipment for their home sleep test. Ivy Urbina demonstrate to them how to perform the test, including how to use the machine and equipment properly. The patient goes home and performs the test.  The data obtained is stored on the equipment and is uploaded at the sleep center the following day. Home sleep tests are designed to provide your patients with a convenient way to get the test done while allowing you to collect important information on how they sleep and make a diagnosis of JACKI. A home sleep test comes with devices that your patient can easily apply themselves and includes:    A belt that measures respiratory effort and is placed around the upper chest.    A small nasal cannula that measures airflow    A finger clip that measures blood oxygen saturation      What is the process and what happens during a Home Sleep Test?    On the day your patient plans on taking the test, they should:    Avoid napping    Follow their normal routine as much as they can. Avoid caffeine after they eat lunch. They should let you know if they're taking regular medication, since the physician may want to have them discontinue it temporarily, depending on what it is. Before the patient's home sleep test, they'll come by sleep clinic to  the equipment. They can also opt to have hit delivered to their home. Provide them with instructions on how to use the home test and equipment and be ready for any questions they may have on things they don't understand. Let them know they can go to bed at their regular time. When they're ready to go to sleep, they'll attach the sensors to their body like you instructed.   They may need to press a button on the device when they're getting into bed, and most patients are asked to keep a sleep log. The sensors typically include a sensor that slips over the patient's index finger to monitor their oxygen levels and heart rate, and another set of sensors that monitors their breathing patterns. Your patient straps these sensors on their chest and places a small tube similar to an oxygen cannula in their nostrils to measure airflow through their nose. When your patient wakes up the following morning, they'll remove the sensors. They either return the device back through the mail or bring it to the sleep clinic. Learning About Sleeping Well  What does sleeping well mean? Sleeping well means getting enough sleep. How much sleep is enough varies among people. The number of hours you sleep is not as important as how you feel when you wake up. If you do not feel refreshed, you probably need more sleep. Another sign of not getting enough sleep is feeling tired during the day. The average total nightly sleep time is 7½ to 8 hours. Healthy adults may need a little more or a little less than this. Why is getting enough sleep important? Getting enough quality sleep is a basic part of good health. When your sleep suffers, your mood and your thoughts can suffer too. You may find yourself feeling more grumpy or stressed. Not getting enough sleep also can lead to serious problems, including injury, accidents, anxiety, and depression. What might cause poor sleeping? Many things can cause sleep problems, including:  Stress. Stress can be caused by fear about a single event, such as giving a speech. Or you may have ongoing stress, such as worry about work or school. Depression, anxiety, and other mental or emotional conditions. Changes in your sleep habits or surroundings. This includes changes that happen where you sleep, such as noise, light, or sleeping in a different bed.  It also includes changes in your sleep pattern, such as having jet lag or working a late shift. Health problems, such as pain, breathing problems, and restless legs syndrome. Lack of regular exercise. How can you help yourself? Here are some tips that may help you sleep more soundly and wake up feeling more refreshed. Your sleeping area   Use your bedroom only for sleeping and sex. A bit of light reading may help you fall asleep. But if it doesn't, do your reading elsewhere in the house. Don't watch TV in bed. Be sure your bed is big enough to stretch out comfortably, especially if you have a sleep partner. Keep your bedroom quiet, dark, and cool. Use curtains, blinds, or a sleep mask to block out light. To block out noise, use earplugs, soothing music, or a \"white noise\" machine. Your evening and bedtime routine   Get regular exercise, but not within 3 to 4 hours before your bedtime. Create a relaxing bedtime routine. You might want to take a warm shower or bath, listen to soothing music, or drink a cup of noncaffeinated tea. Go to bed at the same time every night. And get up at the same time every morning, even if you feel tired. What to avoid   Limit caffeine (coffee, tea, caffeinated sodas) during the day, and don't have any for at least 4 to 6 hours before bedtime. Don't drink alcohol before bedtime. Alcohol can cause you to wake up more often during the night. Don't smoke or use tobacco, especially in the evening. Nicotine can keep you awake. Don't take naps during the day, especially close to bedtime. Don't lie in bed awake for too long. If you can't fall asleep, or if you wake up in the middle of the night and can't get back to sleep within 15 minutes or so, get out of bed and go to another room until you feel sleepy. Don't take medicine right before bed that may keep you awake or make you feel hyper or energized. Your doctor can tell you if your medicine may do this and if you can take it earlier in the day.   If you can't sleep   Imagine yourself in a peaceful, pleasant scene. Focus on the details and feelings of being in a place that is relaxing. Get up and do a quiet or boring activity until you feel sleepy. Don't drink any liquids after 6 p.m. if you wake up often because you have to go to the bathroom. Where can you learn more? Go to Pencil You In.be  Enter B259 in the search box to learn more about \"Learning About Sleeping Well. \"   © 0112-6365 Healthwise, Incorporated. Care instructions adapted under license by [facility] (which disclaims liability or warranty for this information). This care instruction is for use with your licensed healthcare professional. If you have questions about a medical condition or this instruction, always ask your healthcare professional. [address] any warranty or liability for your use of this information. Content Version: 35.9.992068; Current as of: March 12, 2014              Sleep Apnea: After Your Visit  Your Care Instructions  Sleep apnea means that you frequently stop breathing for 10 seconds or longer during sleep. It can be mild to severe, based on the number of times an hour that you stop breathing or have slowed breathing. Blocked or narrowed airways in your nose, mouth, or throat can cause sleep apnea. Your airway can become blocked when your throat muscles and tongue relax during sleep. You can treat sleep apnea at home by making lifestyle changes. You also can use a CPAP breathing machine that keeps tissues in the throat from blocking your airway. Or your doctor may suggest that you use a breathing device while you sleep. It helps keep your airway open. This could be a device that you put in your mouth. Other examples include strips or disks that you use on your nose. In some cases, surgery may be needed to remove enlarged tissues in the throat. Follow-up care is a key part of your treatment and safety.  Be sure to make and go to all appointments, and call your doctor if you are having problems. It's also a good idea to know your test results and keep a list of the medicines you take. How can you care for yourself at home? Lose weight, if needed. It may reduce the number of times you stop breathing or have slowed breathing. Sleep on your side. It may stop mild apnea. If you tend to roll onto your back, sew a pocket in the back of your pajama top. Put a tennis ball into the pocket, and stitch the pocket shut. This will help keep you from sleeping on your back. Avoid alcohol and medicines such as sleeping pills and sedatives before bed. Do not smoke. Smoking can make sleep apnea worse. If you need help quitting, talk to your doctor about stop-smoking programs and medicines. These can increase your chances of quitting for good. Prop up the head of your bed 4 to 6 inches by putting bricks under the legs of the bed. Treat breathing problems, such as a stuffy nose, caused by a cold or allergies. Try a continuous positive airway pressure (CPAP) breathing machine if your doctor recommends it. The machine keeps your airway open when you sleep. If CPAP does not work for you, ask your doctor if you can try other breathing machines. A bilevel positive airway pressure machine uses one type of air pressure for breathing in and another type for breathing out. Another device raises or lowers air pressure as needed while you breathe. Talk to your doctor if:  Your nose feels dry or bleeds when you use one of these machines. You may need to increase moisture in the air. A humidifier may help. Your nose is runny or stuffy from using a breathing machine. Decongestants or a corticosteroid nasal spray may help. You are sleepy during the day and it gets in the way of the normal things you do. Do not drive when you are drowsy. When should you call for help?   Watch closely for changes in your health, and be sure to contact your doctor if:  You still have sleep apnea even though you have made lifestyle changes. You are thinking of trying a device such as CPAP. You are having problems using a CPAP or similar machine. Where can you learn more? Go to PicLyf.be  Enter J936 in the search box to learn more about \"Sleep Apnea: After Your Visit. \"   © 8010-3556 Healthwise, Incorporated. Care instructions adapted under license by [facility] (which disclaims liability or warranty for this information). This care instruction is for use with your licensed healthcare professional. If you have questions about a medical condition or this instruction, always ask your healthcare professional. [address] any warranty or liability for your use of this information. Content Version: 39.2.531309; Current as of: January 14, 2014                        Eating Healthy Foods: After Your Visit  Your Care Instructions  Eating healthy foods can help lower your risk for disease. Healthy food gives you energy and keeps your heart strong, your brain active, your muscles working, and your bones strong. A healthy diet includes a variety of foods from the basic food groups: grains, vegetables, fruits, milk and milk products, and meat and beans. Some people may eat more of their favorite foods from only one food group and, as a result, miss getting the nutrients they need. So, it is important to pay attention not only to what you eat but also to what you are missing from your diet. You can eat a healthy, balanced diet by making a few small changes. Follow-up care is a key part of your treatment and safety. Be sure to make and go to all appointments, and call your doctor if you are having problems. Its also a good idea to know your test results and keep a list of the medicines you take. How can you care for yourself at home? Look at what you eat  Keep a food diary for a week or two and record everything you eat or drink. Track the number of servings you eat from each food group.   For a balanced diet every day, eat a variety of:  6 or more ounce-equivalents of grains, such as cereals, breads, crackers, rice, or pasta, every day. An ounce-equivalent is 1 slice of bread, 1 cup of ready-to-eat cereal, or ½ cup of cooked rice, cooked pasta, or cooked cereal.  2½ cups of vegetables, especially:  Dark-green vegetables such as broccoli and spinach. Orange vegetables such as carrots and sweet potatoes. Dry beans (such as cheema and kidney beans) and peas (such as lentils). 2 cups of fresh, frozen, or canned fruit. A small apple or 1 banana or orange equals 1 cup.  3 cups of nonfat or low-fat milk, yogurt, or other milk products. 5½ ounces of meat and beans, such as chicken, fish, lean meat, beans, nuts, and seeds. One egg, 1 tablespoon of peanut butter, ½ ounce nuts or seeds, or ¼ cup of cooked beans equals 1 ounce of meat. Learn how to read food labels for serving sizes and ingredients. Fast-food and convenience-food meals often contain few or no fruits or vegetables. Make sure you eat some fruits and vegetables to make the meal more nutritious. Look at your food diary. For each food group, add up what you have eaten and then divide the total by the number of days. This will give you an idea of how much you are eating from each food group. See if you can find some ways to change your diet to make it more healthy. Start small  Do not try to make dramatic changes to your diet all at once. You might feel that you are missing out on your favorite foods and then be more likely to fail. Start slowly, and gradually change your habits. Try some of the following:  Use whole wheat bread instead of white bread. Use nonfat or low-fat milk instead of whole milk. Eat brown rice instead of white rice, and eat whole wheat pasta instead of white-flour pasta. Try low-fat cheeses and low-fat yogurt. Add more fruits and vegetables to meals and have them for snacks.   Add lettuce, tomato, cucumber, and onion to sandwiches. Add fruit to yogurt and cereal.  Enjoy food  You can still eat your favorite foods. You just may need to eat less of them. If your favorite foods are high in fat, salt, and sugar, limit how often you eat them, but do not cut them out entirely. Eat a wide variety of foods. Make healthy choices when eating out  The type of restaurant you choose can help you make healthy choices. Even fast-food chains are now offering more low-fat or healthier choices on the menu. Choose smaller portions, or take half of your meal home. When eating out, try:  A veggie pizza with a whole wheat crust or grilled chicken (instead of sausage or pepperoni). Pasta with roasted vegetables, grilled chicken, or marinara sauce instead of cream sauce. A vegetable wrap or grilled chicken wrap. Broiled or poached food instead of fried or breaded items. Make healthy choices easy  Buy packaged, prewashed, ready-to-eat fresh vegetables and fruits, such as baby carrots, salad mixes, and chopped or shredded broccoli and cauliflower. Buy packaged, presliced fruits, such as melon or pineapple. Choose 100% fruit or vegetable juice instead of soda. Limit juice intake to 4 to 6 oz (½ to ¾ cup) a day. Blend low-fat yogurt, fruit juice, and canned or frozen fruit to make a smoothie for breakfast or a snack. Where can you learn more? Go to Patreon.be  Enter T756 in the search box to learn more about \"Eating Healthy Foods: After Your Visit. \"   © 0994-6398 [employer], Incorporated. Care instructions adapted under license by [facility] (which disclaims liability or warranty for this information). This care instruction is for use with your licensed healthcare professional. If you have questions about a medical condition or this instruction, always ask your healthcare professional. [address] any warranty or liability for your use of this information.   Content Version: 99.2.279419; Current as of: May 17, 2013                                    Learning About Physical Activity  What is physical activity? Physical activity is any kind of activity that gets your body moving. Being active means allowing your body to \"practice\" breathing, stretching, and lifting. The more practice your body gets, the better it works. The types of physical activity that can help you get fit and stay healthy include:  Aerobic or \"cardio\" activities that make your heart beat faster and make you breathe harder, such as brisk walking, riding a bike, or running. Aerobic activities strengthen your heart and lungs and build up your endurance. Strength training activities that make your muscles work against, or \"resist,\" something, such as lifting weights or doing push-ups. These activities help tone and strengthen your muscles. Stretches that allow you to move your joints and muscles through their full range of motion. Stretching helps you be more flexible and avoid injury. What are the benefits of physical activity? Being active is one of the best things you can do to get fit and stay healthy. It helps you to:  Feel stronger and have more energy to do all the things you like to do. Focus better at school or work and perform better in sports. Feel, think, and sleep better. Reach and stay at a healthy weight. Lose fat and build lean muscle. Lower your risk for serious health problems. Keep your bones, muscles, and joints strong. Being fit lets you do more physical activity. And it lets you work out harder without as much effort. How can you make physical activity part of your life? Get at least 30 minutes of exercise on most days of the week. Walking is a good choice. You also may want to do other activities, such as running, swimming, cycling, or playing tennis or team sports. Pick activities that you like--ones that make your heart beat faster, your muscles stronger, and your muscles and joints more flexible.  If you find more than one thing you like doing, do them all. You don't have to do the same thing every day. Get your heart pumping every day. Any activity that makes your heart beat faster and keeps it at that rate for a while counts. Here are some great ways to get your heart beating faster:  Go for a brisk walk, run, or bike ride. Go for a hike or swim. Go in-line skating. Play a game of touch football, basketball, or soccer. Ride a bike. Play tennis or racquetball. Climb stairs. Even some household chores can be aerobic--just do them at a faster pace. Vacuuming, raking or mowing the lawn, sweeping the garage, and washing and waxing the car all can help get your heart rate up. Strengthen your muscles during the week. You don't have to lift heavy weights or grow big, bulky muscles to get stronger. Doing a few simple activities that make your muscles work against, or \"resist,\" something can help you get stronger. For example, you can:  Do push-ups or sit-ups, which use your own body weight as resistance. Lift weights or dumbbells or use stretch bands at home or in a gym or community center. Stretch your muscles often. Stretching will help you as you become more active. It can help you stay flexible, loosen tight muscles, and avoid injury. It can also help improve your balance and posture and can be a great way to relax. Be sure to stretch the muscles you'll be using when you work out. Its best to warm your muscles slightly before you stretch them. Walk or do some other light aerobic activity for a few minutes, and then start stretching. When you stretch your muscles:  Do it slowly. Stretching is not about going fast or making sudden movements. Don't push or bounce during a stretch. Hold each stretch for at least 15 to 30 seconds, if you can. You should feel a stretch in the muscle, but not pain. Breathe out as you do the stretch. Then breathe in as you hold the stretch. Don't hold your breath.   If you're worried about how more activity might affect your health, have a checkup before you start. Follow any special advice your doctor gives you for getting a smart start. Where can you learn more? Go to [employer].be  Enter [id number] in the search box to learn more about \"Learning About Physical Activity. \"   © 5839-2549 Healthwise, [employer]. Care instructions adapted under license by [employer] (which disclaims liability or warranty for this information). This care instruction is for use with your licensed healthcare professional. If you have questions about a medical condition or this instruction, always ask your healthcare professional. [address] any warranty or liability for your use of this information. Content Version: 06.7.524845; Current as of: November 15, 2013                                          Learning About CPAP for Sleep Apnea  What is CPAP? CPAP is a small machine that you use at home every night while you sleep. It increases air pressure in your throat to keep your airway open. When you have sleep apnea, this can help you sleep better so you feel much better. CPAP stands for \"continuous positive airway pressure. \"  The CPAP machine will have one of the following:  A mask that covers your nose and mouth  Prongs that fit into your nose  A mask that covers your nose only, the most common type. This type is called NCPAP. The N stands for \"nasal.\"  Why is it done? CPAP is usually the best treatment for obstructive sleep apnea. It is the first treatment choice and the most widely used. Your doctor may suggest CPAP if you have: Moderate to severe sleep apnea. Sleep apnea and coronary artery disease (CAD) or heart failure. How does it help? CPAP can help you have more normal sleep, so you feel less sleepy and more alert during the daytime. CPAP may help keep heart failure or other heart problems from getting worse.   CPAP may help lower your blood pressure. If you use CPAP, your bed partner may also sleep better because you are not snoring or restless. What are the side effects? Some people who use CPAP have:  A dry or stuffy nose and a sore throat. Irritated skin on the face. Sore eyes. Bloating. If you have any of these problems, work with your doctor to fix them. Here are some things you can try:  Be sure the mask or nasal prongs fit well. See if your doctor can adjust the pressure of your CPAP. If your nose is dry, try a humidifier. If your nose is runny or stuffy, try decongestant medicine or a steroid nasal spray. Be safe with medicines. Read and follow all instructions on the label. Do not use the medicine longer than the label says. If these things do not help, you might try a different type of machine. Some machines have air pressure that adjusts on its own. Others have air pressures that are different when you breathe in than when you breathe out. This may reduce discomfort caused by too much pressure in your nose. Where can you learn more? Go to [employer].be  Enter Salvador Cardenas in the search box to learn more about \"Learning About CPAP for Sleep Apnea. \"   © 3201-1594 Healthwise, Incorporated. Care instructions adapted under license by [address] (which disclaims liability or warranty for this information). This care instruction is for use with your licensed healthcare professional. If you have questions about a medical condition or this instruction, always ask your healthcare professional. [place] [address] any warranty or liability for your use of this information. Content Version: 83.5.071458; Current as of: January 24, 2014     Sleep Hygiene Instructions    Sleep only as much as you need to feel refreshed during the following day. Restricting your time in bed helps to consolidate and deepen your sleep. Excessively long times in bed lead to fragmented and shallow sleep.   Get up at your night-time trips to the bathroom. Cut down on all caffeine products. Caffeinated beverages and food (Coffee, tea, cola, chocolate) can cause difficulty falling asleep, awakenings during the night, and shallow sleep. Even caffeine early in the day can disrupt night-time sleep. Avoid alcohol, especially in the evening. Although alcohol helps tense people fall asleep more easily, it causes awakenings later in the night. Smoking may disturb sleep. Nicotine is a stimulant. Try not to smoke during the night when you have trouble sleeping. Learning about Sleeping Well    What does sleeping well mean? Sleeping well means getting enough sleep. How much sleep is enough varies among people. The number of hours you sleep is not as improtant as how you feel when you wake up. If you do not feel refreshed, you probably need more sleep. Another sign of not getting enough sleep is feeling tired during the day. The average totally nightly sleep time is 7 1/2 to 8 hours. Healthy adults may need a little more or a little less than this. Why is getting enough sleep important? Getting enough quality sleep is a basic part of good health. When your sleep suffers, your mood and your thoughts can suffer too. Your thoughts can suffer too. You ma find yourself feeling more grumpy or stressed. No getting enough sleep also can lead to serious problems, including injury, accidents, anxiety, and depression. What might cause poor sleeping? Many things can cause sleep problems, including:    Stress. Stress can be caused by fear about a single event, such as giving a speech. Or you may have ongoing stress, such as worry about work or school. Depression, anxiety, and other mental or emotional conditions. Changes in your sleep habits or surroundings. This includes changes that happen where you sleep, such as noise, light, or sleeping in a different bed.   It also includes changes in your sleep pattern, such as having jet lab or working a late shift. Health problems, such as pain, breathing problems, and restless legs syndrome. Lack of regular exercise. How can you help yourself? Here are some tips that may help you sleep more soundly and wake up feeling more refreshed. Your sleeping area    Use your bedroom only for sleeping and sex. A bit of light reading may help you fall asleep. But if it doesn't, do your reading elsewhere in the house. Don't watch TV in bed. Be sure your bed is big enough to stretch out comfortable, especially if you have a sleep partner. Keep your bedroom quiet, dark, and cool. Use curtains, blinds, or sleep mask to block out light. To block out noise, use earplugs, soothing music, or a \"white noise\" machine. Your evening and bedtime routine    Create a relaxing bedtime routine. You might want to take a warm shower or bath, listen to soothing music, or drink a cup of non-caffeinated tea. Go to bed at the same time every night. And get up at the same time every morning, even if you feel tired. What to avoid:    Limit caffeine (coffee, tea, caffeinated sodas) during the day, and dont have any for at least 4 to 6 hours before bedtime. Don't drink alcohol before bedtime. Alcohol can cause you to wake up more often during the night. Don't smoke or use tobacco, especially in the evening. Nicotine can keep you awake. Don't like in bed away for too long. If you can't fall asleep, or if you wake up in the middle of the night and can't get back to sleep within 15 minutes or so, get out of bed and go to another room until you feel sleepy. Don't take medicine right before bed that may keep you awake or make you feel hyper or enegerized. Your doctor can tell you if your medicine may do this and if you can take it earlier in the day. If you can't sleep:    Imagine yourself in a peaceful, pleasant scene.   Focus on the details and feelings of being in a place that is relaxing. Get up and do a quiet or boring activity until you feel sleepy. Don't drink liquids after 6 p.m. if you wake up often because you have to go to the bathroom. Where can you learn more? Go to http://www.gray.com/    Enter F304 in the search box to learn more about \"Learning About Sleeping Well. \"

## 2022-09-07 DIAGNOSIS — Z95.0 PACEMAKER: Primary | ICD-10-CM

## 2022-09-07 DIAGNOSIS — Z95.0 PACEMAKER: ICD-10-CM

## 2022-09-29 ENCOUNTER — OFFICE VISIT (OUTPATIENT)
Dept: CARDIOLOGY CLINIC | Age: 82
End: 2022-09-29
Payer: MEDICARE

## 2022-09-29 VITALS
WEIGHT: 143 LBS | SYSTOLIC BLOOD PRESSURE: 154 MMHG | HEIGHT: 66 IN | DIASTOLIC BLOOD PRESSURE: 80 MMHG | BODY MASS INDEX: 22.98 KG/M2 | HEART RATE: 88 BPM

## 2022-09-29 DIAGNOSIS — Z95.0 PACEMAKER: ICD-10-CM

## 2022-09-29 DIAGNOSIS — I48.0 PAROXYSMAL ATRIAL FIBRILLATION (HCC): ICD-10-CM

## 2022-09-29 DIAGNOSIS — I10 PRIMARY HYPERTENSION: Primary | ICD-10-CM

## 2022-09-29 PROCEDURE — G8427 DOCREV CUR MEDS BY ELIG CLIN: HCPCS | Performed by: INTERNAL MEDICINE

## 2022-09-29 PROCEDURE — 1123F ACP DISCUSS/DSCN MKR DOCD: CPT | Performed by: INTERNAL MEDICINE

## 2022-09-29 PROCEDURE — 1036F TOBACCO NON-USER: CPT | Performed by: INTERNAL MEDICINE

## 2022-09-29 PROCEDURE — 99214 OFFICE O/P EST MOD 30 MIN: CPT | Performed by: INTERNAL MEDICINE

## 2022-09-29 PROCEDURE — G8399 PT W/DXA RESULTS DOCUMENT: HCPCS | Performed by: INTERNAL MEDICINE

## 2022-09-29 PROCEDURE — G8420 CALC BMI NORM PARAMETERS: HCPCS | Performed by: INTERNAL MEDICINE

## 2022-09-29 PROCEDURE — 1090F PRES/ABSN URINE INCON ASSESS: CPT | Performed by: INTERNAL MEDICINE

## 2022-09-29 RX ORDER — AMLODIPINE BESYLATE 5 MG/1
5 TABLET ORAL DAILY
Qty: 30 TABLET | Refills: 5 | Status: SHIPPED | OUTPATIENT
Start: 2022-09-29

## 2022-09-29 ASSESSMENT — ENCOUNTER SYMPTOMS
SPUTUM PRODUCTION: 0
COLOR CHANGE: 0
HEMATOCHEZIA: 0
ABDOMINAL PAIN: 0
HOARSE VOICE: 0
SHORTNESS OF BREATH: 0
HEMATEMESIS: 0
ORTHOPNEA: 0
BLURRED VISION: 0
DIARRHEA: 0
BOWEL INCONTINENCE: 0
WHEEZING: 0

## 2022-09-29 NOTE — PROGRESS NOTES
[facility] CARDIOLOGY  [address], [address] [facility], [address] [place] [address]  PHONE: [phone]        22        NAME:  Radha Michael  : 1940  MRN: [id number]       SUBJECTIVE:   Radha Michael is a 78 y.o. female seen for a follow up visit regarding the following: The patient has a hx of SSS with a pacemaker,PAF,and primary hypertension. On her last OV,she complained of dyspnea. Follow up echo demonstrated normal LV EF,mild LVH,diastolic dysfunction,mild MR &TR,and moderately elevated RVSP=54. She returns for follow up. I discussed echo results with the patient. Chief Complaint   Patient presents with    Hypertension    Irregular Heart Beat       HPI:    Hypertension  This is a chronic problem. The problem is unchanged. The problem is controlled. Pertinent negatives include no anxiety, blurred vision, chest pain, headaches, malaise/fatigue, neck pain, orthopnea, palpitations, peripheral edema, PND, shortness of breath or sweats. Past Medical History, Past Surgical History, Family history, Social History, and Medications were all reviewed with the patient today and updated as necessary.          Current Outpatient Medications:     fluticasone (FLONASE) 50 MCG/ACT nasal spray, 1 spray by Each Nostril route daily, Disp: , Rfl:     amLODIPine (NORVASC) 2.5 MG tablet, Take 2.5 mg by mouth daily, Disp: , Rfl:     Bacillus Coagulans-Inulin (PROBIOTIC) 1-250 BILLION-MG CAPS, Take 1 tablet by mouth daily, Disp: , Rfl:     apixaban (ELIQUIS) 5 MG TABS tablet, Take 5 mg by mouth 2 times daily, Disp: , Rfl:     aspirin 81 MG EC tablet, Take 81 mg by mouth daily, Disp: , Rfl:     atorvastatin (LIPITOR) 40 MG tablet, TAKE ONE TABLET BY MOUTH AT BEDTIME, Disp: , Rfl:     Cholecalciferol 50 MCG (2000 UT) TABS, Take 2,000 Units by mouth every other day, Disp: , Rfl:     cyanocobalamin 1000 MCG tablet, Take 1,000 mcg by mouth daily, Disp: , Rfl:     dofetilide (TIKOSYN) 250 MCG capsule, Take 250 mcg by mouth 2 times daily, Disp: , Rfl:     levothyroxine (SYNTHROID) 88 MCG tablet, TAKE ONE TABLET BY MOUTH ONE TIME DAILY, Disp: , Rfl:     losartan (COZAAR) 50 MG tablet, Take 1 tablet by mouth daily (Patient not taking: No sig reported), Disp: 30 tablet, Rfl: 5  Allergies   Allergen Reactions    Ciprofloxacin Nausea And Vomiting    Oxycodone-Acetaminophen Other (See Comments)     hallucinations    Diphenhydramine      Other reaction(s): Rash-Allergy, Unknown-Unspecified    Propofol Other (See Comments)     Red burning hands     Past Medical History:   Diagnosis Date    Acute encephalopathy 6/27/2016    Anxiety     Arthritis     Atrial fibrillation ([facility] [address] 4/16/2018    Chronic pain     hip    REYES (dyspnea on exertion) 8/29/2022    Endocrine disease     hypothyroidism    Hypertension     JACKI (obstructive sleep apnea) 9/1/2022    Sepsis ([facility] 6/27/2016    Thyroid disease     TIA (transient ischemic attack) 11/14/2017    Vitamin D deficiency 9/1/2015     Past Surgical History:   Procedure Laterality Date    ORTHOPEDIC SURGERY      right total hip    PACEMAKER      TOTAL KNEE ARTHROPLASTY Left 7/23/15     Family History   Problem Relation Age of Onset    Heart Attack Father       Social History     Tobacco Use    Smoking status: Never    Smokeless tobacco: Never   Substance Use Topics    Alcohol use: No       ROS:    Review of Systems   Constitutional: Negative for chills, decreased appetite, diaphoresis, fever and malaise/fatigue. HENT:  Negative for congestion, hearing loss, hoarse voice and nosebleeds. Eyes:  Negative for blurred vision. Cardiovascular:  Positive for dyspnea on exertion. Negative for chest pain, claudication, cyanosis, irregular heartbeat, leg swelling, near-syncope, orthopnea, palpitations, paroxysmal nocturnal dyspnea and syncope. Respiratory:  Negative for shortness of breath, sputum production and wheezing. Endocrine: Negative for polydipsia, polyphagia and polyuria.    Skin:  Negative for color change. Musculoskeletal:  Negative for neck pain. Gastrointestinal:  Negative for abdominal pain, bowel incontinence, diarrhea, hematemesis and hematochezia. Genitourinary:  Negative for dysuria, frequency and hematuria. Neurological:  Negative for focal weakness, headaches, light-headedness, loss of balance, numbness, sensory change and weakness. Psychiatric/Behavioral:  Negative for altered mental status and memory loss. PHYSICAL EXAM:   BP (!) 154/80   Pulse 88   Ht 5' 6\" (1.676 m)   Wt 143 lb (64.9 kg)   BMI 23.08 kg/m²      Physical Exam  Constitutional:       Appearance: Normal appearance. HENT:      Head: Normocephalic and atraumatic. Nose: Nose normal.   Eyes:      Extraocular Movements: Extraocular movements intact. Pupils: Pupils are equal, round, and reactive to light. Neck:      Vascular: No carotid bruit. Cardiovascular:      Rate and Rhythm: Regular rhythm. Pulses: Normal pulses. Heart sounds: No murmur heard. Pulmonary:      Effort: Pulmonary effort is normal.      Breath sounds: Normal breath sounds. Abdominal:      General: Abdomen is flat. Bowel sounds are normal.      Palpations: Abdomen is soft. Musculoskeletal:         General: Swelling (trace bilateral LE edema) present. Normal range of motion. Cervical back: Normal range of motion and neck supple. Skin:     General: Skin is warm and dry. Neurological:      General: No focal deficit present. Mental Status: She is alert and oriented to person, place, and time. Psychiatric:         Mood and Affect: Mood normal.       Medical problems and test results were reviewed with the patient today.      Recent Results (from the past 672 hour(s))   Transthoracic echocardiogram (TTE) complete with contrast, bubble, strain, and 3D PRN    Collection Time: 09/20/22  9:30 AM   Result Value Ref Range    LV EDV A2C 43 mL    LV EDV A4C 50 mL    LV ESV A2C 19 mL    LV ESV A4C 21 mL    IVSd 1.0 (A) 0.6 - 0.9 cm    LVIDd 4.5 3.9 - 5.3 cm    LVIDs 3.1 cm    LVPWd 1.0 (A) 0.6 - 0.9 cm    LV E' Lateral Velocity 9 cm/s    LV E' Septal Velocity 6 cm/s    LV Ejection Fraction A2C 56 %    LV Ejection Fraction A4C 58 %    EF BP 57 55 - 100 %    LA Minor Axis 6.5 cm    LA Major Axis 6.6 cm    LA Area 2C 23.2 cm2    LA Area 4C 24.6 cm2    LA Volume BP 70 (A) 22 - 52 mL    LA Diameter 4.3 cm    Aortic Root 3.0 cm    MV E Wave Deceleration Time 194.0 ms    MV A Velocity 0.70 m/s    MV E Velocity 1.19 m/s    Est. RA Pressure 3 mmHg    RVIDd 3.1 cm    TR Max Velocity 3.58 m/s    TR Peak Gradient 51 mmHg    Body Surface Area 1.78 m2    Fractional Shortening 2D 31 28 - 44 %    LV ESV Index A4C 12 mL/m2    LV EDV Index A4C 28 mL/m2    LV ESV Index A2C 11 mL/m2    LV EDV Index A2C 24 mL/m2    LVIDd Index 2.54 cm/m2    LVIDs Index 1.75 cm/m2    LV RWT Ratio 0.44     LV Mass 2D 153.3 67 - 162 g    LV Mass 2D Index 86.6 43 - 95 g/m2    MV E/A 1.70     E/E' Ratio (Averaged) 16.53     E/E' Lateral 13.22     E/E' Septal 19.83     LA Volume Index BP 40 (A) 16 - 34 ml/m2    LA Size Index 2.43 cm/m2    LA/AO Root Ratio 1.43     Ao Root Index 1.69 cm/m2    RVSP 54 mmHg    RV Basal Dimension 3.5 cm    RV Mid Dimension 2.9 cm     Lab Results   Component Value Date/Time    CHOL 176 10/31/2020 05:22 AM    HDL 64 10/31/2020 05:22 AM     No results found for any visits on 09/29/22. ASSESSMENT and Adriana Liat was seen today for hypertension and irregular heart beat. Diagnoses and all orders for this visit:    Primary hypertension:Elevated above target or goal.Increase Norvasc from 2.5 mg daily to 5mg daily. Prescription sent to pharmacy. Paroxysmal atrial fibrillation (HCC):Stable. Clinically,she remains in NSR. Continue Tikosyn and Eliquis. Pacemaker:Continue device clinic as scheduled. Disposition:    Return in about 6 months (around 3/29/2023).                 Keya Wade MD  9/29/2022  10:37 AM

## 2022-12-12 DIAGNOSIS — Z95.0 PACEMAKER: ICD-10-CM

## 2022-12-20 ENCOUNTER — TELEPHONE (OUTPATIENT)
Dept: CARDIOLOGY CLINIC | Age: 82
End: 2022-12-20

## 2022-12-20 NOTE — TELEPHONE ENCOUNTER
Pt c/o left arm \"falling asleep\" and she wakes up, moves it around and it feels better. Only happens when she sleeps. Triage informed pt it may be positional as it improves with movement. Also c/o weakness at night. Denies any  CP/SOB. Wants to be checked over by Dr. Beth Dunn today. Triage informed pt first available appt with Dr. Beth Dunn is 1/27/23 and if she feels she needs urgent attention, would recommend urgent care or ER. Pt schedules appt for 1/27/23 at 9:45 at [employer] office. Pt confirms appt date, time, and location, verbalizes understanding to all and agrees to plan.

## 2022-12-20 NOTE — TELEPHONE ENCOUNTER
Patient called and is very anxious. She reports her arm has been falling asleep, she is very nervous and short of breath. Please call her cell at [phone]. Denies chest pain.

## 2022-12-29 RX ORDER — DOFETILIDE 0.25 MG/1
250 CAPSULE ORAL 2 TIMES DAILY
Qty: 180 CAPSULE | Refills: 3 | Status: SHIPPED | OUTPATIENT
Start: 2022-12-29

## 2023-01-27 ENCOUNTER — OFFICE VISIT (OUTPATIENT)
Dept: CARDIOLOGY CLINIC | Age: 83
End: 2023-01-27

## 2023-01-27 VITALS
SYSTOLIC BLOOD PRESSURE: 138 MMHG | HEART RATE: 80 BPM | HEIGHT: 66 IN | BODY MASS INDEX: 24.11 KG/M2 | WEIGHT: 150 LBS | DIASTOLIC BLOOD PRESSURE: 82 MMHG

## 2023-01-27 DIAGNOSIS — Z95.0 PACEMAKER: Primary | ICD-10-CM

## 2023-01-27 DIAGNOSIS — E78.5 HYPERLIPIDEMIA, UNSPECIFIED HYPERLIPIDEMIA TYPE: ICD-10-CM

## 2023-01-27 DIAGNOSIS — I10 PRIMARY HYPERTENSION: ICD-10-CM

## 2023-01-27 DIAGNOSIS — I48.0 PAROXYSMAL ATRIAL FIBRILLATION (HCC): ICD-10-CM

## 2023-01-27 ASSESSMENT — ENCOUNTER SYMPTOMS
BOWEL INCONTINENCE: 0
ABDOMINAL PAIN: 0
HOARSE VOICE: 0
BLURRED VISION: 0
SHORTNESS OF BREATH: 0
COLOR CHANGE: 0
SPUTUM PRODUCTION: 0
WHEEZING: 0
HEMATEMESIS: 0
DIARRHEA: 0
HEMATOCHEZIA: 0
ORTHOPNEA: 0

## 2023-01-27 NOTE — PROGRESS NOTES
[facility] CARDIOLOGY  [address], [address]  PHONE: [phone]        23        NAME:  Marcus Joe  : 1940  MRN: [id number]       SUBJECTIVE:   Marcus Joe is a 78 y.o. female seen for a follow up visit regarding the following: The patient has a hx of SSS with pacemaker,PAF,and primary hypertension. She returns for scheduled follow up. Overall,she reports doing well. Chief Complaint   Patient presents with    Atrial Fibrillation    Hypertension       HPI:    Atrial Fibrillation  Presents for follow-up visit. Symptoms are negative for an AICD problem, bradycardia, chest pain, dizziness, hemodynamic instability, hypertension, hypotension, pacemaker problem, palpitations, shortness of breath, syncope, tachycardia and weakness. The symptoms have been stable. Past Medical History, Past Surgical History, Family history, Social History, and Medications were all reviewed with the patient today and updated as necessary.          Current Outpatient Medications:     apixaban (ELIQUIS) 5 MG TABS tablet, Take 1 tablet by mouth 2 times daily, Disp: 180 tablet, Rfl: 3    dofetilide (TIKOSYN) 250 MCG capsule, Take 1 capsule by mouth 2 times daily, Disp: 180 capsule, Rfl: 3    amLODIPine (NORVASC) 5 MG tablet, Take 1 tablet by mouth daily, Disp: 30 tablet, Rfl: 5    fluticasone (FLONASE) 50 MCG/ACT nasal spray, 1 spray by Each Nostril route daily, Disp: , Rfl:     Bacillus Coagulans-Inulin (PROBIOTIC) 1-250 BILLION-MG CAPS, Take 1 tablet by mouth daily, Disp: , Rfl:     aspirin 81 MG EC tablet, Take 81 mg by mouth daily, Disp: , Rfl:     atorvastatin (LIPITOR) 40 MG tablet, TAKE ONE TABLET BY MOUTH AT BEDTIME, Disp: , Rfl:     Cholecalciferol 50 MCG (2000) TABS, Take 2,000 Units by mouth every other day, Disp: , Rfl:     cyanocobalamin 1000 MCG tablet, Take 1,000 mcg by mouth daily, Disp: , Rfl:     levothyroxine (SYNTHROID) 88 MCG tablet, TAKE ONE TABLET BY MOUTH ONE TIME DAILY, Disp: , Rfl:   Allergies   Allergen Reactions    Ciprofloxacin Nausea And Vomiting    Oxycodone-Acetaminophen Other (See Comments)     hallucinations    Diphenhydramine      Other reaction(s): Rash-Allergy, Unknown-Unspecified    Propofol Other (See Comments)     Red burning hands     Past Medical History:   Diagnosis Date    Acute encephalopathy 6/27/2016    Anxiety     Arthritis     Atrial fibrillation ([facility] [address] 4/16/2018    Chronic pain     hip    REYES (dyspnea on exertion) 8/29/2022    Endocrine disease     hypothyroidism    Hypertension     JACKI (obstructive sleep apnea) 9/1/2022    Sepsis ([facility] [address] 6/27/2016    Thyroid disease     TIA (transient ischemic attack) 11/14/2017    Vitamin D deficiency 9/1/2015     Past Surgical History:   Procedure Laterality Date    ORTHOPEDIC SURGERY      right total hip    PACEMAKER      TOTAL KNEE ARTHROPLASTY Left 7/23/15     Family History   Problem Relation Age of Onset    Heart Attack Father       Social History     Tobacco Use    Smoking status: Never    Smokeless tobacco: Never   Substance Use Topics    Alcohol use: No       ROS:    Review of Systems   Constitutional: Negative for chills, decreased appetite, diaphoresis, fever and malaise/fatigue. HENT:  Negative for congestion, hearing loss, hoarse voice and nosebleeds. Eyes:  Negative for blurred vision. Cardiovascular:  Negative for chest pain, claudication, cyanosis, dyspnea on exertion, irregular heartbeat, leg swelling, near-syncope, orthopnea, palpitations, paroxysmal nocturnal dyspnea and syncope. Respiratory:  Negative for shortness of breath, sputum production and wheezing. Endocrine: Negative for polydipsia, polyphagia and polyuria. Skin:  Negative for color change. Gastrointestinal:  Positive for dysphagia. Negative for abdominal pain, bowel incontinence, diarrhea, hematemesis and hematochezia. Genitourinary:  Negative for dysuria, frequency and hematuria.    Neurological:  Negative for dizziness, focal weakness, light-headedness, loss of balance, numbness, sensory change and weakness. Psychiatric/Behavioral:  Negative for altered mental status and memory loss. PHYSICAL EXAM:   /82   Pulse 80   Ht 5' 6\" (1.676 m)   Wt 150 lb (68 kg)   BMI 24.21 kg/m²      Physical Exam  Constitutional:       Appearance: Normal appearance. HENT:      Head: Normocephalic and atraumatic. Nose: Nose normal.   Eyes:      Extraocular Movements: Extraocular movements intact. Pupils: Pupils are equal, round, and reactive to light. Neck:      Vascular: No carotid bruit. Cardiovascular:      Rate and Rhythm: Regular rhythm. Pulses: Normal pulses. Heart sounds: Murmur (1/6 RITCHIE) heard. Pulmonary:      Effort: Pulmonary effort is normal.      Breath sounds: Normal breath sounds. Abdominal:      General: Abdomen is flat. Bowel sounds are normal.      Palpations: Abdomen is soft. Musculoskeletal:         General: Normal range of motion. Cervical back: Normal range of motion and neck supple. Skin:     General: Skin is warm and dry. Neurological:      General: No focal deficit present. Mental Status: She is alert and oriented to person, place, and time. Psychiatric:         Mood and Affect: Mood normal.       Medical problems and test results were reviewed with the patient today. No results found for this or any previous visit (from the past 672 hour(s)). Lab Results   Component Value Date/Time    CHOL 176 10/31/2020 05:22 AM    HDL 64 10/31/2020 05:22 AM     No results found for any visits on 01/27/23. Yasmin Fraser was seen today for atrial fibrillation and hypertension. Diagnoses and all orders for this visit:    [address] [place] [address] as scheduled. Paroxysmal atrial fibrillation (HCC):Stable. Continue Tikosyn and Eliquis. Primary hypertension:Stable. Continue Norvasc.     Hyperlipidemia, unspecified hyperlipidemia type:per PCP.Continue Lipitor. Dysphagia:Follow up with Gastroenterology. Disposition:    Return in about 6 months (around 7/27/2023).                 Remy Noel MD  1/27/2023  10:13 AM

## 2023-03-14 DIAGNOSIS — Z95.0 PACEMAKER: ICD-10-CM

## 2023-04-03 ENCOUNTER — TELEPHONE (OUTPATIENT)
Dept: CARDIOLOGY CLINIC | Age: 83
End: 2023-04-03

## 2023-04-03 NOTE — TELEPHONE ENCOUNTER
Reviewed pt's concerns with Dr. Aniceto Manzano. States pt can start Lasix 20 mg for edema. Attempted to call pt. No answer. Left message to call.

## 2023-04-03 NOTE — TELEPHONE ENCOUNTER
Pt states her feet and legs have been swelling for the past 5 days. Denies any dietary sodium indiscretions. States she believes she drinks \"more than a quart\" of water per day, but not sure how much. States she has been elevating her feet w/o any relief. Edema does not improve at night. Asking to be seen. Next available [facility] appt with Dr. Israel Cr in 4/10. Offered to discuss with Dr. Israel Cr and call back with a plan. Pt agrees.

## 2023-04-04 RX ORDER — FUROSEMIDE 20 MG/1
20 TABLET ORAL DAILY PRN
Qty: 30 TABLET | Refills: 3 | Status: SHIPPED | OUTPATIENT
Start: 2023-04-04

## 2023-04-25 NOTE — ED NOTES
Report given to [facility], RN to assume care at this time.
Report received from [facility]. This RN assumes pt care at this time.
TRANSFER - OUT REPORT:    Verbal report given to Kishan Robles on Elfida Liss  being transferred to Room #883 for routine progression of care       Report consisted of patients Situation, Background, Assessment and   Recommendations(SBAR). Information from the following report(s) SBAR, Kardex, ED Summary, [facility] and Recent Results was reveiwed with the receiving nurse. Opportunity for questions and clarification was provided.       Ischemic Stroke without Activase/TIA    BP Parameters: Less Than 220/120 for 24 hours, then consult MD for parameters    Controlled With: None    Dysphagia Screen Completed: Yes: Fail  Dysphagia Screening  Vocal Quality/Secretions: (!) Abnormal  History of Dysphagia: No  O2 Saturation: Normal  Alertness: Normal  Pre-Swallow Assessment Score: 1     NIH Stroke Scale Complete: Yes: 2    Frequency of Vital Signs: Every 2 hours    Frequency of Neuro Checks: Every 2 hours
stated

## 2023-07-05 DIAGNOSIS — Z95.0 PACEMAKER: Primary | ICD-10-CM

## 2023-07-05 DIAGNOSIS — I48.0 PAROXYSMAL ATRIAL FIBRILLATION (HCC): ICD-10-CM

## 2023-07-12 ENCOUNTER — TELEPHONE (OUTPATIENT)
Age: 83
End: 2023-07-12

## 2023-07-12 NOTE — TELEPHONE ENCOUNTER
Patient called and said she is just sick and can't really say what is wrong. Patient did say she was short of breath. She said she is miserable at night having to urinate multiple times during the night.

## 2023-07-13 NOTE — TELEPHONE ENCOUNTER
I spoke w/ and he can not see her today. He would like to see what device is doing. Pt.has not been sleeping by her monitor at night. I told her to sleep by device tonight and I will call her in the am.He is happy to make a referral to a PCP of her choosing.

## 2023-07-13 NOTE — TELEPHONE ENCOUNTER
I called and spoke w/pt. she said that she does not have a PCP and stays sick and needs a doctor. She says she wants to meet w/ this afternoon to discuss all the problems she has. She is rambling on and I can not follow what she is asking or wanting.

## 2023-07-14 RX ORDER — METOPROLOL SUCCINATE 25 MG/1
25 TABLET, EXTENDED RELEASE ORAL DAILY
Qty: 90 TABLET | Refills: 3 | Status: SHIPPED | OUTPATIENT
Start: 2023-07-14

## 2023-07-14 NOTE — TELEPHONE ENCOUNTER
Bio reviewed and symptoms correlating w/ an episode of afib. Patient is now back in sinus. [facility] V rates 120's in afib.

## 2023-07-14 NOTE — TELEPHONE ENCOUNTER
I spoke w/ concerning device report. He would like pt.to start Toprol xl 25mg qday this may help with her issues. He feels she really needs to see her PCP as well. If she does not have a PCP he is happy to refer to one of her choosing. I notified pt. of MD response and escribed med a below. Pt.says she does not want a referral for a PCP at this time. Med escribed as below  Requested Prescriptions     Signed Prescriptions Disp Refills    metoprolol succinate (TOPROL XL) 25 MG extended release tablet 90 tablet 3     Sig: Take 1 tablet by mouth daily     Authorizing Provider: Hira Alvarez     Ordering User: TEVIN VO

## 2023-07-18 ENCOUNTER — OFFICE VISIT (OUTPATIENT)
Dept: INTERNAL MEDICINE CLINIC | Facility: CLINIC | Age: 83
End: 2023-07-18
Payer: MEDICARE

## 2023-07-18 VITALS
HEIGHT: 66 IN | HEART RATE: 91 BPM | OXYGEN SATURATION: 96 % | SYSTOLIC BLOOD PRESSURE: 124 MMHG | WEIGHT: 147 LBS | DIASTOLIC BLOOD PRESSURE: 74 MMHG | BODY MASS INDEX: 23.63 KG/M2

## 2023-07-18 DIAGNOSIS — R42 DIZZINESS: ICD-10-CM

## 2023-07-18 DIAGNOSIS — N39.44 NOCTURNAL ENURESIS: ICD-10-CM

## 2023-07-18 DIAGNOSIS — Z86.73 HISTORY OF CVA (CEREBROVASCULAR ACCIDENT): ICD-10-CM

## 2023-07-18 DIAGNOSIS — I10 PRIMARY HYPERTENSION: ICD-10-CM

## 2023-07-18 DIAGNOSIS — R26.81 GAIT INSTABILITY: ICD-10-CM

## 2023-07-18 DIAGNOSIS — M46.1 SACROILIITIS, NOT ELSEWHERE CLASSIFIED (HCC): ICD-10-CM

## 2023-07-18 DIAGNOSIS — I10 PRIMARY HYPERTENSION: Primary | ICD-10-CM

## 2023-07-18 DIAGNOSIS — R41.3 MEMORY LOSS: ICD-10-CM

## 2023-07-18 DIAGNOSIS — E03.9 HYPOTHYROIDISM, UNSPECIFIED TYPE: ICD-10-CM

## 2023-07-18 DIAGNOSIS — I48.0 PAROXYSMAL ATRIAL FIBRILLATION (HCC): ICD-10-CM

## 2023-07-18 LAB
ALBUMIN SERPL-MCNC: 3.9 G/DL (ref 3.2–4.6)
ALBUMIN/GLOB SERPL: 1.2 (ref 0.4–1.6)
ALP SERPL-CCNC: 95 U/L (ref 50–136)
ALT SERPL-CCNC: 29 U/L (ref 12–65)
ANION GAP SERPL CALC-SCNC: 7 MMOL/L (ref 2–11)
AST SERPL-CCNC: 21 U/L (ref 15–37)
BASOPHILS # BLD: 0.1 K/UL (ref 0–0.2)
BASOPHILS NFR BLD: 1 % (ref 0–2)
BILIRUB SERPL-MCNC: 0.9 MG/DL (ref 0.2–1.1)
BUN SERPL-MCNC: 12 MG/DL (ref 8–23)
CALCIUM SERPL-MCNC: 9.9 MG/DL (ref 8.3–10.4)
CHLORIDE SERPL-SCNC: 105 MMOL/L (ref 101–110)
CO2 SERPL-SCNC: 27 MMOL/L (ref 21–32)
CREAT SERPL-MCNC: 0.7 MG/DL (ref 0.6–1)
DIFFERENTIAL METHOD BLD: NORMAL
EOSINOPHIL # BLD: 0.1 K/UL (ref 0–0.8)
EOSINOPHIL NFR BLD: 1 % (ref 0.5–7.8)
ERYTHROCYTE [DISTWIDTH] IN BLOOD BY AUTOMATED COUNT: 14.1 % (ref 11.9–14.6)
GLOBULIN SER CALC-MCNC: 3.3 G/DL (ref 2.8–4.5)
GLUCOSE SERPL-MCNC: 89 MG/DL (ref 65–100)
HCT VFR BLD AUTO: 42.7 % (ref 35.8–46.3)
HGB BLD-MCNC: 14 G/DL (ref 11.7–15.4)
IMM GRANULOCYTES # BLD AUTO: 0.1 K/UL (ref 0–0.5)
IMM GRANULOCYTES NFR BLD AUTO: 1 % (ref 0–5)
LYMPHOCYTES # BLD: 1.3 K/UL (ref 0.5–4.6)
LYMPHOCYTES NFR BLD: 15 % (ref 13–44)
MCH RBC QN AUTO: 31.3 PG (ref 26.1–32.9)
MCHC RBC AUTO-ENTMCNC: 32.8 G/DL (ref 31.4–35)
MCV RBC AUTO: 95.3 FL (ref 82–102)
MONOCYTES # BLD: 0.9 K/UL (ref 0.1–1.3)
MONOCYTES NFR BLD: 11 % (ref 4–12)
NEUTS SEG # BLD: 6.4 K/UL (ref 1.7–8.2)
NEUTS SEG NFR BLD: 71 % (ref 43–78)
NRBC # BLD: 0 K/UL (ref 0–0.2)
PLATELET # BLD AUTO: 390 K/UL (ref 150–450)
PMV BLD AUTO: 10.4 FL (ref 9.4–12.3)
POTASSIUM SERPL-SCNC: 4.2 MMOL/L (ref 3.5–5.1)
PROT SERPL-MCNC: 7.2 G/DL (ref 6.3–8.2)
RBC # BLD AUTO: 4.48 M/UL (ref 4.05–5.2)
SODIUM SERPL-SCNC: 139 MMOL/L (ref 133–143)
TSH, 3RD GENERATION: 1 UIU/ML (ref 0.36–3.74)
WBC # BLD AUTO: 8.8 K/UL (ref 4.3–11.1)

## 2023-07-18 PROCEDURE — G8420 CALC BMI NORM PARAMETERS: HCPCS | Performed by: NURSE PRACTITIONER

## 2023-07-18 PROCEDURE — 1036F TOBACCO NON-USER: CPT | Performed by: NURSE PRACTITIONER

## 2023-07-18 PROCEDURE — 0509F URINE INCON PLAN DOCD: CPT | Performed by: NURSE PRACTITIONER

## 2023-07-18 PROCEDURE — 3074F SYST BP LT 130 MM HG: CPT | Performed by: NURSE PRACTITIONER

## 2023-07-18 PROCEDURE — 99215 OFFICE O/P EST HI 40 MIN: CPT | Performed by: NURSE PRACTITIONER

## 2023-07-18 PROCEDURE — G8427 DOCREV CUR MEDS BY ELIG CLIN: HCPCS | Performed by: NURSE PRACTITIONER

## 2023-07-18 PROCEDURE — G8399 PT W/DXA RESULTS DOCUMENT: HCPCS | Performed by: NURSE PRACTITIONER

## 2023-07-18 PROCEDURE — 3078F DIAST BP <80 MM HG: CPT | Performed by: NURSE PRACTITIONER

## 2023-07-18 PROCEDURE — 1123F ACP DISCUSS/DSCN MKR DOCD: CPT | Performed by: NURSE PRACTITIONER

## 2023-07-18 PROCEDURE — 1090F PRES/ABSN URINE INCON ASSESS: CPT | Performed by: NURSE PRACTITIONER

## 2023-07-18 SDOH — ECONOMIC STABILITY: FOOD INSECURITY: WITHIN THE PAST 12 MONTHS, YOU WORRIED THAT YOUR FOOD WOULD RUN OUT BEFORE YOU GOT MONEY TO BUY MORE.: NEVER TRUE

## 2023-07-18 SDOH — ECONOMIC STABILITY: FOOD INSECURITY: WITHIN THE PAST 12 MONTHS, THE FOOD YOU BOUGHT JUST DIDN'T LAST AND YOU DIDN'T HAVE MONEY TO GET MORE.: NEVER TRUE

## 2023-07-18 SDOH — ECONOMIC STABILITY: INCOME INSECURITY: HOW HARD IS IT FOR YOU TO PAY FOR THE VERY BASICS LIKE FOOD, HOUSING, MEDICAL CARE, AND HEATING?: NOT HARD AT ALL

## 2023-07-18 SDOH — ECONOMIC STABILITY: HOUSING INSECURITY
IN THE LAST 12 MONTHS, WAS THERE A TIME WHEN YOU DID NOT HAVE A STEADY PLACE TO SLEEP OR SLEPT IN A SHELTER (INCLUDING NOW)?: NO

## 2023-07-18 ASSESSMENT — PATIENT HEALTH QUESTIONNAIRE - PHQ9
SUM OF ALL RESPONSES TO PHQ QUESTIONS 1-9: 0
1. LITTLE INTEREST OR PLEASURE IN DOING THINGS: 0
SUM OF ALL RESPONSES TO PHQ QUESTIONS 1-9: 0
SUM OF ALL RESPONSES TO PHQ QUESTIONS 1-9: 0
SUM OF ALL RESPONSES TO PHQ9 QUESTIONS 1 & 2: 0
2. FEELING DOWN, DEPRESSED OR HOPELESS: 0
SUM OF ALL RESPONSES TO PHQ QUESTIONS 1-9: 0

## 2023-07-18 NOTE — PROGRESS NOTES
PROGRESS NOTE      Chief Complaint   Patient presents with    New Patient    Establish Care     Pt here to get re-established here        HPI    Reestablishing care: Previously a patient here. Moved to Dr Umu Bender, then Dr Yessi Chu and now would like to be seen again in our office. Urinary urgency and incontinence, especially at night. Discussed with previous PCP. No medical management in past    Weakness and balance problems: Ongoing since CVA. Recent PT for balance and strengthening. Ambulates with cane when leaving house. 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 Affecting her social interactions. Urinary urgency and nocturia with incontinence: Also ongoing. Wears briefs at night. No known medication management. 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 Neg ENT and dental evaluation. Denies dry mouth though considered as a cause. Paroxysmal atrial fibrillation: Followed by Dr Ashli Caballero. Frandy Egan. This month advised addition of metoprolol xl 25 mg since pt was having symptoms but she is not taking. Hypothyroidism: Synthroid 88 mcg. Compliant and tolerant. Vitamin D deficiency: Vitamin d3 2000 mg daily    Dysphagia: Barium swallow with tablet earlier this year (Dr Janny Perkins). 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 JACKI: Obstructive Sleep Apnea Sleep study 9/2022 notable for respiratory disturbance index of 15.3/hour and oxygen desaturations as low as 84%. Refused CPAP as she felt like test was not accurate. [place] score was 0/24. She did agree to repeat testing. .        Past Medical History, Past Surgical History, Family history, Social History, and Medications were all reviewed and updated as necessary.      Current Outpatient Medications   Medication Sig Dispense Refill    mirabegron (MYRBETRIQ) 25 MG TB24 Take 1 tablet by mouth daily 30 tablet 5    amLODIPine

## 2023-07-20 ENCOUNTER — TELEPHONE (OUTPATIENT)
Dept: INTERNAL MEDICINE CLINIC | Facility: CLINIC | Age: 83
End: 2023-07-20

## 2023-07-20 NOTE — TELEPHONE ENCOUNTER
Can stop the Myrbetriq if she thinks it is causing increased frequency though I would not expect that. It can take several weeks to see the benefit. I am not aware of a facility for inpatient evaluation of different medical complaints. I know she used to go to [facility] for regular visits. Would that be an option for her?

## 2023-07-20 NOTE — TELEPHONE ENCOUNTER
Mildred Smoker requests call from [place] or [place]; refused to give any other information Sonu Coyle(Attending)

## 2023-07-20 NOTE — TELEPHONE ENCOUNTER
I called pt back and spoke to her son Boubacar Swann re: recent lab work being W/N/L And Boubacar Swann wanted to let Leopoldo Chesterfield  know his mom told him she took one dose of her new rx Myretriq and she noted increased voiding ( 3 x in 6 hrs) Boubacar Shree wanted me to ask Leopoldo Chesterfield if there was a \"place\" where mom could go to stay 3-4 days and be evaluated for all of her sx and where one place/dr could review her dx and sx and evaluate her ?

## 2023-07-24 NOTE — TELEPHONE ENCOUNTER
Closing this encounter since I had spoken to pt and pt;s son last Friday. Pt said she will not be taking the Myrbetriq at all Nicola Briceno is aware). Nicola Briceno informed me on Friday [facility] in [facility] is not accepting pts but he said he is calling around.  I asked that he call us back with any updates

## 2023-07-25 ENCOUNTER — TELEPHONE (OUTPATIENT)
Dept: INTERNAL MEDICINE CLINIC | Facility: CLINIC | Age: 83
End: 2023-07-25

## 2023-07-25 NOTE — TELEPHONE ENCOUNTER
----- Message from Mario Stuart sent at 7/25/2023 10:18 AM EDT -----  Regarding: [place] Appointment  Contact: [phone]  Just wanted to let you know Vickey Riggs 1-, has an appointment at the [facility] on August 10, 2023.   Thank you,  Juliette Vogel

## 2023-07-27 NOTE — TELEPHONE ENCOUNTER
Henny Manning gave pt's son Cece Sebastian out fax number and he said he will make sure [facility] faxes you pt's notes form [facility] on 8/10/23

## 2023-08-07 DIAGNOSIS — Z95.0 PACEMAKER: ICD-10-CM

## 2023-08-22 PROCEDURE — 93296 REM INTERROG EVL PM/IDS: CPT | Performed by: INTERNAL MEDICINE

## 2023-08-22 PROCEDURE — 93294 REM INTERROG EVL PM/LDLS PM: CPT | Performed by: INTERNAL MEDICINE

## 2023-08-29 ENCOUNTER — OFFICE VISIT (OUTPATIENT)
Dept: INTERNAL MEDICINE CLINIC | Facility: CLINIC | Age: 83
End: 2023-08-29
Payer: MEDICARE

## 2023-08-29 VITALS
BODY MASS INDEX: 23.63 KG/M2 | WEIGHT: 147 LBS | DIASTOLIC BLOOD PRESSURE: 82 MMHG | SYSTOLIC BLOOD PRESSURE: 136 MMHG | HEIGHT: 66 IN

## 2023-08-29 DIAGNOSIS — R26.81 GAIT INSTABILITY: ICD-10-CM

## 2023-08-29 DIAGNOSIS — M79.89 MASS OF SOFT TISSUE OF UPPER ARM: Primary | ICD-10-CM

## 2023-08-29 DIAGNOSIS — H61.23 BILATERAL IMPACTED CERUMEN: ICD-10-CM

## 2023-08-29 DIAGNOSIS — R26.89 BALANCE PROBLEM: ICD-10-CM

## 2023-08-29 DIAGNOSIS — K58.1 IRRITABLE BOWEL SYNDROME WITH CONSTIPATION: ICD-10-CM

## 2023-08-29 DIAGNOSIS — Z86.73 HISTORY OF CVA (CEREBROVASCULAR ACCIDENT): ICD-10-CM

## 2023-08-29 DIAGNOSIS — K59.09 CHRONIC CONSTIPATION: ICD-10-CM

## 2023-08-29 DIAGNOSIS — R41.3 MEMORY LOSS: ICD-10-CM

## 2023-08-29 DIAGNOSIS — I48.0 PAROXYSMAL ATRIAL FIBRILLATION (HCC): ICD-10-CM

## 2023-08-29 DIAGNOSIS — R47.01 APHASIA: ICD-10-CM

## 2023-08-29 DIAGNOSIS — F41.9 ANXIETY: ICD-10-CM

## 2023-08-29 DIAGNOSIS — I10 PRIMARY HYPERTENSION: ICD-10-CM

## 2023-08-29 DIAGNOSIS — H93.8X2 EAR FULLNESS, LEFT: ICD-10-CM

## 2023-08-29 PROCEDURE — G8420 CALC BMI NORM PARAMETERS: HCPCS | Performed by: NURSE PRACTITIONER

## 2023-08-29 PROCEDURE — G8427 DOCREV CUR MEDS BY ELIG CLIN: HCPCS | Performed by: NURSE PRACTITIONER

## 2023-08-29 PROCEDURE — 99215 OFFICE O/P EST HI 40 MIN: CPT | Performed by: NURSE PRACTITIONER

## 2023-08-29 PROCEDURE — 1123F ACP DISCUSS/DSCN MKR DOCD: CPT | Performed by: NURSE PRACTITIONER

## 2023-08-29 PROCEDURE — 1090F PRES/ABSN URINE INCON ASSESS: CPT | Performed by: NURSE PRACTITIONER

## 2023-08-29 PROCEDURE — 3075F SYST BP GE 130 - 139MM HG: CPT | Performed by: NURSE PRACTITIONER

## 2023-08-29 PROCEDURE — 1036F TOBACCO NON-USER: CPT | Performed by: NURSE PRACTITIONER

## 2023-08-29 PROCEDURE — G8399 PT W/DXA RESULTS DOCUMENT: HCPCS | Performed by: NURSE PRACTITIONER

## 2023-08-29 PROCEDURE — 3079F DIAST BP 80-89 MM HG: CPT | Performed by: NURSE PRACTITIONER

## 2023-08-29 RX ORDER — ESCITALOPRAM OXALATE 5 MG/1
5 TABLET ORAL DAILY
Qty: 30 TABLET | Refills: 5 | Status: SHIPPED | OUTPATIENT
Start: 2023-08-29

## 2023-08-29 RX ORDER — POLYETHYLENE GLYCOL 3350
17 POWDER (GRAM) MISCELLANEOUS PRN
COMMUNITY

## 2023-08-29 ASSESSMENT — ENCOUNTER SYMPTOMS
SINUS PAIN: 0
SINUS PRESSURE: 0

## 2023-08-29 NOTE — PROGRESS NOTES
Medications were all reviewed and updated as necessary. Current Outpatient Medications   Medication Sig Dispense Refill    Cholecalciferol (VITAMIN D3) 125 MCG (5000 UT) TABS Take 1 tablet by mouth every other day      Polyethylene Glycol 3350 POWD Take 17 g by mouth as needed      escitalopram (LEXAPRO) 5 MG tablet Take 1 tablet by mouth daily 30 tablet 5    amLODIPine (NORVASC) 5 MG tablet Take 1 tablet by mouth daily 90 tablet 3    apixaban (ELIQUIS) 5 MG TABS tablet Take 1 tablet by mouth 2 times daily 180 tablet 3    dofetilide (TIKOSYN) 250 MCG capsule Take 1 capsule by mouth 2 times daily 180 capsule 3    fluticasone (FLONASE) 50 MCG/ACT nasal spray 1 spray by Each Nostril route daily as needed      Bacillus Coagulans-Inulin (PROBIOTIC) 1-250 BILLION-MG CAPS Take 1 tablet by mouth daily      aspirin 81 MG EC tablet Take 1 tablet by mouth daily      atorvastatin (LIPITOR) 40 MG tablet TAKE ONE TABLET BY MOUTH AT BEDTIME      cyanocobalamin 1000 MCG tablet Take 1 tablet by mouth daily      levothyroxine (SYNTHROID) 88 MCG tablet TAKE ONE TABLET BY MOUTH ONE TIME DAILY      Cholecalciferol 50 MCG (2000 UT) TABS Take 1 tablet by mouth every other day (Patient not taking: Reported on 8/29/2023)       No current facility-administered medications for this visit. Allergies   Allergen Reactions    Ciprofloxacin Nausea And Vomiting    Oxycodone-Acetaminophen Other (See Comments)     hallucinations    Propofol Other (See Comments)     Red burning hands    Diphenhydramine Rash     Other reaction(s): Rash-Allergy, Unknown-Unspecified       ASSESSMENT and Donny Zavala was seen today for follow-up and results. Diagnoses and all orders for this visit:    Mass of soft tissue of upper arm  -     MRI HUMERUS RIGHT W WO CONTRAST; Future    Memory loss  -     MRI BRAIN WO CONTRAST; Future    Aphasia  -     MRI BRAIN WO CONTRAST; Future    Gait instability  -     MRI BRAIN WO CONTRAST;  Future    Balance problem  -

## 2023-08-31 ENCOUNTER — OFFICE VISIT (OUTPATIENT)
Age: 83
End: 2023-08-31

## 2023-08-31 VITALS
DIASTOLIC BLOOD PRESSURE: 70 MMHG | SYSTOLIC BLOOD PRESSURE: 128 MMHG | WEIGHT: 146 LBS | BODY MASS INDEX: 23.46 KG/M2 | HEIGHT: 66 IN | HEART RATE: 78 BPM

## 2023-08-31 DIAGNOSIS — I10 PRIMARY HYPERTENSION: Primary | ICD-10-CM

## 2023-08-31 DIAGNOSIS — Z95.0 PACEMAKER: ICD-10-CM

## 2023-08-31 DIAGNOSIS — I48.0 PAROXYSMAL ATRIAL FIBRILLATION (HCC): ICD-10-CM

## 2023-08-31 ASSESSMENT — ENCOUNTER SYMPTOMS
COLOR CHANGE: 0
BLURRED VISION: 0
ABDOMINAL PAIN: 1
SHORTNESS OF BREATH: 0
ORTHOPNEA: 0
BOWEL INCONTINENCE: 0
WHEEZING: 0
HEMATOCHEZIA: 0
HOARSE VOICE: 0
SPUTUM PRODUCTION: 0
DIARRHEA: 0
HEMATEMESIS: 0

## 2023-08-31 NOTE — PROGRESS NOTES
[facility] CARDIOLOGY  [address] [facility], [facility], [address]  PHONE: [phone]        23        NAME:  Anjel Cheung  : 1940  MRN: [id number]       SUBJECTIVE:   Anjel Cheung is a 78 y.o. female seen for a follow up visit regarding the following: PAF with SSS & pacemaker and primary hypertension. She returns for scheduled folllow up. She has multiple complaints including:palpitations,bladder incontinence,difficulty swallowing,and occasional abdominal pain. Chief Complaint   Patient presents with    Hypertension    Irregular Heart Beat       HPI:    Atrial Fibrillation  Presents for follow-up visit. Symptoms include palpitations. Symptoms are negative for an AICD problem, bradycardia, chest pain, dizziness, hemodynamic instability, hypertension, hypotension, pacemaker problem, shortness of breath, syncope, tachycardia and weakness. The symptoms have been stable. Past Medical History, Past Surgical History, Family history, Social History, and Medications were all reviewed with the patient today and updated as necessary.          Current Outpatient Medications:     Cholecalciferol (VITAMIN D3) 125 MCG (5000 UT) TABS, Take 1 tablet by mouth every other day, Disp: , Rfl:     Polyethylene Glycol 3350 POWD, Take 17 g by mouth as needed, Disp: , Rfl:     escitalopram (LEXAPRO) 5 MG tablet, Take 1 tablet by mouth daily, Disp: 30 tablet, Rfl: 5    amLODIPine (NORVASC) 5 MG tablet, Take 1 tablet by mouth daily, Disp: 90 tablet, Rfl: 3    apixaban (ELIQUIS) 5 MG TABS tablet, Take 1 tablet by mouth 2 times daily, Disp: 180 tablet, Rfl: 3    dofetilide (TIKOSYN) 250 MCG capsule, Take 1 capsule by mouth 2 times daily, Disp: 180 capsule, Rfl: 3    fluticasone (FLONASE) 50 MCG/ACT nasal spray, 1 spray by Each Nostril route daily as needed, Disp: , Rfl:     Bacillus Coagulans-Inulin (PROBIOTIC) 1-250 BILLION-MG CAPS, Take 1 tablet by mouth daily, Disp: , Rfl:     aspirin 81 MG EC tablet,

## 2023-09-05 ENCOUNTER — OFFICE VISIT (OUTPATIENT)
Dept: ENT CLINIC | Age: 83
End: 2023-09-05
Payer: MEDICARE

## 2023-09-05 VITALS — HEIGHT: 66 IN | RESPIRATION RATE: 16 BRPM | BODY MASS INDEX: 23.46 KG/M2 | WEIGHT: 146 LBS

## 2023-09-05 DIAGNOSIS — H61.23 BILATERAL IMPACTED CERUMEN: Primary | Chronic | ICD-10-CM

## 2023-09-05 DIAGNOSIS — R09.82 PND (POST-NASAL DRIP): Chronic | ICD-10-CM

## 2023-09-05 PROCEDURE — 1123F ACP DISCUSS/DSCN MKR DOCD: CPT | Performed by: PHYSICIAN ASSISTANT

## 2023-09-05 PROCEDURE — 99203 OFFICE O/P NEW LOW 30 MIN: CPT | Performed by: PHYSICIAN ASSISTANT

## 2023-09-05 PROCEDURE — 69210 REMOVE IMPACTED EAR WAX UNI: CPT | Performed by: PHYSICIAN ASSISTANT

## 2023-09-05 ASSESSMENT — ENCOUNTER SYMPTOMS
NAUSEA: 0
SHORTNESS OF BREATH: 0
WHEEZING: 0
CHOKING: 0
APNEA: 0
CONSTIPATION: 0
EYE PAIN: 0
DIARRHEA: 0
STRIDOR: 0
EYE ITCHING: 0
EYE DISCHARGE: 0
SINUS PRESSURE: 1
SINUS PAIN: 0
FACIAL SWELLING: 0
COUGH: 0

## 2023-09-05 NOTE — PROGRESS NOTES
Isis Benjamin is a 78 y.o. female presents today with c/o cerumen impaction. She was seen at her primary care and flushed with peroxide and water mix without resolution. She further complains of sinus congestion with postnasal drainage and feels like it is well controlled with the use of Flonase. She then mentions that she has a lot of solid food dysphagia no regurgitation. She had a clear barium swallow study and as long as she drinks water she does well. Chief Complaint   Patient presents with    New Patient    Ear Problem     Patient here for wax removal.She also states she is having some sinus congestion . Sinus Problem       Patient Active Problem List   Diagnosis    Age related osteoporosis    Hyponatremia    HTN (hypertension)    TIA (transient ischemic attack)    Diverticulosis of large intestine without hemorrhage    Irritable bowel syndrome with constipation    Paroxysmal atrial fibrillation (HCC)    History of subdural hematoma    History of colon polyps    History of CVA (cerebrovascular accident)    Hypothyroidism    Memory loss    Vitamin D deficiency    Pacemaker    Hyperlipidemia    Chronic constipation    REYES (dyspnea on exertion)    JACKI (obstructive sleep apnea)    Nocturnal hypoxemia    Sacroiliitis, not elsewhere classified ([address])        Reviewed and updated this visit by provider:  Tobacco  Allergies  Meds  Problems  Med Hx  Surg Hx  Fam Hx         Review of Systems   Constitutional:  Negative for chills and fever. HENT:  Positive for congestion and sinus pressure. Negative for facial swelling, hearing loss, nosebleeds, sinus pain and sneezing. Eyes:  Negative for pain, discharge and itching. Respiratory:  Negative for apnea, cough, choking, shortness of breath, wheezing and stridor. Cardiovascular:  Negative for chest pain. Gastrointestinal:  Negative for constipation, diarrhea and nausea. Endocrine: Negative for polyuria.    Genitourinary:  Negative for

## 2023-09-06 ENCOUNTER — TELEPHONE (OUTPATIENT)
Dept: INTERNAL MEDICINE CLINIC | Facility: CLINIC | Age: 83
End: 2023-09-06

## 2023-09-06 NOTE — TELEPHONE ENCOUNTER
Patient's daughter in law called  regarding mother in law, up all night emptying bladder, very dizzy, confused  daughter in law feels like she needs to call ambulance, patient wants her to call the nurse and have the nurse say it's ok to call the ambulance.

## 2023-09-06 NOTE — TELEPHONE ENCOUNTER
LM with Kaila Mccormack him to go ahead and take his mom to the ER and then I called pt's daughter in law  Jermain Garsia and I let her know I left Derek Simpson a voice message a few min's ago and I informed Lila Meade due to pt's state of confusion and increased urinary incontinence it is advised to take pt to the ER

## 2023-09-06 NOTE — TELEPHONE ENCOUNTER
Pt's son Rachel Larkin stopped by our office asking is Merlinda Elder has placed a referral to urologist due to urinary incontinent. I let pt know a referral has not been sent but I will ask Merlinda Elder. Rachel Larkin said if she can not get in to see urologist today or tomorrow he will take his mom to the ER,Pt was up all night with urinary incontinence .

## 2023-09-12 ENCOUNTER — APPOINTMENT (OUTPATIENT)
Dept: CT IMAGING | Age: 83
End: 2023-09-12
Payer: MEDICARE

## 2023-09-12 ENCOUNTER — HOSPITAL ENCOUNTER (EMERGENCY)
Age: 83
Discharge: HOME | End: 2023-09-13
Attending: EMERGENCY MEDICINE
Payer: MEDICARE

## 2023-09-12 DIAGNOSIS — R51.9 NONINTRACTABLE HEADACHE, UNSPECIFIED CHRONICITY PATTERN, UNSPECIFIED HEADACHE TYPE: Primary | ICD-10-CM

## 2023-09-12 LAB
ALBUMIN SERPL-MCNC: 3.6 G/DL (ref 3.2–4.6)
ALBUMIN/GLOB SERPL: 0.9 (ref 0.4–1.6)
ALP SERPL-CCNC: 88 U/L (ref 50–136)
ALT SERPL-CCNC: 27 U/L (ref 12–65)
AMMONIA PLAS-SCNC: 16 UMOL/L (ref 11–32)
ANION GAP SERPL CALC-SCNC: 6 MMOL/L (ref 2–11)
AST SERPL-CCNC: 34 U/L (ref 15–37)
BASOPHILS # BLD: 0.1 K/UL (ref 0–0.2)
BASOPHILS NFR BLD: 1 % (ref 0–2)
BILIRUB SERPL-MCNC: 0.5 MG/DL (ref 0.2–1.1)
BUN SERPL-MCNC: 18 MG/DL (ref 8–23)
CALCIUM SERPL-MCNC: 9.5 MG/DL (ref 8.3–10.4)
CHLORIDE SERPL-SCNC: 107 MMOL/L (ref 101–110)
CO2 SERPL-SCNC: 24 MMOL/L (ref 21–32)
CREAT SERPL-MCNC: 0.9 MG/DL (ref 0.6–1)
DIFFERENTIAL METHOD BLD: ABNORMAL
EOSINOPHIL # BLD: 0.2 K/UL (ref 0–0.8)
EOSINOPHIL NFR BLD: 2 % (ref 0.5–7.8)
ERYTHROCYTE [DISTWIDTH] IN BLOOD BY AUTOMATED COUNT: 14.4 % (ref 11.9–14.6)
GLOBULIN SER CALC-MCNC: 4 G/DL (ref 2.8–4.5)
GLUCOSE SERPL-MCNC: 114 MG/DL (ref 65–100)
HCT VFR BLD AUTO: 40.2 % (ref 35.8–46.3)
HGB BLD-MCNC: 13.5 G/DL (ref 11.7–15.4)
IMM GRANULOCYTES # BLD AUTO: 0.1 K/UL (ref 0–0.5)
IMM GRANULOCYTES NFR BLD AUTO: 2 % (ref 0–5)
LIPASE SERPL-CCNC: 78 U/L (ref 73–393)
LYMPHOCYTES # BLD: 1.5 K/UL (ref 0.5–4.6)
LYMPHOCYTES NFR BLD: 17 % (ref 13–44)
MCH RBC QN AUTO: 31 PG (ref 26.1–32.9)
MCHC RBC AUTO-ENTMCNC: 33.6 G/DL (ref 31.4–35)
MCV RBC AUTO: 92.4 FL (ref 82–102)
MONOCYTES # BLD: 1.1 K/UL (ref 0.1–1.3)
MONOCYTES NFR BLD: 13 % (ref 4–12)
NEUTS SEG # BLD: 5.5 K/UL (ref 1.7–8.2)
NEUTS SEG NFR BLD: 65 % (ref 43–78)
NRBC # BLD: 0 K/UL (ref 0–0.2)
PLATELET # BLD AUTO: 365 K/UL (ref 150–450)
PMV BLD AUTO: 10.4 FL (ref 9.4–12.3)
POTASSIUM SERPL-SCNC: 4.1 MMOL/L (ref 3.5–5.1)
PROT SERPL-MCNC: 7.6 G/DL (ref 6.3–8.2)
RBC # BLD AUTO: 4.35 M/UL (ref 4.05–5.2)
SODIUM SERPL-SCNC: 137 MMOL/L (ref 133–143)
WBC # BLD AUTO: 8.4 K/UL (ref 4.3–11.1)

## 2023-09-12 PROCEDURE — 81003 URINALYSIS AUTO W/O SCOPE: CPT

## 2023-09-12 PROCEDURE — 99284 EMERGENCY DEPT VISIT MOD MDM: CPT

## 2023-09-12 PROCEDURE — 93005 ELECTROCARDIOGRAM TRACING: CPT | Performed by: EMERGENCY MEDICINE

## 2023-09-12 PROCEDURE — 83690 ASSAY OF LIPASE: CPT

## 2023-09-12 PROCEDURE — 80053 COMPREHEN METABOLIC PANEL: CPT

## 2023-09-12 PROCEDURE — 70450 CT HEAD/BRAIN W/O DYE: CPT

## 2023-09-12 PROCEDURE — 82140 ASSAY OF AMMONIA: CPT

## 2023-09-12 PROCEDURE — 85025 COMPLETE CBC W/AUTO DIFF WBC: CPT

## 2023-09-12 PROCEDURE — 81001 URINALYSIS AUTO W/SCOPE: CPT

## 2023-09-12 ASSESSMENT — PAIN SCALES - GENERAL: PAINLEVEL_OUTOF10: 0

## 2023-09-12 ASSESSMENT — LIFESTYLE VARIABLES
HOW MANY STANDARD DRINKS CONTAINING ALCOHOL DO YOU HAVE ON A TYPICAL DAY: PATIENT DOES NOT DRINK
HOW OFTEN DO YOU HAVE A DRINK CONTAINING ALCOHOL: NEVER

## 2023-09-12 ASSESSMENT — PAIN - FUNCTIONAL ASSESSMENT: PAIN_FUNCTIONAL_ASSESSMENT: 0-10

## 2023-09-12 NOTE — TELEPHONE ENCOUNTER
I called pt's son Diana Stanley and he said they took pt to urgent care and was 1st given Amoxicillin-clavulanate 875-125 mg x 5 days and the yesterday they called pt back and informed her UTI needed this rx for total of 10 days . Diana Stanley said pt is feeling \"better already\"

## 2023-09-13 VITALS
HEIGHT: 66 IN | TEMPERATURE: 97.9 F | RESPIRATION RATE: 24 BRPM | OXYGEN SATURATION: 99 % | WEIGHT: 146 LBS | BODY MASS INDEX: 23.46 KG/M2 | DIASTOLIC BLOOD PRESSURE: 78 MMHG | HEART RATE: 80 BPM | SYSTOLIC BLOOD PRESSURE: 127 MMHG

## 2023-09-13 LAB
APPEARANCE UR: CLEAR
BACTERIA URNS QL MICRO: NEGATIVE /HPF
BILIRUB UR QL: NEGATIVE
CASTS URNS QL MICRO: NORMAL /LPF
COLOR UR: NORMAL
EKG ATRIAL RATE: 0 BPM
EKG DIAGNOSIS: NORMAL
EKG Q-T INTERVAL: 427 MS
EKG QRS DURATION: 101 MS
EKG QTC CALCULATION (BAZETT): 464 MS
EKG R AXIS: 105 DEGREES
EKG T AXIS: 106 DEGREES
EKG VENTRICULAR RATE: 71 BPM
EPI CELLS #/AREA URNS HPF: NORMAL /HPF
GLUCOSE UR STRIP.AUTO-MCNC: NEGATIVE MG/DL
HGB UR QL STRIP: NEGATIVE
KETONES UR QL STRIP.AUTO: NEGATIVE MG/DL
LEUKOCYTE ESTERASE UR QL STRIP.AUTO: NEGATIVE
MUCOUS THREADS URNS QL MICRO: 0 /LPF
NITRITE UR QL STRIP.AUTO: NEGATIVE
PH UR STRIP: 6.5 (ref 5–9)
PROT UR STRIP-MCNC: NEGATIVE MG/DL
RBC #/AREA URNS HPF: NORMAL /HPF
SP GR UR REFRACTOMETRY: 1.01 (ref 1–1.02)
URINE CULTURE IF INDICATED: NORMAL
UROBILINOGEN UR QL STRIP.AUTO: 0.2 EU/DL (ref 0.2–1)
WBC URNS QL MICRO: NORMAL /HPF

## 2023-09-13 PROCEDURE — 93010 ELECTROCARDIOGRAM REPORT: CPT | Performed by: INTERNAL MEDICINE

## 2023-09-13 NOTE — DISCHARGE INSTRUCTIONS
Follow up with your doctor. If you have any further problems or concerns, return to the emergency department.

## 2023-09-13 NOTE — ED PROVIDER NOTES
Emergency Department Provider Note                   PCP:                BHAVESH Patino - RYLAN               Age: 78 y.o. Sex: female       ICD-10-CM    1. Nonintractable headache, unspecified chronicity pattern, unspecified headache type  R51.9           DISPOSITION Decision To Discharge 09/13/2023 12:21:28 AM        MDM  Number of Diagnoses or Management Options  Diagnosis management comments: MEDICAL DECISION MAKING  Complexity of Problems Addressed:  1 or more acute illnesses that pose a threat to life or bodily function. Data Reviewed and Analyzed:  Category 1:   I independently ordered and reviewed each unique test.    Category 2:   I interpreted the CT Scan No acute bleeding. Category 3: Discussion of management or test interpretation. The patient presents with atypical cephalgia that was resolved when I examined her. Workup here was unremarkable. She was observed for some time while awaiting results, and no acute findings were noted. She was feeling completely back to normal. At the time of disposition, patient was requested discharge home. Recommend close follow up. Risk of Complications and/or Morbidity of Patient Management:  Shared medical decision making was utilized in creating the patients health plan today. Orders Placed This Encounter   Procedures    CT HEAD WO CONTRAST    CBC with Diff    CMP    Lipase    Urinalysis with Reflex to Culture    Ammonia    Diet NPO    POCT Urine Dipstick    POCT Urinalysis no Micro    EKG 12 Lead (Select if Upper Abd Pain, or SOB, Diaphoresis or Tachy)    Saline lock IV        Medications - No data to display    New Prescriptions    No medications on file        Parish Barnes is a 78 y.o. female who presents to the Emergency Department with chief complaint of    Chief Complaint   Patient presents with    Altered Mental Status      The patient presents with head discomfort.  She states she was at home and started feeling a

## 2023-09-13 NOTE — ED TRIAGE NOTES
Pt arrives to ed via [facility] from home with c/o ams. Race score 0 with ems. Pt also c/o nausea and dry mouth, no episodes of emesis. Hx of cva 2 years ago, remaining speech slurring deficit. Pt presents with pedal edema from antibx being treated rn for uti. NIH 1 for slurred speech.

## 2023-09-14 LAB
BILIRUB UR QL: NEGATIVE
GLUCOSE UR QL STRIP.AUTO: NEGATIVE MG/DL
KETONES UR-MCNC: NEGATIVE MG/DL
LEUKOCYTE ESTERASE UR QL STRIP: NEGATIVE
NITRITE UR QL: NEGATIVE
PH UR: 6 (ref 5–9)
PROT UR QL: NEGATIVE MG/DL
RBC # UR STRIP: NEGATIVE
SP GR UR: 1.02 (ref 1–1.02)
UROBILINOGEN UR QL: 0.2 EU/DL (ref 0.2–1)

## 2023-09-21 ENCOUNTER — TELEPHONE (OUTPATIENT)
Dept: INTERNAL MEDICINE CLINIC | Facility: CLINIC | Age: 83
End: 2023-09-21

## 2023-09-21 DIAGNOSIS — N39.0 RECURRENT UTI: Primary | ICD-10-CM

## 2023-09-21 DIAGNOSIS — R32 URINARY INCONTINENCE, UNSPECIFIED TYPE: ICD-10-CM

## 2023-09-21 NOTE — TELEPHONE ENCOUNTER
Pt's son Colten Fink said pt had a UTI on and off the past few weeks Pt had her 3 rd visit to the urgent care today and her UTI is still not cleared up and pt is having fatigue and occ confusion, Colten Fink informed me he was told to call  PCP for a referral to urologist

## 2023-09-22 ENCOUNTER — TELEPHONE (OUTPATIENT)
Dept: INTERNAL MEDICINE CLINIC | Facility: CLINIC | Age: 83
End: 2023-09-22

## 2023-09-22 NOTE — TELEPHONE ENCOUNTER
Pt's son Radha Robert called and I let him know Job Alberto has already sent a referral to urology since pt had seen /s care 3 x and there had recommended pt get a urology  referral. Radha Robert informed me pt finished her antibiotic today and she saw Dr Marr again yesterday. I informed Jose Miguel Blair has requested me faxed a medical record release form (done today) .  Radha Robert said his mom does not have a temp, still has urinary frequency and has been increasing her fluids

## 2023-09-27 ENCOUNTER — OFFICE VISIT (OUTPATIENT)
Dept: UROLOGY | Age: 83
End: 2023-09-27
Payer: MEDICARE

## 2023-09-27 DIAGNOSIS — N39.0 RECURRENT UTI: Primary | ICD-10-CM

## 2023-09-27 DIAGNOSIS — R39.198 OTHER DIFFICULTIES WITH MICTURITION: ICD-10-CM

## 2023-09-27 LAB — PVR, POC: 25 CC

## 2023-09-27 PROCEDURE — 99204 OFFICE O/P NEW MOD 45 MIN: CPT | Performed by: UROLOGY

## 2023-09-27 PROCEDURE — G8399 PT W/DXA RESULTS DOCUMENT: HCPCS | Performed by: UROLOGY

## 2023-09-27 PROCEDURE — 1090F PRES/ABSN URINE INCON ASSESS: CPT | Performed by: UROLOGY

## 2023-09-27 PROCEDURE — 1036F TOBACCO NON-USER: CPT | Performed by: UROLOGY

## 2023-09-27 PROCEDURE — 1123F ACP DISCUSS/DSCN MKR DOCD: CPT | Performed by: UROLOGY

## 2023-09-27 PROCEDURE — G8427 DOCREV CUR MEDS BY ELIG CLIN: HCPCS | Performed by: UROLOGY

## 2023-09-27 PROCEDURE — 51798 US URINE CAPACITY MEASURE: CPT | Performed by: UROLOGY

## 2023-09-27 PROCEDURE — G8420 CALC BMI NORM PARAMETERS: HCPCS | Performed by: UROLOGY

## 2023-09-27 RX ORDER — BACLOFEN 20 MG
500 TABLET ORAL DAILY
COMMUNITY
End: 2023-09-28

## 2023-09-27 ASSESSMENT — ENCOUNTER SYMPTOMS
WHEEZING: 0
EYE PAIN: 0
BACK PAIN: 1
BLOOD IN STOOL: 0
HEARTBURN: 0
DIARRHEA: 0
INDIGESTION: 0
EYE DISCHARGE: 0
VOMITING: 0
ABDOMINAL PAIN: 0
SHORTNESS OF BREATH: 0
SKIN LESIONS: 0
NAUSEA: 0
COUGH: 0
CONSTIPATION: 0

## 2023-09-27 NOTE — PROGRESS NOTES
[facility]  [place], [address]  [facility]  [phone]          Tressa Mancia  : 1940    Chief Complaint   Patient presents with    New Patient     Recurrent uti          HPI     Tressa Mnacia is a 78 y.o.  female who is referred to clinic for recurrent UTI and OAB evaluation. She reports problems with U/F/UUI and debilitating nocturia q1h for the past 6 weeks. States it is [employer] than her baseline. Did home urine test which showed UTI. She took antibiotic without improvement and urine culture positive for UTI at urgent care 6 weeks ago. She was changed to augmentin based on culture but did not improve. She repeated at home urine test and this was again positive. However, she did not have a culture sent and was again treated with an antibiotic. At baseline, she has U/F/UUI. No retention. No gross hematuria but has had microscopic hematuria. No sepsis episodes. No history of stones. No flank pain. No history of  surgeries. Does report history of severe constipation and is on daily miralax for prevention of that. She has tried mirabegron in the past for UUI but could not tolerate the medication and stopped it. Has not been able to try anti-cholinergic due to age > 80 and severe constipation side effect of the medication class.     Cannot leave specimen today and requests to leave off tomorrow    PVR 25 cc      Past Medical History:   Diagnosis Date    Acute encephalopathy 2016    Anxiety     Arthritis     Atrial fibrillation ([address]) 2018    Chronic pain     hip    REYES (dyspnea on exertion) 2022    Endocrine disease     hypothyroidism    Hypertension     JACKI (obstructive sleep apnea) 2022    Sepsis ([address]) 2016    Thyroid disease     TIA (transient ischemic attack) 2017    Vitamin D deficiency 2015     Past Surgical History:   Procedure Laterality Date    ORTHOPEDIC SURGERY      right total hip

## 2023-09-28 ENCOUNTER — TELEPHONE (OUTPATIENT)
Dept: UROLOGY | Age: 83
End: 2023-09-28

## 2023-09-28 ENCOUNTER — OFFICE VISIT (OUTPATIENT)
Dept: INTERNAL MEDICINE CLINIC | Facility: CLINIC | Age: 83
End: 2023-09-28
Payer: MEDICARE

## 2023-09-28 ENCOUNTER — OFFICE VISIT (OUTPATIENT)
Dept: UROLOGY | Age: 83
End: 2023-09-28
Payer: MEDICARE

## 2023-09-28 VITALS
WEIGHT: 147 LBS | BODY MASS INDEX: 23.63 KG/M2 | SYSTOLIC BLOOD PRESSURE: 130 MMHG | HEIGHT: 66 IN | HEART RATE: 77 BPM | TEMPERATURE: 98.1 F | OXYGEN SATURATION: 95 % | DIASTOLIC BLOOD PRESSURE: 82 MMHG

## 2023-09-28 DIAGNOSIS — N39.0 RECURRENT UTI: Primary | ICD-10-CM

## 2023-09-28 DIAGNOSIS — R22.31 ARM MASS, RIGHT: ICD-10-CM

## 2023-09-28 DIAGNOSIS — I10 PRIMARY HYPERTENSION: ICD-10-CM

## 2023-09-28 DIAGNOSIS — R31.29 MICROSCOPIC HEMATURIA: ICD-10-CM

## 2023-09-28 DIAGNOSIS — E03.9 HYPOTHYROIDISM, UNSPECIFIED TYPE: ICD-10-CM

## 2023-09-28 DIAGNOSIS — N39.46 MIXED STRESS AND URGE URINARY INCONTINENCE: ICD-10-CM

## 2023-09-28 LAB
BILIRUBIN, URINE, POC: NEGATIVE
BLOOD URINE, POC: NEGATIVE
GLUCOSE URINE, POC: NEGATIVE
KETONES, URINE, POC: NORMAL
LEUKOCYTE ESTERASE, URINE, POC: NEGATIVE
NITRITE, URINE, POC: NEGATIVE
PH, URINE, POC: 6 (ref 4.6–8)
PROTEIN,URINE, POC: NEGATIVE
SPECIFIC GRAVITY, URINE, POC: 1.02 (ref 1–1.03)
URINALYSIS CLARITY, POC: NORMAL
URINALYSIS COLOR, POC: NORMAL
UROBILINOGEN, POC: NORMAL

## 2023-09-28 PROCEDURE — 0509F URINE INCON PLAN DOCD: CPT | Performed by: NURSE PRACTITIONER

## 2023-09-28 PROCEDURE — G8399 PT W/DXA RESULTS DOCUMENT: HCPCS | Performed by: NURSE PRACTITIONER

## 2023-09-28 PROCEDURE — 3079F DIAST BP 80-89 MM HG: CPT | Performed by: NURSE PRACTITIONER

## 2023-09-28 PROCEDURE — 1090F PRES/ABSN URINE INCON ASSESS: CPT | Performed by: NURSE PRACTITIONER

## 2023-09-28 PROCEDURE — 3075F SYST BP GE 130 - 139MM HG: CPT | Performed by: NURSE PRACTITIONER

## 2023-09-28 PROCEDURE — 1123F ACP DISCUSS/DSCN MKR DOCD: CPT | Performed by: NURSE PRACTITIONER

## 2023-09-28 PROCEDURE — G8427 DOCREV CUR MEDS BY ELIG CLIN: HCPCS | Performed by: NURSE PRACTITIONER

## 2023-09-28 PROCEDURE — G8420 CALC BMI NORM PARAMETERS: HCPCS | Performed by: NURSE PRACTITIONER

## 2023-09-28 PROCEDURE — 1036F TOBACCO NON-USER: CPT | Performed by: NURSE PRACTITIONER

## 2023-09-28 PROCEDURE — 81003 URINALYSIS AUTO W/O SCOPE: CPT | Performed by: NURSE PRACTITIONER

## 2023-09-28 PROCEDURE — 99214 OFFICE O/P EST MOD 30 MIN: CPT | Performed by: NURSE PRACTITIONER

## 2023-09-28 RX ORDER — LANOLIN ALCOHOL/MO/W.PET/CERES
1000 CREAM (GRAM) TOPICAL DAILY
COMMUNITY
Start: 2021-06-15 | End: 2023-09-28

## 2023-09-28 ASSESSMENT — ENCOUNTER SYMPTOMS: SHORTNESS OF BREATH: 0

## 2023-09-28 NOTE — TELEPHONE ENCOUNTER
Pt's son dropped urine specimen off this morning. Urine is neg. Called informed pt's son of urine analysis. Pt's son wants to know the next steps. He stated pt has tried myrbetriq and discontinued due to an intolerance.  Please advise

## 2023-09-28 NOTE — PROGRESS NOTES
Reason for collection: urine check  Patient #: [phone]  Pharmacy: [facility]. UA - Dipstick  Results for orders placed or performed in visit on 09/28/23   AMB POC URINALYSIS DIP STICK AUTO W/O MICRO   Result Value Ref Range    Color (UA POC)      Clarity (UA POC)      Glucose, Urine, POC Negative Negative    Bilirubin, Urine, POC Negative Negative    KETONES, Urine, POC Trace Negative    Specific Gravity, Urine, POC 1.020 1.001 - 1.035    Blood (UA POC) Negative Negative    pH, Urine, POC 6.0 4.6 - 8.0    Protein, Urine, POC Negative Negative    Urobilinogen, POC 0.2 mg/dL     Nitrite, Urine, POC Negative Negative    Leukocyte Esterase, Urine, POC Negative Negative       UA - Micro  WBC - 0  RBC - 0  Bacteria - 0  Epith - 0      Requested Prescriptions      No prescriptions requested or ordered in this encounter     Plan    Diagnosis Orders   1.  Recurrent UTI  AMB POC URINALYSIS DIP STICK AUTO W/O MICRO      Urine normal.   Aimee Zhang, APRN - CNP

## 2023-09-28 NOTE — PROGRESS NOTES
PROGRESS NOTE      Chief Complaint   Patient presents with    Anxiety     Pt said  he only took the Lexapro x 2 days (said rx caused her to \"talk to loud and fast felt hyper\"       HPI    Recurrent UTI: Evaluated and treated at [facility]. Antibiotic changed after culture results and symptoms improved but returned. UA negative x2. Evaluated by Urology yesterday - returned specimen today and reportedly negative. Waiting to hear next steps - considering cystoscopy. Arm mass: Without recent change bur gradual increase in size. Order for MRI but has not scheduled yet since recent urinary problems. Anxiety: Prescribed Lexapro 5 mg last visit and today was 1 month follow-up to discuss. However she only took 2 days due to intolerance (talking loud and fast). Not interested in alternative medical management. Itchy scalp: Daily. Especially on the left. Using signs dandruff shampoo. Past Medical History, Past Surgical History, Family history, Social History, and Medications were all reviewed and updated as necessary.      Current Outpatient Medications   Medication Sig Dispense Refill    vitamin B-12 (CYANOCOBALAMIN) 1000 MCG tablet Take 1 tablet by mouth daily      Cholecalciferol (VITAMIN D3) 125 MCG (5000 UT) TABS Take 1 tablet by mouth every other day      Polyethylene Glycol 3350 POWD Take 17 g by mouth as needed      amLODIPine (NORVASC) 5 MG tablet Take 1 tablet by mouth daily 90 tablet 3    apixaban (ELIQUIS) 5 MG TABS tablet Take 1 tablet by mouth 2 times daily 180 tablet 3    dofetilide (TIKOSYN) 250 MCG capsule Take 1 capsule by mouth 2 times daily 180 capsule 3    fluticasone (FLONASE) 50 MCG/ACT nasal spray 1 spray by Each Nostril route daily as needed      Bacillus Coagulans-Inulin (PROBIOTIC) 1-250 BILLION-MG CAPS Take 1 tablet by mouth daily      aspirin 81 MG EC tablet Take 1 tablet by mouth daily      atorvastatin (LIPITOR) 40 MG tablet TAKE ONE TABLET BY MOUTH AT BEDTIME

## 2023-10-02 NOTE — TELEPHONE ENCOUNTER
MD Patt Romo MA  Caller: Unspecified (4 days ago,  9:27 AM)  Please schedule patient for cysto with me next available with CT prior. I ordered imaging today. This will be next step in evaluation since urine test is negative but she is still symptomatic and not tolerating mirabegron.

## 2023-10-11 ENCOUNTER — HOSPITAL ENCOUNTER (OUTPATIENT)
Dept: CT IMAGING | Age: 83
Discharge: HOME | End: 2023-10-14
Attending: UROLOGY
Payer: MEDICARE

## 2023-10-11 DIAGNOSIS — R31.29 MICROSCOPIC HEMATURIA: ICD-10-CM

## 2023-10-11 PROCEDURE — 74178 CT ABD&PLV WO CNTR FLWD CNTR: CPT

## 2023-10-11 PROCEDURE — 6360000004 HC RX CONTRAST MEDICATION: Performed by: UROLOGY

## 2023-10-11 RX ADMIN — IOPAMIDOL 100 ML: 755 INJECTION, SOLUTION INTRAVENOUS at 13:36

## 2023-10-12 NOTE — RESULT ENCOUNTER NOTE
. Cresencio Diaz, your CT scan is normal.  I do recommend that we proceed with the cystoscopy in the office to look in your bladder and make sure that it looks okay as well. My medical assistant, Hilda Clark, will call you to set this up.      Zoltan Muir,  Dr. Elizabeth Addison

## 2023-10-23 ENCOUNTER — PROCEDURE VISIT (OUTPATIENT)
Dept: UROLOGY | Age: 83
End: 2023-10-23
Payer: MEDICARE

## 2023-10-23 DIAGNOSIS — N39.0 RECURRENT UTI: Primary | ICD-10-CM

## 2023-10-23 DIAGNOSIS — R39.15 URINARY URGENCY: ICD-10-CM

## 2023-10-23 LAB
BILIRUBIN, URINE, POC: NEGATIVE
BLOOD URINE, POC: NEGATIVE
GLUCOSE URINE, POC: NEGATIVE
KETONES, URINE, POC: NEGATIVE
LEUKOCYTE ESTERASE, URINE, POC: NEGATIVE
NITRITE, URINE, POC: NEGATIVE
PH, URINE, POC: 7 (ref 4.6–8)
PROTEIN,URINE, POC: NEGATIVE
SPECIFIC GRAVITY, URINE, POC: 1.01 (ref 1–1.03)
URINALYSIS CLARITY, POC: NORMAL
URINALYSIS COLOR, POC: NORMAL
UROBILINOGEN, POC: NORMAL

## 2023-10-23 PROCEDURE — G8399 PT W/DXA RESULTS DOCUMENT: HCPCS | Performed by: UROLOGY

## 2023-10-23 PROCEDURE — 81003 URINALYSIS AUTO W/O SCOPE: CPT | Performed by: UROLOGY

## 2023-10-23 PROCEDURE — 99214 OFFICE O/P EST MOD 30 MIN: CPT | Performed by: UROLOGY

## 2023-10-23 PROCEDURE — 52000 CYSTOURETHROSCOPY: CPT | Performed by: UROLOGY

## 2023-10-23 PROCEDURE — 1036F TOBACCO NON-USER: CPT | Performed by: UROLOGY

## 2023-10-23 PROCEDURE — G8420 CALC BMI NORM PARAMETERS: HCPCS | Performed by: UROLOGY

## 2023-10-23 PROCEDURE — G8427 DOCREV CUR MEDS BY ELIG CLIN: HCPCS | Performed by: UROLOGY

## 2023-10-23 PROCEDURE — 1123F ACP DISCUSS/DSCN MKR DOCD: CPT | Performed by: UROLOGY

## 2023-10-23 PROCEDURE — 1090F PRES/ABSN URINE INCON ASSESS: CPT | Performed by: UROLOGY

## 2023-10-23 PROCEDURE — G8484 FLU IMMUNIZE NO ADMIN: HCPCS | Performed by: UROLOGY

## 2023-10-23 NOTE — PROGRESS NOTES
Cysto and CT without obvious cause. Discussed UTI prevention measures today including hydration, timed/double voiding, daily cranberry. Urgency / OAB:   Cannot tolerate anti-cholinergics and failed mirabegron. Cysto and CT shows no obvious source. Options are continue current therapy with dietary management vs. Gemtesa samples vs. Botox vs. Interstim. Risks/benefits of each reviewed. Opts for no additional treatment at this time. F/U PRN. Patient had a complete established visit today for urinary urgency that is separately identifiable from procedure performed today for recurrent UTI follow up. Complete documentation of this separate visit is present. I have spent 30 minutes today reviewing previous notes, test results and face to face with the patient as well as documenting. Santiago Moraes M.D.     [facility]  [facility], [address] [place] [address]  Phone: [phone]  Fax: [phone]

## 2023-10-24 ENCOUNTER — HOSPITAL ENCOUNTER (OUTPATIENT)
Dept: MRI IMAGING | Age: 83
Discharge: HOME | End: 2023-10-27
Attending: UROLOGY
Payer: MEDICARE

## 2023-10-24 DIAGNOSIS — M79.89 MASS OF SOFT TISSUE OF UPPER ARM: ICD-10-CM

## 2023-10-24 PROCEDURE — A9579 GAD-BASE MR CONTRAST NOS,1ML: HCPCS | Performed by: UROLOGY

## 2023-10-24 PROCEDURE — 73220 MRI UPPR EXTREMITY W/O&W/DYE: CPT

## 2023-10-24 PROCEDURE — 6360000004 HC RX CONTRAST MEDICATION: Performed by: UROLOGY

## 2023-10-24 RX ADMIN — GADOTERIDOL 13 ML: 279.3 INJECTION, SOLUTION INTRAVENOUS at 09:06

## 2023-11-21 ENCOUNTER — PATIENT MESSAGE (OUTPATIENT)
Dept: INTERNAL MEDICINE CLINIC | Facility: CLINIC | Age: 83
End: 2023-11-21

## 2023-11-30 DIAGNOSIS — E03.9 HYPOTHYROIDISM, UNSPECIFIED TYPE: Primary | ICD-10-CM

## 2023-11-30 NOTE — TELEPHONE ENCOUNTER
Pt says that she has a script for synthroid from another doctor that she no longer sees and wants to know if Romel Pedroza will refill it for her.  Please call her at [phone]    LOV 9/28/2023  NOV 1/11/2024

## 2023-12-01 RX ORDER — LEVOTHYROXINE SODIUM 88 UG/1
88 TABLET ORAL DAILY
Qty: 90 TABLET | Refills: 3 | Status: SHIPPED | OUTPATIENT
Start: 2023-12-01

## 2023-12-01 NOTE — TELEPHONE ENCOUNTER
From: Meghann Sy  To: Dennis Cartagena  Sent: 2023 2:51 PM EST  Subject: Possible causes for bladder and bowel control issues    Camryn Anderson has been doing more research and she has a question concerning her back issues relating to her bladder bowel control problems. Attached is the in formation she has been reading. Camryn Anderson would like to know if the back issues can cause the bladder and bowel problems and if so, is there anything we can do to improve the situation?    Thank you,  Seema Esparza ([phone]) for   Barb Cardenas  3-

## 2023-12-01 NOTE — TELEPHONE ENCOUNTER
I called pt and I spoke to pt's son and he verified pt is taking Levothyroxine 88 mcg QD and he asked for this to be a 90 day supply

## 2023-12-11 ENCOUNTER — TELEPHONE (OUTPATIENT)
Age: 83
End: 2023-12-11

## 2023-12-11 PROCEDURE — 93294 REM INTERROG EVL PM/LDLS PM: CPT | Performed by: INTERNAL MEDICINE

## 2023-12-11 PROCEDURE — 93296 REM INTERROG EVL PM/IDS: CPT | Performed by: INTERNAL MEDICINE

## 2023-12-11 NOTE — TELEPHONE ENCOUNTER
Pt states she is having REYES. States when she walks up 12 steps she is [place] out. \" Denies CP. Pedal edema no worse than usual. Weight is stable at 145-146. States sx for the past month or so. States she becomes \"really tired\" and has to sit down when after going up stairs. States she is able to walk to and from the mailbox without taking a break. States she only gets these sx when going up the steps, but admits she doesn't do that much. Pt worked in with Dr. Marcus Collier on 12/14 at 11:00 in [place] office as pt does not believe she can wait until Dr. Capo Heredia next opening in [place] on 1/4, and cannot go to [facility] or [place] offices. ER precautions given for acute worsening of sx. Verb understanding.

## 2023-12-14 ENCOUNTER — OFFICE VISIT (OUTPATIENT)
Age: 83
End: 2023-12-14
Payer: MEDICARE

## 2023-12-14 VITALS
SYSTOLIC BLOOD PRESSURE: 138 MMHG | HEIGHT: 66 IN | WEIGHT: 142.7 LBS | HEART RATE: 80 BPM | DIASTOLIC BLOOD PRESSURE: 74 MMHG | BODY MASS INDEX: 22.93 KG/M2

## 2023-12-14 DIAGNOSIS — I48.0 PAROXYSMAL ATRIAL FIBRILLATION (HCC): Primary | ICD-10-CM

## 2023-12-14 DIAGNOSIS — I10 PRIMARY HYPERTENSION: ICD-10-CM

## 2023-12-14 DIAGNOSIS — Z95.0 PACEMAKER: ICD-10-CM

## 2023-12-14 PROCEDURE — G8399 PT W/DXA RESULTS DOCUMENT: HCPCS | Performed by: INTERNAL MEDICINE

## 2023-12-14 PROCEDURE — 1123F ACP DISCUSS/DSCN MKR DOCD: CPT | Performed by: INTERNAL MEDICINE

## 2023-12-14 PROCEDURE — 1036F TOBACCO NON-USER: CPT | Performed by: INTERNAL MEDICINE

## 2023-12-14 PROCEDURE — 99213 OFFICE O/P EST LOW 20 MIN: CPT | Performed by: INTERNAL MEDICINE

## 2023-12-14 PROCEDURE — G8484 FLU IMMUNIZE NO ADMIN: HCPCS | Performed by: INTERNAL MEDICINE

## 2023-12-14 PROCEDURE — 3078F DIAST BP <80 MM HG: CPT | Performed by: INTERNAL MEDICINE

## 2023-12-14 PROCEDURE — G8427 DOCREV CUR MEDS BY ELIG CLIN: HCPCS | Performed by: INTERNAL MEDICINE

## 2023-12-14 PROCEDURE — G8420 CALC BMI NORM PARAMETERS: HCPCS | Performed by: INTERNAL MEDICINE

## 2023-12-14 PROCEDURE — 3075F SYST BP GE 130 - 139MM HG: CPT | Performed by: INTERNAL MEDICINE

## 2023-12-14 PROCEDURE — 1090F PRES/ABSN URINE INCON ASSESS: CPT | Performed by: INTERNAL MEDICINE

## 2023-12-14 RX ORDER — DILTIAZEM HYDROCHLORIDE 180 MG/1
180 CAPSULE, EXTENDED RELEASE ORAL DAILY
Qty: 30 CAPSULE | Refills: 5 | Status: SHIPPED | OUTPATIENT
Start: 2023-12-14

## 2023-12-14 NOTE — PROGRESS NOTES
[facility] CARDIOLOGY  [address] [facility], [facility], [address]  PHONE: [phone]        23        NAME:  Tamara Contreras  : 1940  MRN: [id number]       SUBJECTIVE:   Tamara Contreras is a 78 y.o. female seen for a follow up visit regarding the following: The patient has PAF,SSS with pacemaker,and primary hypertension. She is worked in today due to complaints of worsening nocturnal palpitations and fatigue. She reports generally feeling unwell. Chief Complaint   Patient presents with    Shortness of Breath     Fatigue       HPI:    Atrial Fibrillation  Presents for follow-up visit. Symptoms include palpitations, tachycardia and weakness. Symptoms are negative for an AICD problem, bradycardia, chest pain, dizziness, hemodynamic instability, hypertension, hypotension, pacemaker problem, shortness of breath and syncope. Past Medical History, Past Surgical History, Family history, Social History, and Medications were all reviewed with the patient today and updated as necessary.          Current Outpatient Medications:     dilTIAZem (TIAZAC) 180 MG extended release capsule, Take 1 capsule by mouth daily, Disp: 30 capsule, Rfl: 5    levothyroxine (SYNTHROID) 88 MCG tablet, Take 1 tablet by mouth Daily TAKE ONE TABLET BY MOUTH ONE TIME DAILY, Disp: 90 tablet, Rfl: 3    Cholecalciferol (VITAMIN D3) 125 MCG (5000 UT) TABS, Take 1 tablet by mouth every other day, Disp: , Rfl:     Polyethylene Glycol 3350 POWD, Take 17 g by mouth as needed, Disp: , Rfl:     apixaban (ELIQUIS) 5 MG TABS tablet, Take 1 tablet by mouth 2 times daily, Disp: 180 tablet, Rfl: 3    dofetilide (TIKOSYN) 250 MCG capsule, Take 1 capsule by mouth 2 times daily, Disp: 180 capsule, Rfl: 3    fluticasone (FLONASE) 50 MCG/ACT nasal spray, 1 spray by Each Nostril route daily as needed, Disp: , Rfl:     Bacillus Coagulans-Inulin (PROBIOTIC) 1-250 BILLION-MG CAPS, Take 1 tablet by mouth daily, Disp: , Rfl:     aspirin 81

## 2024-01-05 RX ORDER — DOFETILIDE 0.25 MG/1
250 CAPSULE ORAL 2 TIMES DAILY
Qty: 180 CAPSULE | Refills: 3 | Status: CANCELLED | OUTPATIENT
Start: 2024-01-05

## 2024-01-08 ENCOUNTER — NURSE ONLY (OUTPATIENT)
Age: 84
End: 2024-01-08

## 2024-01-08 RX ORDER — DILTIAZEM HYDROCHLORIDE 240 MG/1
240 CAPSULE, COATED, EXTENDED RELEASE ORAL DAILY
Qty: 30 CAPSULE | Refills: 1 | Status: SHIPPED | OUTPATIENT
Start: 2024-01-08

## 2024-01-08 NOTE — PROGRESS NOTES
Pt came into the office stating has been off of Tikosyn since 1/5/24 needing a refill as well as a PA.     Pt's BP was 162/90 HR 80  Waited 5 mins /84    Spoke with Dr. Frost states BP and HR looks good, stop Tikosyn, increase Dilt to 240, monitor HR as well as BP to call back with issues. If doing ok with changes will see back as planned 3/15/24.     Advised pt of Dr. Frost's response was given a verbal understanding.

## 2024-01-08 NOTE — TELEPHONE ENCOUNTER
Pt came b [facility] office and needs refills of her dofetilide 250MG please [facility] to [facility] on [address] there is an auth required for this medication and pt is completely out

## 2024-01-09 RX ORDER — DOFETILIDE 0.25 MG/1
250 CAPSULE ORAL 2 TIMES DAILY
Qty: 180 CAPSULE | Refills: 3 | Status: SHIPPED | OUTPATIENT
Start: 2024-01-09

## 2024-01-09 NOTE — TELEPHONE ENCOUNTER
Son states we had to come in yesterday to get her Dofetilide filled and then we called in the wrong medicine We called in Diltiazem and she is out of Defetilide for 2 weeks per her son. Please call ASAP       [facility]

## 2024-01-10 ENCOUNTER — NURSE ONLY (OUTPATIENT)
Age: 84
End: 2024-01-10

## 2024-01-10 NOTE — PROGRESS NOTES
Patients son came into office asking about patients Tikosyn prescription as patient claims it was not sent in. It appears prescription was sent yesterday at 320pm to [facility]. Patients son called [facility] and they state they are working on filling prescription.

## 2024-01-11 ENCOUNTER — OFFICE VISIT (OUTPATIENT)
Dept: INTERNAL MEDICINE CLINIC | Facility: CLINIC | Age: 84
End: 2024-01-11
Payer: MEDICARE

## 2024-01-11 VITALS
OXYGEN SATURATION: 97 % | HEART RATE: 73 BPM | SYSTOLIC BLOOD PRESSURE: 126 MMHG | BODY MASS INDEX: 22.82 KG/M2 | DIASTOLIC BLOOD PRESSURE: 72 MMHG | HEIGHT: 66 IN | WEIGHT: 142 LBS

## 2024-01-11 DIAGNOSIS — M46.1 SACROILIITIS, NOT ELSEWHERE CLASSIFIED (HCC): ICD-10-CM

## 2024-01-11 DIAGNOSIS — E78.5 HYPERLIPIDEMIA, UNSPECIFIED HYPERLIPIDEMIA TYPE: ICD-10-CM

## 2024-01-11 DIAGNOSIS — N39.46 MIXED STRESS AND URGE URINARY INCONTINENCE: ICD-10-CM

## 2024-01-11 DIAGNOSIS — M54.2 NECK PAIN: Primary | ICD-10-CM

## 2024-01-11 DIAGNOSIS — I48.0 PAROXYSMAL ATRIAL FIBRILLATION (HCC): ICD-10-CM

## 2024-01-11 PROCEDURE — G8484 FLU IMMUNIZE NO ADMIN: HCPCS | Performed by: NURSE PRACTITIONER

## 2024-01-11 PROCEDURE — 1090F PRES/ABSN URINE INCON ASSESS: CPT | Performed by: NURSE PRACTITIONER

## 2024-01-11 PROCEDURE — 1123F ACP DISCUSS/DSCN MKR DOCD: CPT | Performed by: NURSE PRACTITIONER

## 2024-01-11 PROCEDURE — 0509F URINE INCON PLAN DOCD: CPT | Performed by: NURSE PRACTITIONER

## 2024-01-11 PROCEDURE — G8399 PT W/DXA RESULTS DOCUMENT: HCPCS | Performed by: NURSE PRACTITIONER

## 2024-01-11 PROCEDURE — 3074F SYST BP LT 130 MM HG: CPT | Performed by: NURSE PRACTITIONER

## 2024-01-11 PROCEDURE — 3078F DIAST BP <80 MM HG: CPT | Performed by: NURSE PRACTITIONER

## 2024-01-11 PROCEDURE — 1036F TOBACCO NON-USER: CPT | Performed by: NURSE PRACTITIONER

## 2024-01-11 PROCEDURE — G8420 CALC BMI NORM PARAMETERS: HCPCS | Performed by: NURSE PRACTITIONER

## 2024-01-11 PROCEDURE — G8427 DOCREV CUR MEDS BY ELIG CLIN: HCPCS | Performed by: NURSE PRACTITIONER

## 2024-01-11 PROCEDURE — 99213 OFFICE O/P EST LOW 20 MIN: CPT | Performed by: NURSE PRACTITIONER

## 2024-01-11 ASSESSMENT — PATIENT HEALTH QUESTIONNAIRE - PHQ9
SUM OF ALL RESPONSES TO PHQ QUESTIONS 1-9: 0
SUM OF ALL RESPONSES TO PHQ QUESTIONS 1-9: 0
SUM OF ALL RESPONSES TO PHQ9 QUESTIONS 1 & 2: 0
2. FEELING DOWN, DEPRESSED OR HOPELESS: 0
SUM OF ALL RESPONSES TO PHQ QUESTIONS 1-9: 0
1. LITTLE INTEREST OR PLEASURE IN DOING THINGS: 0
SUM OF ALL RESPONSES TO PHQ QUESTIONS 1-9: 0

## 2024-01-12 RX ORDER — ATORVASTATIN CALCIUM 40 MG/1
40 TABLET, FILM COATED ORAL NIGHTLY
Qty: 90 TABLET | Refills: 3 | Status: SHIPPED | OUTPATIENT
Start: 2024-01-12

## 2024-01-12 ASSESSMENT — ENCOUNTER SYMPTOMS: SHORTNESS OF BREATH: 0

## 2024-01-13 NOTE — PROGRESS NOTES
PROGRESS NOTE      Chief Complaint   Patient presents with    Hypothyroidism     Pt here for 4 month f/u        HPI    Left neck pain: Occipital area.  Relatively new, present for a few weeks.  No injury or specific precipitating event.  Previously experiencing similar pain on the right.  Some left arm paresthesias to fingertips.  No left arm pain.    Urinary incontinence: Ongoing.  Evaluated by urology.  Offered Botox.  Not candidate for surgery due to age.  Myrbetriq not tolerated.    Anxiety: Frustrated with multiple chronic medical complaints without identifiable diagnoses.  Considering return to [place] for thorough evaluation.  Has seen multiple medical providers here in town.        Past Medical History, Past Surgical History, Family history, Social History, and Medications were all reviewed and updated as necessary.     Current Outpatient Medications   Medication Sig Dispense Refill    atorvastatin (LIPITOR) 40 MG tablet Take 1 tablet by mouth at bedtime 90 tablet 3    dofetilide (TIKOSYN) 250 MCG capsule Take 1 capsule by mouth 2 times daily 180 capsule 3    dilTIAZem (CARDIZEM CD) 240 MG extended release capsule Take 1 capsule by mouth daily 30 capsule 1    apixaban (ELIQUIS) 5 MG TABS tablet Take 1 tablet by mouth 2 times daily 180 tablet 3    levothyroxine (SYNTHROID) 88 MCG tablet Take 1 tablet by mouth Daily TAKE ONE TABLET BY MOUTH ONE TIME DAILY (Patient taking differently: Take 1 tablet by mouth Daily) 90 tablet 3    Cholecalciferol (VITAMIN D3) 125 MCG (5000 UT) TABS Take 1 tablet by mouth every other day      Polyethylene Glycol 3350 POWD Take 17 g by mouth daily as needed      fluticasone (FLONASE) 50 MCG/ACT nasal spray 1 spray by Each Nostril route daily as needed      Bacillus Coagulans-Inulin (PROBIOTIC) 1-250 BILLION-MG CAPS Take 1 tablet by mouth daily      aspirin 81 MG EC tablet Take 1 tablet by mouth daily      cyanocobalamin 1000 MCG tablet Take 1 tablet by mouth daily       No current

## 2024-02-26 ENCOUNTER — TELEPHONE (OUTPATIENT)
Age: 84
End: 2024-02-26

## 2024-02-26 RX ORDER — AMLODIPINE BESYLATE 5 MG/1
5 TABLET ORAL DAILY
COMMUNITY
End: 2024-03-01 | Stop reason: SDUPTHER

## 2024-02-26 RX ORDER — ASCORBIC ACID 125 MG
500 TABLET,CHEWABLE ORAL DAILY
COMMUNITY

## 2024-02-26 NOTE — TELEPHONE ENCOUNTER
Feels she is going down hill fast.   Increased SOB when walking a few steps and with any exertion.   Much difficulty walking in home because of increased SOB and weakness.   Very bad swelling in both feet and legs to knees.   Skin on ankles is very dry and deep red, but no pain, increased heat, or open areas on legs.   No weight gain.  Unable to sleep at night. Increased nocturia keeps her awake.   Occasional slight burning on urination, but is afebrile with no dark, cloudy, or foul smelling urine.   Has not checked BP or HR.   Taking amlodipine 5 mg qd, diltiazem  mg qd, Tikosyn 250 mcg BID, Eliquis 5 mg BID, ASA 81 mg qd, and Mag-ox 500 mg qd.   Next scheduled appointment with Dr. Frost is 3/20/24, rescheduled by office from 3/15/24,     Patient asks for appointment with Dr. Frost, today. She states she can go to  for appointment.

## 2024-02-26 NOTE — TELEPHONE ENCOUNTER
ok.Ask to check BP and HR.May want to consider stopping Norvasc since she is taking 2 calcium channel blockers ( Norvasc & Cardizem ).Keep scheduled ap't.  Received: Today  Trav Frost MD Keener, Lynn F, RN  Caller: Unspecified (Today,  9:16 AM)

## 2024-02-26 NOTE — TELEPHONE ENCOUNTER
Pt feels like she is going down hill fast. Hard time breathing from time to time.  Legs are giving out. Fluid, yesterday legs gave up. 2 groups of 6 or 7 stairs, can cannot do it. Has a pacemaker.    Wants to talk to a nurse and see if she can see a dr today. Gets on scale every morning. No weight gain.

## 2024-02-29 ENCOUNTER — OFFICE VISIT (OUTPATIENT)
Age: 84
End: 2024-02-29
Payer: MEDICARE

## 2024-02-29 ENCOUNTER — TELEPHONE (OUTPATIENT)
Age: 84
End: 2024-02-29

## 2024-02-29 VITALS
HEIGHT: 66 IN | BODY MASS INDEX: 22.6 KG/M2 | SYSTOLIC BLOOD PRESSURE: 166 MMHG | DIASTOLIC BLOOD PRESSURE: 82 MMHG | HEART RATE: 86 BPM | WEIGHT: 140.6 LBS

## 2024-02-29 DIAGNOSIS — I10 PRIMARY HYPERTENSION: ICD-10-CM

## 2024-02-29 DIAGNOSIS — I48.0 PAROXYSMAL ATRIAL FIBRILLATION (HCC): Primary | ICD-10-CM

## 2024-02-29 DIAGNOSIS — Z95.0 PACEMAKER: ICD-10-CM

## 2024-02-29 DIAGNOSIS — I10 HTN (HYPERTENSION): Primary | ICD-10-CM

## 2024-02-29 DIAGNOSIS — M81.0 AGE RELATED OSTEOPOROSIS: ICD-10-CM

## 2024-02-29 PROCEDURE — 1090F PRES/ABSN URINE INCON ASSESS: CPT | Performed by: INTERNAL MEDICINE

## 2024-02-29 PROCEDURE — G8484 FLU IMMUNIZE NO ADMIN: HCPCS | Performed by: INTERNAL MEDICINE

## 2024-02-29 PROCEDURE — 1123F ACP DISCUSS/DSCN MKR DOCD: CPT | Performed by: INTERNAL MEDICINE

## 2024-02-29 PROCEDURE — G8420 CALC BMI NORM PARAMETERS: HCPCS | Performed by: INTERNAL MEDICINE

## 2024-02-29 PROCEDURE — G8427 DOCREV CUR MEDS BY ELIG CLIN: HCPCS | Performed by: INTERNAL MEDICINE

## 2024-02-29 PROCEDURE — 1036F TOBACCO NON-USER: CPT | Performed by: INTERNAL MEDICINE

## 2024-02-29 PROCEDURE — 99214 OFFICE O/P EST MOD 30 MIN: CPT | Performed by: INTERNAL MEDICINE

## 2024-02-29 ASSESSMENT — ENCOUNTER SYMPTOMS
BOWEL INCONTINENCE: 0
ORTHOPNEA: 0
SHORTNESS OF BREATH: 0
HEMATEMESIS: 0
HOARSE VOICE: 0
SPUTUM PRODUCTION: 0
BLURRED VISION: 0
COLOR CHANGE: 0
HEMATOCHEZIA: 0
WHEEZING: 0
ABDOMINAL PAIN: 0
DIARRHEA: 0

## 2024-02-29 NOTE — TELEPHONE ENCOUNTER
Dr Frost recommended she see Dr Ja Brown and they called and are not taking new patients Can Dr Frost send a referral to the so maybe they will see her Please call

## 2024-02-29 NOTE — PROGRESS NOTES
congestion, hearing loss, hoarse voice and nosebleeds.    Eyes:  Negative for blurred vision.   Cardiovascular:  Positive for leg swelling. Negative for chest pain, claudication, cyanosis, dyspnea on exertion, irregular heartbeat, near-syncope, orthopnea, palpitations, paroxysmal nocturnal dyspnea and syncope.   Respiratory:  Negative for shortness of breath, sputum production and wheezing.    Endocrine: Negative for polydipsia, polyphagia and polyuria.   Skin:  Negative for color change.   Musculoskeletal:  Positive for arthritis and falls.   Gastrointestinal:  Negative for abdominal pain, bowel incontinence, diarrhea, hematemesis and hematochezia.   Genitourinary:  Negative for dysuria, frequency and hematuria.   Neurological:  Negative for dizziness, focal weakness, light-headedness, loss of balance, numbness, sensory change and weakness.   Psychiatric/Behavioral:  Negative for altered mental status and memory loss.            PHYSICAL EXAM:   BP (!) 166/82   Pulse 86   Ht 1.676 m (5' 6\")   Wt 63.8 kg (140 lb 9.6 oz)   BMI 22.69 kg/m²      Physical Exam  Constitutional:       Appearance: Normal appearance.   HENT:      Head: Normocephalic and atraumatic.      Nose: Nose normal.   Eyes:      Extraocular Movements: Extraocular movements intact.      Pupils: Pupils are equal, round, and reactive to light.   Neck:      Vascular: No carotid bruit.   Cardiovascular:      Rate and Rhythm: Regular rhythm.      Pulses: Normal pulses.      Heart sounds: No murmur heard.  Pulmonary:      Effort: Pulmonary effort is normal.      Breath sounds: Normal breath sounds.   Abdominal:      General: Abdomen is flat. Bowel sounds are normal.      Palpations: Abdomen is soft.   Musculoskeletal:         General: Swelling (trace bilateral pedal edema) present. Normal range of motion.      Cervical back: Normal range of motion and neck supple.   Skin:     General: Skin is warm and dry.   Neurological:      General: No focal deficit

## 2024-03-01 RX ORDER — AMLODIPINE BESYLATE 5 MG/1
5 TABLET ORAL DAILY
Qty: 90 TABLET | Refills: 3 | Status: SHIPPED | OUTPATIENT
Start: 2024-03-01

## 2024-03-01 NOTE — TELEPHONE ENCOUNTER
[facility]   Was in yesterday and suppose to get Amlodipine refill and its not at the [facility]   Please call

## 2024-03-20 PROCEDURE — 93294 REM INTERROG EVL PM/LDLS PM: CPT | Performed by: INTERNAL MEDICINE

## 2024-03-20 PROCEDURE — 93296 REM INTERROG EVL PM/IDS: CPT | Performed by: INTERNAL MEDICINE

## 2024-03-27 ENCOUNTER — OFFICE VISIT (OUTPATIENT)
Dept: PRIMARY CARE CLINIC | Facility: CLINIC | Age: 84
End: 2024-03-27
Payer: MEDICARE

## 2024-03-27 VITALS
HEART RATE: 74 BPM | WEIGHT: 141 LBS | SYSTOLIC BLOOD PRESSURE: 148 MMHG | TEMPERATURE: 97.7 F | OXYGEN SATURATION: 98 % | HEIGHT: 66 IN | BODY MASS INDEX: 22.66 KG/M2 | DIASTOLIC BLOOD PRESSURE: 80 MMHG

## 2024-03-27 DIAGNOSIS — Z95.0 PACEMAKER: ICD-10-CM

## 2024-03-27 DIAGNOSIS — Z53.20 COLONOSCOPY REFUSED: ICD-10-CM

## 2024-03-27 DIAGNOSIS — I10 PRIMARY HYPERTENSION: Primary | ICD-10-CM

## 2024-03-27 DIAGNOSIS — G47.33 OSA (OBSTRUCTIVE SLEEP APNEA): ICD-10-CM

## 2024-03-27 DIAGNOSIS — G89.29 CHRONIC BILATERAL LOW BACK PAIN WITH BILATERAL SCIATICA: ICD-10-CM

## 2024-03-27 DIAGNOSIS — K58.1 IRRITABLE BOWEL SYNDROME WITH CONSTIPATION: ICD-10-CM

## 2024-03-27 DIAGNOSIS — M54.41 CHRONIC BILATERAL LOW BACK PAIN WITH BILATERAL SCIATICA: ICD-10-CM

## 2024-03-27 DIAGNOSIS — Z53.20 MAMMOGRAM DECLINED: ICD-10-CM

## 2024-03-27 DIAGNOSIS — Z86.010 HISTORY OF COLON POLYPS: ICD-10-CM

## 2024-03-27 DIAGNOSIS — K59.09 CHRONIC CONSTIPATION: ICD-10-CM

## 2024-03-27 DIAGNOSIS — E03.9 HYPOTHYROIDISM, UNSPECIFIED TYPE: ICD-10-CM

## 2024-03-27 DIAGNOSIS — M50.30 DDD (DEGENERATIVE DISC DISEASE), CERVICAL: ICD-10-CM

## 2024-03-27 DIAGNOSIS — Z74.09 IMPAIRED MOBILITY: ICD-10-CM

## 2024-03-27 DIAGNOSIS — R73.03 PREDIABETES: ICD-10-CM

## 2024-03-27 DIAGNOSIS — M46.1 SACROILIITIS, NOT ELSEWHERE CLASSIFIED (HCC): ICD-10-CM

## 2024-03-27 DIAGNOSIS — I48.0 PAROXYSMAL ATRIAL FIBRILLATION (HCC): ICD-10-CM

## 2024-03-27 DIAGNOSIS — Z28.20 VACCINE REFUSED BY PATIENT: ICD-10-CM

## 2024-03-27 DIAGNOSIS — Z86.73 HISTORY OF STROKE: ICD-10-CM

## 2024-03-27 DIAGNOSIS — J30.9 ALLERGIC RHINITIS, UNSPECIFIED SEASONALITY, UNSPECIFIED TRIGGER: ICD-10-CM

## 2024-03-27 DIAGNOSIS — M81.0 AGE RELATED OSTEOPOROSIS, UNSPECIFIED PATHOLOGICAL FRACTURE PRESENCE: ICD-10-CM

## 2024-03-27 DIAGNOSIS — M17.0 LOCALIZED OSTEOARTHRITIS OF BOTH KNEES: ICD-10-CM

## 2024-03-27 DIAGNOSIS — R60.0 PEDAL EDEMA: ICD-10-CM

## 2024-03-27 DIAGNOSIS — E78.5 HYPERLIPIDEMIA, UNSPECIFIED HYPERLIPIDEMIA TYPE: ICD-10-CM

## 2024-03-27 DIAGNOSIS — M54.42 CHRONIC BILATERAL LOW BACK PAIN WITH BILATERAL SCIATICA: ICD-10-CM

## 2024-03-27 DIAGNOSIS — M51.37 DDD (DEGENERATIVE DISC DISEASE), LUMBOSACRAL: ICD-10-CM

## 2024-03-27 DIAGNOSIS — E66.9 OBESITY, UNSPECIFIED OBESITY SEVERITY, UNSPECIFIED OBESITY TYPE: ICD-10-CM

## 2024-03-27 PROBLEM — M51.379 DDD (DEGENERATIVE DISC DISEASE), LUMBOSACRAL: Status: ACTIVE | Noted: 2024-03-27

## 2024-03-27 PROCEDURE — 99204 OFFICE O/P NEW MOD 45 MIN: CPT | Performed by: FAMILY MEDICINE

## 2024-03-27 PROCEDURE — G8420 CALC BMI NORM PARAMETERS: HCPCS | Performed by: FAMILY MEDICINE

## 2024-03-27 PROCEDURE — 1090F PRES/ABSN URINE INCON ASSESS: CPT | Performed by: FAMILY MEDICINE

## 2024-03-27 PROCEDURE — 1036F TOBACCO NON-USER: CPT | Performed by: FAMILY MEDICINE

## 2024-03-27 PROCEDURE — G8427 DOCREV CUR MEDS BY ELIG CLIN: HCPCS | Performed by: FAMILY MEDICINE

## 2024-03-27 PROCEDURE — 1123F ACP DISCUSS/DSCN MKR DOCD: CPT | Performed by: FAMILY MEDICINE

## 2024-03-27 PROCEDURE — G8484 FLU IMMUNIZE NO ADMIN: HCPCS | Performed by: FAMILY MEDICINE

## 2024-03-27 RX ORDER — LEVOTHYROXINE SODIUM 88 UG/1
88 TABLET ORAL DAILY
Qty: 90 TABLET | Refills: 1 | Status: SHIPPED | OUTPATIENT
Start: 2024-03-27

## 2024-03-27 SDOH — HEALTH STABILITY: PHYSICAL HEALTH: ON AVERAGE, HOW MANY DAYS PER WEEK DO YOU ENGAGE IN MODERATE TO STRENUOUS EXERCISE (LIKE A BRISK WALK)?: 3 DAYS

## 2024-03-27 SDOH — HEALTH STABILITY: PHYSICAL HEALTH: ON AVERAGE, HOW MANY MINUTES DO YOU ENGAGE IN EXERCISE AT THIS LEVEL?: 10 MIN

## 2024-03-27 ASSESSMENT — ENCOUNTER SYMPTOMS
RESPIRATORY NEGATIVE: 1
ALLERGIC/IMMUNOLOGIC NEGATIVE: 1
GASTROINTESTINAL NEGATIVE: 1
EYES NEGATIVE: 1
BACK PAIN: 1

## 2024-03-28 NOTE — PROGRESS NOTES
[facility]  [address]  [place] [address]  Office : [phone]  Fax : [phone]      Subjective:  The patient is a 84 y.o. female  who presents for f/u on multiple chronic medical conditions-good compliance with medications-pt here to get routeine labs and need refill on meds.no cardiopulmonary symptoms  Hypertension--Pt BP been controlled with meds  Prediabetes -pts blood sugar stable on med  Hyperlipidemia--pts on low carb diet and low fat diet -stable on med  Gerd -stable on diet /med  Thyroid problem- stable  Chronic pain-DDD LS/C SPINE + OA knees and SI joints--ortho referral--impaired mobility  Hx of stroke--in blood thinnes--on and off  mild cognitive deficieny--She has some residual weakness in her right lower extremity from her 2020 stroke. -resolved in 2023--no residual deficit in 2024  Cerebrovascular disease-MICROANGIOPATHY-- seen NEUROLOGIST-was referred to cardiologist for watchman device  Eye exam 2023  Hearung good  Osteoporosis-srefused treatment-on vit d  Recovering well from recent UTI    Refused mammogram/colonoscopy        Patient Active Problem List   Diagnosis    Age related osteoporosis    Hyponatremia    HTN (hypertension)    TIA (transient ischemic attack)    Diverticulosis of large intestine without hemorrhage    Irritable bowel syndrome with constipation    Paroxysmal atrial fibrillation (HCC)    History of subdural hematoma    History of colon polyps    History of CVA (cerebrovascular accident)    Hypothyroidism    Memory loss    Vitamin D deficiency    Pacemaker    Hyperlipidemia    Chronic constipation    REYES (dyspnea on exertion)    JACKI (obstructive sleep apnea)    Nocturnal hypoxemia    Sacroiliitis, not elsewhere classified (HCC)    History of stroke    DDD (degenerative disc disease), cervical    Prediabetes    DDD (degenerative disc disease), lumbosacral    Impaired mobility    Obesity, unspecified obesity severity, unspecified obesity type

## 2024-04-08 ENCOUNTER — OFFICE VISIT (OUTPATIENT)
Dept: ORTHOPEDIC SURGERY | Age: 84
End: 2024-04-08
Payer: MEDICARE

## 2024-04-08 DIAGNOSIS — M54.50 LUMBAR BACK PAIN: ICD-10-CM

## 2024-04-08 DIAGNOSIS — M47.816 SPONDYLOSIS OF LUMBAR REGION WITHOUT MYELOPATHY OR RADICULOPATHY: Primary | ICD-10-CM

## 2024-04-08 DIAGNOSIS — M54.16 LUMBAR RADICULOPATHY: ICD-10-CM

## 2024-04-08 DIAGNOSIS — M79.10 MYALGIA, UNSPECIFIED SITE: ICD-10-CM

## 2024-04-08 PROCEDURE — 99214 OFFICE O/P EST MOD 30 MIN: CPT | Performed by: PHYSICAL MEDICINE & REHABILITATION

## 2024-04-08 PROCEDURE — 1036F TOBACCO NON-USER: CPT | Performed by: PHYSICAL MEDICINE & REHABILITATION

## 2024-04-08 PROCEDURE — G8420 CALC BMI NORM PARAMETERS: HCPCS | Performed by: PHYSICAL MEDICINE & REHABILITATION

## 2024-04-08 PROCEDURE — 1123F ACP DISCUSS/DSCN MKR DOCD: CPT | Performed by: PHYSICAL MEDICINE & REHABILITATION

## 2024-04-08 PROCEDURE — G8428 CUR MEDS NOT DOCUMENT: HCPCS | Performed by: PHYSICAL MEDICINE & REHABILITATION

## 2024-04-08 PROCEDURE — 1090F PRES/ABSN URINE INCON ASSESS: CPT | Performed by: PHYSICAL MEDICINE & REHABILITATION

## 2024-04-08 NOTE — PROGRESS NOTES
Date:  04/08/24     HPI:  I am seeing Filomena BOLTON Narciso in evalution/folowup.  I saw her originally 3 years ago.  Full details in that note but has had problems after being released from the hospital after a cerebrovascular event.  Has been undergoing physical therapy and noticed pain in the lower lumbar paraspinal areas.  Unilateral or bilateral.  History was little bit vague but there were no radiating symptoms and she complained of no neurologic issues in lower extremities.  Plain lumbosacral spine films reveal degenerative changes.  She had been taking a Salonpas patch and possibly diclofenac patches with some benefit.  She has to avoid anti-inflammatory medication because she was on aspirin and Eliquis, the latter for atrial fibrillation.  Discussed situation at length and at that time she did not really feel anything further need to be done, was comfortable with how she was doing.  Options would include physical therapy or spine injections, either Non-fluoroscopically guided or fluoroscopically guided.    Have not seen her in several years but she is complaining of some pain in the lower lumbar paraspinal areas, similar to before.  Worse with sitting but with questionable relieving factors.  No radiating symptoms and no professed neurologic issues in lower extremities, aside from imbalance.  She uses a cane for this.  She has had no recent physical therapy or spine exercises.  Really does not take a lot of pain medications for this, again has to avoid anti-inflammatory medications.  She has been taking patches, possibly Aspercreme or something similar that.  These seem to help.    She also complains about \"water running\" sensation down the lower extremities, more medially.  This is more of a nuisance and not associated with other issues.  It is hard to tell exactly how often this is happening or for how long.    I believe since have seen her she has had a LALO and TKA.  Seemingly has done pretty well with

## 2024-04-18 ENCOUNTER — NURSE ONLY (OUTPATIENT)
Age: 84
End: 2024-04-18

## 2024-04-18 ENCOUNTER — OFFICE VISIT (OUTPATIENT)
Age: 84
End: 2024-04-18
Payer: MEDICARE

## 2024-04-18 VITALS
BODY MASS INDEX: 22.34 KG/M2 | DIASTOLIC BLOOD PRESSURE: 74 MMHG | WEIGHT: 139 LBS | HEART RATE: 88 BPM | SYSTOLIC BLOOD PRESSURE: 112 MMHG | HEIGHT: 66 IN

## 2024-04-18 DIAGNOSIS — I10 PRIMARY HYPERTENSION: ICD-10-CM

## 2024-04-18 DIAGNOSIS — I49.5 SSS (SICK SINUS SYNDROME) (HCC): ICD-10-CM

## 2024-04-18 DIAGNOSIS — I48.0 PAROXYSMAL ATRIAL FIBRILLATION (HCC): Primary | ICD-10-CM

## 2024-04-18 DIAGNOSIS — Z95.0 PACEMAKER: ICD-10-CM

## 2024-04-18 PROCEDURE — 1090F PRES/ABSN URINE INCON ASSESS: CPT | Performed by: INTERNAL MEDICINE

## 2024-04-18 PROCEDURE — 1123F ACP DISCUSS/DSCN MKR DOCD: CPT | Performed by: INTERNAL MEDICINE

## 2024-04-18 PROCEDURE — 1036F TOBACCO NON-USER: CPT | Performed by: INTERNAL MEDICINE

## 2024-04-18 PROCEDURE — G8427 DOCREV CUR MEDS BY ELIG CLIN: HCPCS | Performed by: INTERNAL MEDICINE

## 2024-04-18 PROCEDURE — 99213 OFFICE O/P EST LOW 20 MIN: CPT | Performed by: INTERNAL MEDICINE

## 2024-04-18 PROCEDURE — G8420 CALC BMI NORM PARAMETERS: HCPCS | Performed by: INTERNAL MEDICINE

## 2024-04-18 RX ORDER — METOPROLOL SUCCINATE 50 MG/1
50 TABLET, EXTENDED RELEASE ORAL DAILY
Qty: 30 TABLET | Refills: 5 | Status: SHIPPED | OUTPATIENT
Start: 2024-04-18

## 2024-04-18 ASSESSMENT — ENCOUNTER SYMPTOMS
BOWEL INCONTINENCE: 0
BLURRED VISION: 0
COLOR CHANGE: 0
SHORTNESS OF BREATH: 0
ORTHOPNEA: 0
HEMATOCHEZIA: 0
DIARRHEA: 0
HOARSE VOICE: 0
NAUSEA: 1
SPUTUM PRODUCTION: 0
WHEEZING: 0
ABDOMINAL PAIN: 0
HEMATEMESIS: 0

## 2024-04-18 NOTE — PROGRESS NOTES
[facility] CARDIOLOGY  [address] [place] [address], [address]  [place] [address]  PHONE: [phone]        24        NAME:  Filomena Stuart  : 1940  MRN: [id number]       SUBJECTIVE:   Filomena Stuart is a 84 y.o. female seen for a follow up visit regarding the following: PAF with PPM,and primary hypertension.Recently,she was seen for complaints of fatigue REYES,peripheral edema,and generally not feeling well.  Follow up echo revealed noraml LV EF,mild MR & TR,and RVSP=45.She returns for follow up and pacer interrogation.I discussed echo results with the patient.Pacer interrogation reveals increased AF burden with persistent AF with RVR over the past 3 days despite Tikosyn.She remains on Eliquis.She continues to report feeing bad.She is unaware of her occasional elevated HR.She denies chest pain or excessive dyspnea.  Chief Complaint   Patient presents with    Atrial Fibrillation    Hypertension       HPI:    Atrial Fibrillation  Presents for follow-up visit. Symptoms include tachycardia. Symptoms are negative for an AICD problem, bradycardia, chest pain, dizziness, hemodynamic instability, hypertension, hypotension, pacemaker problem, palpitations, shortness of breath, syncope and weakness. The symptoms have been worsening.       Past Medical History, Past Surgical History, Family history, Social History, and Medications were all reviewed with the patient today and updated as necessary.         Current Outpatient Medications:     metoprolol succinate (TOPROL XL) 50 MG extended release tablet, Take 1 tablet by mouth daily, Disp: 30 tablet, Rfl: 5    levothyroxine (SYNTHROID) 88 MCG tablet, Take 1 tablet by mouth Daily TAKE ONE TABLET BY MOUTH ONE TIME DAILY, Disp: 90 tablet, Rfl: 1    atorvastatin (LIPITOR) 40 MG tablet, Take 1 tablet by mouth at bedtime, Disp: 90 tablet, Rfl: 3    dofetilide (TIKOSYN) 250 MCG capsule, Take 1 capsule by mouth 2 times daily, Disp: 180 capsule, Rfl: 3    apixaban

## 2024-05-09 ENCOUNTER — NURSE ONLY (OUTPATIENT)
Age: 84
End: 2024-05-09

## 2024-05-09 ENCOUNTER — INITIAL CONSULT (OUTPATIENT)
Age: 84
End: 2024-05-09
Payer: MEDICARE

## 2024-05-09 VITALS
SYSTOLIC BLOOD PRESSURE: 150 MMHG | BODY MASS INDEX: 22.35 KG/M2 | HEIGHT: 66 IN | DIASTOLIC BLOOD PRESSURE: 88 MMHG | HEART RATE: 81 BPM | WEIGHT: 139.1 LBS

## 2024-05-09 DIAGNOSIS — I48.0 PAROXYSMAL ATRIAL FIBRILLATION (HCC): Primary | ICD-10-CM

## 2024-05-09 DIAGNOSIS — I10 PRIMARY HYPERTENSION: ICD-10-CM

## 2024-05-09 DIAGNOSIS — Z95.0 PACEMAKER: ICD-10-CM

## 2024-05-09 PROCEDURE — G8420 CALC BMI NORM PARAMETERS: HCPCS | Performed by: INTERNAL MEDICINE

## 2024-05-09 PROCEDURE — 99204 OFFICE O/P NEW MOD 45 MIN: CPT | Performed by: INTERNAL MEDICINE

## 2024-05-09 PROCEDURE — 93000 ELECTROCARDIOGRAM COMPLETE: CPT | Performed by: INTERNAL MEDICINE

## 2024-05-09 PROCEDURE — G8427 DOCREV CUR MEDS BY ELIG CLIN: HCPCS | Performed by: INTERNAL MEDICINE

## 2024-05-09 PROCEDURE — 1123F ACP DISCUSS/DSCN MKR DOCD: CPT | Performed by: INTERNAL MEDICINE

## 2024-05-09 PROCEDURE — 1036F TOBACCO NON-USER: CPT | Performed by: INTERNAL MEDICINE

## 2024-05-09 PROCEDURE — 1090F PRES/ABSN URINE INCON ASSESS: CPT | Performed by: INTERNAL MEDICINE

## 2024-05-09 NOTE — PROGRESS NOTES
[facility] CARDIOLOGY, [address], [address]  [place] [address]  PHONE: [phone]  Filomena Stuart  1940    Chief Complant:  No chief complaint on file.     Consultation is requested by [unfilled] for evaluation of No chief complaint on file.    Reason for Consultation: afib    History:  Filomena Stuart is a very pleasant 84 y.o. female with a past medical and cardiac history significant for HTN, pAF, TIA, CVA, PPM and presents today for consultation for afib. She has a multitude of complaints, but mainly just says she feels \"sick\". Difficult historian, unable to really expand on what she means. Not having cardiac or arrhythmia specific symptoms.     Cardiac PMH: (Old records have been reviewed and summarized below)  TTE (4/16/24): EF 55-60%, mod LAE    Past Medical History, Past Surgical History, Family history, Social History, and Medications were all reviewed with the patient today and updated as necessary.     Current Outpatient Medications   Medication Sig Dispense Refill    metoprolol succinate (TOPROL XL) 50 MG extended release tablet Take 1 tablet by mouth daily 30 tablet 5    levothyroxine (SYNTHROID) 88 MCG tablet Take 1 tablet by mouth Daily TAKE ONE TABLET BY MOUTH ONE TIME DAILY 90 tablet 1    atorvastatin (LIPITOR) 40 MG tablet Take 1 tablet by mouth at bedtime 90 tablet 3    dofetilide (TIKOSYN) 250 MCG capsule Take 1 capsule by mouth 2 times daily 180 capsule 3    apixaban (ELIQUIS) 5 MG TABS tablet Take 1 tablet by mouth 2 times daily 180 tablet 3    Cholecalciferol (VITAMIN D3) 125 MCG (5000 UT) TABS Take 1 tablet by mouth every other day      fluticasone (FLONASE) 50 MCG/ACT nasal spray 1 spray by Each Nostril route daily as needed      Bacillus Coagulans-Inulin (PROBIOTIC) 1-250 BILLION-MG CAPS Take 1 tablet by mouth daily      aspirin 81 MG EC tablet Take 1 tablet by mouth daily      cyanocobalamin 1000 MCG tablet Take 1 tablet by mouth daily       No current

## 2024-06-06 ENCOUNTER — TELEPHONE (OUTPATIENT)
Dept: UROLOGY | Age: 84
End: 2024-06-06

## 2024-06-06 ENCOUNTER — OFFICE VISIT (OUTPATIENT)
Dept: UROLOGY | Age: 84
End: 2024-06-06
Payer: MEDICARE

## 2024-06-06 DIAGNOSIS — R39.15 URINARY URGENCY: ICD-10-CM

## 2024-06-06 DIAGNOSIS — N32.81 OAB (OVERACTIVE BLADDER): Primary | ICD-10-CM

## 2024-06-06 DIAGNOSIS — N39.0 RECURRENT UTI: ICD-10-CM

## 2024-06-06 LAB
BILIRUBIN, URINE, POC: NEGATIVE
BLOOD URINE, POC: NEGATIVE
GLUCOSE URINE, POC: NEGATIVE
KETONES, URINE, POC: NORMAL
LEUKOCYTE ESTERASE, URINE, POC: NORMAL
NITRITE, URINE, POC: POSITIVE
PH, URINE, POC: 6 (ref 4.6–8)
PROTEIN,URINE, POC: NORMAL
SPECIFIC GRAVITY, URINE, POC: 1.01 (ref 1–1.03)
URINALYSIS CLARITY, POC: NORMAL
URINALYSIS COLOR, POC: NORMAL
UROBILINOGEN, POC: NORMAL

## 2024-06-06 PROCEDURE — 99214 OFFICE O/P EST MOD 30 MIN: CPT | Performed by: NURSE PRACTITIONER

## 2024-06-06 PROCEDURE — G8420 CALC BMI NORM PARAMETERS: HCPCS | Performed by: NURSE PRACTITIONER

## 2024-06-06 PROCEDURE — 1090F PRES/ABSN URINE INCON ASSESS: CPT | Performed by: NURSE PRACTITIONER

## 2024-06-06 PROCEDURE — 81003 URINALYSIS AUTO W/O SCOPE: CPT | Performed by: NURSE PRACTITIONER

## 2024-06-06 PROCEDURE — 1036F TOBACCO NON-USER: CPT | Performed by: NURSE PRACTITIONER

## 2024-06-06 PROCEDURE — 1123F ACP DISCUSS/DSCN MKR DOCD: CPT | Performed by: NURSE PRACTITIONER

## 2024-06-06 PROCEDURE — G8427 DOCREV CUR MEDS BY ELIG CLIN: HCPCS | Performed by: NURSE PRACTITIONER

## 2024-06-06 PROCEDURE — 51702 INSERT TEMP BLADDER CATH: CPT | Performed by: NURSE PRACTITIONER

## 2024-06-06 ASSESSMENT — ENCOUNTER SYMPTOMS: BACK PAIN: 0

## 2024-06-06 NOTE — PROGRESS NOTES
Highest education level: Not on file   Occupational History    Not on file   Tobacco Use    Smoking status: Never     Passive exposure: Never    Smokeless tobacco: Never   Vaping Use    Vaping Use: Never used   Substance and Sexual Activity    Alcohol use: No    Drug use: No    Sexual activity: Not Currently   Other Topics Concern    Not on file   Social History Narrative    Not on file     Social Determinants of Health     Financial Resource Strain: Low Risk  (7/18/2023)    Overall Financial Resource Strain (CARDIA)     Difficulty of Paying Living Expenses: Not hard at all   Food Insecurity: Not on file (7/18/2023)   Transportation Needs: Unknown (7/18/2023)    PRAPARE - Transportation     Lack of Transportation (Medical): Not on file     Lack of Transportation (Non-Medical): No   Physical Activity: Insufficiently Active (3/27/2024)    Exercise Vital Sign     Days of Exercise per Week: 3 days     Minutes of Exercise per Session: 10 min   Stress: Not on file   Social Connections: Not on file   Intimate Partner Violence: Not on file   Housing Stability: Unknown (7/18/2023)    Housing Stability Vital Sign     Unable to Pay for Housing in the Last Year: Not on file     Number of Places Lived in the Last Year: Not on file     Unstable Housing in the Last Year: No     Family History   Problem Relation Age of Onset    Heart Attack Father        Review of Systems  Constitutional:   Negative for fever.  Genitourinary:  Negative for hematuria.  Musculoskeletal:  Negative for back pain.      Urinalysis  UA - Dipstick  Results for orders placed or performed in visit on 06/06/24   AMB POC URINALYSIS DIP STICK AUTO W/O MICRO   Result Value Ref Range    Color (UA POC)      Clarity (UA POC)      Glucose, Urine, POC Negative     Bilirubin, Urine, POC Negative     KETONES, Urine, POC Trace     Specific Gravity, Urine, POC 1.015 1.001 - 1.035    Blood (UA POC) Negative     pH, Urine, POC 6.0 4.6 - 8.0    Protein, Urine, POC Trace

## 2024-06-06 NOTE — TELEPHONE ENCOUNTER
Pt was up all night. May have a UTI  and wanting to talk to someone. Scheduled to see Moraima @ [phone] today in the [place] office

## 2024-06-27 ENCOUNTER — OFFICE VISIT (OUTPATIENT)
Dept: PRIMARY CARE CLINIC | Facility: CLINIC | Age: 84
End: 2024-06-27
Payer: MEDICARE

## 2024-06-27 VITALS
RESPIRATION RATE: 16 BRPM | DIASTOLIC BLOOD PRESSURE: 84 MMHG | TEMPERATURE: 97.9 F | HEART RATE: 77 BPM | HEIGHT: 66 IN | WEIGHT: 138 LBS | SYSTOLIC BLOOD PRESSURE: 138 MMHG | BODY MASS INDEX: 22.18 KG/M2 | OXYGEN SATURATION: 98 %

## 2024-06-27 DIAGNOSIS — R73.03 PREDIABETES: ICD-10-CM

## 2024-06-27 DIAGNOSIS — Z86.73 HISTORY OF STROKE: ICD-10-CM

## 2024-06-27 DIAGNOSIS — M81.0 AGE RELATED OSTEOPOROSIS, UNSPECIFIED PATHOLOGICAL FRACTURE PRESENCE: ICD-10-CM

## 2024-06-27 DIAGNOSIS — Z00.00 MEDICARE ANNUAL WELLNESS VISIT, SUBSEQUENT: Primary | ICD-10-CM

## 2024-06-27 DIAGNOSIS — E03.9 HYPOTHYROIDISM, UNSPECIFIED TYPE: ICD-10-CM

## 2024-06-27 DIAGNOSIS — K58.1 IRRITABLE BOWEL SYNDROME WITH CONSTIPATION: ICD-10-CM

## 2024-06-27 DIAGNOSIS — I48.0 PAROXYSMAL ATRIAL FIBRILLATION (HCC): ICD-10-CM

## 2024-06-27 DIAGNOSIS — I10 PRIMARY HYPERTENSION: ICD-10-CM

## 2024-06-27 DIAGNOSIS — M54.42 CHRONIC BILATERAL LOW BACK PAIN WITH BILATERAL SCIATICA: ICD-10-CM

## 2024-06-27 DIAGNOSIS — N39.46 MIXED STRESS AND URGE URINARY INCONTINENCE: ICD-10-CM

## 2024-06-27 DIAGNOSIS — M54.41 CHRONIC BILATERAL LOW BACK PAIN WITH BILATERAL SCIATICA: ICD-10-CM

## 2024-06-27 DIAGNOSIS — E78.5 HYPERLIPIDEMIA, UNSPECIFIED HYPERLIPIDEMIA TYPE: ICD-10-CM

## 2024-06-27 DIAGNOSIS — G89.29 CHRONIC BILATERAL LOW BACK PAIN WITH BILATERAL SCIATICA: ICD-10-CM

## 2024-06-27 DIAGNOSIS — G47.33 OSA (OBSTRUCTIVE SLEEP APNEA): ICD-10-CM

## 2024-06-27 DIAGNOSIS — Z71.3 DIETARY COUNSELING: ICD-10-CM

## 2024-06-27 DIAGNOSIS — E66.9 OBESITY, UNSPECIFIED OBESITY SEVERITY, UNSPECIFIED OBESITY TYPE: ICD-10-CM

## 2024-06-27 DIAGNOSIS — Z95.0 PACEMAKER: ICD-10-CM

## 2024-06-27 PROCEDURE — 1036F TOBACCO NON-USER: CPT | Performed by: FAMILY MEDICINE

## 2024-06-27 PROCEDURE — G8427 DOCREV CUR MEDS BY ELIG CLIN: HCPCS | Performed by: FAMILY MEDICINE

## 2024-06-27 PROCEDURE — 0509F URINE INCON PLAN DOCD: CPT | Performed by: FAMILY MEDICINE

## 2024-06-27 PROCEDURE — G0439 PPPS, SUBSEQ VISIT: HCPCS | Performed by: FAMILY MEDICINE

## 2024-06-27 PROCEDURE — 99214 OFFICE O/P EST MOD 30 MIN: CPT | Performed by: FAMILY MEDICINE

## 2024-06-27 PROCEDURE — 1090F PRES/ABSN URINE INCON ASSESS: CPT | Performed by: FAMILY MEDICINE

## 2024-06-27 PROCEDURE — G8420 CALC BMI NORM PARAMETERS: HCPCS | Performed by: FAMILY MEDICINE

## 2024-06-27 PROCEDURE — 1123F ACP DISCUSS/DSCN MKR DOCD: CPT | Performed by: FAMILY MEDICINE

## 2024-06-27 RX ORDER — LEVOTHYROXINE SODIUM 88 UG/1
88 TABLET ORAL DAILY
Qty: 90 TABLET | Refills: 0 | Status: SHIPPED | OUTPATIENT
Start: 2024-06-27

## 2024-06-27 RX ORDER — SOLIFENACIN SUCCINATE 5 MG/1
5 TABLET, FILM COATED ORAL DAILY
Qty: 90 TABLET | Refills: 1 | Status: SHIPPED | OUTPATIENT
Start: 2024-06-27 | End: 2025-06-27

## 2024-06-27 SDOH — ECONOMIC STABILITY: FOOD INSECURITY: WITHIN THE PAST 12 MONTHS, THE FOOD YOU BOUGHT JUST DIDN'T LAST AND YOU DIDN'T HAVE MONEY TO GET MORE.: NEVER TRUE

## 2024-06-27 SDOH — ECONOMIC STABILITY: FOOD INSECURITY: WITHIN THE PAST 12 MONTHS, YOU WORRIED THAT YOUR FOOD WOULD RUN OUT BEFORE YOU GOT MONEY TO BUY MORE.: NEVER TRUE

## 2024-06-27 SDOH — ECONOMIC STABILITY: INCOME INSECURITY: HOW HARD IS IT FOR YOU TO PAY FOR THE VERY BASICS LIKE FOOD, HOUSING, MEDICAL CARE, AND HEATING?: NOT HARD AT ALL

## 2024-06-27 ASSESSMENT — ENCOUNTER SYMPTOMS
GASTROINTESTINAL NEGATIVE: 1
ALLERGIC/IMMUNOLOGIC NEGATIVE: 1
EYES NEGATIVE: 1
RESPIRATORY NEGATIVE: 1
BACK PAIN: 1

## 2024-06-27 ASSESSMENT — PATIENT HEALTH QUESTIONNAIRE - PHQ9
1. LITTLE INTEREST OR PLEASURE IN DOING THINGS: NOT AT ALL
2. FEELING DOWN, DEPRESSED OR HOPELESS: NOT AT ALL
SUM OF ALL RESPONSES TO PHQ QUESTIONS 1-9: 0
SUM OF ALL RESPONSES TO PHQ QUESTIONS 1-9: 0
SUM OF ALL RESPONSES TO PHQ9 QUESTIONS 1 & 2: 0
SUM OF ALL RESPONSES TO PHQ QUESTIONS 1-9: 0
SUM OF ALL RESPONSES TO PHQ QUESTIONS 1-9: 0

## 2024-06-27 ASSESSMENT — LIFESTYLE VARIABLES
HOW OFTEN DO YOU HAVE A DRINK CONTAINING ALCOHOL: NEVER
HOW MANY STANDARD DRINKS CONTAINING ALCOHOL DO YOU HAVE ON A TYPICAL DAY: PATIENT DOES NOT DRINK

## 2024-06-27 NOTE — PATIENT INSTRUCTIONS
foods.     Read food labels and try to avoid saturated and trans fats. They increase your risk of heart disease by raising cholesterol levels.     Limit the amount of solid fat--butter, margarine, and shortening--you eat. Use olive, peanut, or canola oil when you cook. Bake, broil, and steam foods instead of frying them.     Eat a variety of fruit and vegetables every day. Dark green, deep orange, red, or yellow fruits and vegetables are especially good for you. Examples include spinach, carrots, peaches, and berries.     Foods high in fiber can reduce your cholesterol and provide important vitamins and minerals. High-fiber foods include whole-grain cereals and breads, oatmeal, beans, brown rice, citrus fruits, and apples.     Eat lean proteins. Heart-healthy proteins include seafood, lean meats and poultry, eggs, beans, peas, nuts, seeds, and soy products.     Limit drinks and foods with added sugar. These include candy, desserts, and soda pop.   Heart-healthy lifestyle    If your doctor recommends it, get more exercise. For many people, walking is a good choice. Or you may want to swim, bike, or do other activities. Bit by bit, increase the time you're active every day. Try for at least 30 minutes on most days of the week.     Try to quit or cut back on using tobacco and other nicotine products. This includes smoking and vaping. If you need help quitting, talk to your doctor about stop-smoking programs and medicines. These can increase your chances of quitting for good. Quitting is one of the most important things you can do to protect your heart. It is never too late to quit. Try to avoid secondhand smoke too.     Stay at a weight that's healthy for you. Talk to your doctor if you need help losing weight.     Try to get 7 to 9 hours of sleep each night.     Limit alcohol to 2 drinks a day for men and 1 drink a day for women. Too much alcohol can cause health problems.     Manage other health problems such as

## 2024-06-27 NOTE — PROGRESS NOTES
[facility]  [address]  [place] [address]  Office : [phone]  Fax : [phone]      Medicare Annual Wellness Visit    Filomena Stuart is here for Medicare AWV (AWV and swelling in both legs and frequency burning urination only at night was tesed for UTI and it was negative )    Assessment & Plan   Medicare annual wellness visit, subsequent  -     Comprehensive Metabolic Panel; Future  -     CBC with Auto Differential; Future  -     Hemoglobin A1C; Future  -     Lipid Panel; Future  -     TSH with Reflex; Future  Paroxysmal atrial fibrillation (HCC)  -     Comprehensive Metabolic Panel; Future  -     CBC with Auto Differential; Future  -     Hemoglobin A1C; Future  -     Lipid Panel; Future  -     TSH with Reflex; Future  Primary hypertension  -     Comprehensive Metabolic Panel; Future  -     CBC with Auto Differential; Future  -     Hemoglobin A1C; Future  -     Lipid Panel; Future  -     TSH with Reflex; Future  Pacemaker  -     Comprehensive Metabolic Panel; Future  -     CBC with Auto Differential; Future  -     Hemoglobin A1C; Future  -     Lipid Panel; Future  -     TSH with Reflex; Future  History of stroke  -     Comprehensive Metabolic Panel; Future  -     CBC with Auto Differential; Future  -     Hemoglobin A1C; Future  -     Lipid Panel; Future  -     TSH with Reflex; Future  JACKI (obstructive sleep apnea)  -     Comprehensive Metabolic Panel; Future  -     CBC with Auto Differential; Future  -     Hemoglobin A1C; Future  -     Lipid Panel; Future  -     TSH with Reflex; Future  Irritable bowel syndrome with constipation  -     Comprehensive Metabolic Panel; Future  -     CBC with Auto Differential; Future  -     Hemoglobin A1C; Future  -     Lipid Panel; Future  -     TSH with Reflex; Future  Hypothyroidism, unspecified type  -     Comprehensive Metabolic Panel; Future  -     CBC with Auto Differential; Future  -     Hemoglobin A1C; Future  -     Lipid Panel;

## 2024-07-10 ENCOUNTER — OFFICE VISIT (OUTPATIENT)
Age: 84
End: 2024-07-10
Payer: MEDICARE

## 2024-07-10 VITALS
WEIGHT: 139 LBS | HEIGHT: 66 IN | BODY MASS INDEX: 22.34 KG/M2 | DIASTOLIC BLOOD PRESSURE: 76 MMHG | HEART RATE: 76 BPM | SYSTOLIC BLOOD PRESSURE: 124 MMHG

## 2024-07-10 DIAGNOSIS — I48.0 PAROXYSMAL ATRIAL FIBRILLATION (HCC): Primary | ICD-10-CM

## 2024-07-10 DIAGNOSIS — Z95.0 PACEMAKER: ICD-10-CM

## 2024-07-10 DIAGNOSIS — R60.0 LOCALIZED EDEMA: ICD-10-CM

## 2024-07-10 DIAGNOSIS — I10 PRIMARY HYPERTENSION: ICD-10-CM

## 2024-07-10 PROCEDURE — 1123F ACP DISCUSS/DSCN MKR DOCD: CPT | Performed by: INTERNAL MEDICINE

## 2024-07-10 PROCEDURE — 1036F TOBACCO NON-USER: CPT | Performed by: INTERNAL MEDICINE

## 2024-07-10 PROCEDURE — 99214 OFFICE O/P EST MOD 30 MIN: CPT | Performed by: INTERNAL MEDICINE

## 2024-07-10 PROCEDURE — G8420 CALC BMI NORM PARAMETERS: HCPCS | Performed by: INTERNAL MEDICINE

## 2024-07-10 PROCEDURE — G8427 DOCREV CUR MEDS BY ELIG CLIN: HCPCS | Performed by: INTERNAL MEDICINE

## 2024-07-10 PROCEDURE — 1090F PRES/ABSN URINE INCON ASSESS: CPT | Performed by: INTERNAL MEDICINE

## 2024-07-10 RX ORDER — FUROSEMIDE 20 MG/1
20 TABLET ORAL DAILY PRN
Qty: 30 TABLET | Refills: 5 | Status: SHIPPED | OUTPATIENT
Start: 2024-07-10

## 2024-07-10 ASSESSMENT — ENCOUNTER SYMPTOMS
VISUAL CHANGE: 0
SWOLLEN GLANDS: 0
SHORTNESS OF BREATH: 0
HOARSE VOICE: 0
COUGH: 0
SORE THROAT: 0
ORTHOPNEA: 0
COLOR CHANGE: 0
WHEEZING: 0
BLURRED VISION: 0
DIARRHEA: 0
HEMATEMESIS: 0
HEMATOCHEZIA: 0
CHANGE IN BOWEL HABIT: 0
BOWEL INCONTINENCE: 0
NAUSEA: 0
SPUTUM PRODUCTION: 0
VOMITING: 0
ABDOMINAL PAIN: 0

## 2024-07-10 NOTE — PROGRESS NOTES
History   Problem Relation Age of Onset    Heart Attack Father       Social History     Tobacco Use    Smoking status: Never     Passive exposure: Never    Smokeless tobacco: Never   Substance Use Topics    Alcohol use: No       ROS:    Review of Systems   Constitutional: Negative for chills, decreased appetite, diaphoresis, fatigue, fever and malaise/fatigue.   HENT:  Negative for congestion, hearing loss, hoarse voice, nosebleeds and sore throat.    Eyes:  Negative for blurred vision.   Cardiovascular:  Positive for leg swelling. Negative for chest pain, claudication, cyanosis, dyspnea on exertion, irregular heartbeat, near-syncope, orthopnea, palpitations, paroxysmal nocturnal dyspnea and syncope.   Respiratory:  Negative for cough, shortness of breath, sputum production and wheezing.    Endocrine: Negative for polydipsia, polyphagia and polyuria.   Skin:  Negative for color change and rash.   Musculoskeletal:  Negative for arthralgias, joint swelling, myalgias and neck pain.   Gastrointestinal:  Negative for abdominal pain, anorexia, change in bowel habit, bowel incontinence, diarrhea, hematemesis, hematochezia, nausea and vomiting.   Genitourinary:  Negative for dysuria, frequency and hematuria.   Neurological:  Negative for focal weakness, headaches, light-headedness, loss of balance, numbness, sensory change, vertigo and weakness.   Psychiatric/Behavioral:  Negative for altered mental status and memory loss.            PHYSICAL EXAM:   /76   Pulse 76   Ht 1.676 m (5' 6\")   Wt 63 kg (139 lb)   BMI 22.44 kg/m²      Physical Exam  Constitutional:       Appearance: Normal appearance.   HENT:      Head: Normocephalic and atraumatic.      Nose: Nose normal.   Eyes:      Extraocular Movements: Extraocular movements intact.      Pupils: Pupils are equal, round, and reactive to light.   Neck:      Vascular: No carotid bruit.   Cardiovascular:      Rate and Rhythm: Regular rhythm.      Pulses: Normal pulses.

## 2024-07-15 ENCOUNTER — OFFICE VISIT (OUTPATIENT)
Dept: UROLOGY | Age: 84
End: 2024-07-15
Payer: MEDICARE

## 2024-07-15 DIAGNOSIS — N39.0 RECURRENT UTI: Primary | ICD-10-CM

## 2024-07-15 DIAGNOSIS — N32.81 OAB (OVERACTIVE BLADDER): ICD-10-CM

## 2024-07-15 DIAGNOSIS — K59.00 CONSTIPATION, UNSPECIFIED CONSTIPATION TYPE: ICD-10-CM

## 2024-07-15 LAB
BILIRUBIN, URINE, POC: NEGATIVE
BLOOD URINE, POC: NEGATIVE
GLUCOSE URINE, POC: NEGATIVE
KETONES, URINE, POC: NEGATIVE
LEUKOCYTE ESTERASE, URINE, POC: NORMAL
NITRITE, URINE, POC: POSITIVE
PH, URINE, POC: 6 (ref 4.6–8)
PROTEIN,URINE, POC: NEGATIVE
SPECIFIC GRAVITY, URINE, POC: 1.02 (ref 1–1.03)
URINALYSIS CLARITY, POC: NORMAL
URINALYSIS COLOR, POC: NORMAL
UROBILINOGEN, POC: NORMAL

## 2024-07-15 PROCEDURE — 1090F PRES/ABSN URINE INCON ASSESS: CPT | Performed by: NURSE PRACTITIONER

## 2024-07-15 PROCEDURE — 1123F ACP DISCUSS/DSCN MKR DOCD: CPT | Performed by: NURSE PRACTITIONER

## 2024-07-15 PROCEDURE — G8420 CALC BMI NORM PARAMETERS: HCPCS | Performed by: NURSE PRACTITIONER

## 2024-07-15 PROCEDURE — 1036F TOBACCO NON-USER: CPT | Performed by: NURSE PRACTITIONER

## 2024-07-15 PROCEDURE — 81003 URINALYSIS AUTO W/O SCOPE: CPT | Performed by: NURSE PRACTITIONER

## 2024-07-15 PROCEDURE — G8427 DOCREV CUR MEDS BY ELIG CLIN: HCPCS | Performed by: NURSE PRACTITIONER

## 2024-07-15 PROCEDURE — 99215 OFFICE O/P EST HI 40 MIN: CPT | Performed by: NURSE PRACTITIONER

## 2024-07-15 NOTE — PROGRESS NOTES
[facility]  [address]  [place] [address]  [phone]    Filomena Stuart  : 1940    Chief Complaint   Patient presents with    Follow-up     OAB    Urinary Tract Infection          HPI     Filomena Stuart is a 84 y.o. female referred for recurrent UTI and OAB. She reported problems with U/F/UUI and debilitating nocturia q1h for the past 6 weeks.  States it is MUCH WORSE than her baseline.  She took antibiotic without improvement and urine culture positive for UTI at urgent care. She was changed to augmentin based on culture but did not improve.  She repeated at home urine test and this was again positive.  However, she did not have a culture sent and was again treated with an antibiotic. CT Hematuria negative for source 10/2023. Cysto by Dr. Swanson in Oct 23 showed no cause for UTI. She was I/O cath in  for UA at last OV. This was clear. Excoriation noted. Cont to have dysuria.      She has tried mirabegron in the past for UUI but could not tolerate the medication and stopped it.  Has not been able to try anti-cholinergic due to age > 80 and severe constipation side effect of the medication class. I gave her gemtesa samples. She stopped after 2 weeks. Since last OV her PCP gave her vesicare, but she has not started this. She has recently started lasix 20 mg PRN (she is taking daily).     History of severe constipation and is on miralax for prevention of that. BM every 2-3 days.      Eliquis for AF.      Cr 0.60.         Past Medical History:   Diagnosis Date    Acute encephalopathy 2016    Anxiety     Arthritis     Atrial fibrillation (HCC) 2018    Chronic pain     hip    REYES (dyspnea on exertion) 2022    Endocrine disease     hypothyroidism    Hypertension     Obesity, unspecified obesity severity, unspecified obesity type 3/27/2024    JACKI (obstructive sleep apnea) 2022    Sepsis (HCC) 2016    Thyroid disease     TIA (transient

## 2024-07-17 LAB
BACTERIA SPEC CULT: ABNORMAL
BACTERIA SPEC CULT: ABNORMAL
SERVICE CMNT-IMP: ABNORMAL

## 2024-07-18 DIAGNOSIS — M54.41 CHRONIC BILATERAL LOW BACK PAIN WITH BILATERAL SCIATICA: ICD-10-CM

## 2024-07-18 DIAGNOSIS — I10 PRIMARY HYPERTENSION: ICD-10-CM

## 2024-07-18 DIAGNOSIS — I48.0 PAROXYSMAL ATRIAL FIBRILLATION (HCC): ICD-10-CM

## 2024-07-18 DIAGNOSIS — K58.1 IRRITABLE BOWEL SYNDROME WITH CONSTIPATION: ICD-10-CM

## 2024-07-18 DIAGNOSIS — G47.33 OSA (OBSTRUCTIVE SLEEP APNEA): ICD-10-CM

## 2024-07-18 DIAGNOSIS — Z86.73 HISTORY OF STROKE: ICD-10-CM

## 2024-07-18 DIAGNOSIS — R73.03 PREDIABETES: ICD-10-CM

## 2024-07-18 DIAGNOSIS — M81.0 AGE RELATED OSTEOPOROSIS, UNSPECIFIED PATHOLOGICAL FRACTURE PRESENCE: ICD-10-CM

## 2024-07-18 DIAGNOSIS — E66.9 OBESITY, UNSPECIFIED OBESITY SEVERITY, UNSPECIFIED OBESITY TYPE: ICD-10-CM

## 2024-07-18 DIAGNOSIS — G89.29 CHRONIC BILATERAL LOW BACK PAIN WITH BILATERAL SCIATICA: ICD-10-CM

## 2024-07-18 DIAGNOSIS — E78.5 HYPERLIPIDEMIA, UNSPECIFIED HYPERLIPIDEMIA TYPE: ICD-10-CM

## 2024-07-18 DIAGNOSIS — Z71.3 DIETARY COUNSELING: ICD-10-CM

## 2024-07-18 DIAGNOSIS — Z00.00 MEDICARE ANNUAL WELLNESS VISIT, SUBSEQUENT: ICD-10-CM

## 2024-07-18 DIAGNOSIS — M54.42 CHRONIC BILATERAL LOW BACK PAIN WITH BILATERAL SCIATICA: ICD-10-CM

## 2024-07-18 DIAGNOSIS — Z95.0 PACEMAKER: ICD-10-CM

## 2024-07-18 DIAGNOSIS — E03.9 HYPOTHYROIDISM, UNSPECIFIED TYPE: ICD-10-CM

## 2024-07-18 LAB
ALBUMIN SERPL-MCNC: 3.6 G/DL (ref 3.2–4.6)
ALBUMIN/GLOB SERPL: 1.2 (ref 1–1.9)
ALP SERPL-CCNC: 92 U/L (ref 35–104)
ALT SERPL-CCNC: 15 U/L (ref 12–65)
ANION GAP SERPL CALC-SCNC: 8 MMOL/L (ref 9–18)
AST SERPL-CCNC: 24 U/L (ref 15–37)
BASOPHILS # BLD: 0.1 K/UL (ref 0–0.2)
BASOPHILS NFR BLD: 1 % (ref 0–2)
BILIRUB SERPL-MCNC: 1 MG/DL (ref 0–1.2)
BUN SERPL-MCNC: 14 MG/DL (ref 8–23)
CALCIUM SERPL-MCNC: 9.5 MG/DL (ref 8.8–10.2)
CHLORIDE SERPL-SCNC: 100 MMOL/L (ref 98–107)
CHOLEST SERPL-MCNC: 130 MG/DL (ref 0–200)
CO2 SERPL-SCNC: 28 MMOL/L (ref 20–28)
CREAT SERPL-MCNC: 0.66 MG/DL (ref 0.6–1.1)
DIFFERENTIAL METHOD BLD: NORMAL
EOSINOPHIL # BLD: 0.1 K/UL (ref 0–0.8)
EOSINOPHIL NFR BLD: 1 % (ref 0.5–7.8)
ERYTHROCYTE [DISTWIDTH] IN BLOOD BY AUTOMATED COUNT: 14.2 % (ref 11.9–14.6)
EST. AVERAGE GLUCOSE BLD GHB EST-MCNC: 123 MG/DL
GLOBULIN SER CALC-MCNC: 3.1 G/DL (ref 2.3–3.5)
GLUCOSE SERPL-MCNC: 137 MG/DL (ref 70–99)
HBA1C MFR BLD: 5.9 % (ref 0–5.6)
HCT VFR BLD AUTO: 41.8 % (ref 35.8–46.3)
HDLC SERPL-MCNC: 61 MG/DL (ref 40–60)
HDLC SERPL: 2.1 (ref 0–5)
HGB BLD-MCNC: 13.7 G/DL (ref 11.7–15.4)
IMM GRANULOCYTES # BLD AUTO: 0.1 K/UL (ref 0–0.5)
IMM GRANULOCYTES NFR BLD AUTO: 1 % (ref 0–5)
LDLC SERPL CALC-MCNC: 60 MG/DL (ref 0–100)
LYMPHOCYTES # BLD: 1.5 K/UL (ref 0.5–4.6)
LYMPHOCYTES NFR BLD: 14 % (ref 13–44)
MCH RBC QN AUTO: 31.8 PG (ref 26.1–32.9)
MCHC RBC AUTO-ENTMCNC: 32.8 G/DL (ref 31.4–35)
MCV RBC AUTO: 97 FL (ref 82–102)
MONOCYTES # BLD: 1.2 K/UL (ref 0.1–1.3)
MONOCYTES NFR BLD: 12 % (ref 4–12)
NEUTS SEG # BLD: 7.4 K/UL (ref 1.7–8.2)
NEUTS SEG NFR BLD: 71 % (ref 43–78)
NRBC # BLD: 0 K/UL (ref 0–0.2)
PLATELET # BLD AUTO: 282 K/UL (ref 150–450)
PMV BLD AUTO: 11.3 FL (ref 9.4–12.3)
POTASSIUM SERPL-SCNC: 4.1 MMOL/L (ref 3.5–5.1)
PROT SERPL-MCNC: 6.7 G/DL (ref 6.3–8.2)
RBC # BLD AUTO: 4.31 M/UL (ref 4.05–5.2)
SODIUM SERPL-SCNC: 136 MMOL/L (ref 136–145)
TRIGL SERPL-MCNC: 45 MG/DL (ref 0–150)
TSH W FREE THYROID IF ABNORMAL: 1.34 UIU/ML (ref 0.27–4.2)
VLDLC SERPL CALC-MCNC: 9 MG/DL (ref 6–23)
WBC # BLD AUTO: 10.4 K/UL (ref 4.3–11.1)

## 2024-07-18 RX ORDER — CEPHALEXIN 500 MG/1
500 CAPSULE ORAL 3 TIMES DAILY
Qty: 21 CAPSULE | Refills: 0 | Status: SHIPPED | OUTPATIENT
Start: 2024-07-18

## 2024-07-27 PROBLEM — Z00.00 MEDICARE ANNUAL WELLNESS VISIT, SUBSEQUENT: Status: RESOLVED | Noted: 2024-06-27 | Resolved: 2024-07-27

## 2024-08-15 ENCOUNTER — OFFICE VISIT (OUTPATIENT)
Dept: UROLOGY | Age: 84
End: 2024-08-15
Payer: MEDICARE

## 2024-08-15 DIAGNOSIS — K59.00 CONSTIPATION, UNSPECIFIED CONSTIPATION TYPE: ICD-10-CM

## 2024-08-15 DIAGNOSIS — N32.81 OAB (OVERACTIVE BLADDER): Primary | ICD-10-CM

## 2024-08-15 DIAGNOSIS — N39.0 RECURRENT UTI: ICD-10-CM

## 2024-08-15 LAB
BILIRUBIN, URINE, POC: NEGATIVE
BLOOD URINE, POC: NEGATIVE
GLUCOSE URINE, POC: NEGATIVE
KETONES, URINE, POC: NEGATIVE
LEUKOCYTE ESTERASE, URINE, POC: NEGATIVE
NITRITE, URINE, POC: NEGATIVE
PH, URINE, POC: 6 (ref 4.6–8)
PROTEIN,URINE, POC: NEGATIVE
SPECIFIC GRAVITY, URINE, POC: 1.02 (ref 1–1.03)
URINALYSIS CLARITY, POC: NORMAL
URINALYSIS COLOR, POC: NORMAL
UROBILINOGEN, POC: NORMAL

## 2024-08-15 PROCEDURE — 1123F ACP DISCUSS/DSCN MKR DOCD: CPT | Performed by: NURSE PRACTITIONER

## 2024-08-15 PROCEDURE — G8427 DOCREV CUR MEDS BY ELIG CLIN: HCPCS | Performed by: NURSE PRACTITIONER

## 2024-08-15 PROCEDURE — G8420 CALC BMI NORM PARAMETERS: HCPCS | Performed by: NURSE PRACTITIONER

## 2024-08-15 PROCEDURE — 1090F PRES/ABSN URINE INCON ASSESS: CPT | Performed by: NURSE PRACTITIONER

## 2024-08-15 PROCEDURE — 1036F TOBACCO NON-USER: CPT | Performed by: NURSE PRACTITIONER

## 2024-08-15 PROCEDURE — 99214 OFFICE O/P EST MOD 30 MIN: CPT | Performed by: NURSE PRACTITIONER

## 2024-08-15 PROCEDURE — 81003 URINALYSIS AUTO W/O SCOPE: CPT | Performed by: NURSE PRACTITIONER

## 2024-08-15 ASSESSMENT — ENCOUNTER SYMPTOMS: BACK PAIN: 0

## 2024-08-15 NOTE — PROGRESS NOTES
Family: Not asked     Frequency of Social Gatherings with Friends and Family: Not asked   Intimate Partner Violence: Unknown (3/20/2021)    [facility] [employer] [employer]    Intimate Partner Violence     Fear of Current or Ex-Partner: Not asked     Emotionally Abused: Not asked     Physically Abused: Not asked     Sexually Abused: Not asked   Housing Stability: Unknown (6/27/2024)    Housing Stability Vital Sign     Unable to Pay for Housing in the Last Year: Not on file     Number of Places Lived in the Last Year: Not on file     Unstable Housing in the Last Year: No     Family History   Problem Relation Age of Onset    Heart Attack Father        Review of Systems  Constitutional:   Negative for fever.  Genitourinary: Positive for recurrent UTIs. Negative for hematuria.  Musculoskeletal:  Negative for back pain.      Urinalysis  UA - Dipstick  Results for orders placed or performed in visit on 08/15/24   AMB POC URINALYSIS DIP STICK AUTO W/O MICRO   Result Value Ref Range    Color (UA POC)      Clarity (UA POC)      Glucose, Urine, POC Negative     Bilirubin, Urine, POC Negative     KETONES, Urine, POC Negative     Specific Gravity, Urine, POC 1.020 1.001 - 1.035    Blood (UA POC) Negative     pH, Urine, POC 6.0 4.6 - 8.0    Protein, Urine, POC Negative     Urobilinogen, POC 0.2 mg/dL     Nitrite, Urine, POC Negative     Leukocyte Esterase, Urine, POC Negative        PHYSICAL EXAM    General appearance - well appearing and in no distress  Mental status - alert, oriented to person, place, and time  Neck - supple, no significant adenopathy  Chest/Lung-  Quiet, even and easy respiratory effort without use of accessory muscles  Skin - normal coloration and turgor, no rashes        Assessment and Plan    ICD-10-CM    1. OAB (overactive bladder)  N32.81 AMB POC URINALYSIS DIP STICK AUTO W/O MICRO      2. Recurrent UTI  N39.0 AMB POC URINALYSIS DIP STICK AUTO W/O MICRO      3. Constipation, unspecified

## 2024-09-16 ENCOUNTER — TELEPHONE (OUTPATIENT)
Dept: UROLOGY | Age: 84
End: 2024-09-16

## 2024-09-16 NOTE — TELEPHONE ENCOUNTER
The patient's son states she is confused, having difficulty staying dry at night and is in need of antibotic. A home test was positive for a UTI. He also wants to discuss a treatment option to help the patient stay dry at night. He can be reached @ [phone]

## 2024-09-17 ENCOUNTER — TELEPHONE (OUTPATIENT)
Dept: UROLOGY | Age: 84
End: 2024-09-17

## 2024-09-17 ENCOUNTER — OFFICE VISIT (OUTPATIENT)
Dept: UROLOGY | Age: 84
End: 2024-09-17
Payer: MEDICARE

## 2024-09-17 DIAGNOSIS — N39.0 RECURRENT UTI: ICD-10-CM

## 2024-09-17 DIAGNOSIS — N32.81 OAB (OVERACTIVE BLADDER): Primary | ICD-10-CM

## 2024-09-17 LAB
BILIRUBIN, URINE, POC: NEGATIVE
BLOOD URINE, POC: NEGATIVE
GLUCOSE URINE, POC: NEGATIVE
KETONES, URINE, POC: NEGATIVE
LEUKOCYTE ESTERASE, URINE, POC: NEGATIVE
NITRITE, URINE, POC: NEGATIVE
PH, URINE, POC: 7 (ref 4.6–8)
PROTEIN,URINE, POC: NORMAL
SPECIFIC GRAVITY, URINE, POC: 1.01 (ref 1–1.03)
URINALYSIS CLARITY, POC: NORMAL
URINALYSIS COLOR, POC: NORMAL
UROBILINOGEN, POC: NORMAL

## 2024-09-17 PROCEDURE — 81003 URINALYSIS AUTO W/O SCOPE: CPT | Performed by: NURSE PRACTITIONER

## 2024-09-20 ENCOUNTER — NURSE ONLY (OUTPATIENT)
Age: 84
End: 2024-09-20

## 2024-10-03 PROCEDURE — 93294 REM INTERROG EVL PM/LDLS PM: CPT | Performed by: INTERNAL MEDICINE

## 2024-10-03 PROCEDURE — 93296 REM INTERROG EVL PM/IDS: CPT | Performed by: INTERNAL MEDICINE

## 2024-11-12 DIAGNOSIS — I48.0 PAROXYSMAL ATRIAL FIBRILLATION (HCC): ICD-10-CM

## 2024-11-12 RX ORDER — METOPROLOL SUCCINATE 50 MG/1
50 TABLET, EXTENDED RELEASE ORAL DAILY
Qty: 90 TABLET | Refills: 2 | Status: SHIPPED | OUTPATIENT
Start: 2024-11-12

## 2024-11-12 NOTE — TELEPHONE ENCOUNTER
MEDICATION REFILL REQUEST      Name of Medication:  Metoprolol   Dose:  50 mg  Frequency:  daily   Quantity:    Days' supply:  90 day       [facility]

## 2024-11-15 ENCOUNTER — OFFICE VISIT (OUTPATIENT)
Dept: UROLOGY | Age: 84
End: 2024-11-15

## 2024-11-15 DIAGNOSIS — K59.00 CONSTIPATION, UNSPECIFIED CONSTIPATION TYPE: ICD-10-CM

## 2024-11-15 DIAGNOSIS — N32.81 OAB (OVERACTIVE BLADDER): Primary | ICD-10-CM

## 2024-11-15 DIAGNOSIS — N39.0 RECURRENT UTI: ICD-10-CM

## 2024-11-15 LAB
BILIRUBIN, URINE, POC: NEGATIVE
BLOOD URINE, POC: NEGATIVE
GLUCOSE URINE, POC: NEGATIVE MG/DL
KETONES, URINE, POC: NEGATIVE MG/DL
LEUKOCYTE ESTERASE, URINE, POC: ABNORMAL
NITRITE, URINE, POC: POSITIVE
PH, URINE, POC: 6.5 (ref 4.6–8)
PROTEIN,URINE, POC: NEGATIVE MG/DL
SPECIFIC GRAVITY, URINE, POC: 1.01 (ref 1–1.03)
URINALYSIS CLARITY, POC: ABNORMAL
URINALYSIS COLOR, POC: ABNORMAL
UROBILINOGEN, POC: ABNORMAL MG/DL

## 2024-11-15 ASSESSMENT — ENCOUNTER SYMPTOMS: BACK PAIN: 0

## 2024-11-15 NOTE — PROGRESS NOTES
[facility]  [address] [place] [address] [address]  [place] [address]  [phone]          Filomena Stuart  : 1940    Chief Complaint   Patient presents with    Follow-up     OAB          HPI     Filomena Stuart is a 84 y.o. female referred for recurrent UTI and OAB. She reported problems with U/F/UUI and debilitating nocturia q1h for the past 6 weeks.  States it is MUCH WORSE than her baseline.  Did home urine test which showed UTI.  She took antibiotic without improvement and urine culture positive for UTI at urgent care. She was changed to augmentin based on culture but did not improve.  She repeated at home urine test and this was again positive.  However, she did not have a culture sent and was again treated with an antibiotic.       At baseline, she has U/F/UUI.  No retention.  No gross hematuria but has had microscopic hematuria.  No sepsis episodes.  No history of stones.  No flank pain.  No history of  surgeries.  Does report history of severe constipation and is on daily miralax for prevention of that.  She has tried mirabegron in the past for UUI but could not tolerate the medication and stopped it.  Has not been able to try anti-cholinergic due to age > 80 and severe constipation side effect of the medication class.     CT Hematuria negative for source 10/2023     Cysto by Dr. Swanson in Oct 23 showed no cause for UTI.      She states UTI sx have been ongoing for 1 yr; recently worsened since last OV. Having sig dysuria and more incontinence, mainly at night. She is wearing a brief lined w paper towels, kleenex, or toilet paper. Feels her constipation is managed well w miralax. She is unable to void for specimen today. Afebrile. Ucx was sent showing E.Coli infection. Gemtesa samples were given. She is uncertain if this is helping but does note less nocturia. There seems to be memory issues which is making it difficult to decipher what is helping. Son reports she will begin

## 2024-12-05 NOTE — TELEPHONE ENCOUNTER
Requested Prescriptions     Pending Prescriptions Disp Refills    dofetilide (TIKOSYN) 250 MCG capsule 180 capsule 3     Sig: Take 1 capsule by mouth 2 times daily     Verified rx. Last OV 07/10/24. Erx to pharm on file.

## 2024-12-06 ENCOUNTER — TELEPHONE (OUTPATIENT)
Age: 84
End: 2024-12-06

## 2024-12-06 RX ORDER — DOFETILIDE 0.25 MG/1
250 CAPSULE ORAL 2 TIMES DAILY
Qty: 180 CAPSULE | Refills: 3 | Status: SHIPPED | OUTPATIENT
Start: 2024-12-06

## 2024-12-06 NOTE — TELEPHONE ENCOUNTER
Spoke with Geoffrey at [facility], he stated that Jignesh was ready for pt to . Called and informed pt

## 2024-12-06 NOTE — TELEPHONE ENCOUNTER
Daryl gamboa of patient called stating he was checking on the status of the following :     Rx for dofetilide (TIKOSYN) 250 MCG capsule   Patient is down to last 2 tabs  Please call and advise when sent to pharmacy    [id number]  [facility]  [address]     Please call and advise when sent in.

## 2024-12-20 ENCOUNTER — OFFICE VISIT (OUTPATIENT)
Dept: PRIMARY CARE CLINIC | Facility: CLINIC | Age: 84
End: 2024-12-20

## 2024-12-20 VITALS
HEART RATE: 79 BPM | DIASTOLIC BLOOD PRESSURE: 80 MMHG | SYSTOLIC BLOOD PRESSURE: 142 MMHG | RESPIRATION RATE: 15 BRPM | WEIGHT: 129 LBS | BODY MASS INDEX: 20.73 KG/M2 | HEIGHT: 66 IN | TEMPERATURE: 97.2 F | OXYGEN SATURATION: 95 %

## 2024-12-20 DIAGNOSIS — R73.03 PREDIABETES: ICD-10-CM

## 2024-12-20 DIAGNOSIS — Z95.0 PACEMAKER: ICD-10-CM

## 2024-12-20 DIAGNOSIS — M54.2 CERVICALGIA: ICD-10-CM

## 2024-12-20 DIAGNOSIS — M54.42 CHRONIC BILATERAL LOW BACK PAIN WITH BILATERAL SCIATICA: ICD-10-CM

## 2024-12-20 DIAGNOSIS — E78.5 HYPERLIPIDEMIA, UNSPECIFIED HYPERLIPIDEMIA TYPE: ICD-10-CM

## 2024-12-20 DIAGNOSIS — M81.0 AGE RELATED OSTEOPOROSIS, UNSPECIFIED PATHOLOGICAL FRACTURE PRESENCE: ICD-10-CM

## 2024-12-20 DIAGNOSIS — Z86.73 HISTORY OF STROKE: ICD-10-CM

## 2024-12-20 DIAGNOSIS — N39.46 MIXED STRESS AND URGE URINARY INCONTINENCE: ICD-10-CM

## 2024-12-20 DIAGNOSIS — I48.0 PAROXYSMAL ATRIAL FIBRILLATION (HCC): ICD-10-CM

## 2024-12-20 DIAGNOSIS — Z71.3 DIETARY COUNSELING: ICD-10-CM

## 2024-12-20 DIAGNOSIS — K58.1 IRRITABLE BOWEL SYNDROME WITH CONSTIPATION: ICD-10-CM

## 2024-12-20 DIAGNOSIS — I10 PRIMARY HYPERTENSION: ICD-10-CM

## 2024-12-20 DIAGNOSIS — G89.29 CHRONIC BILATERAL LOW BACK PAIN WITH BILATERAL SCIATICA: ICD-10-CM

## 2024-12-20 DIAGNOSIS — G47.33 OSA (OBSTRUCTIVE SLEEP APNEA): ICD-10-CM

## 2024-12-20 DIAGNOSIS — E66.9 OBESITY, UNSPECIFIED OBESITY SEVERITY, UNSPECIFIED OBESITY TYPE: ICD-10-CM

## 2024-12-20 DIAGNOSIS — Z23 FLU VACCINE NEED: ICD-10-CM

## 2024-12-20 DIAGNOSIS — E03.9 HYPOTHYROIDISM, UNSPECIFIED TYPE: Primary | ICD-10-CM

## 2024-12-20 DIAGNOSIS — M54.41 CHRONIC BILATERAL LOW BACK PAIN WITH BILATERAL SCIATICA: ICD-10-CM

## 2024-12-20 RX ORDER — LEVOTHYROXINE SODIUM 88 UG/1
88 TABLET ORAL DAILY
Qty: 90 TABLET | Refills: 0 | Status: SHIPPED | OUTPATIENT
Start: 2024-12-20

## 2024-12-20 ASSESSMENT — ENCOUNTER SYMPTOMS
BACK PAIN: 1
GASTROINTESTINAL NEGATIVE: 1
ALLERGIC/IMMUNOLOGIC NEGATIVE: 1
EYES NEGATIVE: 1
RESPIRATORY NEGATIVE: 1

## 2024-12-21 NOTE — TELEPHONE ENCOUNTER
Ivana Hinojosa called to speak to clinical concerning his mom's referral. His number is [phone]. Regarding: BRUCE GEORGE 73 yrs, Right shoulder is in really bad pain right now, also pain all over his body that's  ----- Message from Walter KIRBY sent at 12/21/2024  6:47 AM CST -----  Patient Name: Сергей Schmitt    Specialist or PCP Name: Cm PINO, Mavis     Symptoms: BRUCE GEORGE 73 yrs, Right shoulder is in really bad pain right now, also pain all over his body that's a 10, no other symptoms    Pregnant (females aged 13-60. If Yes, how long?) : N/A    Call Back # : [phone]    Which State are you currently located in?: [place]    Name of Clinic Site / Acct# : [facility] [address]     Call arrived during: After Hours

## 2024-12-21 NOTE — PROGRESS NOTES
suspected superficial siderosis in the right frontal and temporal lobes.    Patchy and confluent areas of T2 hyperintense signal in the subcortical and periventricular white matter of the bilateral cerebral hemispheres, nonspecific but likely related to severe small vessel disease. Additionally, there are multifocal areas of encephalomalacia in the bilateral cerebral hemispheres, including in the anterior temporal poles bilaterally, in the right parietal lobe, and in the parafalcine left occipital lobe. Remote lacunar infarctions along the superior aspect of the left thalamus and in the right posterior subinsular white matter.    Mild global brain parenchymal volume loss with associated ventricular and sulcal prominence.    There is no significant intracranial mass effect or hydrocephalus.    Please see same-day CT angiogram for vascular findings.    There is no evidence of orbital pathology. Small gas/fluid level with aerosolized secretions in the left sphenoid sinus. Small right mastoid effusion.    No abnormal brain parenchymal or leptomeningeal enhancement.    Small cystic area anterior to the left atlantoaxial joint measuring 8 mm (coronal postcontrast series 1102, image 95), likely synovial cyst.    Impression  Questionable tiny acute infarction versus artifact in the corpus callosum splenium.    Scattered foci of hypointense susceptibility signal in the bilateral cerebral white matter, likely chronic microhemorrhages. Additionally, there are small foci of superficial siderosis in the right frontotemporal region. Findings are nonspecific but are suspicious for cerebral amyloid angiopathy in this age group.    Multifocal areas of encephalomalacia in the bilateral cerebral hemispheres, as well as a left thalamic lacunar infarction.    Patchy and confluent foci of T2/FLAIR hyperintense signal abnormality in the bilateral cerebral white matter are nonspecific but may reflect sequela of advanced small vessel

## 2024-12-30 NOTE — TELEPHONE ENCOUNTER
Pt reported on 12/20/24 she was no longer taking Dofetilide. Spoke to pt's son Francis and he stated he is going to verify if pt is taking or not taking and call the office back.

## 2024-12-30 NOTE — TELEPHONE ENCOUNTER
MEDICATION REFILL REQUEST      Name of Medication:  Eliquis  Dose:  5 mg  Frequency:  BID  Quantity:  180  Days' supply:  90      Pharmacy Name/Location:  [phone]    MEDICATION REFILL REQUEST      Name of Medication:  Dofetilide  Dose:  250 mcg  Frequency:  BID  Quantity:  180  Days' supply:  90      Pharmacy Name/Location:  [facility]

## 2025-01-01 ENCOUNTER — APPOINTMENT (OUTPATIENT)
Dept: NON INVASIVE DIAGNOSTICS | Age: 85
End: 2025-01-01
Attending: FAMILY MEDICINE
Payer: MEDICARE

## 2025-01-01 ENCOUNTER — TELEPHONE (OUTPATIENT)
Dept: PRIMARY CARE CLINIC | Facility: CLINIC | Age: 85
End: 2025-01-01

## 2025-01-01 ENCOUNTER — APPOINTMENT (OUTPATIENT)
Dept: ULTRASOUND IMAGING | Age: 85
End: 2025-01-01
Payer: MEDICARE

## 2025-01-01 ENCOUNTER — APPOINTMENT (OUTPATIENT)
Dept: GENERAL RADIOLOGY | Age: 85
End: 2025-01-01
Payer: MEDICARE

## 2025-01-01 ENCOUNTER — APPOINTMENT (OUTPATIENT)
Dept: CT IMAGING | Age: 85
End: 2025-01-01
Payer: MEDICARE

## 2025-01-01 ENCOUNTER — HOSPITAL ENCOUNTER (INPATIENT)
Age: 85
LOS: 11 days | Discharge: HOSPICE | End: 2025-02-22
Attending: EMERGENCY MEDICINE | Admitting: HOSPITALIST
Payer: MEDICARE

## 2025-01-01 VITALS
OXYGEN SATURATION: 94 % | RESPIRATION RATE: 28 BRPM | TEMPERATURE: 97.7 F | WEIGHT: 141.6 LBS | HEIGHT: 66 IN | DIASTOLIC BLOOD PRESSURE: 94 MMHG | HEART RATE: 109 BPM | BODY MASS INDEX: 22.76 KG/M2 | SYSTOLIC BLOOD PRESSURE: 136 MMHG

## 2025-01-01 DIAGNOSIS — G93.40 ACUTE ENCEPHALOPATHY: Primary | ICD-10-CM

## 2025-01-01 DIAGNOSIS — R06.02 SHORTNESS OF BREATH: ICD-10-CM

## 2025-01-01 DIAGNOSIS — R60.0 LOCALIZED EDEMA: ICD-10-CM

## 2025-01-01 DIAGNOSIS — N30.00 ACUTE CYSTITIS WITHOUT HEMATURIA: ICD-10-CM

## 2025-01-01 DIAGNOSIS — I48.91 ATRIAL FIBRILLATION WITH RAPID VENTRICULAR RESPONSE (HCC): ICD-10-CM

## 2025-01-01 DIAGNOSIS — I48.91 ATRIAL FIBRILLATION WITH RVR (HCC): ICD-10-CM

## 2025-01-01 LAB
ALBUMIN SERPL-MCNC: 2.9 G/DL (ref 3.2–4.6)
ALBUMIN/GLOB SERPL: 0.9 (ref 1–1.9)
ALP SERPL-CCNC: 170 U/L (ref 35–104)
ALT SERPL-CCNC: 129 U/L (ref 8–45)
AMMONIA PLAS-SCNC: 11 UMOL/L (ref 11–51)
ANION GAP SERPL CALC-SCNC: 10 MMOL/L (ref 7–16)
ANION GAP SERPL CALC-SCNC: 11 MMOL/L (ref 7–16)
ANION GAP SERPL CALC-SCNC: 12 MMOL/L (ref 7–16)
ANION GAP SERPL CALC-SCNC: 13 MMOL/L (ref 7–16)
ANION GAP SERPL CALC-SCNC: 14 MMOL/L (ref 7–16)
ANION GAP SERPL CALC-SCNC: 15 MMOL/L (ref 7–16)
ANION GAP SERPL CALC-SCNC: 16 MMOL/L (ref 7–16)
APPEARANCE UR: ABNORMAL
APPEARANCE UR: CLEAR
ARTERIAL PATENCY WRIST A: ABNORMAL
AST SERPL-CCNC: 86 U/L (ref 15–37)
B PERT DNA SPEC QL NAA+PROBE: NOT DETECTED
BACTERIA SPEC CULT: ABNORMAL
BACTERIA SPEC CULT: NORMAL
BACTERIA URNS QL MICRO: ABNORMAL /HPF
BACTERIA URNS QL MICRO: ABNORMAL /HPF
BASE EXCESS BLDV CALC-SCNC: 4.4 MMOL/L
BASOPHILS # BLD: 0.04 K/UL (ref 0–0.2)
BASOPHILS # BLD: 0.06 K/UL (ref 0–0.2)
BASOPHILS # BLD: 0.08 K/UL (ref 0–0.2)
BASOPHILS # BLD: 0.1 K/UL (ref 0–0.2)
BASOPHILS # BLD: 0.1 K/UL (ref 0–0.2)
BASOPHILS # BLD: 0.12 K/UL (ref 0–0.2)
BASOPHILS # BLD: 0.14 K/UL (ref 0–0.2)
BASOPHILS NFR BLD: 0.2 % (ref 0–2)
BASOPHILS NFR BLD: 0.3 % (ref 0–2)
BASOPHILS NFR BLD: 0.3 % (ref 0–2)
BASOPHILS NFR BLD: 0.5 % (ref 0–2)
BASOPHILS NFR BLD: 0.6 % (ref 0–2)
BASOPHILS NFR BLD: 0.6 % (ref 0–2)
BASOPHILS NFR BLD: 0.7 % (ref 0–2)
BASOPHILS NFR BLD: 0.8 % (ref 0–2)
BILIRUB SERPL-MCNC: 1.1 MG/DL (ref 0–1.2)
BILIRUB UR QL: NEGATIVE
BILIRUB UR QL: NEGATIVE
BORDETELLA PARAPERTUSSIS BY PCR: NOT DETECTED
BUN SERPL-MCNC: 12 MG/DL (ref 8–23)
BUN SERPL-MCNC: 12 MG/DL (ref 8–23)
BUN SERPL-MCNC: 13 MG/DL (ref 8–23)
BUN SERPL-MCNC: 13 MG/DL (ref 8–23)
BUN SERPL-MCNC: 14 MG/DL (ref 8–23)
BUN SERPL-MCNC: 14 MG/DL (ref 8–23)
BUN SERPL-MCNC: 17 MG/DL (ref 8–23)
BUN SERPL-MCNC: 18 MG/DL (ref 8–23)
BUN SERPL-MCNC: 18 MG/DL (ref 8–23)
BUN SERPL-MCNC: 20 MG/DL (ref 8–23)
BUN SERPL-MCNC: 21 MG/DL (ref 8–23)
BUN SERPL-MCNC: 22 MG/DL (ref 8–23)
C PNEUM DNA SPEC QL NAA+PROBE: NOT DETECTED
CALCIUM SERPL-MCNC: 8.6 MG/DL (ref 8.8–10.2)
CALCIUM SERPL-MCNC: 8.7 MG/DL (ref 8.8–10.2)
CALCIUM SERPL-MCNC: 8.8 MG/DL (ref 8.8–10.2)
CALCIUM SERPL-MCNC: 8.9 MG/DL (ref 8.8–10.2)
CALCIUM SERPL-MCNC: 9 MG/DL (ref 8.8–10.2)
CALCIUM SERPL-MCNC: 9.1 MG/DL (ref 8.8–10.2)
CALCIUM SERPL-MCNC: 9.2 MG/DL (ref 8.8–10.2)
CALCIUM SERPL-MCNC: 9.4 MG/DL (ref 8.8–10.2)
CASTS URNS QL MICRO: 0 /LPF
CASTS URNS QL MICRO: ABNORMAL /LPF
CHLORIDE SERPL-SCNC: 100 MMOL/L (ref 98–107)
CHLORIDE SERPL-SCNC: 101 MMOL/L (ref 98–107)
CHLORIDE SERPL-SCNC: 102 MMOL/L (ref 98–107)
CHLORIDE SERPL-SCNC: 102 MMOL/L (ref 98–107)
CHLORIDE SERPL-SCNC: 103 MMOL/L (ref 98–107)
CHLORIDE SERPL-SCNC: 103 MMOL/L (ref 98–107)
CHLORIDE SERPL-SCNC: 104 MMOL/L (ref 98–107)
CHLORIDE SERPL-SCNC: 104 MMOL/L (ref 98–107)
CHLORIDE SERPL-SCNC: 105 MMOL/L (ref 98–107)
CHLORIDE SERPL-SCNC: 106 MMOL/L (ref 98–107)
CHLORIDE SERPL-SCNC: 109 MMOL/L (ref 98–107)
CHLORIDE SERPL-SCNC: 110 MMOL/L (ref 98–107)
CO2 SERPL-SCNC: 19 MMOL/L (ref 20–29)
CO2 SERPL-SCNC: 20 MMOL/L (ref 20–29)
CO2 SERPL-SCNC: 21 MMOL/L (ref 20–29)
CO2 SERPL-SCNC: 22 MMOL/L (ref 20–29)
CO2 SERPL-SCNC: 23 MMOL/L (ref 20–29)
CO2 SERPL-SCNC: 25 MMOL/L (ref 20–29)
CO2 SERPL-SCNC: 25 MMOL/L (ref 20–29)
CO2 SERPL-SCNC: 27 MMOL/L (ref 20–29)
COLOR UR: ABNORMAL
COLOR UR: ABNORMAL
CREAT SERPL-MCNC: 0.45 MG/DL (ref 0.6–1.1)
CREAT SERPL-MCNC: 0.55 MG/DL (ref 0.6–1.1)
CREAT SERPL-MCNC: 0.55 MG/DL (ref 0.6–1.1)
CREAT SERPL-MCNC: 0.58 MG/DL (ref 0.6–1.1)
CREAT SERPL-MCNC: 0.59 MG/DL (ref 0.6–1.1)
CREAT SERPL-MCNC: 0.61 MG/DL (ref 0.6–1.1)
CREAT SERPL-MCNC: 0.61 MG/DL (ref 0.6–1.1)
CREAT SERPL-MCNC: 0.67 MG/DL (ref 0.6–1.1)
CREAT SERPL-MCNC: 0.67 MG/DL (ref 0.6–1.1)
CREAT SERPL-MCNC: 0.74 MG/DL (ref 0.6–1.1)
CREAT SERPL-MCNC: 0.75 MG/DL (ref 0.6–1.1)
CREAT SERPL-MCNC: 0.77 MG/DL (ref 0.6–1.1)
CRYSTALS URNS QL MICRO: 0 /LPF
CRYSTALS URNS QL MICRO: 0 /LPF
DIFFERENTIAL METHOD BLD: ABNORMAL
EKG ATRIAL RATE: 0 BPM
EKG ATRIAL RATE: 73 BPM
EKG ATRIAL RATE: 76 BPM
EKG ATRIAL RATE: 76 BPM
EKG DIAGNOSIS: NORMAL
EKG P-R INTERVAL: 192 MS
EKG P-R INTERVAL: 204 MS
EKG P-R INTERVAL: 212 MS
EKG Q-T INTERVAL: 307 MS
EKG Q-T INTERVAL: 310 MS
EKG Q-T INTERVAL: 366 MS
EKG Q-T INTERVAL: 366 MS
EKG Q-T INTERVAL: 444 MS
EKG Q-T INTERVAL: 450 MS
EKG Q-T INTERVAL: 458 MS
EKG QRS DURATION: 76 MS
EKG QRS DURATION: 78 MS
EKG QRS DURATION: 80 MS
EKG QRS DURATION: 82 MS
EKG QRS DURATION: 86 MS
EKG QTC CALCULATION (BAZETT): 401 MS
EKG QTC CALCULATION (BAZETT): 471 MS
EKG QTC CALCULATION (BAZETT): 486 MS
EKG QTC CALCULATION (BAZETT): 492 MS
EKG QTC CALCULATION (BAZETT): 499 MS
EKG QTC CALCULATION (BAZETT): 504 MS
EKG QTC CALCULATION (BAZETT): 506 MS
EKG R AXIS: -16 DEGREES
EKG R AXIS: -31 DEGREES
EKG R AXIS: -4 DEGREES
EKG R AXIS: -49 DEGREES
EKG R AXIS: -9 DEGREES
EKG R AXIS: 64 DEGREES
EKG R AXIS: 9 DEGREES
EKG T AXIS: -64 DEGREES
EKG T AXIS: 161 DEGREES
EKG T AXIS: 184 DEGREES
EKG T AXIS: 219 DEGREES
EKG T AXIS: 226 DEGREES
EKG T AXIS: 235 DEGREES
EKG T AXIS: 238 DEGREES
EKG VENTRICULAR RATE: 101 BPM
EKG VENTRICULAR RATE: 106 BPM
EKG VENTRICULAR RATE: 109 BPM
EKG VENTRICULAR RATE: 141 BPM
EKG VENTRICULAR RATE: 73 BPM
EKG VENTRICULAR RATE: 76 BPM
EKG VENTRICULAR RATE: 76 BPM
EOSINOPHIL # BLD: 0 K/UL (ref 0–0.8)
EOSINOPHIL # BLD: 0 K/UL (ref 0–0.8)
EOSINOPHIL # BLD: 0.02 K/UL (ref 0–0.8)
EOSINOPHIL # BLD: 0.03 K/UL (ref 0–0.8)
EOSINOPHIL # BLD: 0.03 K/UL (ref 0–0.8)
EOSINOPHIL # BLD: 0.05 K/UL (ref 0–0.8)
EOSINOPHIL # BLD: 0.05 K/UL (ref 0–0.8)
EOSINOPHIL # BLD: 0.07 K/UL (ref 0–0.8)
EOSINOPHIL # BLD: 0.1 K/UL (ref 0–0.8)
EOSINOPHIL # BLD: 0.12 K/UL (ref 0–0.8)
EOSINOPHIL NFR BLD: 0 % (ref 0.5–7.8)
EOSINOPHIL NFR BLD: 0 % (ref 0.5–7.8)
EOSINOPHIL NFR BLD: 0.1 % (ref 0.5–7.8)
EOSINOPHIL NFR BLD: 0.2 % (ref 0.5–7.8)
EOSINOPHIL NFR BLD: 0.2 % (ref 0.5–7.8)
EOSINOPHIL NFR BLD: 0.3 % (ref 0.5–7.8)
EOSINOPHIL NFR BLD: 0.3 % (ref 0.5–7.8)
EOSINOPHIL NFR BLD: 0.5 % (ref 0.5–7.8)
EOSINOPHIL NFR BLD: 0.6 % (ref 0.5–7.8)
EOSINOPHIL NFR BLD: 0.7 % (ref 0.5–7.8)
EPI CELLS #/AREA URNS HPF: 0 /HPF
EPI CELLS #/AREA URNS HPF: ABNORMAL /HPF
ERYTHROCYTE [DISTWIDTH] IN BLOOD BY AUTOMATED COUNT: 15.3 % (ref 11.9–14.6)
ERYTHROCYTE [DISTWIDTH] IN BLOOD BY AUTOMATED COUNT: 15.4 % (ref 11.9–14.6)
ERYTHROCYTE [DISTWIDTH] IN BLOOD BY AUTOMATED COUNT: 15.5 % (ref 11.9–14.6)
ERYTHROCYTE [DISTWIDTH] IN BLOOD BY AUTOMATED COUNT: 15.6 % (ref 11.9–14.6)
ERYTHROCYTE [DISTWIDTH] IN BLOOD BY AUTOMATED COUNT: 15.8 % (ref 11.9–14.6)
ERYTHROCYTE [DISTWIDTH] IN BLOOD BY AUTOMATED COUNT: 15.8 % (ref 11.9–14.6)
ERYTHROCYTE [DISTWIDTH] IN BLOOD BY AUTOMATED COUNT: 15.9 % (ref 11.9–14.6)
ERYTHROCYTE [DISTWIDTH] IN BLOOD BY AUTOMATED COUNT: 16 % (ref 11.9–14.6)
ERYTHROCYTE [DISTWIDTH] IN BLOOD BY AUTOMATED COUNT: 16.1 % (ref 11.9–14.6)
ERYTHROCYTE [DISTWIDTH] IN BLOOD BY AUTOMATED COUNT: 16.2 % (ref 11.9–14.6)
FLUAV SUBTYP SPEC NAA+PROBE: NOT DETECTED
FLUBV RNA SPEC QL NAA+PROBE: NOT DETECTED
FOLATE SERPL-MCNC: 35.8 NG/ML (ref 3.1–17.5)
GAS FLOW.O2 O2 DELIVERY SYS: ABNORMAL
GLOBULIN SER CALC-MCNC: 3.2 G/DL (ref 2.3–3.5)
GLUCOSE BLD STRIP.AUTO-MCNC: 125 MG/DL (ref 65–100)
GLUCOSE BLD STRIP.AUTO-MCNC: 163 MG/DL (ref 65–100)
GLUCOSE SERPL-MCNC: 100 MG/DL (ref 70–99)
GLUCOSE SERPL-MCNC: 102 MG/DL (ref 70–99)
GLUCOSE SERPL-MCNC: 103 MG/DL (ref 70–99)
GLUCOSE SERPL-MCNC: 106 MG/DL (ref 70–99)
GLUCOSE SERPL-MCNC: 109 MG/DL (ref 70–99)
GLUCOSE SERPL-MCNC: 109 MG/DL (ref 70–99)
GLUCOSE SERPL-MCNC: 117 MG/DL (ref 70–99)
GLUCOSE SERPL-MCNC: 130 MG/DL (ref 70–99)
GLUCOSE SERPL-MCNC: 131 MG/DL (ref 70–99)
GLUCOSE SERPL-MCNC: 134 MG/DL (ref 70–99)
GLUCOSE SERPL-MCNC: 161 MG/DL (ref 70–99)
GLUCOSE SERPL-MCNC: 89 MG/DL (ref 70–99)
GLUCOSE UR STRIP.AUTO-MCNC: NEGATIVE MG/DL
GLUCOSE UR STRIP.AUTO-MCNC: NEGATIVE MG/DL
HADV DNA SPEC QL NAA+PROBE: NOT DETECTED
HCO3 BLDV-SCNC: 30.7 MMOL/L (ref 23–28)
HCOV 229E RNA SPEC QL NAA+PROBE: NOT DETECTED
HCOV HKU1 RNA SPEC QL NAA+PROBE: NOT DETECTED
HCOV NL63 RNA SPEC QL NAA+PROBE: NOT DETECTED
HCOV OC43 RNA SPEC QL NAA+PROBE: NOT DETECTED
HCT VFR BLD AUTO: 36 % (ref 35.8–46.3)
HCT VFR BLD AUTO: 36.4 % (ref 35.8–46.3)
HCT VFR BLD AUTO: 39.7 % (ref 35.8–46.3)
HCT VFR BLD AUTO: 40 % (ref 35.8–46.3)
HCT VFR BLD AUTO: 40.4 % (ref 35.8–46.3)
HCT VFR BLD AUTO: 40.7 % (ref 35.8–46.3)
HCT VFR BLD AUTO: 41.5 % (ref 35.8–46.3)
HCT VFR BLD AUTO: 41.8 % (ref 35.8–46.3)
HCT VFR BLD AUTO: 42.1 % (ref 35.8–46.3)
HCT VFR BLD AUTO: 42.8 % (ref 35.8–46.3)
HCT VFR BLD AUTO: 42.8 % (ref 35.8–46.3)
HCT VFR BLD AUTO: 44.1 % (ref 35.8–46.3)
HGB BLD-MCNC: 12.3 G/DL (ref 11.7–15.4)
HGB BLD-MCNC: 12.6 G/DL (ref 11.7–15.4)
HGB BLD-MCNC: 13.3 G/DL (ref 11.7–15.4)
HGB BLD-MCNC: 13.4 G/DL (ref 11.7–15.4)
HGB BLD-MCNC: 13.4 G/DL (ref 11.7–15.4)
HGB BLD-MCNC: 13.6 G/DL (ref 11.7–15.4)
HGB BLD-MCNC: 13.9 G/DL (ref 11.7–15.4)
HGB BLD-MCNC: 13.9 G/DL (ref 11.7–15.4)
HGB BLD-MCNC: 14.1 G/DL (ref 11.7–15.4)
HGB BLD-MCNC: 14.3 G/DL (ref 11.7–15.4)
HGB BLD-MCNC: 14.8 G/DL (ref 11.7–15.4)
HGB BLD-MCNC: 14.8 G/DL (ref 11.7–15.4)
HGB UR QL STRIP: ABNORMAL
HGB UR QL STRIP: NEGATIVE
HMPV RNA SPEC QL NAA+PROBE: NOT DETECTED
HPIV1 RNA SPEC QL NAA+PROBE: NOT DETECTED
HPIV2 RNA SPEC QL NAA+PROBE: NOT DETECTED
HPIV3 RNA SPEC QL NAA+PROBE: NOT DETECTED
HPIV4 RNA SPEC QL NAA+PROBE: NOT DETECTED
IMM GRANULOCYTES # BLD AUTO: 0.3 K/UL (ref 0–0.5)
IMM GRANULOCYTES # BLD AUTO: 0.38 K/UL (ref 0–0.5)
IMM GRANULOCYTES # BLD AUTO: 0.45 K/UL (ref 0–0.5)
IMM GRANULOCYTES # BLD AUTO: 0.46 K/UL (ref 0–0.5)
IMM GRANULOCYTES # BLD AUTO: 0.56 K/UL (ref 0–0.5)
IMM GRANULOCYTES # BLD AUTO: 0.66 K/UL (ref 0–0.5)
IMM GRANULOCYTES # BLD AUTO: 0.68 K/UL (ref 0–0.5)
IMM GRANULOCYTES # BLD AUTO: 0.75 K/UL (ref 0–0.5)
IMM GRANULOCYTES # BLD AUTO: 0.87 K/UL (ref 0–0.5)
IMM GRANULOCYTES # BLD AUTO: 0.88 K/UL (ref 0–0.5)
IMM GRANULOCYTES # BLD AUTO: 0.92 K/UL (ref 0–0.5)
IMM GRANULOCYTES # BLD AUTO: 1.12 K/UL (ref 0–0.5)
IMM GRANULOCYTES NFR BLD AUTO: 2.6 % (ref 0–5)
IMM GRANULOCYTES NFR BLD AUTO: 2.8 % (ref 0–5)
IMM GRANULOCYTES NFR BLD AUTO: 3 % (ref 0–5)
IMM GRANULOCYTES NFR BLD AUTO: 3 % (ref 0–5)
IMM GRANULOCYTES NFR BLD AUTO: 3.2 % (ref 0–5)
IMM GRANULOCYTES NFR BLD AUTO: 3.6 % (ref 0–5)
IMM GRANULOCYTES NFR BLD AUTO: 3.9 % (ref 0–5)
IMM GRANULOCYTES NFR BLD AUTO: 4.1 % (ref 0–5)
IMM GRANULOCYTES NFR BLD AUTO: 4.5 % (ref 0–5)
IMM GRANULOCYTES NFR BLD AUTO: 5 % (ref 0–5)
IMM GRANULOCYTES NFR BLD AUTO: 5.2 % (ref 0–5)
IMM GRANULOCYTES NFR BLD AUTO: 5.6 % (ref 0–5)
KETONES UR QL STRIP.AUTO: 15 MG/DL
KETONES UR QL STRIP.AUTO: ABNORMAL MG/DL
LACTATE SERPL-SCNC: 1.4 MMOL/L (ref 0.5–2)
LACTATE SERPL-SCNC: 1.9 MMOL/L (ref 0.5–2)
LEUKOCYTE ESTERASE UR QL STRIP.AUTO: ABNORMAL
LEUKOCYTE ESTERASE UR QL STRIP.AUTO: ABNORMAL
LYMPHOCYTES # BLD: 0.67 K/UL (ref 0.5–4.6)
LYMPHOCYTES # BLD: 0.72 K/UL (ref 0.5–4.6)
LYMPHOCYTES # BLD: 0.74 K/UL (ref 0.5–4.6)
LYMPHOCYTES # BLD: 0.75 K/UL (ref 0.5–4.6)
LYMPHOCYTES # BLD: 0.8 K/UL (ref 0.5–4.6)
LYMPHOCYTES # BLD: 0.91 K/UL (ref 0.5–4.6)
LYMPHOCYTES # BLD: 1.11 K/UL (ref 0.5–4.6)
LYMPHOCYTES # BLD: 1.11 K/UL (ref 0.5–4.6)
LYMPHOCYTES # BLD: 1.18 K/UL (ref 0.5–4.6)
LYMPHOCYTES # BLD: 1.19 K/UL (ref 0.5–4.6)
LYMPHOCYTES # BLD: 1.43 K/UL (ref 0.5–4.6)
LYMPHOCYTES # BLD: 1.62 K/UL (ref 0.5–4.6)
LYMPHOCYTES NFR BLD: 10 % (ref 13–44)
LYMPHOCYTES NFR BLD: 3.8 % (ref 13–44)
LYMPHOCYTES NFR BLD: 4.4 % (ref 13–44)
LYMPHOCYTES NFR BLD: 4.6 % (ref 13–44)
LYMPHOCYTES NFR BLD: 4.7 % (ref 13–44)
LYMPHOCYTES NFR BLD: 5.2 % (ref 13–44)
LYMPHOCYTES NFR BLD: 5.3 % (ref 13–44)
LYMPHOCYTES NFR BLD: 5.3 % (ref 13–44)
LYMPHOCYTES NFR BLD: 6.4 % (ref 13–44)
LYMPHOCYTES NFR BLD: 7.9 % (ref 13–44)
LYMPHOCYTES NFR BLD: 8.1 % (ref 13–44)
LYMPHOCYTES NFR BLD: 8.1 % (ref 13–44)
M PNEUMO DNA SPEC QL NAA+PROBE: NOT DETECTED
MAGNESIUM SERPL-MCNC: 1.8 MG/DL (ref 1.8–2.4)
MAGNESIUM SERPL-MCNC: 1.9 MG/DL (ref 1.8–2.4)
MCH RBC QN AUTO: 30.9 PG (ref 26.1–32.9)
MCH RBC QN AUTO: 31 PG (ref 26.1–32.9)
MCH RBC QN AUTO: 31.2 PG (ref 26.1–32.9)
MCH RBC QN AUTO: 31.3 PG (ref 26.1–32.9)
MCH RBC QN AUTO: 31.4 PG (ref 26.1–32.9)
MCH RBC QN AUTO: 31.6 PG (ref 26.1–32.9)
MCH RBC QN AUTO: 31.7 PG (ref 26.1–32.9)
MCH RBC QN AUTO: 31.8 PG (ref 26.1–32.9)
MCHC RBC AUTO-ENTMCNC: 32.7 G/DL (ref 31.4–35)
MCHC RBC AUTO-ENTMCNC: 32.9 G/DL (ref 31.4–35)
MCHC RBC AUTO-ENTMCNC: 33 G/DL (ref 31.4–35)
MCHC RBC AUTO-ENTMCNC: 33.2 G/DL (ref 31.4–35)
MCHC RBC AUTO-ENTMCNC: 33.5 G/DL (ref 31.4–35)
MCHC RBC AUTO-ENTMCNC: 33.6 G/DL (ref 31.4–35)
MCHC RBC AUTO-ENTMCNC: 33.8 G/DL (ref 31.4–35)
MCHC RBC AUTO-ENTMCNC: 34 G/DL (ref 31.4–35)
MCHC RBC AUTO-ENTMCNC: 34.2 G/DL (ref 31.4–35)
MCHC RBC AUTO-ENTMCNC: 34.2 G/DL (ref 31.4–35)
MCHC RBC AUTO-ENTMCNC: 34.6 G/DL (ref 31.4–35)
MCHC RBC AUTO-ENTMCNC: 34.6 G/DL (ref 31.4–35)
MCV RBC AUTO: 89.7 FL (ref 82–102)
MCV RBC AUTO: 91.2 FL (ref 82–102)
MCV RBC AUTO: 91.3 FL (ref 82–102)
MCV RBC AUTO: 92.3 FL (ref 82–102)
MCV RBC AUTO: 93 FL (ref 82–102)
MCV RBC AUTO: 93 FL (ref 82–102)
MCV RBC AUTO: 93.2 FL (ref 82–102)
MCV RBC AUTO: 93.9 FL (ref 82–102)
MCV RBC AUTO: 94.6 FL (ref 82–102)
MCV RBC AUTO: 94.7 FL (ref 82–102)
MCV RBC AUTO: 94.9 FL (ref 82–102)
MCV RBC AUTO: 95 FL (ref 82–102)
MONOCYTES # BLD: 0.49 K/UL (ref 0.1–1.3)
MONOCYTES # BLD: 1.25 K/UL (ref 0.1–1.3)
MONOCYTES # BLD: 1.74 K/UL (ref 0.1–1.3)
MONOCYTES # BLD: 1.79 K/UL (ref 0.1–1.3)
MONOCYTES # BLD: 1.79 K/UL (ref 0.1–1.3)
MONOCYTES # BLD: 1.86 K/UL (ref 0.1–1.3)
MONOCYTES # BLD: 1.97 K/UL (ref 0.1–1.3)
MONOCYTES # BLD: 2 K/UL (ref 0.1–1.3)
MONOCYTES # BLD: 2.41 K/UL (ref 0.1–1.3)
MONOCYTES # BLD: 2.79 K/UL (ref 0.1–1.3)
MONOCYTES # BLD: 3.17 K/UL (ref 0.1–1.3)
MONOCYTES # BLD: 3.52 K/UL (ref 0.1–1.3)
MONOCYTES NFR BLD: 10.8 % (ref 4–12)
MONOCYTES NFR BLD: 11 % (ref 4–12)
MONOCYTES NFR BLD: 12.4 % (ref 4–12)
MONOCYTES NFR BLD: 13.1 % (ref 4–12)
MONOCYTES NFR BLD: 13.2 % (ref 4–12)
MONOCYTES NFR BLD: 13.3 % (ref 4–12)
MONOCYTES NFR BLD: 15.2 % (ref 4–12)
MONOCYTES NFR BLD: 17.6 % (ref 4–12)
MONOCYTES NFR BLD: 18 % (ref 4–12)
MONOCYTES NFR BLD: 3.9 % (ref 4–12)
MONOCYTES NFR BLD: 7.2 % (ref 4–12)
MONOCYTES NFR BLD: 9.2 % (ref 4–12)
MUCOUS THREADS URNS QL MICRO: 0 /LPF
MUCOUS THREADS URNS QL MICRO: ABNORMAL /LPF
NEUTS SEG # BLD: 10.41 K/UL (ref 1.7–8.2)
NEUTS SEG # BLD: 10.79 K/UL (ref 1.7–8.2)
NEUTS SEG # BLD: 11.36 K/UL (ref 1.7–8.2)
NEUTS SEG # BLD: 11.84 K/UL (ref 1.7–8.2)
NEUTS SEG # BLD: 13.07 K/UL (ref 1.7–8.2)
NEUTS SEG # BLD: 13.45 K/UL (ref 1.7–8.2)
NEUTS SEG # BLD: 13.58 K/UL (ref 1.7–8.2)
NEUTS SEG # BLD: 14.47 K/UL (ref 1.7–8.2)
NEUTS SEG # BLD: 15.07 K/UL (ref 1.7–8.2)
NEUTS SEG # BLD: 16.21 K/UL (ref 1.7–8.2)
NEUTS SEG # BLD: 17.61 K/UL (ref 1.7–8.2)
NEUTS SEG # BLD: 8.4 K/UL (ref 1.7–8.2)
NEUTS SEG NFR BLD: 67.3 % (ref 43–78)
NEUTS SEG NFR BLD: 67.9 % (ref 43–78)
NEUTS SEG NFR BLD: 71.4 % (ref 43–78)
NEUTS SEG NFR BLD: 75.2 % (ref 43–78)
NEUTS SEG NFR BLD: 76.4 % (ref 43–78)
NEUTS SEG NFR BLD: 77.1 % (ref 43–78)
NEUTS SEG NFR BLD: 77.7 % (ref 43–78)
NEUTS SEG NFR BLD: 79.5 % (ref 43–78)
NEUTS SEG NFR BLD: 81 % (ref 43–78)
NEUTS SEG NFR BLD: 82.1 % (ref 43–78)
NEUTS SEG NFR BLD: 83.1 % (ref 43–78)
NEUTS SEG NFR BLD: 86.2 % (ref 43–78)
NITRITE UR QL STRIP.AUTO: NEGATIVE
NITRITE UR QL STRIP.AUTO: POSITIVE
NRBC # BLD: 0 K/UL (ref 0–0.2)
NT PRO BNP: 2910 PG/ML (ref 0–450)
OTHER OBSERVATIONS: ABNORMAL
PCO2 BLDV: 51.2 MMHG (ref 41–51)
PH BLDV: 7.39 (ref 7.32–7.42)
PH UR STRIP: 5.5 (ref 5–9)
PH UR STRIP: 6 (ref 5–9)
PLATELET # BLD AUTO: 200 K/UL (ref 150–450)
PLATELET # BLD AUTO: 264 K/UL (ref 150–450)
PLATELET # BLD AUTO: 273 K/UL (ref 150–450)
PLATELET # BLD AUTO: 278 K/UL (ref 150–450)
PLATELET # BLD AUTO: 291 K/UL (ref 150–450)
PLATELET # BLD AUTO: 298 K/UL (ref 150–450)
PLATELET # BLD AUTO: 298 K/UL (ref 150–450)
PLATELET # BLD AUTO: 330 K/UL (ref 150–450)
PLATELET # BLD AUTO: 334 K/UL (ref 150–450)
PLATELET # BLD AUTO: 340 K/UL (ref 150–450)
PLATELET # BLD AUTO: 357 K/UL (ref 150–450)
PLATELET # BLD AUTO: 382 K/UL (ref 150–450)
PLATELET COMMENT: ADEQUATE
PMV BLD AUTO: 10 FL (ref 9.4–12.3)
PMV BLD AUTO: 10.1 FL (ref 9.4–12.3)
PMV BLD AUTO: 10.1 FL (ref 9.4–12.3)
PMV BLD AUTO: 10.4 FL (ref 9.4–12.3)
PMV BLD AUTO: 10.5 FL (ref 9.4–12.3)
PMV BLD AUTO: 11.3 FL (ref 9.4–12.3)
PMV BLD AUTO: 11.3 FL (ref 9.4–12.3)
PMV BLD AUTO: 11.7 FL (ref 9.4–12.3)
PMV BLD AUTO: 9.6 FL (ref 9.4–12.3)
PMV BLD AUTO: 9.7 FL (ref 9.4–12.3)
PMV BLD AUTO: 9.8 FL (ref 9.4–12.3)
PMV BLD AUTO: 9.8 FL (ref 9.4–12.3)
PO2 BLDV: 49 MMHG
POC FIO2: 2
POTASSIUM SERPL-SCNC: 3.2 MMOL/L (ref 3.5–5.1)
POTASSIUM SERPL-SCNC: 3.4 MMOL/L (ref 3.5–5.1)
POTASSIUM SERPL-SCNC: 3.5 MMOL/L (ref 3.5–5.1)
POTASSIUM SERPL-SCNC: 3.6 MMOL/L (ref 3.5–5.1)
POTASSIUM SERPL-SCNC: 3.8 MMOL/L (ref 3.5–5.1)
POTASSIUM SERPL-SCNC: 3.8 MMOL/L (ref 3.5–5.1)
POTASSIUM SERPL-SCNC: 3.9 MMOL/L (ref 3.5–5.1)
POTASSIUM SERPL-SCNC: 4 MMOL/L (ref 3.5–5.1)
POTASSIUM SERPL-SCNC: 4.1 MMOL/L (ref 3.5–5.1)
POTASSIUM SERPL-SCNC: 4.4 MMOL/L (ref 3.5–5.1)
PROCALCITONIN SERPL-MCNC: 0.04 NG/ML (ref 0–0.1)
PROCALCITONIN SERPL-MCNC: 0.14 NG/ML (ref 0–0.1)
PROT SERPL-MCNC: 6 G/DL (ref 6.3–8.2)
PROT UR STRIP-MCNC: 100 MG/DL
PROT UR STRIP-MCNC: 100 MG/DL
RBC # BLD AUTO: 3.87 M/UL (ref 4.05–5.2)
RBC # BLD AUTO: 3.99 M/UL (ref 4.05–5.2)
RBC # BLD AUTO: 4.27 M/UL (ref 4.05–5.2)
RBC # BLD AUTO: 4.29 M/UL (ref 4.05–5.2)
RBC # BLD AUTO: 4.3 M/UL (ref 4.05–5.2)
RBC # BLD AUTO: 4.3 M/UL (ref 4.05–5.2)
RBC # BLD AUTO: 4.42 M/UL (ref 4.05–5.2)
RBC # BLD AUTO: 4.43 M/UL (ref 4.05–5.2)
RBC # BLD AUTO: 4.51 M/UL (ref 4.05–5.2)
RBC # BLD AUTO: 4.58 M/UL (ref 4.05–5.2)
RBC # BLD AUTO: 4.74 M/UL (ref 4.05–5.2)
RBC # BLD AUTO: 4.77 M/UL (ref 4.05–5.2)
RBC #/AREA URNS HPF: ABNORMAL /HPF
RBC #/AREA URNS HPF: ABNORMAL /HPF
RBC MORPH BLD: ABNORMAL
RSV RNA SPEC QL NAA+PROBE: NOT DETECTED
RV+EV RNA SPEC QL NAA+PROBE: NOT DETECTED
SAO2 % BLDV: 82.9 % (ref 65–88)
SARS-COV-2 RNA RESP QL NAA+PROBE: NOT DETECTED
SERVICE CMNT-IMP: ABNORMAL
SERVICE CMNT-IMP: NORMAL
SODIUM SERPL-SCNC: 133 MMOL/L (ref 136–145)
SODIUM SERPL-SCNC: 133 MMOL/L (ref 136–145)
SODIUM SERPL-SCNC: 135 MMOL/L (ref 136–145)
SODIUM SERPL-SCNC: 137 MMOL/L (ref 136–145)
SODIUM SERPL-SCNC: 138 MMOL/L (ref 136–145)
SODIUM SERPL-SCNC: 138 MMOL/L (ref 136–145)
SODIUM SERPL-SCNC: 139 MMOL/L (ref 136–145)
SODIUM SERPL-SCNC: 139 MMOL/L (ref 136–145)
SODIUM SERPL-SCNC: 140 MMOL/L (ref 136–145)
SODIUM SERPL-SCNC: 144 MMOL/L (ref 136–145)
SODIUM SERPL-SCNC: 144 MMOL/L (ref 136–145)
SODIUM SERPL-SCNC: 147 MMOL/L (ref 136–145)
SP GR UR REFRACTOMETRY: 1.02 (ref 1–1.02)
SP GR UR REFRACTOMETRY: 1.02 (ref 1–1.02)
SPECIMEN TYPE: ABNORMAL
TROPONIN T SERPL HS-MCNC: 19 NG/L (ref 0–14)
TROPONIN T SERPL HS-MCNC: 21 NG/L (ref 0–14)
TSH W FREE THYROID IF ABNORMAL: 3.8 UIU/ML (ref 0.27–4.2)
URINE CULTURE IF INDICATED: ABNORMAL
URINE CULTURE IF INDICATED: ABNORMAL
UROBILINOGEN UR QL STRIP.AUTO: 0.2 EU/DL (ref 0.2–1)
UROBILINOGEN UR QL STRIP.AUTO: 1 EU/DL (ref 0.2–1)
VIT B12 SERPL-MCNC: >4000 PG/ML (ref 193–986)
WBC # BLD AUTO: 11.8 K/UL (ref 4.3–11.1)
WBC # BLD AUTO: 12.5 K/UL (ref 4.3–11.1)
WBC # BLD AUTO: 13.6 K/UL (ref 4.3–11.1)
WBC # BLD AUTO: 15.1 K/UL (ref 4.3–11.1)
WBC # BLD AUTO: 16.1 K/UL (ref 4.3–11.1)
WBC # BLD AUTO: 16.9 K/UL (ref 4.3–11.1)
WBC # BLD AUTO: 17.4 K/UL (ref 4.3–11.1)
WBC # BLD AUTO: 17.6 K/UL (ref 4.3–11.1)
WBC # BLD AUTO: 19.4 K/UL (ref 4.3–11.1)
WBC # BLD AUTO: 20 K/UL (ref 4.3–11.1)
WBC # BLD AUTO: 21 K/UL (ref 4.3–11.1)
WBC # BLD AUTO: 21.4 K/UL (ref 4.3–11.1)
WBC MORPH BLD: ABNORMAL
WBC URNS QL MICRO: ABNORMAL /HPF
WBC URNS QL MICRO: ABNORMAL /HPF

## 2025-01-01 PROCEDURE — 36415 COLL VENOUS BLD VENIPUNCTURE: CPT

## 2025-01-01 PROCEDURE — 85025 COMPLETE CBC W/AUTO DIFF WBC: CPT

## 2025-01-01 PROCEDURE — 6360000002 HC RX W HCPCS: Performed by: HOSPITALIST

## 2025-01-01 PROCEDURE — 92523 SPEECH SOUND LANG COMPREHEN: CPT

## 2025-01-01 PROCEDURE — 2500000003 HC RX 250 WO HCPCS: Performed by: NURSE PRACTITIONER

## 2025-01-01 PROCEDURE — 2500000003 HC RX 250 WO HCPCS: Performed by: HOSPITALIST

## 2025-01-01 PROCEDURE — 2500000003 HC RX 250 WO HCPCS: Performed by: INTERNAL MEDICINE

## 2025-01-01 PROCEDURE — 6370000000 HC RX 637 (ALT 250 FOR IP): Performed by: INTERNAL MEDICINE

## 2025-01-01 PROCEDURE — 1100000003 HC PRIVATE W/ TELEMETRY

## 2025-01-01 PROCEDURE — 92526 ORAL FUNCTION THERAPY: CPT

## 2025-01-01 PROCEDURE — 6360000002 HC RX W HCPCS: Performed by: FAMILY MEDICINE

## 2025-01-01 PROCEDURE — 2580000003 HC RX 258: Performed by: INTERNAL MEDICINE

## 2025-01-01 PROCEDURE — 93971 EXTREMITY STUDY: CPT

## 2025-01-01 PROCEDURE — 76937 US GUIDE VASCULAR ACCESS: CPT

## 2025-01-01 PROCEDURE — 97166 OT EVAL MOD COMPLEX 45 MIN: CPT

## 2025-01-01 PROCEDURE — 6370000000 HC RX 637 (ALT 250 FOR IP): Performed by: HOSPITALIST

## 2025-01-01 PROCEDURE — 71045 X-RAY EXAM CHEST 1 VIEW: CPT

## 2025-01-01 PROCEDURE — 6370000000 HC RX 637 (ALT 250 FOR IP): Performed by: NURSE PRACTITIONER

## 2025-01-01 PROCEDURE — 70450 CT HEAD/BRAIN W/O DYE: CPT

## 2025-01-01 PROCEDURE — 84484 ASSAY OF TROPONIN QUANT: CPT

## 2025-01-01 PROCEDURE — 93005 ELECTROCARDIOGRAM TRACING: CPT | Performed by: INTERNAL MEDICINE

## 2025-01-01 PROCEDURE — 99232 SBSQ HOSP IP/OBS MODERATE 35: CPT | Performed by: INTERNAL MEDICINE

## 2025-01-01 PROCEDURE — 2700000000 HC OXYGEN THERAPY PER DAY

## 2025-01-01 PROCEDURE — 92507 TX SP LANG VOICE COMM INDIV: CPT

## 2025-01-01 PROCEDURE — 6360000002 HC RX W HCPCS: Performed by: INTERNAL MEDICINE

## 2025-01-01 PROCEDURE — 82962 GLUCOSE BLOOD TEST: CPT

## 2025-01-01 PROCEDURE — 80048 BASIC METABOLIC PNL TOTAL CA: CPT

## 2025-01-01 PROCEDURE — 93280 PM DEVICE PROGR EVAL DUAL: CPT | Performed by: INTERNAL MEDICINE

## 2025-01-01 PROCEDURE — 87088 URINE BACTERIA CULTURE: CPT

## 2025-01-01 PROCEDURE — 81001 URINALYSIS AUTO W/SCOPE: CPT

## 2025-01-01 PROCEDURE — 87186 SC STD MICRODIL/AGAR DIL: CPT

## 2025-01-01 PROCEDURE — 83735 ASSAY OF MAGNESIUM: CPT

## 2025-01-01 PROCEDURE — 83605 ASSAY OF LACTIC ACID: CPT

## 2025-01-01 PROCEDURE — 84443 ASSAY THYROID STIM HORMONE: CPT

## 2025-01-01 PROCEDURE — 2580000003 HC RX 258: Performed by: FAMILY MEDICINE

## 2025-01-01 PROCEDURE — 2500000003 HC RX 250 WO HCPCS: Performed by: FAMILY MEDICINE

## 2025-01-01 PROCEDURE — 2580000003 HC RX 258: Performed by: HOSPITALIST

## 2025-01-01 PROCEDURE — 87040 BLOOD CULTURE FOR BACTERIA: CPT

## 2025-01-01 PROCEDURE — 93010 ELECTROCARDIOGRAM REPORT: CPT | Performed by: INTERNAL MEDICINE

## 2025-01-01 PROCEDURE — 2400000000

## 2025-01-01 PROCEDURE — 51798 US URINE CAPACITY MEASURE: CPT

## 2025-01-01 PROCEDURE — 83880 ASSAY OF NATRIURETIC PEPTIDE: CPT

## 2025-01-01 PROCEDURE — 2580000003 HC RX 258: Performed by: NURSE PRACTITIONER

## 2025-01-01 PROCEDURE — 96375 TX/PRO/DX INJ NEW DRUG ADDON: CPT

## 2025-01-01 PROCEDURE — 96361 HYDRATE IV INFUSION ADD-ON: CPT

## 2025-01-01 PROCEDURE — 74018 RADEX ABDOMEN 1 VIEW: CPT

## 2025-01-01 PROCEDURE — 2500000003 HC RX 250 WO HCPCS: Performed by: EMERGENCY MEDICINE

## 2025-01-01 PROCEDURE — 6370000000 HC RX 637 (ALT 250 FOR IP): Performed by: FAMILY MEDICINE

## 2025-01-01 PROCEDURE — 93005 ELECTROCARDIOGRAM TRACING: CPT | Performed by: FAMILY MEDICINE

## 2025-01-01 PROCEDURE — 94762 N-INVAS EAR/PLS OXIMTRY CONT: CPT

## 2025-01-01 PROCEDURE — 93005 ELECTROCARDIOGRAM TRACING: CPT | Performed by: NURSE PRACTITIONER

## 2025-01-01 PROCEDURE — 82746 ASSAY OF FOLIC ACID SERUM: CPT

## 2025-01-01 PROCEDURE — 82803 BLOOD GASES ANY COMBINATION: CPT

## 2025-01-01 PROCEDURE — 99223 1ST HOSP IP/OBS HIGH 75: CPT | Performed by: INTERNAL MEDICINE

## 2025-01-01 PROCEDURE — 97530 THERAPEUTIC ACTIVITIES: CPT

## 2025-01-01 PROCEDURE — 51702 INSERT TEMP BLADDER CATH: CPT

## 2025-01-01 PROCEDURE — 97535 SELF CARE MNGMENT TRAINING: CPT

## 2025-01-01 PROCEDURE — 93005 ELECTROCARDIOGRAM TRACING: CPT | Performed by: EMERGENCY MEDICINE

## 2025-01-01 PROCEDURE — 6360000002 HC RX W HCPCS: Performed by: EMERGENCY MEDICINE

## 2025-01-01 PROCEDURE — 92610 EVALUATE SWALLOWING FUNCTION: CPT

## 2025-01-01 PROCEDURE — 84145 PROCALCITONIN (PCT): CPT

## 2025-01-01 PROCEDURE — 93971 EXTREMITY STUDY: CPT | Performed by: RADIOLOGY

## 2025-01-01 PROCEDURE — 51701 INSERT BLADDER CATHETER: CPT

## 2025-01-01 PROCEDURE — 80053 COMPREHEN METABOLIC PANEL: CPT

## 2025-01-01 PROCEDURE — 87086 URINE CULTURE/COLONY COUNT: CPT

## 2025-01-01 PROCEDURE — 99221 1ST HOSP IP/OBS SF/LOW 40: CPT | Performed by: STUDENT IN AN ORGANIZED HEALTH CARE EDUCATION/TRAINING PROGRAM

## 2025-01-01 PROCEDURE — 2580000003 HC RX 258: Performed by: EMERGENCY MEDICINE

## 2025-01-01 PROCEDURE — 97162 PT EVAL MOD COMPLEX 30 MIN: CPT

## 2025-01-01 PROCEDURE — 94760 N-INVAS EAR/PLS OXIMETRY 1: CPT

## 2025-01-01 PROCEDURE — 82607 VITAMIN B-12: CPT

## 2025-01-01 PROCEDURE — 82140 ASSAY OF AMMONIA: CPT

## 2025-01-01 PROCEDURE — 96374 THER/PROPH/DIAG INJ IV PUSH: CPT

## 2025-01-01 PROCEDURE — 0202U NFCT DS 22 TRGT SARS-COV-2: CPT

## 2025-01-01 PROCEDURE — 99222 1ST HOSP IP/OBS MODERATE 55: CPT | Performed by: INTERNAL MEDICINE

## 2025-01-01 PROCEDURE — 99285 EMERGENCY DEPT VISIT HI MDM: CPT

## 2025-01-01 RX ORDER — METOPROLOL TARTRATE 1 MG/ML
5 INJECTION, SOLUTION INTRAVENOUS ONCE
Status: COMPLETED | OUTPATIENT
Start: 2025-01-01 | End: 2025-01-01

## 2025-01-01 RX ORDER — SODIUM CHLORIDE 0.9 % (FLUSH) 0.9 %
5-40 SYRINGE (ML) INJECTION EVERY 12 HOURS SCHEDULED
Status: DISCONTINUED | OUTPATIENT
Start: 2025-01-01 | End: 2025-01-01

## 2025-01-01 RX ORDER — METOPROLOL TARTRATE 1 MG/ML
5 INJECTION, SOLUTION INTRAVENOUS EVERY 6 HOURS PRN
Status: DISCONTINUED | OUTPATIENT
Start: 2025-01-01 | End: 2025-01-01

## 2025-01-01 RX ORDER — FLUTICASONE PROPIONATE 50 MCG
1 SPRAY, SUSPENSION (ML) NASAL DAILY PRN
Status: DISCONTINUED | OUTPATIENT
Start: 2025-01-01 | End: 2025-01-01 | Stop reason: HOSPADM

## 2025-01-01 RX ORDER — MORPHINE SULFATE 2 MG/ML
2 INJECTION, SOLUTION INTRAMUSCULAR; INTRAVENOUS EVERY 4 HOURS PRN
Status: DISCONTINUED | OUTPATIENT
Start: 2025-01-01 | End: 2025-01-01

## 2025-01-01 RX ORDER — DILTIAZEM HYDROCHLORIDE 5 MG/ML
5 INJECTION INTRAVENOUS ONCE
Status: COMPLETED | OUTPATIENT
Start: 2025-01-01 | End: 2025-01-01

## 2025-01-01 RX ORDER — FUROSEMIDE 10 MG/ML
40 INJECTION INTRAMUSCULAR; INTRAVENOUS 2 TIMES DAILY
Status: DISCONTINUED | OUTPATIENT
Start: 2025-01-01 | End: 2025-01-01

## 2025-01-01 RX ORDER — DILTIAZEM HYDROCHLORIDE 5 MG/ML
20 INJECTION INTRAVENOUS ONCE
Status: COMPLETED | OUTPATIENT
Start: 2025-01-01 | End: 2025-01-01

## 2025-01-01 RX ORDER — HYDROCORTISONE SODIUM SUCCINATE 100 MG/2ML
100 INJECTION INTRAMUSCULAR; INTRAVENOUS EVERY 6 HOURS
Status: COMPLETED | OUTPATIENT
Start: 2025-01-01 | End: 2025-01-01

## 2025-01-01 RX ORDER — QUETIAPINE FUMARATE 25 MG/1
12.5 TABLET, FILM COATED ORAL EVERY 6 HOURS PRN
Status: DISCONTINUED | OUTPATIENT
Start: 2025-01-01 | End: 2025-01-01

## 2025-01-01 RX ORDER — ASPIRIN 81 MG/1
81 TABLET, CHEWABLE ORAL DAILY
Status: DISCONTINUED | OUTPATIENT
Start: 2025-01-01 | End: 2025-01-01

## 2025-01-01 RX ORDER — GLYCOPYRROLATE 0.2 MG/ML
0.2 INJECTION INTRAMUSCULAR; INTRAVENOUS EVERY 4 HOURS PRN
Status: DISCONTINUED | OUTPATIENT
Start: 2025-01-01 | End: 2025-01-01 | Stop reason: HOSPADM

## 2025-01-01 RX ORDER — THIAMINE HYDROCHLORIDE 100 MG/ML
100 INJECTION, SOLUTION INTRAMUSCULAR; INTRAVENOUS DAILY
Status: DISCONTINUED | OUTPATIENT
Start: 2025-01-01 | End: 2025-01-01

## 2025-01-01 RX ORDER — MENTHOL/CAMPHOR/ALLANTOIN/PHE 0.6-0.5-1%
OINTMENT(EA) TOPICAL PRN
Status: DISCONTINUED | OUTPATIENT
Start: 2025-01-01 | End: 2025-01-01 | Stop reason: HOSPADM

## 2025-01-01 RX ORDER — MORPHINE SULFATE 2 MG/ML
1 INJECTION, SOLUTION INTRAMUSCULAR; INTRAVENOUS EVERY 4 HOURS PRN
Status: DISCONTINUED | OUTPATIENT
Start: 2025-01-01 | End: 2025-01-01

## 2025-01-01 RX ORDER — TROSPIUM CHLORIDE 20 MG/1
20 TABLET, FILM COATED ORAL
Status: DISCONTINUED | OUTPATIENT
Start: 2025-01-01 | End: 2025-01-01

## 2025-01-01 RX ORDER — DILTIAZEM HYDROCHLORIDE 5 MG/ML
10 INJECTION INTRAVENOUS ONCE
Status: COMPLETED | OUTPATIENT
Start: 2025-01-01 | End: 2025-01-01

## 2025-01-01 RX ORDER — SODIUM CHLORIDE 0.9 % (FLUSH) 0.9 %
5-40 SYRINGE (ML) INJECTION PRN
Status: DISCONTINUED | OUTPATIENT
Start: 2025-01-01 | End: 2025-01-01 | Stop reason: HOSPADM

## 2025-01-01 RX ORDER — LANOLIN ALCOHOL/MO/W.PET/CERES
100 CREAM (GRAM) TOPICAL DAILY
Status: DISCONTINUED | OUTPATIENT
Start: 2025-01-01 | End: 2025-01-01

## 2025-01-01 RX ORDER — SODIUM CHLORIDE 9 MG/ML
INJECTION, SOLUTION INTRAVENOUS CONTINUOUS
Status: DISCONTINUED | OUTPATIENT
Start: 2025-01-01 | End: 2025-01-01

## 2025-01-01 RX ORDER — DILTIAZEM HYDROCHLORIDE 120 MG/1
120 CAPSULE, COATED, EXTENDED RELEASE ORAL DAILY
Status: DISCONTINUED | OUTPATIENT
Start: 2025-01-01 | End: 2025-01-01

## 2025-01-01 RX ORDER — ACETAMINOPHEN 325 MG/1
650 TABLET ORAL EVERY 6 HOURS PRN
Status: DISCONTINUED | OUTPATIENT
Start: 2025-01-01 | End: 2025-01-01 | Stop reason: HOSPADM

## 2025-01-01 RX ORDER — SODIUM CHLORIDE 9 MG/ML
INJECTION, SOLUTION INTRAVENOUS PRN
Status: DISCONTINUED | OUTPATIENT
Start: 2025-01-01 | End: 2025-01-01 | Stop reason: HOSPADM

## 2025-01-01 RX ORDER — METOPROLOL TARTRATE 1 MG/ML
5 INJECTION, SOLUTION INTRAVENOUS EVERY 6 HOURS
Status: DISCONTINUED | OUTPATIENT
Start: 2025-01-01 | End: 2025-01-01

## 2025-01-01 RX ORDER — LACTOBACILLUS RHAMNOSUS GG 10B CELL
1 CAPSULE ORAL
Status: DISCONTINUED | OUTPATIENT
Start: 2025-01-01 | End: 2025-01-01

## 2025-01-01 RX ORDER — LANOLIN ALCOHOL/MO/W.PET/CERES
1000 CREAM (GRAM) TOPICAL DAILY
Status: DISCONTINUED | OUTPATIENT
Start: 2025-01-01 | End: 2025-01-01

## 2025-01-01 RX ORDER — METOPROLOL SUCCINATE 25 MG/1
50 TABLET, EXTENDED RELEASE ORAL DAILY
Status: DISCONTINUED | OUTPATIENT
Start: 2025-01-01 | End: 2025-01-01

## 2025-01-01 RX ORDER — POLYETHYLENE GLYCOL 3350 17 G/17G
17 POWDER, FOR SOLUTION ORAL DAILY
Status: DISCONTINUED | OUTPATIENT
Start: 2025-01-01 | End: 2025-01-01

## 2025-01-01 RX ORDER — 0.9 % SODIUM CHLORIDE 0.9 %
1000 INTRAVENOUS SOLUTION INTRAVENOUS ONCE
Status: COMPLETED | OUTPATIENT
Start: 2025-01-01 | End: 2025-01-01

## 2025-01-01 RX ORDER — MORPHINE SULFATE 2 MG/ML
1 INJECTION, SOLUTION INTRAMUSCULAR; INTRAVENOUS EVERY 30 MIN PRN
Status: DISCONTINUED | OUTPATIENT
Start: 2025-01-01 | End: 2025-01-01 | Stop reason: HOSPADM

## 2025-01-01 RX ORDER — METOPROLOL TARTRATE 25 MG/1
25 TABLET, FILM COATED ORAL EVERY 6 HOURS
Status: DISCONTINUED | OUTPATIENT
Start: 2025-01-01 | End: 2025-01-01

## 2025-01-01 RX ORDER — DEXTROSE MONOHYDRATE 50 MG/ML
INJECTION, SOLUTION INTRAVENOUS CONTINUOUS
Status: DISCONTINUED | OUTPATIENT
Start: 2025-01-01 | End: 2025-01-01

## 2025-01-01 RX ORDER — MORPHINE SULFATE 2 MG/ML
1 INJECTION, SOLUTION INTRAMUSCULAR; INTRAVENOUS
Status: DISCONTINUED | OUTPATIENT
Start: 2025-01-01 | End: 2025-01-01

## 2025-01-01 RX ORDER — FUROSEMIDE 10 MG/ML
20 INJECTION INTRAMUSCULAR; INTRAVENOUS ONCE
Status: DISCONTINUED | OUTPATIENT
Start: 2025-01-01 | End: 2025-01-01

## 2025-01-01 RX ORDER — LEVOTHYROXINE SODIUM 88 UG/1
88 TABLET ORAL
Status: DISCONTINUED | OUTPATIENT
Start: 2025-01-01 | End: 2025-01-01

## 2025-01-01 RX ORDER — DOFETILIDE 0.25 MG/1
250 CAPSULE ORAL EVERY 12 HOURS SCHEDULED
Status: DISCONTINUED | OUTPATIENT
Start: 2025-01-01 | End: 2025-01-01

## 2025-01-01 RX ORDER — ACETAMINOPHEN 650 MG/1
650 SUPPOSITORY RECTAL EVERY 6 HOURS PRN
Status: DISCONTINUED | OUTPATIENT
Start: 2025-01-01 | End: 2025-01-01 | Stop reason: HOSPADM

## 2025-01-01 RX ORDER — FAMOTIDINE 20 MG/1
20 TABLET, FILM COATED ORAL 2 TIMES DAILY
Status: DISCONTINUED | OUTPATIENT
Start: 2025-01-01 | End: 2025-01-01

## 2025-01-01 RX ORDER — SENNA AND DOCUSATE SODIUM 50; 8.6 MG/1; MG/1
2 TABLET, FILM COATED ORAL DAILY
Status: DISCONTINUED | OUTPATIENT
Start: 2025-01-01 | End: 2025-01-01

## 2025-01-01 RX ORDER — ATORVASTATIN CALCIUM 40 MG/1
40 TABLET, FILM COATED ORAL NIGHTLY
Status: DISCONTINUED | OUTPATIENT
Start: 2025-01-01 | End: 2025-01-01

## 2025-01-01 RX ORDER — QUETIAPINE FUMARATE 25 MG/1
12.5 TABLET, FILM COATED ORAL EVERY 12 HOURS
Status: DISCONTINUED | OUTPATIENT
Start: 2025-01-01 | End: 2025-01-01

## 2025-01-01 RX ADMIN — WATER 1000 MG: 1 INJECTION INTRAMUSCULAR; INTRAVENOUS; SUBCUTANEOUS at 17:40

## 2025-01-01 RX ADMIN — ASPIRIN 81 MG: 81 TABLET, CHEWABLE ORAL at 08:48

## 2025-01-01 RX ADMIN — QUETIAPINE FUMARATE 12.5 MG: 25 TABLET ORAL at 20:45

## 2025-01-01 RX ADMIN — CYANOCOBALAMIN TAB 1000 MCG 1000 MCG: 1000 TAB at 08:38

## 2025-01-01 RX ADMIN — SODIUM CHLORIDE: 9 INJECTION, SOLUTION INTRAVENOUS at 23:55

## 2025-01-01 RX ADMIN — QUETIAPINE FUMARATE 12.5 MG: 25 TABLET ORAL at 11:45

## 2025-01-01 RX ADMIN — METOROPROLOL TARTRATE 5 MG: 5 INJECTION, SOLUTION INTRAVENOUS at 07:35

## 2025-01-01 RX ADMIN — TROSPIUM CHLORIDE 20 MG: 20 TABLET, FILM COATED ORAL at 17:00

## 2025-01-01 RX ADMIN — ASPIRIN 81 MG: 81 TABLET, CHEWABLE ORAL at 12:11

## 2025-01-01 RX ADMIN — SODIUM CHLORIDE, PRESERVATIVE FREE 10 ML: 5 INJECTION INTRAVENOUS at 22:26

## 2025-01-01 RX ADMIN — ATORVASTATIN CALCIUM 40 MG: 40 TABLET, FILM COATED ORAL at 20:41

## 2025-01-01 RX ADMIN — METOPROLOL SUCCINATE 50 MG: 25 TABLET, EXTENDED RELEASE ORAL at 09:13

## 2025-01-01 RX ADMIN — QUETIAPINE FUMARATE 12.5 MG: 25 TABLET ORAL at 08:58

## 2025-01-01 RX ADMIN — METOROPROLOL TARTRATE 5 MG: 5 INJECTION, SOLUTION INTRAVENOUS at 21:54

## 2025-01-01 RX ADMIN — FAMOTIDINE 20 MG: 10 INJECTION, SOLUTION INTRAVENOUS at 09:28

## 2025-01-01 RX ADMIN — SODIUM CHLORIDE 5 MG/HR: 900 INJECTION, SOLUTION INTRAVENOUS at 06:27

## 2025-01-01 RX ADMIN — LEVOTHYROXINE SODIUM 88 MCG: 0.09 TABLET ORAL at 04:56

## 2025-01-01 RX ADMIN — METOPROLOL TARTRATE 25 MG: 25 TABLET, FILM COATED ORAL at 04:56

## 2025-01-01 RX ADMIN — ASPIRIN 81 MG: 81 TABLET, CHEWABLE ORAL at 08:47

## 2025-01-01 RX ADMIN — MORPHINE SULFATE 2 MG: 2 INJECTION, SOLUTION INTRAMUSCULAR; INTRAVENOUS at 22:54

## 2025-01-01 RX ADMIN — ASPIRIN 81 MG: 81 TABLET, CHEWABLE ORAL at 10:48

## 2025-01-01 RX ADMIN — THIAMINE HYDROCHLORIDE 100 MG: 100 INJECTION, SOLUTION INTRAMUSCULAR; INTRAVENOUS at 08:33

## 2025-01-01 RX ADMIN — SODIUM CHLORIDE 15 MG/HR: 900 INJECTION, SOLUTION INTRAVENOUS at 13:47

## 2025-01-01 RX ADMIN — APIXABAN 5 MG: 5 TABLET, FILM COATED ORAL at 21:49

## 2025-01-01 RX ADMIN — POTASSIUM BICARBONATE 40 MEQ: 782 TABLET, EFFERVESCENT ORAL at 12:33

## 2025-01-01 RX ADMIN — ATORVASTATIN CALCIUM 40 MG: 40 TABLET, FILM COATED ORAL at 20:40

## 2025-01-01 RX ADMIN — APIXABAN 5 MG: 5 TABLET, FILM COATED ORAL at 22:09

## 2025-01-01 RX ADMIN — SODIUM CHLORIDE 10 MG/HR: 900 INJECTION, SOLUTION INTRAVENOUS at 11:08

## 2025-01-01 RX ADMIN — SODIUM CHLORIDE 12.5 MG/HR: 900 INJECTION, SOLUTION INTRAVENOUS at 17:27

## 2025-01-01 RX ADMIN — SODIUM CHLORIDE, PRESERVATIVE FREE 10 ML: 5 INJECTION INTRAVENOUS at 09:12

## 2025-01-01 RX ADMIN — Medication 1 CAPSULE: at 08:47

## 2025-01-01 RX ADMIN — SODIUM CHLORIDE: 9 INJECTION, SOLUTION INTRAVENOUS at 11:12

## 2025-01-01 RX ADMIN — MORPHINE SULFATE 1 MG: 2 INJECTION, SOLUTION INTRAMUSCULAR; INTRAVENOUS at 02:00

## 2025-01-01 RX ADMIN — CHOLECALCIFEROL TAB 125 MCG (5000 UNIT) 5000 UNITS: 125 TAB at 09:23

## 2025-01-01 RX ADMIN — METOPROLOL SUCCINATE 50 MG: 25 TABLET, EXTENDED RELEASE ORAL at 09:23

## 2025-01-01 RX ADMIN — Medication 1 CAPSULE: at 08:38

## 2025-01-01 RX ADMIN — SODIUM CHLORIDE 7.5 MG/HR: 900 INJECTION, SOLUTION INTRAVENOUS at 16:02

## 2025-01-01 RX ADMIN — SODIUM CHLORIDE, PRESERVATIVE FREE 10 ML: 5 INJECTION INTRAVENOUS at 20:50

## 2025-01-01 RX ADMIN — SODIUM CHLORIDE 12.5 MG/HR: 900 INJECTION, SOLUTION INTRAVENOUS at 22:21

## 2025-01-01 RX ADMIN — METOPROLOL TARTRATE 25 MG: 25 TABLET, FILM COATED ORAL at 17:14

## 2025-01-01 RX ADMIN — SODIUM CHLORIDE: 9 INJECTION, SOLUTION INTRAVENOUS at 09:49

## 2025-01-01 RX ADMIN — HYDROCORTISONE SODIUM SUCCINATE 100 MG: 100 INJECTION, POWDER, FOR SOLUTION INTRAMUSCULAR; INTRAVENOUS at 14:07

## 2025-01-01 RX ADMIN — QUETIAPINE FUMARATE 12.5 MG: 25 TABLET ORAL at 20:41

## 2025-01-01 RX ADMIN — DILTIAZEM HYDROCHLORIDE 10 MG: 5 INJECTION, SOLUTION INTRAVENOUS at 10:23

## 2025-01-01 RX ADMIN — WATER 1000 MG: 1 INJECTION INTRAMUSCULAR; INTRAVENOUS; SUBCUTANEOUS at 16:00

## 2025-01-01 RX ADMIN — APIXABAN 5 MG: 5 TABLET, FILM COATED ORAL at 20:24

## 2025-01-01 RX ADMIN — HYDROCORTISONE SODIUM SUCCINATE 100 MG: 100 INJECTION, POWDER, FOR SOLUTION INTRAMUSCULAR; INTRAVENOUS at 09:28

## 2025-01-01 RX ADMIN — DOFETILIDE 250 MCG: 0.25 CAPSULE ORAL at 09:22

## 2025-01-01 RX ADMIN — POLYETHYLENE GLYCOL 3350 17 G: 17 POWDER, FOR SOLUTION ORAL at 14:06

## 2025-01-01 RX ADMIN — DOFETILIDE 250 MCG: 0.25 CAPSULE ORAL at 12:08

## 2025-01-01 RX ADMIN — DOFETILIDE 250 MCG: 0.25 CAPSULE ORAL at 21:47

## 2025-01-01 RX ADMIN — SODIUM CHLORIDE, PRESERVATIVE FREE 10 ML: 5 INJECTION INTRAVENOUS at 10:46

## 2025-01-01 RX ADMIN — METOROPROLOL TARTRATE 5 MG: 5 INJECTION, SOLUTION INTRAVENOUS at 14:12

## 2025-01-01 RX ADMIN — METOROPROLOL TARTRATE 5 MG: 5 INJECTION, SOLUTION INTRAVENOUS at 02:41

## 2025-01-01 RX ADMIN — DOCUSATE SODIUM 50 MG AND SENNOSIDES 8.6 MG 2 TABLET: 8.6; 5 TABLET, FILM COATED ORAL at 14:06

## 2025-01-01 RX ADMIN — QUETIAPINE FUMARATE 12.5 MG: 25 TABLET ORAL at 11:46

## 2025-01-01 RX ADMIN — FAMOTIDINE 20 MG: 10 INJECTION, SOLUTION INTRAVENOUS at 09:04

## 2025-01-01 RX ADMIN — Medication 100 MG: at 12:15

## 2025-01-01 RX ADMIN — ATORVASTATIN CALCIUM 40 MG: 40 TABLET, FILM COATED ORAL at 20:50

## 2025-01-01 RX ADMIN — FAMOTIDINE 20 MG: 10 INJECTION, SOLUTION INTRAVENOUS at 09:22

## 2025-01-01 RX ADMIN — SODIUM CHLORIDE 12.5 MG/HR: 900 INJECTION, SOLUTION INTRAVENOUS at 01:43

## 2025-01-01 RX ADMIN — CHOLECALCIFEROL TAB 125 MCG (5000 UNIT) 5000 UNITS: 125 TAB at 08:47

## 2025-01-01 RX ADMIN — QUETIAPINE FUMARATE 12.5 MG: 25 TABLET ORAL at 08:38

## 2025-01-01 RX ADMIN — SODIUM CHLORIDE, PRESERVATIVE FREE 10 ML: 5 INJECTION INTRAVENOUS at 08:55

## 2025-01-01 RX ADMIN — CYANOCOBALAMIN TAB 1000 MCG 1000 MCG: 1000 TAB at 08:48

## 2025-01-01 RX ADMIN — WATER 5 MG: 1 INJECTION INTRAMUSCULAR; INTRAVENOUS; SUBCUTANEOUS at 06:30

## 2025-01-01 RX ADMIN — QUETIAPINE FUMARATE 12.5 MG: 25 TABLET ORAL at 20:50

## 2025-01-01 RX ADMIN — APIXABAN 5 MG: 5 TABLET, FILM COATED ORAL at 08:48

## 2025-01-01 RX ADMIN — THIAMINE HYDROCHLORIDE 100 MG: 100 INJECTION, SOLUTION INTRAMUSCULAR; INTRAVENOUS at 12:21

## 2025-01-01 RX ADMIN — ATORVASTATIN CALCIUM 40 MG: 40 TABLET, FILM COATED ORAL at 20:45

## 2025-01-01 RX ADMIN — WATER 1000 MG: 1 INJECTION INTRAMUSCULAR; INTRAVENOUS; SUBCUTANEOUS at 15:47

## 2025-01-01 RX ADMIN — SODIUM CHLORIDE, PRESERVATIVE FREE 10 ML: 5 INJECTION INTRAVENOUS at 20:24

## 2025-01-01 RX ADMIN — METOPROLOL SUCCINATE 50 MG: 25 TABLET, EXTENDED RELEASE ORAL at 08:47

## 2025-01-01 RX ADMIN — FAMOTIDINE 20 MG: 20 TABLET, FILM COATED ORAL at 12:13

## 2025-01-01 RX ADMIN — THIAMINE HYDROCHLORIDE 100 MG: 100 INJECTION, SOLUTION INTRAMUSCULAR; INTRAVENOUS at 14:07

## 2025-01-01 RX ADMIN — TROSPIUM CHLORIDE 20 MG: 20 TABLET, FILM COATED ORAL at 17:14

## 2025-01-01 RX ADMIN — Medication 1 CAPSULE: at 09:13

## 2025-01-01 RX ADMIN — QUETIAPINE FUMARATE 12.5 MG: 25 TABLET ORAL at 10:23

## 2025-01-01 RX ADMIN — TROSPIUM CHLORIDE 20 MG: 20 TABLET, FILM COATED ORAL at 05:41

## 2025-01-01 RX ADMIN — MORPHINE SULFATE 2 MG: 2 INJECTION, SOLUTION INTRAMUSCULAR; INTRAVENOUS at 11:07

## 2025-01-01 RX ADMIN — SODIUM CHLORIDE 7.5 MG/HR: 900 INJECTION, SOLUTION INTRAVENOUS at 04:33

## 2025-01-01 RX ADMIN — THIAMINE HYDROCHLORIDE 100 MG: 100 INJECTION, SOLUTION INTRAMUSCULAR; INTRAVENOUS at 08:48

## 2025-01-01 RX ADMIN — ATORVASTATIN CALCIUM 40 MG: 40 TABLET, FILM COATED ORAL at 21:49

## 2025-01-01 RX ADMIN — HYDROCORTISONE SODIUM SUCCINATE 100 MG: 100 INJECTION, POWDER, FOR SOLUTION INTRAMUSCULAR; INTRAVENOUS at 19:38

## 2025-01-01 RX ADMIN — SODIUM CHLORIDE, PRESERVATIVE FREE 10 ML: 5 INJECTION INTRAVENOUS at 19:38

## 2025-01-01 RX ADMIN — SODIUM CHLORIDE 15 MG/HR: 900 INJECTION, SOLUTION INTRAVENOUS at 06:47

## 2025-01-01 RX ADMIN — DILTIAZEM HYDROCHLORIDE 5 MG: 5 INJECTION, SOLUTION INTRAVENOUS at 17:50

## 2025-01-01 RX ADMIN — METOROPROLOL TARTRATE 5 MG: 5 INJECTION, SOLUTION INTRAVENOUS at 10:44

## 2025-01-01 RX ADMIN — ASPIRIN 81 MG: 81 TABLET, CHEWABLE ORAL at 11:48

## 2025-01-01 RX ADMIN — METOROPROLOL TARTRATE 5 MG: 5 INJECTION, SOLUTION INTRAVENOUS at 07:08

## 2025-01-01 RX ADMIN — FAMOTIDINE 20 MG: 10 INJECTION, SOLUTION INTRAVENOUS at 21:25

## 2025-01-01 RX ADMIN — SODIUM CHLORIDE: 9 INJECTION, SOLUTION INTRAVENOUS at 07:15

## 2025-01-01 RX ADMIN — SODIUM CHLORIDE, PRESERVATIVE FREE 10 ML: 5 INJECTION INTRAVENOUS at 07:08

## 2025-01-01 RX ADMIN — MORPHINE SULFATE 1 MG: 2 INJECTION, SOLUTION INTRAMUSCULAR; INTRAVENOUS at 15:32

## 2025-01-01 RX ADMIN — SODIUM CHLORIDE, PRESERVATIVE FREE 10 ML: 5 INJECTION INTRAVENOUS at 21:51

## 2025-01-01 RX ADMIN — SODIUM CHLORIDE, PRESERVATIVE FREE 10 ML: 5 INJECTION INTRAVENOUS at 21:49

## 2025-01-01 RX ADMIN — QUETIAPINE FUMARATE 12.5 MG: 25 TABLET ORAL at 02:20

## 2025-01-01 RX ADMIN — QUETIAPINE FUMARATE 12.5 MG: 25 TABLET ORAL at 21:50

## 2025-01-01 RX ADMIN — DOFETILIDE 250 MCG: 0.25 CAPSULE ORAL at 20:45

## 2025-01-01 RX ADMIN — TROSPIUM CHLORIDE 20 MG: 20 TABLET, FILM COATED ORAL at 08:48

## 2025-01-01 RX ADMIN — APIXABAN 5 MG: 5 TABLET, FILM COATED ORAL at 09:57

## 2025-01-01 RX ADMIN — Medication 1 CAPSULE: at 09:23

## 2025-01-01 RX ADMIN — APIXABAN 5 MG: 5 TABLET, FILM COATED ORAL at 20:55

## 2025-01-01 RX ADMIN — SODIUM CHLORIDE 10 MG/HR: 900 INJECTION, SOLUTION INTRAVENOUS at 16:43

## 2025-01-01 RX ADMIN — DOCUSATE SODIUM 50 MG AND SENNOSIDES 8.6 MG 2 TABLET: 8.6; 5 TABLET, FILM COATED ORAL at 12:17

## 2025-01-01 RX ADMIN — Medication 1 CAPSULE: at 11:46

## 2025-01-01 RX ADMIN — DOFETILIDE 250 MCG: 0.25 CAPSULE ORAL at 20:40

## 2025-01-01 RX ADMIN — CYANOCOBALAMIN TAB 1000 MCG 1000 MCG: 1000 TAB at 09:13

## 2025-01-01 RX ADMIN — DILTIAZEM HYDROCHLORIDE 120 MG: 120 CAPSULE, EXTENDED RELEASE ORAL at 09:23

## 2025-01-01 RX ADMIN — CYANOCOBALAMIN TAB 1000 MCG 1000 MCG: 1000 TAB at 11:45

## 2025-01-01 RX ADMIN — LEVOTHYROXINE SODIUM 88 MCG: 0.09 TABLET ORAL at 05:41

## 2025-01-01 RX ADMIN — TROSPIUM CHLORIDE 20 MG: 20 TABLET, FILM COATED ORAL at 17:40

## 2025-01-01 RX ADMIN — APIXABAN 5 MG: 5 TABLET, FILM COATED ORAL at 09:13

## 2025-01-01 RX ADMIN — APIXABAN 5 MG: 5 TABLET, FILM COATED ORAL at 11:48

## 2025-01-01 RX ADMIN — LEVOTHYROXINE SODIUM 88 MCG: 0.09 TABLET ORAL at 09:13

## 2025-01-01 RX ADMIN — DOFETILIDE 250 MCG: 0.25 CAPSULE ORAL at 22:08

## 2025-01-01 RX ADMIN — FAMOTIDINE 20 MG: 20 TABLET, FILM COATED ORAL at 21:47

## 2025-01-01 RX ADMIN — METOPROLOL SUCCINATE 50 MG: 25 TABLET, EXTENDED RELEASE ORAL at 08:48

## 2025-01-01 RX ADMIN — QUETIAPINE FUMARATE 12.5 MG: 25 TABLET ORAL at 20:40

## 2025-01-01 RX ADMIN — THIAMINE HYDROCHLORIDE 100 MG: 100 INJECTION, SOLUTION INTRAMUSCULAR; INTRAVENOUS at 15:47

## 2025-01-01 RX ADMIN — METOPROLOL TARTRATE 25 MG: 25 TABLET, FILM COATED ORAL at 13:50

## 2025-01-01 RX ADMIN — QUETIAPINE FUMARATE 12.5 MG: 25 TABLET ORAL at 11:03

## 2025-01-01 RX ADMIN — CYANOCOBALAMIN TAB 1000 MCG 1000 MCG: 1000 TAB at 08:47

## 2025-01-01 RX ADMIN — DOFETILIDE 250 MCG: 0.25 CAPSULE ORAL at 20:41

## 2025-01-01 RX ADMIN — DOFETILIDE 250 MCG: 0.25 CAPSULE ORAL at 09:13

## 2025-01-01 RX ADMIN — QUETIAPINE FUMARATE 12.5 MG: 25 TABLET ORAL at 20:24

## 2025-01-01 RX ADMIN — ATORVASTATIN CALCIUM 40 MG: 40 TABLET, FILM COATED ORAL at 22:09

## 2025-01-01 RX ADMIN — CHOLECALCIFEROL TAB 125 MCG (5000 UNIT) 5000 UNITS: 125 TAB at 08:48

## 2025-01-01 RX ADMIN — ATORVASTATIN CALCIUM 40 MG: 40 TABLET, FILM COATED ORAL at 21:48

## 2025-01-01 RX ADMIN — DOCUSATE SODIUM 50 MG AND SENNOSIDES 8.6 MG 2 TABLET: 8.6; 5 TABLET, FILM COATED ORAL at 08:48

## 2025-01-01 RX ADMIN — APIXABAN 5 MG: 5 TABLET, FILM COATED ORAL at 08:39

## 2025-01-01 RX ADMIN — FAMOTIDINE 20 MG: 10 INJECTION, SOLUTION INTRAVENOUS at 08:48

## 2025-01-01 RX ADMIN — CHOLECALCIFEROL TAB 125 MCG (5000 UNIT) 5000 UNITS: 125 TAB at 08:38

## 2025-01-01 RX ADMIN — POTASSIUM BICARBONATE 40 MEQ: 782 TABLET, EFFERVESCENT ORAL at 10:42

## 2025-01-01 RX ADMIN — DOFETILIDE 250 MCG: 0.25 CAPSULE ORAL at 20:50

## 2025-01-01 RX ADMIN — APIXABAN 5 MG: 5 TABLET, FILM COATED ORAL at 12:10

## 2025-01-01 RX ADMIN — SODIUM CHLORIDE, PRESERVATIVE FREE 10 ML: 5 INJECTION INTRAVENOUS at 09:20

## 2025-01-01 RX ADMIN — QUETIAPINE FUMARATE 12.5 MG: 25 TABLET ORAL at 22:39

## 2025-01-01 RX ADMIN — TROSPIUM CHLORIDE 20 MG: 20 TABLET, FILM COATED ORAL at 10:47

## 2025-01-01 RX ADMIN — HYDROCORTISONE SODIUM SUCCINATE 100 MG: 100 INJECTION, POWDER, FOR SOLUTION INTRAMUSCULAR; INTRAVENOUS at 03:51

## 2025-01-01 RX ADMIN — DILTIAZEM HYDROCHLORIDE 20 MG: 5 INJECTION, SOLUTION INTRAVENOUS at 15:43

## 2025-01-01 RX ADMIN — FUROSEMIDE 40 MG: 10 INJECTION, SOLUTION INTRAMUSCULAR; INTRAVENOUS at 11:08

## 2025-01-01 RX ADMIN — ASPIRIN 81 MG: 81 TABLET, CHEWABLE ORAL at 08:38

## 2025-01-01 RX ADMIN — APIXABAN 5 MG: 5 TABLET, FILM COATED ORAL at 08:46

## 2025-01-01 RX ADMIN — SODIUM CHLORIDE, PRESERVATIVE FREE 10 ML: 5 INJECTION INTRAVENOUS at 20:40

## 2025-01-01 RX ADMIN — METOPROLOL SUCCINATE 50 MG: 25 TABLET, EXTENDED RELEASE ORAL at 11:46

## 2025-01-01 RX ADMIN — DILTIAZEM HYDROCHLORIDE 5 MG: 5 INJECTION, SOLUTION INTRAVENOUS at 00:28

## 2025-01-01 RX ADMIN — DOFETILIDE 250 MCG: 0.25 CAPSULE ORAL at 11:40

## 2025-01-01 RX ADMIN — DILTIAZEM HYDROCHLORIDE 120 MG: 120 CAPSULE, EXTENDED RELEASE ORAL at 08:43

## 2025-01-01 RX ADMIN — ASPIRIN 81 MG: 81 TABLET, CHEWABLE ORAL at 09:13

## 2025-01-01 RX ADMIN — APIXABAN 5 MG: 5 TABLET, FILM COATED ORAL at 09:22

## 2025-01-01 RX ADMIN — METOROPROLOL TARTRATE 5 MG: 5 INJECTION, SOLUTION INTRAVENOUS at 02:00

## 2025-01-01 RX ADMIN — FAMOTIDINE 20 MG: 10 INJECTION, SOLUTION INTRAVENOUS at 12:20

## 2025-01-01 RX ADMIN — CHOLECALCIFEROL TAB 125 MCG (5000 UNIT) 5000 UNITS: 125 TAB at 11:44

## 2025-01-01 RX ADMIN — LEVOTHYROXINE SODIUM 88 MCG: 0.09 TABLET ORAL at 08:48

## 2025-01-01 RX ADMIN — WATER 1000 MG: 1 INJECTION INTRAMUSCULAR; INTRAVENOUS; SUBCUTANEOUS at 15:48

## 2025-01-01 RX ADMIN — SODIUM CHLORIDE, PRESERVATIVE FREE 10 ML: 5 INJECTION INTRAVENOUS at 20:43

## 2025-01-01 RX ADMIN — APIXABAN 5 MG: 5 TABLET, FILM COATED ORAL at 20:40

## 2025-01-01 RX ADMIN — SODIUM CHLORIDE, PRESERVATIVE FREE 10 ML: 5 INJECTION INTRAVENOUS at 12:22

## 2025-01-01 RX ADMIN — WATER 1000 MG: 1 INJECTION INTRAMUSCULAR; INTRAVENOUS; SUBCUTANEOUS at 16:53

## 2025-01-01 RX ADMIN — WATER 1000 MG: 1 INJECTION INTRAMUSCULAR; INTRAVENOUS; SUBCUTANEOUS at 17:03

## 2025-01-01 RX ADMIN — DOFETILIDE 250 MCG: 0.25 CAPSULE ORAL at 11:41

## 2025-01-01 RX ADMIN — CYANOCOBALAMIN TAB 1000 MCG 1000 MCG: 1000 TAB at 09:23

## 2025-01-01 RX ADMIN — FAMOTIDINE 20 MG: 20 TABLET, FILM COATED ORAL at 10:49

## 2025-01-01 RX ADMIN — SODIUM CHLORIDE 1000 ML: 9 INJECTION, SOLUTION INTRAVENOUS at 14:41

## 2025-01-01 RX ADMIN — DOFETILIDE 250 MCG: 0.25 CAPSULE ORAL at 09:56

## 2025-01-01 RX ADMIN — METOPROLOL SUCCINATE 50 MG: 25 TABLET, EXTENDED RELEASE ORAL at 08:38

## 2025-01-01 RX ADMIN — APIXABAN 5 MG: 5 TABLET, FILM COATED ORAL at 20:41

## 2025-01-01 RX ADMIN — METOROPROLOL TARTRATE 5 MG: 5 INJECTION, SOLUTION INTRAVENOUS at 01:16

## 2025-01-01 RX ADMIN — APIXABAN 5 MG: 5 TABLET, FILM COATED ORAL at 23:20

## 2025-01-01 RX ADMIN — METOROPROLOL TARTRATE 5 MG: 5 INJECTION, SOLUTION INTRAVENOUS at 17:53

## 2025-01-01 RX ADMIN — MORPHINE SULFATE 1 MG: 2 INJECTION, SOLUTION INTRAMUSCULAR; INTRAVENOUS at 13:38

## 2025-01-01 RX ADMIN — DOFETILIDE 250 MCG: 0.25 CAPSULE ORAL at 20:24

## 2025-01-01 RX ADMIN — Medication 7 G: at 11:08

## 2025-01-01 RX ADMIN — FAMOTIDINE 20 MG: 20 TABLET, FILM COATED ORAL at 22:09

## 2025-01-01 RX ADMIN — QUETIAPINE FUMARATE 12.5 MG: 25 TABLET ORAL at 18:29

## 2025-01-01 RX ADMIN — MORPHINE SULFATE 1 MG: 2 INJECTION, SOLUTION INTRAMUSCULAR; INTRAVENOUS at 06:11

## 2025-01-01 RX ADMIN — DIPHENHYDRAMINE HYDROCHLORIDE 5 ML: 12.5 LIQUID ORAL at 12:13

## 2025-01-01 RX ADMIN — POLYETHYLENE GLYCOL 3350 17 G: 17 POWDER, FOR SOLUTION ORAL at 08:58

## 2025-01-01 RX ADMIN — TROSPIUM CHLORIDE 20 MG: 20 TABLET, FILM COATED ORAL at 18:29

## 2025-01-01 RX ADMIN — DILTIAZEM HYDROCHLORIDE 120 MG: 120 CAPSULE, EXTENDED RELEASE ORAL at 11:47

## 2025-01-01 RX ADMIN — THIAMINE HYDROCHLORIDE 100 MG: 100 INJECTION, SOLUTION INTRAMUSCULAR; INTRAVENOUS at 09:22

## 2025-01-01 RX ADMIN — DOFETILIDE 250 MCG: 0.25 CAPSULE ORAL at 08:38

## 2025-01-01 RX ADMIN — Medication 7 G: at 08:00

## 2025-01-01 RX ADMIN — DOFETILIDE 250 MCG: 0.25 CAPSULE ORAL at 21:24

## 2025-01-01 RX ADMIN — DOFETILIDE 250 MCG: 0.25 CAPSULE ORAL at 08:47

## 2025-01-01 RX ADMIN — WATER 1000 MG: 1 INJECTION INTRAMUSCULAR; INTRAVENOUS; SUBCUTANEOUS at 16:50

## 2025-01-01 RX ADMIN — METOROPROLOL TARTRATE 5 MG: 5 INJECTION, SOLUTION INTRAVENOUS at 22:44

## 2025-01-01 RX ADMIN — SODIUM CHLORIDE, PRESERVATIVE FREE 10 ML: 5 INJECTION INTRAVENOUS at 09:00

## 2025-01-01 RX ADMIN — ACETAMINOPHEN 650 MG: 325 TABLET ORAL at 22:09

## 2025-01-01 RX ADMIN — METOROPROLOL TARTRATE 5 MG: 5 INJECTION, SOLUTION INTRAVENOUS at 08:00

## 2025-01-01 RX ADMIN — QUETIAPINE FUMARATE 12.5 MG: 25 TABLET ORAL at 09:41

## 2025-01-01 RX ADMIN — DICLOFENAC SODIUM 2 G: 10 GEL TOPICAL at 18:29

## 2025-01-01 RX ADMIN — METOPROLOL SUCCINATE 50 MG: 25 TABLET, EXTENDED RELEASE ORAL at 11:43

## 2025-01-01 RX ADMIN — SODIUM CHLORIDE, PRESERVATIVE FREE 10 ML: 5 INJECTION INTRAVENOUS at 08:00

## 2025-01-01 RX ADMIN — Medication 100 MG: at 10:49

## 2025-01-01 RX ADMIN — SODIUM CHLORIDE 5 MG/HR: 900 INJECTION, SOLUTION INTRAVENOUS at 23:19

## 2025-01-01 RX ADMIN — SODIUM CHLORIDE 5 MG/HR: 900 INJECTION, SOLUTION INTRAVENOUS at 00:11

## 2025-01-01 RX ADMIN — SODIUM CHLORIDE 10 MG/HR: 900 INJECTION, SOLUTION INTRAVENOUS at 22:58

## 2025-01-01 RX ADMIN — SODIUM CHLORIDE, PRESERVATIVE FREE 10 ML: 5 INJECTION INTRAVENOUS at 08:34

## 2025-01-01 RX ADMIN — APIXABAN 5 MG: 5 TABLET, FILM COATED ORAL at 20:45

## 2025-01-01 RX ADMIN — ACETAMINOPHEN 650 MG: 325 TABLET ORAL at 17:43

## 2025-01-01 RX ADMIN — ASPIRIN 81 MG: 81 TABLET, CHEWABLE ORAL at 09:23

## 2025-01-01 RX ADMIN — MORPHINE SULFATE 2 MG: 2 INJECTION, SOLUTION INTRAMUSCULAR; INTRAVENOUS at 08:00

## 2025-01-01 RX ADMIN — DOFETILIDE 250 MCG: 0.25 CAPSULE ORAL at 08:46

## 2025-01-01 RX ADMIN — METOROPROLOL TARTRATE 5 MG: 5 INJECTION, SOLUTION INTRAVENOUS at 23:05

## 2025-01-01 RX ADMIN — FAMOTIDINE 20 MG: 20 TABLET, FILM COATED ORAL at 20:40

## 2025-01-01 RX ADMIN — APIXABAN 5 MG: 5 TABLET, FILM COATED ORAL at 21:23

## 2025-01-01 RX ADMIN — THIAMINE HYDROCHLORIDE 100 MG: 100 INJECTION, SOLUTION INTRAMUSCULAR; INTRAVENOUS at 09:04

## 2025-01-01 RX ADMIN — ATORVASTATIN CALCIUM 40 MG: 40 TABLET, FILM COATED ORAL at 20:24

## 2025-01-01 RX ADMIN — DILTIAZEM HYDROCHLORIDE 120 MG: 120 CAPSULE, EXTENDED RELEASE ORAL at 08:47

## 2025-01-01 RX ADMIN — CHOLECALCIFEROL TAB 125 MCG (5000 UNIT) 5000 UNITS: 125 TAB at 09:13

## 2025-01-01 RX ADMIN — SODIUM CHLORIDE, PRESERVATIVE FREE 10 ML: 5 INJECTION INTRAVENOUS at 20:45

## 2025-01-01 RX ADMIN — APIXABAN 5 MG: 5 TABLET, FILM COATED ORAL at 11:40

## 2025-01-01 RX ADMIN — METOROPROLOL TARTRATE 5 MG: 5 INJECTION, SOLUTION INTRAVENOUS at 11:10

## 2025-01-01 RX ADMIN — FAMOTIDINE 20 MG: 10 INJECTION, SOLUTION INTRAVENOUS at 08:34

## 2025-01-01 RX ADMIN — DILTIAZEM HYDROCHLORIDE 120 MG: 120 CAPSULE, EXTENDED RELEASE ORAL at 11:46

## 2025-01-01 RX ADMIN — SODIUM CHLORIDE 975 ML: 9 INJECTION, SOLUTION INTRAVENOUS at 19:46

## 2025-01-01 RX ADMIN — DOFETILIDE 250 MCG: 0.25 CAPSULE ORAL at 21:49

## 2025-01-01 RX ADMIN — Medication 1 CAPSULE: at 08:48

## 2025-01-01 RX ADMIN — SODIUM CHLORIDE 10 MG/HR: 900 INJECTION, SOLUTION INTRAVENOUS at 07:09

## 2025-01-01 RX ADMIN — SODIUM CHLORIDE, PRESERVATIVE FREE 10 ML: 5 INJECTION INTRAVENOUS at 22:48

## 2025-01-01 ASSESSMENT — PAIN SCALES - GENERAL
PAINLEVEL_OUTOF10: 0
PAINLEVEL_OUTOF10: 7
PAINLEVEL_OUTOF10: 0

## 2025-01-01 ASSESSMENT — PAIN - FUNCTIONAL ASSESSMENT: PAIN_FUNCTIONAL_ASSESSMENT: NONE - DENIES PAIN

## 2025-01-03 NOTE — TELEPHONE ENCOUNTER
Requested Prescriptions     Pending Prescriptions Disp Refills    apixaban (ELIQUIS) 5 MG TABS tablet 180 tablet 3     Sig: Take 1 tablet by mouth 2 times daily     Verified rx. Last OV 07/10/24. Erx to pharm on file.

## 2025-01-04 ENCOUNTER — TELEPHONE (OUTPATIENT)
Dept: CARDIOLOGY CLINIC | Age: 85
End: 2025-01-04

## 2025-01-04 DIAGNOSIS — I48.0 PAROXYSMAL ATRIAL FIBRILLATION (HCC): Primary | ICD-10-CM

## 2025-01-04 NOTE — TELEPHONE ENCOUNTER
Called for a refill on the weekend for Eliquis-sent refill to [facility].    Sincerely,  Greg Casillas MD

## 2025-01-06 RX ORDER — DOFETILIDE 0.25 MG/1
250 CAPSULE ORAL 2 TIMES DAILY
Qty: 180 CAPSULE | Refills: 3 | Status: SHIPPED | OUTPATIENT
Start: 2025-01-06

## 2025-01-06 NOTE — TELEPHONE ENCOUNTER
MEDICATION REFILL REQUEST      Name of Medication:  Dofetilide   Dose:  25 mg  Frequency:  twice a day   Quantity:    Days' supply:        Pharmacy Name/Location:  [facility] /Please roddy Brito if any problems or question calling this in.

## 2025-01-27 ENCOUNTER — TELEMEDICINE (OUTPATIENT)
Dept: PRIMARY CARE CLINIC | Facility: CLINIC | Age: 85
End: 2025-01-27

## 2025-01-27 DIAGNOSIS — M81.0 AGE RELATED OSTEOPOROSIS, UNSPECIFIED PATHOLOGICAL FRACTURE PRESENCE: ICD-10-CM

## 2025-01-27 DIAGNOSIS — E66.9 OBESITY, UNSPECIFIED OBESITY SEVERITY, UNSPECIFIED OBESITY TYPE: ICD-10-CM

## 2025-01-27 DIAGNOSIS — N39.46 MIXED STRESS AND URGE URINARY INCONTINENCE: ICD-10-CM

## 2025-01-27 DIAGNOSIS — I48.0 PAROXYSMAL ATRIAL FIBRILLATION (HCC): ICD-10-CM

## 2025-01-27 DIAGNOSIS — I10 PRIMARY HYPERTENSION: ICD-10-CM

## 2025-01-27 DIAGNOSIS — Z02.2 ENCOUNTER FOR EXAMINATION FOR ADMISSION TO ASSISTED LIVING FACILITY: Primary | ICD-10-CM

## 2025-01-27 DIAGNOSIS — Z95.0 PACEMAKER: ICD-10-CM

## 2025-01-27 DIAGNOSIS — R73.03 PREDIABETES: ICD-10-CM

## 2025-01-27 DIAGNOSIS — E03.9 HYPOTHYROIDISM, UNSPECIFIED TYPE: ICD-10-CM

## 2025-01-27 DIAGNOSIS — Z86.73 HISTORY OF STROKE: ICD-10-CM

## 2025-01-27 DIAGNOSIS — E78.5 HYPERLIPIDEMIA, UNSPECIFIED HYPERLIPIDEMIA TYPE: ICD-10-CM

## 2025-01-27 DIAGNOSIS — G47.33 OSA (OBSTRUCTIVE SLEEP APNEA): ICD-10-CM

## 2025-01-27 DIAGNOSIS — Z71.3 DIETARY COUNSELING: ICD-10-CM

## 2025-01-27 RX ORDER — LEVOTHYROXINE SODIUM 88 UG/1
88 TABLET ORAL DAILY
Qty: 90 TABLET | Refills: 1 | Status: SHIPPED | OUTPATIENT
Start: 2025-01-27

## 2025-01-27 RX ORDER — ATORVASTATIN CALCIUM 40 MG/1
40 TABLET, FILM COATED ORAL NIGHTLY
Qty: 90 TABLET | Refills: 1 | Status: SHIPPED | OUTPATIENT
Start: 2025-01-27

## 2025-01-27 SDOH — ECONOMIC STABILITY: FOOD INSECURITY: WITHIN THE PAST 12 MONTHS, THE FOOD YOU BOUGHT JUST DIDN'T LAST AND YOU DIDN'T HAVE MONEY TO GET MORE.: NEVER TRUE

## 2025-01-27 SDOH — ECONOMIC STABILITY: FOOD INSECURITY: WITHIN THE PAST 12 MONTHS, YOU WORRIED THAT YOUR FOOD WOULD RUN OUT BEFORE YOU GOT MONEY TO BUY MORE.: NEVER TRUE

## 2025-01-27 ASSESSMENT — ENCOUNTER SYMPTOMS
RESPIRATORY NEGATIVE: 1
GASTROINTESTINAL NEGATIVE: 1
EYES NEGATIVE: 1
BACK PAIN: 1
ALLERGIC/IMMUNOLOGIC NEGATIVE: 1

## 2025-01-27 ASSESSMENT — PATIENT HEALTH QUESTIONNAIRE - PHQ9
SUM OF ALL RESPONSES TO PHQ QUESTIONS 1-9: 0
1. LITTLE INTEREST OR PLEASURE IN DOING THINGS: NOT AT ALL
2. FEELING DOWN, DEPRESSED OR HOPELESS: NOT AT ALL
SUM OF ALL RESPONSES TO PHQ QUESTIONS 1-9: 0
SUM OF ALL RESPONSES TO PHQ9 QUESTIONS 1 & 2: 0

## 2025-01-27 NOTE — TELEPHONE ENCOUNTER
Form was completed and sent.  I called kyra to check what pharmacy needs to be use for prescriptions.  Left .

## 2025-01-27 NOTE — PROGRESS NOTES
[facility]  [address]  [place] [address]  Office : [phone]  Fax : [phone]      Subjective:  The patient is a 84 y.o. female  who presents for fu on to get papers filled for nursing home facility placemennt  PPD done last Friday at an urgent care-will be read today  Flu vaccine UTD    f/u on multiple chronic medical conditions-good compliance with medications-pt here to get routeine labs and need refill on meds.no cardiopulmonary symptoms  Hypertension--Pt BP been controlled with meds  Prediabetes -pts blood sugar stable on med  Hyperlipidemia--pts on low carb diet and low fat diet -stable on med  Gerd -stable on diet /med  Thyroid problem- stable  Osteoporosis--refused treatments  Chronic pain-DDD LS/C SPINE + OA knees and SI joints--ortho referral--impaired mobility  Chronic tjdhddiyxst-KDI-wxjalne to see ortho CT c spine 2024  Hx of stroke--in blood thinnes--on and off  mild cognitive deficieny--She has some residual weakness in her right lower extremity from her 2020 stroke. -resolved in 2023--no residual deficit in 2024  Cerebrovascular disease-MICROANGIOPATHY-- seen NEUROLOGIST-was referred to cardiologist for watchman device  Urinary incontinence --wants to try med      Eye exam 2023  Hearung good  Osteoporosis-srefused treatment-on vit d  Recovering well from recent UTI  Refused mammogram/colonoscopy        Patient Active Problem List   Diagnosis    Age related osteoporosis    Hyponatremia    HTN (hypertension)    TIA (transient ischemic attack)    Diverticulosis of large intestine without hemorrhage    Irritable bowel syndrome with constipation    Paroxysmal atrial fibrillation (HCC)    History of subdural hematoma    History of colon polyps    History of CVA (cerebrovascular accident)    Hypothyroidism    Memory loss    Vitamin D deficiency    Pacemaker    Hyperlipidemia    Chronic constipation    REYES (dyspnea on exertion)    JACKI (obstructive sleep apnea)

## 2025-02-11 PROBLEM — G93.40 ACUTE ENCEPHALOPATHY: Status: ACTIVE | Noted: 2025-02-11

## 2025-02-11 PROBLEM — D68.69 SECONDARY HYPERCOAGULABLE STATE: Status: ACTIVE | Noted: 2025-02-11

## 2025-02-11 PROBLEM — A41.9 SEPSIS (HCC): Status: ACTIVE | Noted: 2025-02-11

## 2025-02-11 PROBLEM — N39.0 ACUTE UTI: Status: ACTIVE | Noted: 2025-02-11

## 2025-02-11 PROBLEM — I48.91 ATRIAL FIBRILLATION WITH RVR (HCC): Status: ACTIVE | Noted: 2025-01-01

## 2025-02-11 NOTE — ED PROVIDER NOTES
Emergency Department Provider Note                   PCP:                Izabela Christopher MD               Age: 85 y.o.      Sex: female       ICD-10-CM    1. Acute encephalopathy  G93.40       2. Acute cystitis without hematuria  N30.00       3. Atrial fibrillation with rapid ventricular response (HCC)  I48.91           DISPOSITION Decision To Admit 02/11/2025 06:19:36 PM   DISPOSITION CONDITION Stable             MDM  Number of Diagnoses or Management Options  Acute cystitis without hematuria  Acute encephalopathy  Atrial fibrillation with rapid ventricular response (HCC)  Diagnosis management comments: MEDICAL DECISION MAKING  Complexity of Problems Addressed:  1 or more chronic illnesses with a severe exacerbation or progression.  1 or more acute illnesses that pose a threat to life or bodily function.     Data Reviewed and Analyzed:  Category 1:   I independently ordered and reviewed each unique test.  The patients assessment required an independent historian: Son.  The reason they were needed is dementia.    Category 2:   I independently ordered and interpreted the ED EKG in the absence of a Cardiologist.    Rate: 141  EKG Interpretation: EKG Interpretation: atrial fibrillation  ST Segments: Nonspecific ST segments - NO STEMI    Category 3: Discussion of management or test interpretation.  The patient presents with confusion, history of UTI, chronic afib with uncontrolled rate.  The patient's urine is positive for infection, IV Rocephin is given.  I discussed her medications with her son.  There is some confusion over what medications she is exactly supposed to be on for atrial fibrillation.  She did receive IV Cardizem and IV Lopressor here for rate control.  The son felt that the patient would best be served by being observed overnight.  The hospitalist was consulted.  The patient was admitted and I have discussed patient management with the admitting provider.    Risk of Complications and/or Morbidity  V-rate  Repolarization abnormality, prob rate related    Confirmed by MD RITA (), ADITI ([id number]) on 2/11/2025 3:15:52 PM          CT HEAD WO CONTRAST   Final Result   no acute intracranial pathology, stable compared with September 2020      Findings in the periventricular and deep white matter likely represent moderate   to severe, chronic, small vessel white matter ischemic changes. Stable   hypoattenuation in the white matter tracts in the bilateral temporal lobes   (middle cranial fossa), right worse than left.      Electronically signed by Bobo Bee      XR CHEST PORTABLE   Final Result      1. Small left greater than right pleural effusions. Bibasilar opacities favor   compressive atelectasis.      2. 90 degrees rotation of the pacemaker device in comparison to the most recent   prior chest x-ray of 6/4/2018. Correlation for intentional changes in pacemaker   positioning and for pacemaker function is recommended.      3. There is a tubular metallic density overlying the heart, favored to be   external to the patient. However, continued attention on follow-up exams is   recommended.         Electronically signed by DEEPTHI JAMES                          Voice dictation software was used during the making of this note.  This software is not perfect and grammatical and other typographical errors may be present.  This note has not been completely proofread for errors.     Wesley Tillman MD  02/11/25 [id number]

## 2025-02-11 NOTE — H&P
Hospitalist History and Physical   Admit Date:  2025  2:23 PM   Name:  Filomena Stuart   Age:  85 y.o.  Sex:  female  :  1940   MRN:  [id number]   Room:  ER14/14    Presenting/Chief Complaint: Leg Pain, Dysuria, and Irregular Heart Beat     Reason(s) for Admission: Sepsis (HCC) [A41.9]     History of Present Illness:   Filomena Stuart is a 85 y.o. female with medical history of atrial fibrillation on Eliquis hypertension, prior CVA, hypothyroidism deficiency, status post pacemaker, dyslipidemia, mixed origin stress incontinence, osteoarthritis/debility sent from her facility in view of generalized weakness, increased confusion going on for past 2 to 3 days.  Patient is a long-term resident of [facility] on , patient was noted to have excessive weakness and lethargy for past 2 to 3 days which is progressively getting worse and was noted to have some dysuria with pungent odor to it, patient also reported of having some dysuria and right leg pain.  Patient was also reported to be confused per the facility nursing staff.  History mostly obtained from ER provider, patient's son and chart review.  While being in EMS, patient developed A-fib with RVR.  No reported vomiting, diarrhea, syncopal event, facial droop, chest pain.  VS on arrival: Tmax 98.8, RR 16-34,  IR IR, /109, room air sats of 92%.  Blood work essentially unremarkable, UA with 2+ bacteria and positive nitrites and leukocyte esterase.  Chest x-ray showed small bilateral pleural effusions with compressive atelectasis.  CT head with no acute intracranial normality showed stable hypoattenuation white matter chronic in bilateral temporal lobes, worse on the right than left with periventricular and deep white matter representing moderate to severe chronic small vessel ischemic changes..      Assessment & Plan:     Principal Problem:    Sepsis (HCC)    Acute UTI  Plan: -  Admit to inpatient in view of sepsis, meeting  Urine 1.0 0.2 - 1.0 EU/dL    Nitrite, Urine Positive (A) NEG      Leukocyte Esterase, Urine TRACE (A) NEG      WBC, UA 0-3 0 /hpf    RBC, UA 0-3 0 /hpf    BACTERIA, URINE 2+ (H) 0 /hpf    Urine Culture if Indicated CULTURE NOT INDICATED BY UA RESULT      Epithelial Cells, UA 0-3 0 /hpf    Casts 0 0 /lpf    Crystals 0 0 /LPF    Mucus, UA 0 0 /lpf    Other observations RESULTS VERIFIED MANUALLY     Troponin    Collection Time: 02/11/25  3:47 PM   Result Value Ref Range    Troponin T 19.0 (H) 0 - 14 ng/L       No results for input(s): \"COVID19\" in the last 72 hours.    CT HEAD WO CONTRAST    Result Date: 2/11/2025  CT HEAD WO CONTRAST 85 years Female INDICATION: Reason for exam:->Confusion PRIORS: none head CT 9/12/2023 TECHNIQUE:     Contiguous axial sections of the head were obtained without IV     contrast FINDINGS:     The calvarium is intact. No fractures are appreciated. The paranasal sinuses and mastoid air cells are well developed and aerated. The globes, conal contents, and soft tissues are unremarkable.     There is no acute intracranial blood or blood product. There is no mass or mass effect. The ventricles and other CSF containing spaces are age appropriate.     no acute intracranial pathology, stable compared with September 2020 Findings in the periventricular and deep white matter likely represent moderate to severe, chronic, small vessel white matter ischemic changes. Stable hypoattenuation in the white matter tracts in the bilateral temporal lobes (middle cranial fossa), right worse than left. Electronically signed by Bobo Bee    XR CHEST PORTABLE    Result Date: 2/11/2025  EXAMINATION: CHEST RADIOGRAPH 2/11/2025 3:01 PM ACCESSION NUMBER: [id number] INDICATION: Tachycardia COMPARISON: Chest x-ray 6/4/2018, 5/15/2018 TECHNIQUE: A single view of the chest was obtained. FINDINGS: Support Lines and Tubes: The pacemaker device has flipped 90 degrees in the craniocaudal plane. The pacemaker leads are

## 2025-02-11 NOTE — ED TRIAGE NOTES
Pt arrives via EMS from [facility] on [place].  Pt reports right leg pain and dysuria.  Pt reports low urine output over last 3 days.  Pt has pungent urine and hx of UTIs.  Pt was in afib RVR in route.

## 2025-02-12 PROBLEM — N30.00 ACUTE CYSTITIS WITHOUT HEMATURIA: Status: ACTIVE | Noted: 2025-01-01

## 2025-02-12 NOTE — CONSULTS
[facility] Initial Cardiac Evaluation                 Date of  Admission: 2/11/2025  2:23 PM     Primary Care Physician:  Izabela Christopher MD  Primary Cardiologist:  Dr. Frost  Referring Physician:  Dr. Zaidi   Attending Physician: Dr. Dominguez    CC/Reason for consult:  afib with RVR       Filomena Stuart is a 85 y.o. female with PMH of Biotronik DC PPM,  pAF, HTN, CVA, and hypothyroidism, who presented to the ED with generalized weakness and confusion.  Nursing staff at the facility she lives in reported dysuria with pungent odor. Labs:  , K 4, BUN 18, creatinine 0.77, WBC 12.5 with left shift, H&H 13.6/40, plt 278 UA showed 2+ bacteria with +nitrates and leukocyte esterase.  Chest x-ray showed small bilateral pleural effusions with compressive atelectasis. CT head with no acute intracranial normality showed stable hypoattenuation white matter chronic in bilateral temporal lobes, worse on the right than left with periventricular and deep white matter representing moderate to severe chronic small vessel ischemic changes.  Pt admitted for treatment of sepsis secondary to UTI.      Pt remains in afib with RVR on cardizem 5 mg/hr.  She has been continued on her home Tikosyn and Eliquis.  She remains confused this morning and is oriented to person only.  Son at the bedside assists with history.        Past Medical History:   Diagnosis Date    Acute encephalopathy 6/27/2016    Anxiety     Arthritis     Atrial fibrillation (HCC) 4/16/2018    Chronic pain     hip    REYES (dyspnea on exertion) 8/29/2022    Endocrine disease     hypothyroidism    Hypertension     Obesity, unspecified obesity severity, unspecified obesity type 3/27/2024    JACKI (obstructive sleep apnea) 9/1/2022    Sepsis (HCC) 6/27/2016    Thyroid disease     TIA (transient ischemic attack) 11/14/2017    Vitamin D deficiency 9/1/2015      Past Surgical History:   Procedure Laterality Date    ORTHOPEDIC SURGERY      right total hip

## 2025-02-12 NOTE — CARE COORDINATION
Patient admitted for sepsis. UTI diagnosis. Afib with RVR. Cardiology managing.  CM attempted to meet with patient but agitated with mitts for safety. Son in and out of room to visit.  PT/OT unable to work with patient today.    CM attempted to get confirmation of Eliquis doses prior to admission. Multiple attempts made by phone to [facility]. CM went to [facility] and spoke with Ventura Sousa. Merry reports patient moved into [facility] on 1/30. Patient has received medications from the facility since 1/30. Last dose of Eliquis given at the facility was 2/11. Merry reports appropriate behavior since patient moved in. Patient has a car at the facility as she is an active .No DME or supportive care currently.   CM will follow to assist with transition of care needs.     02/12/25 5318   Service Assessment   Patient Orientation Self   Cognition Dementia / Early Alzheimer's   History Provided By Medical Record   Primary Caregiver Other (Comment)   Accompanied By/Relationship Son at bedside   Support Systems Children   Patient's Healthcare Decision Maker is: Legal Next of Kin   PCP Verified by CM Yes   Last Visit to PCP Within last 3 months   Prior Functional Level Independent in ADLs/IADLs   Current Functional Level Independent in ADLs/IADLs   Can patient return to prior living arrangement Yes   Ability to make needs known: Fair   Family able to assist with home care needs: Yes   Would you like for me to discuss the discharge plan with any other family members/significant others, and if so, who? Yes  (Sons)   Financial Resources Medicare   Community Resources Assisted Living  ([facility])   CM/SW Referral Other (see comment)  (N/A)   Social/Functional History   Lives With Alone   Type of Home Assisted living  ([facility])   Home Layout One level   Receives Help From Family   Prior Level of Assist for ADLs Independent   Prior Level of Assist for Homemaking   ([facility])   Ambulation Assistance Independent   Prior

## 2025-02-12 NOTE — CONSULTS
Original consult placed for a midline. Pt is not a candidate, as she is too agitated, and combative, even while in her restraints. Consult changed to peripheral access.     Ultrasound was used to find the vein which was compressible and does not have any features of an artery or nerve bundle. Skin was cleaned and disinfected prior to IV puncture. Peripheral access was obtained in the right forearm using 22 G 1.75\" Peripheral IV catheter x 1 attempt. Blood return was present and IV flushed without difficulty. IV dressing applied, no immediate complications noted.             2nd IV placed in the right upper arm using a 22 G 1.75\" PIV x1 attempt.

## 2025-02-12 NOTE — PROGRESS NOTES
Hospitalist Progress Note   Admit Date:  2025  2:23 PM   Name:  Filomena Stuart   Age:  85 y.o.  Sex:  female  :  1940   MRN:  [id number]   Room:  [facility]/    Presenting/Chief Complaint: Leg Pain, Dysuria, and Irregular Heart Beat     Reason(s) for Admission: Atrial fibrillation with rapid ventricular response (HCC) [I48.91]  Acute cystitis without hematuria [N30.00]  Acute encephalopathy [G93.40]  Sepsis (HCC) [A41.9]     Hospital Course:     Filomena Stuart is a 85 y.o. woman with medical history of atrial fibrillation on Eliquis hypertension, prior CVA, hypothyroidism deficiency, status post pacemaker, dyslipidemia, mixed origin stress incontinence, osteoarthritis/debility sent from her facility in view of generalized weakness, increased confusion going on for past 2 to 3 days.  Patient is a long-term resident of [facility] on , patient was noted to have excessive weakness and lethargy for past 2 to 3 days which is progressively getting worse and was noted to have some dysuria with pungent odor to it, patient also reported of having some dysuria and right leg pain.  Patient was also reported to be confused per the facility nursing staff.  History mostly obtained from ER provider, patient's son and chart review.  While being in EMS, patient developed A-fib with RVR.  No reported vomiting, diarrhea, syncopal event, facial droop, chest pain.  VS on arrival: Tmax 98.8, RR 16-34,  IR IR, /109, room air sats of 92%.  Blood work essentially unremarkable, UA with 2+ bacteria and positive nitrites and leukocyte esterase.  Chest x-ray showed small bilateral pleural effusions with compressive atelectasis.  CT head with no acute intracranial normality showed stable hypoattenuation white matter chronic in bilateral temporal lobes, worse on the right than left with periventricular and deep white matter representing moderate to severe chronic small vessel ischemic changes.    Subjective  Value Ref Range    Sodium 133 (L) 136 - 145 mmol/L    Potassium 4.0 3.5 - 5.1 mmol/L    Chloride 102 98 - 107 mmol/L    CO2 19 (L) 20 - 29 mmol/L    Anion Gap 12 7 - 16 mmol/L    Glucose 161 (H) 70 - 99 mg/dL    BUN 18 8 - 23 MG/DL    Creatinine 0.77 0.60 - 1.10 MG/DL    Est, Glom Filt Rate 75 >60 ml/min/1.73m2    Calcium 8.8 8.8 - 10.2 MG/DL   CBC with Auto Differential    Collection Time: 02/12/25  4:46 AM   Result Value Ref Range    WBC 12.5 (H) 4.3 - 11.1 K/uL    RBC 4.29 4.05 - 5.2 M/uL    Hemoglobin 13.6 11.7 - 15.4 g/dL    Hematocrit 40.0 35.8 - 46.3 %    MCV 93.2 82 - 102 FL    MCH 31.7 26.1 - 32.9 PG    MCHC 34.0 31.4 - 35.0 g/dL    RDW 15.5 (H) 11.9 - 14.6 %    Platelets 278 150 - 450 K/uL    MPV 11.7 9.4 - 12.3 FL    nRBC 0.00 0.0 - 0.2 K/uL    Differential Type AUTOMATED      Neutrophils % 86.2 (H) 43.0 - 78.0 %    Lymphocytes % 6.4 (L) 13.0 - 44.0 %    Monocytes % 3.9 (L) 4.0 - 12.0 %    Eosinophils % 0.0 (L) 0.5 - 7.8 %    Basophils % 0.5 0.0 - 2.0 %    Immature Granulocytes % 3.0 0.0 - 5.0 %    Neutrophils Absolute 10.79 (H) 1.70 - 8.20 K/UL    Lymphocytes Absolute 0.80 0.50 - 4.60 K/UL    Monocytes Absolute 0.49 0.10 - 1.30 K/UL    Eosinophils Absolute 0.00 0.00 - 0.80 K/UL    Basophils Absolute 0.06 0.00 - 0.20 K/UL    Immature Granulocytes Absolute 0.38 0.0 - 0.5 K/UL       No results for input(s): \"COVID19\" in the last 72 hours.    Current Meds:  Current Facility-Administered Medications   Medication Dose Route Frequency    thiamine (B-1) injection 100 mg  100 mg IntraVENous Daily    QUEtiapine (SEROQUEL) tablet 12.5 mg  12.5 mg Oral Q12H    QUEtiapine (SEROQUEL) tablet 12.5 mg  12.5 mg Oral Q6H PRN    levothyroxine (SYNTHROID) tablet 88 mcg  88 mcg Oral QAM AC    atorvastatin (LIPITOR) tablet 40 mg  40 mg Oral Nightly    dofetilide (TIKOSYN) capsule 250 mcg  250 mcg Oral 2 times per day    metoprolol succinate (TOPROL XL) extended release tablet 50 mg  50 mg Oral Daily    apixaban (ELIQUIS)

## 2025-02-12 NOTE — ED NOTES
TRANSFER - OUT REPORT:    Verbal report given to Rocky on Filomena Stuart  being transferred to [facility] for routine progression of patient care       Report consisted of patient's Situation, Background, Assessment and   Recommendations(SBAR).     Information from the following report(s) Nurse Handoff Report, ED Encounter Summary, ED SBAR, MAR, and Neuro Assessment was reviewed with the receiving nurse.    Lines:   Peripheral IV 02/11/25 Left Antecubital (Active)        Opportunity for questions and clarification was provided.      Patient transported with:  Registered Cayla Almanzar RN  02/11/25 2012

## 2025-02-12 NOTE — ED NOTES
Attempted to Call report to 4th floor, no response from floor     Yolanda Briscoe, RN  02/11/25 1912

## 2025-02-12 NOTE — PROGRESS NOTES
TRANSFER - IN REPORT:    Verbal report received from Aneesh RN on Filomena Stuart being received from [facility] ED for routine progression of care.     Report consisted of patient’s Situation, Background, Assessment and Recommendations(SBAR).     Information from the following report(s) SBAR and ED Summary was reviewed. Opportunity for questions and clarification was provided.      Assessment completed upon patient’s arrival to unit and care assumed.     Patient received to room 436. Patient connected to monitor and assessment completed. Plan of care reviewed. Patient oriented to room and call light. Patient aware to use call light to communicate any chest pain or needs.     Admission skin assessment completed with second RN and reveals the following: **     4 Eyes Skin Assessment     NAME:  Filomena Stuart  YOB: 1940  MEDICAL RECORD NUMBER:  [id number]    The patient is being assessed for  Admission    I agree that at least one RN has performed a thorough Head to Toe Skin Assessment on the patient. ALL assessment sites listed below have been assessed.      Areas assessed by both nurses:    Sacrum. Buttock, Coccyx, Ischium and Legs. Feet and Heels        Does the Patient have a Wound? No noted wound(s)    Pt has some blanchable redness to sacrum and buttocks. Some excoriation from brief on groin area. BLE swelling and ariana        Rob Prevention initiated by RN: Yes  Wound Care Orders initiated by RN: No    Pressure Injury (Stage 3,4, Unstageable, DTI, NWPT, and Complex wounds) if present, place Wound referral order by RN under : No    New Ostomies, if present place, Ostomy referral order under : No     Nurse 1 eSignature: Electronically signed by PRACHI LOUIS RN on 2/12/25 at 3:08 AM EST    **SHARE this note so that the co-signing nurse can place an eSignature**    Nurse 2 eSignature: Electronically signed by Riley Johnson RN on 2/12/25 at 5:47 AM EST

## 2025-02-12 NOTE — PROGRESS NOTES
Attempted to see patient this AM for occupational therapy evaluation session. Patient not appropriate for OT/PT today, confused and agitated and in restraints. Will follow and re-attempt as schedule permits/patient available. Thank you,    Nicolle Portillo, OT    Rehab Caseload Tracker

## 2025-02-13 NOTE — CARE COORDINATION
Patient's status discussed in IDR. Improved cognition today, conversant. Up in chair.  Cardiology monitoring Afib with RVR. Patient on Tikosyn. Dilt drip.   Changing to oral ABX for UTI in couple of days.   CM informed by PT that patient was able to participate in a session today. Recommending IP rehab from both disciplines.   SLP ordered per PT request for garbled speech and cognition.  CM placed order to Physical Medicine.

## 2025-02-13 NOTE — PROGRESS NOTES
ACUTE PHYSICAL THERAPY GOALS:   (Developed with and agreed upon by patient and/or caregiver.)  LTG:  (1.)Ms. Stuart will move from supine to sit and sit to supine, scoot up and down and roll side to side in bed with INDEPENDENT within 7 day(s).   (2.)Ms. Stuart will transfer from bed to chair and chair to bed with INDEPENDENT using the least restrictive device within 7 day(s).   (3.)Ms. Stuart will ambulate with modified INDEPENDENCE for 500+ feet with the least restrictive/no device within 7 day(s).  (4.)Ms. Stuart will perform standing static and dynamic balance activities x 8 minutes with SUPERVISION to improve safety within 7 day(s).   (5.)Ms. Stuart will ascend and descend 4 stairs using one hand rail(s) with SUPERVISION to improve functional mobility and safety within 7 day(s).     PHYSICAL THERAPY Initial Assessment and Daily Note  (Link to Caseload Tracking: PT Visit Days : 1  Acknowledge Orders  Time In/Out  PT Charge Capture  Rehab Caseload Tracker    Filomena Stuart is a 85 y.o. female   PRIMARY DIAGNOSIS: Sepsis (HCC)  Atrial fibrillation with rapid ventricular response (HCC) [I48.91]  Acute cystitis without hematuria [N30.00]  Acute encephalopathy [G93.40]  Sepsis (HCC) [A41.9]       Reason for Referral: Other abnormalities of gait and mobility (R26.89)  History of falling (Z91.81)  Inpatient: Payor: MEDICARE / Plan: MEDICARE PART A AND B / Product Type: *No Product type* /     ASSESSMENT:     REHAB RECOMMENDATIONS:   Recommendation to date pending progress:  Setting:  Inpatient Rehab Facility    Equipment:    To Be Determined     ASSESSMENT:  Ms. Stuart lives at [facility], she ambulates independently with a cane or rollator in home and community.  Patient was admitted with above and is currently Ox1.  Her speech is very difficult to understand.  Patient today required min A of 2 for all mobility.  At one point she pushed her walker forward and required mod A to regain balance.   She was impulsive and

## 2025-02-13 NOTE — PROGRESS NOTES
02/12/25 2015 97.7 °F (36.5 °C) (!) 103 20 130/74 93 %   02/12/25 1528 97.9 °F (36.6 °C) -- 20 (!) 141/99 92 %   02/12/25 1421 -- -- -- (!) 142/89 --   02/12/25 1150 98.1 °F (36.7 °C) -- -- -- 90 %       Oxygen Therapy  SpO2: 95 %  Pulse via Oximetry: 106 beats per minute  Pulse Oximeter Device Mode: Intermittent  Pulse Oximeter Device Location: Finger  O2 Device: None (Room air)    Estimated body mass index is 23.31 kg/m² as calculated from the following:    Height as of this encounter: 1.676 m (5' 6\").    Weight as of this encounter: 65.5 kg (144 lb 6.4 oz).    Intake/Output Summary (Last 24 hours) at 2/13/2025 0929  Last data filed at 2/12/2025 1404  Gross per 24 hour   Intake 120 ml   Output --   Net 120 ml         Physical Exam:     General:    Well nourished.  Pleasant elderly woman in no distress  Head:  Normocephalic, atraumatic  Eyes:  Sclerae appear normal.  Pupils equally round.  ENT:  Nares appear normal.  Moist oral mucosa  Neck:  No restricted ROM.  Trachea midline   CV:   Irregular.  No m/r/g.  No jugular venous distension.  Lungs:   CTAB.  No wheezing, rhonchi, or rales.  Symmetric expansion.  Abdomen:   Soft, nontender, nondistended.  NABS  Extremities: No cyanosis or clubbing.  No edema  Skin:     No rashes.  Normal coloration.   Warm and dry.    Neuro:  Face symmetric.  No gross motor deficits noted   Psych:  Pleasant, conversant, orientation improving, not able to answer all orientation questions    I have personally reviewed labs and tests:  Recent Labs:  Recent Results (from the past 48 hour(s))   EKG 12 Lead    Collection Time: 02/11/25  2:32 PM   Result Value Ref Range    Ventricular Rate 141 BPM    Atrial Rate 0 BPM    QRS Duration 86 ms    Q-T Interval 307 ms    QTc Calculation (Bazett) 471 ms    R Axis 64 degrees    T Axis -64 degrees    Diagnosis       Atrial fibrillation with rapid V-rate  Repolarization abnormality, prob rate related    Confirmed by MD RITA (), ADITI ([id number]) on  Human Metapneumovirus by PCR NOT DETECTED NOTDET      Rhinovirus Enterovirus PCR NOT DETECTED NOTDET      Influenza A by PCR NOT DETECTED NOTDET      Influenza B PCR NOT DETECTED NOTDET      Parainfluenza 1 PCR NOT DETECTED NOTDET      Parainfluenza 2 PCR NOT DETECTED NOTDET      Parainfluenza 3 PCR NOT DETECTED NOTDET      Parainfluenza 4 PCR NOT DETECTED NOTDET      Respiratory Syncytial Virus by PCR NOT DETECTED NOTDET      Bordetella parapertussis by PCR NOT DETECTED NOTDET      Bordetella pertussis by PCR NOT DETECTED NOTDET      Chlamydophila Pneumonia PCR NOT DETECTED NOTDET      Mycoplasma pneumo by PCR NOT DETECTED NOTDET     Basic Metabolic Panel w/ Reflex to MG    Collection Time: 02/13/25  5:45 AM   Result Value Ref Range    Sodium 135 (L) 136 - 145 mmol/L    Potassium 4.4 3.5 - 5.1 mmol/L    Chloride 103 98 - 107 mmol/L    CO2 20 20 - 29 mmol/L    Anion Gap 12 7 - 16 mmol/L    Glucose 130 (H) 70 - 99 mg/dL    BUN 22 8 - 23 MG/DL    Creatinine 0.74 0.60 - 1.10 MG/DL    Est, Glom Filt Rate 78 >60 ml/min/1.73m2    Calcium 9.1 8.8 - 10.2 MG/DL   CBC with Auto Differential    Collection Time: 02/13/25  5:45 AM   Result Value Ref Range    WBC 21.4 (H) 4.3 - 11.1 K/uL    RBC 3.99 (L) 4.05 - 5.2 M/uL    Hemoglobin 12.6 11.7 - 15.4 g/dL    Hematocrit 36.4 35.8 - 46.3 %    MCV 91.2 82 - 102 FL    MCH 31.6 26.1 - 32.9 PG    MCHC 34.6 31.4 - 35.0 g/dL    RDW 15.3 (H) 11.9 - 14.6 %    Platelets 273 150 - 450 K/uL    MPV 11.3 9.4 - 12.3 FL    nRBC 0.00 0.0 - 0.2 K/uL    Differential Type AUTOMATED      Neutrophils % 82.1 (H) 43.0 - 78.0 %    Lymphocytes % 5.2 (L) 13.0 - 44.0 %    Monocytes % 9.2 4.0 - 12.0 %    Eosinophils % 0.0 (L) 0.5 - 7.8 %    Basophils % 0.3 0.0 - 2.0 %    Immature Granulocytes % 3.2 0.0 - 5.0 %    Neutrophils Absolute 17.61 (H) 1.70 - 8.20 K/UL    Lymphocytes Absolute 1.11 0.50 - 4.60 K/UL    Monocytes Absolute 1.97 (H) 0.10 - 1.30 K/UL    Eosinophils Absolute 0.00 0.00 - 0.80 K/UL    Basophils

## 2025-02-13 NOTE — PROGRESS NOTES
Swollen soft area to right upper arm, Clif Hargrove MD notified. Orders for venous vascular duplex.

## 2025-02-13 NOTE — PROGRESS NOTES
2/13/2025 11:57 AM    Admit Date: 2/11/2025    Admit Diagnosis: Atrial fibrillation with rapid ventricular response (HCC) [I48.91]  Acute cystitis without hematuria [N30.00]  Acute encephalopathy [G93.40]  Sepsis (HCC) [A41.9]      Subjective:   More alert - taking po- no cp or sob      Objective:    BP (!) 120/91   Pulse 100   Temp 97.6 °F (36.4 °C) (Tympanic)   Resp 18   Ht 1.676 m (5' 6\")   Wt 65.5 kg (144 lb 6.4 oz)   SpO2 95%   BMI 23.31 kg/m²     Physical Exam:  General-Well Developed, Well Nourished, No Acute Distress, Alert & Oriented x 3, appropriate mood.  Neck- supple, no JVD  CV- regular rate and rhythm no MRG  Lung- clear bilaterally  Abd- soft, nontender, nondistended  Ext- no edema bilaterally.  Skin- warm and dry        Data Review:   Recent Labs     02/13/25  0545   *   K 4.4   BUN 22   WBC 21.4*   HGB 12.6   HCT 36.4          Assessment/Plan:     Active Hospital Problems    Acute cystitis without hematuria      Sepsis (HCC)      Acute UTI      Atrial fibrillation with RVR (HCC)rate ok- will see how she responds to po meds at this time and adjust according to hr- stay on tkosyn for now      Acute encephalopathy      Secondary hypercoagulable state (HCC)      Mixed stress and urge urinary incontinence            life style modification            vesicare if worse-refused med            Seen urologist-refused interventions abd botoc            Uses depends      Pacemaker      History of CVA (cerebrovascular accident)      Hypothyroidism            Stable on med      HTN (hypertension)                Vitamin D deficiency      Hyperlipidemia            statin

## 2025-02-13 NOTE — PROGRESS NOTES
GOALS:  LTG: Patient will tolerate least restrictive diet without overt signs or symptoms of airway compromise.   STG: Patient will tolerate minced and moist diet and thin liquids without overt signs or symptoms of airway compromise.   STG: Patient will participate in modified barium swallow study as clinically indicated.    STG: Speech therapy will perform assessment of speech and cognitive function, as patient is able to participate in assessment. Additional goals and recommendations to follow.     SPEECH LANGUAGE PATHOLOGY: DYSPHAGIA Initial Assessment    Acknowledge Order  I  Therapy Time  I   Charges     I  Rehab Caseload Tracker      NAME: Filomena Stuart  : 1940  MRN: [id number]    ADMISSION DATE: 2025  PRIMARY DIAGNOSIS: Sepsis (HCC)    ICD-10: Treatment Diagnosis: R13.12 Dysphagia, Oropharyngeal Phase    RECOMMENDATIONS   Diet:    Minced and Moist  Thin Liquids    Medication: as tolerated   Compensatory Swallowing Strategies:   Assist feed  Small bites/sips  Upright as possible for all oral intake   Therapeutic Intervention:   Patient/family education  Dysphagia treatment  Speech treatment  Cognitive-linguistic treatment   Patient continues to require skilled intervention:  Yes. Recommend ongoing speech therapy services during this hospitalization.     Anticipated Discharge Needs: Ongoing speech therapy is recommended at next level of care.      ASSESSMENT    Dysphagia: Patient exhibits moderate oral and suspect pharyngeal phase dysphagia. Primary limitation with swallowing attributed to patient's altered mental status. Recommend minced and moist diet with thin liquids. Provide assistance with all PO intake. Patient should be awake for all PO intake as well.     Patient does exhibit deficits in speech and cognitive function. Unable to participate in further assessment due to patient's altered mental status. Will follow for dysphagia during acute phase of care and perform additional assessment,

## 2025-02-13 NOTE — PROGRESS NOTES
SPEECH LANGUAGE PATHOLOGY: ATTEMPT     NAME: Filomena Stuart  : 1940  MRN: [id number]    ADMISSION DATE: 2025  PRIMARY DIAGNOSIS: Sepsis (HCC)    Patient resting during PM and unable to participate in speech therapy evaluation. ST will follow up and perform assessment, as patient is medically appropriate.     PATRICK HERNANDEZ, SLP  2025 12:25 PM

## 2025-02-13 NOTE — PROGRESS NOTES
ACUTE OCCUPATIONAL THERAPY GOALS:   (Developed with and agreed upon by patient and/or caregiver.)  1. Pt will toilet with SBA   2. Pt will complete functional mobility for ADLs with SBA using AD as needed  3. Pt will complete lower body dressing with SBA using AE as needed  4. Pt will complete grooming and hygiene at sink with SBA  5.  Pt will tolerate 23 minutes functional activity with min or fewer rest breaks to promote increased endurance for ADLs    Timeframe: 7 visits      OCCUPATIONAL THERAPY Initial Assessment and Daily Note       OT Visit Days: 1  Acknowledge Orders  Time  OT Charge Capture  Rehab Caseload Tracker      Filomena Stuart is a 85 y.o. female   PRIMARY DIAGNOSIS: Sepsis (HCC)  Atrial fibrillation with rapid ventricular response (HCC) [I48.91]  Acute cystitis without hematuria [N30.00]  Acute encephalopathy [G93.40]  Sepsis (HCC) [A41.9]       Reason for Referral: Generalized Muscle Weakness (M62.81)  History of falling (Z91.81)  Inpatient: Payor: MEDICARE / Plan: MEDICARE PART A AND B / Product Type: *No Product type* /     ASSESSMENT:     REHAB RECOMMENDATIONS:   Recommendation to date pending progress:  Setting:  Inpatient Rehab Facility     Equipment:    To Be Determined     ASSESSMENT:  Ms. Stuart was admitted with UTI, encephalopathy, sepsis. Pt just moved to [facility] and is independent at baseline. Pt presented with generalized weakness and with deficits in cognition, mobility, balance, and activity tolerance impacting ADLs. Pt required min-mod A overall for ADLs, min x2 for mobility for ADLs. Pt unsteady and had LOB requiring mod A to correct while exiting bathroom. Pt endorsed fatigue with activity. Pt presented below her functional baseline and would benefit from skilled OT services to address deficits. Rec IRC.      [facility] AM-PAC™ “6 Clicks” Daily Activity Inpatient Short Form:    AM-PAC Daily Activity - Inpatient   How much help is needed for putting on and taking  Balance  Decreased Cognition  Decreased Safety Awareness  Decreased Strength  Decreased Transfer Abilities   INTERVENTIONS PLANNED:  (Benefits and precautions of occupational therapy have been discussed with the patient.)  Self Care Training  Therapeutic Activity  Therapeutic Exercise/HEP  Neuromuscular Re-education  Manual Therapy  Education         TREATMENT:     EVALUATION: MODERATE COMPLEXITY: (Untimed Charge)  The initial evaluation charge encompasses clinical chart review, objective assessment, interpretation of assessment, and skilled monitoring of the patient's response to treatment in order to develop a plan of care.     TREATMENT:   Co-Treatment between OT and PT necessary due to patient's decreased overall endurance/tolerance levels, as well as need for high level skilled assistance to complete functional transfers/mobility and functional tasks  Self Care (23 minutes): Patient participated in upper body dressing, lower body dressing, grooming, functional mobility, bed mobility, bathroom mobility, assistive device, and compensatory technique in unsupported sitting and standing with moderate visual, verbal, manual, and tactile cueing to increase independence, decrease assistance required, increase activity tolerance, and increase safety awareness. The patient was educated on role of occupational therapy, compensatory technique during ADL , and strategies to improve safety and patient and family will need reinforcement at next session.     TREATMENT GRID:  N/A    AFTER TREATMENT PRECAUTIONS: Alarm Activated, Call light within reach, Chair, Needs within reach, RN notified, and Visitors at bedside    INTERDISCIPLINARY COLLABORATION:  RN/ PCT and PT/ PTA    EDUCATION:  Education Given To: Patient  Education Provided: Role of Therapy;Plan of Care  Barriers to Learning: Cognition    TOTAL TREATMENT DURATION AND TIME:  Time In: 0922  Time Out: 0953  Minutes: 31    Nicolle Portillo OT

## 2025-02-14 NOTE — PROGRESS NOTES
GOALS:  LTG: Patient will tolerate least restrictive diet without overt signs or symptoms of airway compromise.   STG: Patient will tolerate minced and moist diet and thin liquids without overt signs or symptoms of airway compromise. Ongoing 2025  STG: Patient will participate in modified barium swallow study as clinically indicated.  Ongoing 2025  STG: Speech therapy will perform assessment of speech and cognitive function, as patient is able to participate in assessment. Additional goals and recommendations to follow. Ongoing 2025    SPEECH LANGUAGE PATHOLOGY: DYSPHAGIA Initial Assessment    Acknowledge Order  I  Therapy Time  I   Charges     I  Rehab Caseload Tracker      NAME: Filomena Stuart  : 1940  MRN: [id number]    ADMISSION DATE: 2025  PRIMARY DIAGNOSIS: Sepsis (HCC)    ICD-10: Treatment Diagnosis: R13.12 Dysphagia, Oropharyngeal Phase    RECOMMENDATIONS   Diet:    Minced and Moist  Thin Liquids    Medication: as tolerated   Compensatory Swallowing Strategies:   Assist feed  Small bites/sips  Upright as possible for all oral intake   Therapeutic Intervention:   Patient/family education  Dysphagia treatment  Speech treatment  Cognitive-linguistic treatment   Patient continues to require skilled intervention:  Yes. Recommend ongoing speech therapy services during this hospitalization.     Anticipated Discharge Needs: Ongoing speech therapy is recommended at next level of care.      ASSESSMENT    Dysphagia: Patient continues to exhibit altered mental status which impacts patient's safety with PO intake. Recommend to continue minced and moist foods with thin liquids. Speech therapy will follow for dysphagia during acute phase of care.     Recommend speech therapy services post acute care.     GENERAL    Subjective: Patient awake, alert when engaged. Seen after PT/OT services.     Reason for Consult:  Difficulty understanding speech and cognition     CT of brain from 2025

## 2025-02-14 NOTE — PROGRESS NOTES
ACUTE OCCUPATIONAL THERAPY GOALS:   (Developed with and agreed upon by patient and/or caregiver.)  1. Pt will toilet with SBA   2. Pt will complete functional mobility for ADLs with SBA using AD as needed  3. Pt will complete lower body dressing with SBA using AE as neededx  4. Pt will complete grooming and hygiene at sink with SBA  5.  Pt will tolerate 23 minutes functional activity with min or fewer rest breaks to promote increased endurance for ADLs     Timeframe: 7 visits    OCCUPATIONAL THERAPY: Daily Note PM   OT Visit Days: 2   Time In/Out  OT Charge Capture  Rehab Caseload Tracker  OT Orders    Filomena Stuart is a 85 y.o. female   PRIMARY DIAGNOSIS: Sepsis (HCC)  Atrial fibrillation with rapid ventricular response (HCC) [I48.91]  Acute cystitis without hematuria [N30.00]  Acute encephalopathy [G93.40]  Sepsis (HCC) [A41.9]       Inpatient: Payor: MEDICARE / Plan: MEDICARE PART A AND B / Product Type: *No Product type* /     ASSESSMENT:     REHAB RECOMMENDATIONS:   Recommendation to date pending progress:  Setting:  Short-term Rehab    Equipment:    To Be Determined     ASSESSMENT:  Ms. Stuart remains limited by deficits in cognition, overall strength, mobility, balance, and activity tolerance impacting ADLs. Pt very confused upon arrival, hallucinating at times, agitated/ anxious and required max cues and frequent redirection for all tasks. Poor frustration tolerance with displays of outward aggression (I.e, slamming walker on floor) at times. Pt required mod A for ADLs and min-mod x2 for mobility. No progress towards goals today, continue POC.        SUBJECTIVE:     Ms. Stuart states, \"watch out for that wire! It's spinning!\"     Social/Functional Lives With: Alone  Type of Home: Assisted living ([facility])  Home Layout: One level  Home Equipment: Cane, Rollator  Has the patient had two or more falls in the past year or any fall with injury in the past year?: Yes  Receives Help From: Family  Prior  Level of Assist for ADLs: Independent  Prior Level of Assist for Homemaking:  ([facility])  Prior Level of Assist for Ambulation: Independent community ambulator, with or without device  Prior Level of Assist for Transfers: Independent  Active : Yes  Mode of Transportation: Car  Occupation: Retired    OBJECTIVE:     LINES / DRAINS / AIRWAY: IV and Telemetry     RESTRICTIONS/PRECAUTIONS:  Restrictions/Precautions  Restrictions/Precautions: Fall Risk        PAIN: VITALS / O2:   Pre Treatment:    0        Post Treatment: 0 Vitals          Oxygen        MOBILITY: I Mod I S SBA CGA Min Mod Max Total  NT x2 Comments:   Bed Mobility    Rolling [] [] [] [] [] [] [] [] [] [] []    Supine to Sit [] [] [] [] [] [x] [x] [] [] [] [x]    Scooting [] [] [] [] [] [x] [] [] [] [] []    Sit to Supine [] [] [] [] [] [x] [x] [] [] [] [x]    Transfers    Sit to Stand [] [] [] [] [] [x] [x] [] [] [] [x]    Bed to Chair [] [] [] [] [] [x] [x] [] [] [] [x]    Stand to Sit [] [] [] [] [] [x] [x] [] [] [] [x]    Tub/Shower [] [] [] [] [] [] [] [] [] [] []     Toilet [] [] [] [] [] [] [] [] [] [] []      [] [] [] [] [] [] [] [] [] [] []    I=Independent, Mod I=Modified Independent, S=Supervision/Setup, SBA=Standby Assistance, CGA=Contact Guard Assistance, Min=Minimal Assistance, Mod=Moderate Assistance, Max=Maximal Assistance, Total=Total Assistance, NT=Not Tested    ACTIVITIES OF DAILY LIVING: I Mod I S SBA CGA Min Mod Max Total NT Comments   BASIC ADLs:              Upper Body   Bathing [] [] [] [] [] [] [] [] [] []     Lower Body Bathing [] [] [] [] [] [] [x] [] [] []     Toileting [] [] [] [] [] [] [] [] [] []    Upper Body Dressing [] [] [] [] [] [] [x] [] [] []    Lower Body Dressing [] [] [] [] [] [] [x] [] [] []    Feeding [] [] [] [] [] [] [x] [] [] []    Grooming [] [] [] [] [] [x] [] [] [] []    Personal Device Care [] [] [] [] [] [] [] [] [] []    Functional Mobility [] [] [] [] [] [x] [x] [] [] [] x2   I=Independent,

## 2025-02-14 NOTE — PROGRESS NOTES
Hospitalist Progress Note   Admit Date:  2025  2:23 PM   Name:  Filomena Stuart   Age:  85 y.o.  Sex:  female  :  1940   MRN:  [id number]   Room:  [facility]/    Presenting/Chief Complaint: Leg Pain, Dysuria, and Irregular Heart Beat     Reason(s) for Admission: Atrial fibrillation with rapid ventricular response (HCC) [I48.91]  Acute cystitis without hematuria [N30.00]  Acute encephalopathy [G93.40]  Sepsis (HCC) [A41.9]     Hospital Course:     Filomena Stuart is a 85 y.o. woman with medical history of atrial fibrillation on Eliquis hypertension, prior CVA, hypothyroidism deficiency, status post pacemaker, dyslipidemia, mixed origin stress incontinence, osteoarthritis/debility sent from her facility in view of generalized weakness, increased confusion going on for past 2 to 3 days.  Patient is a long-term resident of [facility] on , patient was noted to have excessive weakness and lethargy for past 2 to 3 days which is progressively getting worse and was noted to have some dysuria with pungent odor to it, patient also reported of having some dysuria and right leg pain.  Patient was also reported to be confused per the facility nursing staff.  History mostly obtained from ER provider, patient's son and chart review.  While being in EMS, patient developed A-fib with RVR.  No reported vomiting, diarrhea, syncopal event, facial droop, chest pain.  VS on arrival: Tmax 98.8, RR 16-34,  IR IR, /109, room air sats of 92%.  Blood work essentially unremarkable, UA with 2+ bacteria and positive nitrites and leukocyte esterase.  Chest x-ray showed small bilateral pleural effusions with compressive atelectasis.  CT head with no acute intracranial normality showed stable hypoattenuation white matter chronic in bilateral temporal lobes, worse on the right than left with periventricular and deep white matter representing moderate to severe chronic small vessel ischemic changes.    Subjective  typographical errors.

## 2025-02-14 NOTE — CARE COORDINATION
Patient continues with intermittent confusion. Mitts for safety. PT/OT/SLP following.  IRC consulted and will monitor patient's progress for possible placement in IRC at discharge.   Patient recently, 1/30, became a resident at [facility]. Per staff, patient was appropriate cognitively and ambulating in the facility.Has her car parked at [facility].

## 2025-02-14 NOTE — PROGRESS NOTES
ACUTE PHYSICAL THERAPY GOALS:   (Developed with and agreed upon by patient and/or caregiver.)  LTG:  (1.)Ms. Stuart will move from supine to sit and sit to supine, scoot up and down and roll side to side in bed with INDEPENDENT within 7 day(s).   (2.)Ms. Stuart will transfer from bed to chair and chair to bed with INDEPENDENT using the least restrictive device within 7 day(s).   (3.)Ms. Stuart will ambulate with modified INDEPENDENCE for 500+ feet with the least restrictive/no device within 7 day(s).  (4.)Ms. Stuart will perform standing static and dynamic balance activities x 8 minutes with SUPERVISION to improve safety within 7 day(s).   (5.)Ms. Stuart will ascend and descend 4 stairs using one hand rail(s) with SUPERVISION to improve functional mobility and safety within 7 day(s).    PHYSICAL THERAPY: Daily Note PM   (Link to Caseload Tracking: PT Visit Days : 2  Time In/Out PT Charge Capture  Rehab Caseload Tracker  Orders    Filomena Stuart is a 85 y.o. female   PRIMARY DIAGNOSIS: Sepsis (HCC)  Atrial fibrillation with rapid ventricular response (HCC) [I48.91]  Acute cystitis without hematuria [N30.00]  Acute encephalopathy [G93.40]  Sepsis (HCC) [A41.9]       Inpatient: Payor: MEDICARE / Plan: MEDICARE PART A AND B / Product Type: *No Product type* /     ASSESSMENT:     REHAB RECOMMENDATIONS:   Recommendation to date pending progress:  Setting:  Inpatient Rehab Facility    Equipment:    To Be Determined     ASSESSMENT:  Ms. Stuart was supine in bed with mitts on and appears very confused and slightly agitated.  Worked on redirecting her and she did participate with therapies.  She was given min Ax2 for transfers and ambulation as well as max multimodal cueing to stay on task and for safety.  Worked on sitting and standing balance while patient performed self care activities with OT.   No significant progress today, patient limited by her altered mental status.  Will continue to work towards goals..

## 2025-02-14 NOTE — PROGRESS NOTES
SPEECH LANGUAGE PATHOLOGY: ATTEMPT     NAME: Filomena Stuart  : 1940  MRN: [id number]    ADMISSION DATE: 2025  PRIMARY DIAGNOSIS: Sepsis (HCC)    Patient somnolent, unable to participate in speech therapy services during AM. ST will follow up and treat, as patient is medically appropriate/able to participate.     PATRICK HERNANDEZ, SLP  2025 8:47 AM

## 2025-02-14 NOTE — PROGRESS NOTES
Physical Therapy Note:    Attempted to see patient this AM for physical therapy treatment  session. Patient somnolent and unable to participate with PT this morning. Will follow and re-attempt as schedule permits/patient available. Thank you,    MARTA Ledesma, PT     Rehab Caseload Tracker

## 2025-02-14 NOTE — PLAN OF CARE
Problem: Discharge Planning  Goal: Discharge to home or other facility with appropriate resources  Outcome: Progressing     Problem: Safety - Adult  Goal: Free from fall injury  Outcome: Progressing     Problem: ABCDS Injury Assessment  Goal: Absence of physical injury  Outcome: Progressing     Problem: Safety - Medical Restraint  Goal: Remains free of injury from restraints (Restraint for Interference with Medical Device)  Description: INTERVENTIONS:  1. Determine that other, less restrictive measures have been tried or would not be effective before applying the restraint  2. Evaluate the patient's condition at the time of restraint application  3. Inform patient/family regarding the reason for restraint  4. Q2H: Monitor safety, psychosocial status, comfort, nutrition and hydration  Outcome: Progressing     Problem: Respiratory - Adult  Goal: Achieves optimal ventilation and oxygenation  Outcome: Progressing     Problem: Cardiovascular - Adult  Goal: Absence of cardiac dysrhythmias or at baseline  Outcome: Progressing     Problem: Neurosensory - Adult  Goal: Achieves stable or improved neurological status  Outcome: Progressing  Goal: Achieves maximal functionality and self care  Outcome: Progressing     Problem: Skin/Tissue Integrity - Adult  Goal: Skin integrity remains intact  Outcome: Progressing     Problem: Musculoskeletal - Adult  Goal: Return mobility to safest level of function  Outcome: Progressing  Goal: Return ADL status to a safe level of function  Outcome: Progressing     Problem: Genitourinary - Adult  Goal: Absence of urinary retention  Outcome: Progressing

## 2025-02-14 NOTE — CONSULTS
[facility]  Inpatient Rehab Consult        Admission Date: 2/11/2025  Primary Care Provider: Izabela Christopher MD  Specialty Group / Referring Service: Medicine      Chief Complaint : Pain  Admitting Diagnosis:   Atrial fibrillation with rapid ventricular response (HCC) [I48.91]  Acute cystitis without hematuria [N30.00]  Acute encephalopathy [G93.40]  Sepsis (HCC) [A41.9]    Principal Problem:    Sepsis (HCC)  Active Problems:    Acute cystitis without hematuria    HTN (hypertension)    History of CVA (cerebrovascular accident)    Hypothyroidism    Vitamin D deficiency    Pacemaker    Hyperlipidemia    Mixed stress and urge urinary incontinence    Acute UTI    Atrial fibrillation with RVR (HCC)    Acute encephalopathy    Secondary hypercoagulable state (HCC)  Resolved Problems:    * No resolved hospital problems. *      Acute Rehab Diagnoses:  Encounter for rehabilitation [Z51.89]   Abnormality of gait and mobility [R26.9]  Decreased independence for activities of daily living (ADL) [Z78.9]  Physical debility / deconditioning [R53.81]      Medical Dx:  Past Medical History:   Diagnosis Date    Acute encephalopathy 6/27/2016    Anxiety     Arthritis     Atrial fibrillation (HCC) 4/16/2018    Chronic pain     hip    REYES (dyspnea on exertion) 8/29/2022    Endocrine disease     hypothyroidism    Hypertension     Obesity, unspecified obesity severity, unspecified obesity type 3/27/2024    JACKI (obstructive sleep apnea) 9/1/2022    Sepsis (HCC) 6/27/2016    Thyroid disease     TIA (transient ischemic attack) 11/14/2017    Vitamin D deficiency 9/1/2015       Date of Evaluation: February 14, 2025    Subjective       HPI: Filomena Stuart is a 85 y.o. female patient who presented to [facility] on 2/11/2025 secondary to altered mental status. Per H&P \" Filomena Stuart is a 85 y.o. female with medical history of atrial fibrillation on Eliquis hypertension, prior CVA, hypothyroidism  NAD.  HEENT: Severe dry mouth, + thrush  HEART: Irregular   LUNGS: Even and unlabored breathing.  ABD: Soft,  non-distended  EXT: +DP/PT pulses. No calf tenderness to palpation. No edema.  Upper extremity mittens  SKIN: No visible open wounds  MSK: Antigravity strength BUE and BLE  PSYCH: calm, cooperative.  Bilateral mittens in place    Recent Results (from the past 72 hour(s))   EKG 12 Lead    Collection Time: 02/11/25  2:32 PM   Result Value Ref Range    Ventricular Rate 141 BPM    Atrial Rate 0 BPM    QRS Duration 86 ms    Q-T Interval 307 ms    QTc Calculation (Bazett) 471 ms    R Axis 64 degrees    T Axis -64 degrees    Diagnosis       Atrial fibrillation with rapid V-rate  Repolarization abnormality, prob rate related    Confirmed by MD RITA (), ADITI ([id number]) on 2/11/2025 3:15:52 PM     Basic Metabolic Panel    Collection Time: 02/11/25  2:42 PM   Result Value Ref Range    Sodium 133 (L) 136 - 145 mmol/L    Potassium 3.9 3.5 - 5.1 mmol/L    Chloride 100 98 - 107 mmol/L    CO2 22 20 - 29 mmol/L    Anion Gap 11 7 - 16 mmol/L    Glucose 103 (H) 70 - 99 mg/dL    BUN 17 8 - 23 MG/DL    Creatinine 0.75 0.60 - 1.10 MG/DL    Est, Glom Filt Rate 77 >60 ml/min/1.73m2    Calcium 9.0 8.8 - 10.2 MG/DL   CBC with Auto Differential    Collection Time: 02/11/25  2:42 PM   Result Value Ref Range    WBC 11.8 (H) 4.3 - 11.1 K/uL    RBC 3.87 (L) 4.05 - 5.2 M/uL    Hemoglobin 12.3 11.7 - 15.4 g/dL    Hematocrit 36.0 35.8 - 46.3 %    MCV 93.0 82 - 102 FL    MCH 31.8 26.1 - 32.9 PG    MCHC 34.2 31.4 - 35.0 g/dL    RDW 15.5 (H) 11.9 - 14.6 %    Platelets 200 150 - 450 K/uL    MPV 11.3 9.4 - 12.3 FL    nRBC 0.00 0.0 - 0.2 K/uL    Differential Type AUTOMATED      Neutrophils % 71.4 43.0 - 78.0 %    Lymphocytes % 10.0 (L) 13.0 - 44.0 %    Monocytes % 15.2 (H) 4.0 - 12.0 %    Eosinophils % 0.3 (L) 0.5 - 7.8 %    Basophils % 0.5 0.0 - 2.0 %    Immature Granulocytes % 2.6 0.0 - 5.0 %    Neutrophils Absolute 8.40 (H) 1.70 - 8.20 K/UL

## 2025-02-15 NOTE — PROGRESS NOTES
[facility] CARDIOLOGY PROGRESS NOTE           2/15/2025 11:59 AM    Admit Date: 2/11/2025    Admit Diagnosis: Atrial fibrillation with rapid ventricular response (HCC) [I48.91]  Acute cystitis without hematuria [N30.00]  Acute encephalopathy [G93.40]  Sepsis (HCC) [A41.9]    Assessment:   Principal Problem:    Sepsis (HCC)  Active Problems:    Acute cystitis without hematuria    HTN (hypertension)    History of CVA (cerebrovascular accident)    Hypothyroidism    Vitamin D deficiency    Pacemaker    Hyperlipidemia    Mixed stress and urge urinary incontinence    Acute UTI    Atrial fibrillation with RVR (HCC)    Acute encephalopathy    Secondary hypercoagulable state (HCC)  Resolved Problems:    * No resolved hospital problems. *      Plan:   AF - stable, mild tachycardia. On dilt gtt, Tikosyn, BB. Given her age, if RVR and persistence of AF continues, a pace/ablate procedure may be considered. Would not attempt DCCV until primary issues are improved/resolved.   Tikosyn use - continue for now, if it's decided rhythm control is unattainable would likely stop Tikosyn at one point. Given this is unclear at this time, continue for now but if Tikosyn needs to be stopped to concerns with ECG findings, ie prolonged QTC, or if renal function worsens, would stop.   Stroke ppx - continue Eliquis 5 mg po BID.   Sepsis from urinary source - per medicine. Continue abx.   Acute encephalopathy - likely related to underlying infection.   HTN - stable, continue medical therapy.   Stroke history - continue Eliquis, OMT.   Dispo - ongoing inpt care. Appreciate PT/OT/SW recs.     Thank you for allowing me to participate in the electrophysiologic care of this most pleasant patient. Please feel free to contact me if there are any questions or concerns.    Trev Jones MD, MS  Clinical Cardiac Electrophysiology  [facility]    Subjective:   No complaints this AM, no chest pain or shortness of

## 2025-02-15 NOTE — PROGRESS NOTES
Hospitalist Progress Note   Admit Date:  2025  2:23 PM   Name:  Filomena Stuart   Age:  85 y.o.  Sex:  female  :  1940   MRN:  [id number]   Room:  [facility]/    Presenting/Chief Complaint: Leg Pain, Dysuria, and Irregular Heart Beat     Reason(s) for Admission: Atrial fibrillation with rapid ventricular response (HCC) [I48.91]  Acute cystitis without hematuria [N30.00]  Acute encephalopathy [G93.40]  Sepsis (HCC) [A41.9]     Hospital Course:     Filomena Stuart is a 85 y.o. woman with medical history of atrial fibrillation on Eliquis hypertension, prior CVA, hypothyroidism deficiency, status post pacemaker, dyslipidemia, mixed origin stress incontinence, osteoarthritis/debility sent from her facility in view of generalized weakness, increased confusion going on for past 2 to 3 days.  Patient is a long-term resident of [facility] on , patient was noted to have excessive weakness and lethargy for past 2 to 3 days which is progressively getting worse and was noted to have some dysuria with pungent odor to it, patient also reported of having some dysuria and right leg pain.  Patient was also reported to be confused per the facility nursing staff.  History mostly obtained from ER provider, patient's son and chart review.  While being in EMS, patient developed A-fib with RVR.  No reported vomiting, diarrhea, syncopal event, facial droop, chest pain.  VS on arrival: Tmax 98.8, RR 16-34,  IR IR, /109, room air sats of 92%.  Blood work essentially unremarkable, UA with 2+ bacteria and positive nitrites and leukocyte esterase.  Chest x-ray showed small bilateral pleural effusions with compressive atelectasis.  CT head with no acute intracranial normality showed stable hypoattenuation white matter chronic in bilateral temporal lobes, worse on the right than left with periventricular and deep white matter representing moderate to severe chronic small vessel ischemic changes.    Subjective

## 2025-02-16 NOTE — PROGRESS NOTES
Hospitalist Progress Note   Admit Date:  2025  2:23 PM   Name:  Filomena Stuart   Age:  85 y.o.  Sex:  female  :  1940   MRN:  [id number]   Room:  [facility]/    Presenting/Chief Complaint: Leg Pain, Dysuria, and Irregular Heart Beat     Reason(s) for Admission: Atrial fibrillation with rapid ventricular response (HCC) [I48.91]  Acute cystitis without hematuria [N30.00]  Acute encephalopathy [G93.40]  Sepsis (HCC) [A41.9]     Hospital Course:     Filomena Stuart is a 85 y.o. woman with medical history of atrial fibrillation on Eliquis hypertension, prior CVA, hypothyroidism deficiency, status post pacemaker, dyslipidemia, mixed origin stress incontinence, osteoarthritis/debility sent from her facility in view of generalized weakness, increased confusion going on for past 2 to 3 days.  Patient is a long-term resident of [facility] on , patient was noted to have excessive weakness and lethargy for past 2 to 3 days which is progressively getting worse and was noted to have some dysuria with pungent odor to it, patient also reported of having some dysuria and right leg pain.  Patient was also reported to be confused per the facility nursing staff.  History mostly obtained from ER provider, patient's son and chart review.  While being in EMS, patient developed A-fib with RVR.  No reported vomiting, diarrhea, syncopal event, facial droop, chest pain.  VS on arrival: Tmax 98.8, RR 16-34,  IR IR, /109, room air sats of 92%.  Blood work essentially unremarkable, UA with 2+ bacteria and positive nitrites and leukocyte esterase.  Chest x-ray showed small bilateral pleural effusions with compressive atelectasis.  CT head with no acute intracranial normality showed stable hypoattenuation white matter chronic in bilateral temporal lobes, worse on the right than left with periventricular and deep white matter representing moderate to severe chronic small vessel ischemic changes.    Subjective

## 2025-02-16 NOTE — PROGRESS NOTES
[facility] CARDIOLOGY PROGRESS NOTE           2/16/2025 11:01 AM    Admit Date: 2/11/2025    Admit Diagnosis: Atrial fibrillation with rapid ventricular response (HCC) [I48.91]  Acute cystitis without hematuria [N30.00]  Acute encephalopathy [G93.40]  Sepsis (HCC) [A41.9]    Assessment:   Principal Problem:    Sepsis (HCC)  Active Problems:    Acute cystitis without hematuria    HTN (hypertension)    History of CVA (cerebrovascular accident)    Hypothyroidism    Vitamin D deficiency    Pacemaker    Hyperlipidemia    Mixed stress and urge urinary incontinence    Acute UTI    Atrial fibrillation with RVR (HCC)    Acute encephalopathy    Secondary hypercoagulable state (HCC)  Resolved Problems:    * No resolved hospital problems. *      Plan:   AF - stable, mild tachycardia. On dilt gtt, Tikosyn, BB. Given her age, if RVR and persistence of AF continues, a pace/ablate procedure may be considered. Would not attempt DCCV until primary issues are improved/resolved.   Tikosyn use - continue for now, if it's decided rhythm control is unattainable would likely stop Tikosyn at one point. Given this is unclear at this time, continue for now, but if Tikosyn needs to be stopped to concerns with ECG findings, ie prolonged QTC, or if renal function worsens, would stop.   Possibly back in NSR today, will obtain ECG to ensure this.  Stroke ppx - continue Eliquis 5 mg po BID.   Symptomatic bradycardia - s/p DCPM, stable function, reviewed recent device interrogation. BTK, device, implanted 218, previous AF burden 4% PTA.  Sepsis from urinary source - per medicine. Continue abx.   Acute encephalopathy - likely related to underlying infection.   HTN - stable, continue medical therapy.   Stroke history - continue Eliquis, OMT.   Dispo - ongoing inpt care. Appreciate PT/OT/SW recs.     Thank you for allowing me to participate in the electrophysiologic care of this most pleasant patient. Please feel free to contact me

## 2025-02-16 NOTE — PROGRESS NOTES
GOALS:  LTG: Patient will tolerate least restrictive diet without overt signs or symptoms of airway compromise.   STG: Patient will tolerate minced and moist diet and thin liquids without overt signs or symptoms of airway compromise. Ongoing 2025  STG: Patient will participate in modified barium swallow study as clinically indicated.  Ongoing 2025  STG: Speech therapy will perform assessment of speech and cognitive function, as patient is able to participate in assessment. Additional goals and recommendations to follow. Ongoing 2025    SPEECH LANGUAGE PATHOLOGY: DYSPHAGIA Daily Note #1    Acknowledge Order  I  Therapy Time  I   Charges     I  Rehab Caseload Tracker      NAME: Filomena Stuart  : 1940  MRN: [id number]    ADMISSION DATE: 2025  PRIMARY DIAGNOSIS: Sepsis (HCC)    ICD-10: Treatment Diagnosis: R13.12 Dysphagia, Oropharyngeal Phase    RECOMMENDATIONS   Diet:    Minced and Moist  Thin Liquids    Medication: as tolerated   Compensatory Swallowing Strategies:   Assist feed  Small bites/sips  Upright as possible for all oral intake   Therapeutic Intervention:   Patient/family education  Dysphagia treatment  Speech treatment  Cognitive-linguistic treatment   Patient continues to require skilled intervention:  Yes. Recommend ongoing speech therapy services during this hospitalization.     Anticipated Discharge Needs: Ongoing speech therapy is recommended at next level of care.      ASSESSMENT    Dysphagia: Patient continues to exhibit altered mental status which impacts patient's safety with PO intake. No overt signs of airway compromise were observed this date while RN provided medications with thin liquid wash or floated in puree. Patient stated twice that she felt the water went up her nose, however, this happened with second or third medication and then did not happen again. Complete oral clearance noted with each swallow and family reports patient typically swallowing one pill at

## 2025-02-17 NOTE — SIGNIFICANT EVENT
Right upper extremity duplex was done today and showed thrombus in the right basilic vein.  Basilic vein is superficial vein and therefore usually do not require anticoagulation for thrombosis.  In this situation, patient is already on Eliquis 5 mg twice daily for atrial flutter.  Can continue Eliquis at this time.

## 2025-02-17 NOTE — PROGRESS NOTES
[facility] CARDIOLOGY PROGRESS NOTE    2/17/2025 6:54 AM    Admit Date: 2/11/2025        Subjective:   Stable overnight without reported angina, CHF, or palpitations but agitated/altered and in restraints and mittens.  Atrial pacing on telemetry now, blood pressure stable, otherwise vitals stable and controlled. No other complaints overnight. Tolerating meds well.         Objective:      Vitals:    02/16/25 1952 02/17/25 0034 02/17/25 0419 02/17/25 0453   BP: 131/64 132/86 (!) 167/79 125/71   Pulse: 83 70 74 76   Resp: 18 18 18    Temp: 97.9 °F (36.6 °C) 97.7 °F (36.5 °C) 97.5 °F (36.4 °C)    TempSrc: Axillary Axillary Axillary    SpO2: 97% 93% 92%    Weight:   64.8 kg (142 lb 14.4 oz)    Height:           Physical Exam:  Neck- supple, difficult to assess but no obvious JVD  CV-irregularly irregular, no MRG  Lung- clear bilaterally anteriorly  Abd- soft, nontender, nondistended  Ext- no distal edema  Skin- warm and dry    Data Review:   Pertinent lab and noninvasive imaging over the past 24 hours reviewed and evaluated.    Recent Labs     02/15/25  0711 02/16/25  0622    138   K 3.8 3.8   BUN 14 12   WBC 13.6* 16.1*   HGB 14.3 13.9   HCT 41.8 41.5    298     Lab Results   Component Value Date    LDL 60 07/18/2024       Assessment and Plan:     AF -resolved, atrial pacing on telemetry.  Continue Tikosyn, oral long-acting beta-blockers, Cardizem CD, and Eliquis.    Stroke prophylaxis with secondary hypercoagulable state- continue Eliquis 5 mg po BID.     Symptomatic bradycardia - s/p DCPM, stable function, reviewed recent device interrogation.  Biotronik, implanted 218, previous AF burden 4% PTA.    Sepsis from urinary source - per medicine. Continue antibiotics and supportive care    Acute encephalopathy -worsening overnight per sitter, restrained and in mittens, will not respond to verbal stimuli or obey commands this morning, likely related to underlying infection.  Follow with hospitalist    HTN -

## 2025-02-17 NOTE — CARE COORDINATION
Patient's status discussed in IDR. Less confused this AM. Out of mitts. Dr moore spoke with son, Chon, about transition of care. PT/OT recommending STR in SNF.  CM contacted Chon [phone], to discuss recommendation. Chon reports he would like to speak with his brother to see if [facility], patient's residence can offer any nursing options. Chon given contact information to follow up with CM with decision. SNF list left in patient's room if patient unable to transition back to her residence.

## 2025-02-17 NOTE — PROGRESS NOTES
Hospitalist Progress Note   Admit Date:  2025  2:23 PM   Name:  Filomena Stuart   Age:  85 y.o.  Sex:  female  :  1940   MRN:  [id number]   Room:  [facility]/    Presenting/Chief Complaint: Leg Pain, Dysuria, and Irregular Heart Beat     Reason(s) for Admission: Atrial fibrillation with rapid ventricular response (HCC) [I48.91]  Acute cystitis without hematuria [N30.00]  Acute encephalopathy [G93.40]  Sepsis (HCC) [A41.9]     Hospital Course:     Filomena Stuart is a 85 y.o. woman with medical history of atrial fibrillation on Eliquis hypertension, prior CVA, hypothyroidism deficiency, status post pacemaker, dyslipidemia, mixed origin stress incontinence, osteoarthritis/debility sent from her facility in view of generalized weakness, increased confusion going on for past 2 to 3 days.  Patient is a long-term resident of [facility] on , patient was noted to have excessive weakness and lethargy for past 2 to 3 days which is progressively getting worse and was noted to have some dysuria with pungent odor to it, patient also reported of having some dysuria and right leg pain.  Patient was also reported to be confused per the facility nursing staff.  History mostly obtained from ER provider, patient's son and chart review.  While being in EMS, patient developed A-fib with RVR.  No reported vomiting, diarrhea, syncopal event, facial droop, chest pain.  VS on arrival: Tmax 98.8, RR 16-34,  IR IR, /109, room air sats of 92%.  Blood work essentially unremarkable, UA with 2+ bacteria and positive nitrites and leukocyte esterase.  Chest x-ray showed small bilateral pleural effusions with compressive atelectasis.  CT head with no acute intracranial normality showed stable hypoattenuation white matter chronic in bilateral temporal lobes, worse on the right than left with periventricular and deep white matter representing moderate to severe chronic small vessel ischemic changes.    She was  1.30 K/UL    Eosinophils Absolute 0.10 0.00 - 0.80 K/UL    Basophils Absolute 0.10 0.00 - 0.20 K/UL    Immature Granulocytes Absolute 0.66 (H) 0.0 - 0.5 K/UL       No results for input(s): \"COVID19\" in the last 72 hours.      Current Meds:  Current Facility-Administered Medications   Medication Dose Route Frequency    dilTIAZem (CARDIZEM CD) extended release capsule 120 mg  120 mg Oral Daily    magic (miracle) mouthwash  5 mL Swish & Spit 4x Daily PRN    diclofenac sodium (VOLTAREN) 1 % gel 2 g  2 g Topical 4x Daily PRN    thiamine (B-1) injection 100 mg  100 mg IntraVENous Daily    QUEtiapine (SEROQUEL) tablet 12.5 mg  12.5 mg Oral Q12H    QUEtiapine (SEROQUEL) tablet 12.5 mg  12.5 mg Oral Q6H PRN    famotidine (PEPCID) 20 mg in sodium chloride (PF) 0.9 % 10 mL injection  20 mg IntraVENous Daily    levothyroxine (SYNTHROID) tablet 88 mcg  88 mcg Oral QAM AC    atorvastatin (LIPITOR) tablet 40 mg  40 mg Oral Nightly    dofetilide (TIKOSYN) capsule 250 mcg  250 mcg Oral 2 times per day    metoprolol succinate (TOPROL XL) extended release tablet 50 mg  50 mg Oral Daily    apixaban (ELIQUIS) tablet 5 mg  5 mg Oral BID    trospium (SANCTURA) tablet 20 mg  20 mg Oral BID AC    fluticasone (FLONASE) 50 MCG/ACT nasal spray 1 spray  1 spray Each Nostril Daily PRN    lactobacillus (CULTURELLE) capsule 1 capsule  1 capsule Oral Daily with breakfast    aspirin chewable tablet 81 mg  81 mg Oral Daily    vitamin B-12 (CYANOCOBALAMIN) tablet 1,000 mcg  1,000 mcg Oral Daily    sodium chloride flush 0.9 % injection 5-40 mL  5-40 mL IntraVENous 2 times per day    sodium chloride flush 0.9 % injection 5-40 mL  5-40 mL IntraVENous PRN    0.9 % sodium chloride infusion   IntraVENous PRN    acetaminophen (TYLENOL) tablet 650 mg  650 mg Oral Q6H PRN    Or    acetaminophen (TYLENOL) suppository 650 mg  650 mg Rectal Q6H PRN    cefTRIAXone (ROCEPHIN) 1,000 mg in sterile water 10 mL IV syringe  1,000 mg IntraVENous Q24H    vitamin D3

## 2025-02-17 NOTE — PROGRESS NOTES
GOALS:  LTG: Patient will tolerate least restrictive diet without overt signs or symptoms of airway compromise.   STG: Patient will tolerate minced and moist diet and thin liquids without overt signs or symptoms of airway compromise. Ongoing 2025 ONGOING 25  STG: Patient will participate in modified barium swallow study as clinically indicated.  Ongoing 2025  STG: Speech therapy will perform assessment of speech and cognitive function, as patient is able to participate in assessment. Additional goals and recommendations to follow. Ongoing 2025, 25    SPEECH LANGUAGE PATHOLOGY: DYSPHAGIA Daily Note #1    Acknowledge Order  I  Therapy Time  I   Charges     I  Rehab Caseload Tracker      NAME: Filomena Stuart  : 1940  MRN: [id number]    ADMISSION DATE: 2025  PRIMARY DIAGNOSIS: Sepsis (HCC)    ICD-10: Treatment Diagnosis: R13.12 Dysphagia, Oropharyngeal Phase    RECOMMENDATIONS   Diet:    Minced and Moist  Thin Liquids    Medication: whole floated in puree or applesauce and crushed in puree or applesauce, as permitted by pharmacy   Compensatory Swallowing Strategies:   Assist feed  Small bites/sips  Upright as possible for all oral intake   Therapeutic Intervention:   Patient/family education  Dysphagia treatment  Speech treatment  Cognitive-linguistic treatment   Patient continues to require skilled intervention:  Yes. Recommend ongoing speech therapy services during this hospitalization.     Anticipated Discharge Needs: Ongoing speech therapy is recommended at next level of care.      ASSESSMENT    Dysphagia: Patient continues to exhibit altered mental status which impacts patient's safety with PO intake. Only single coughing episode when consuming minced/moist meat. Otherwise, consuming food items on minced/moist breakfast tray and serial straw sips of thin liquids without signs or symptoms of airway compromise.  Some difficulty with clearing capsules off tongue, requiring bites

## 2025-02-17 NOTE — PROGRESS NOTES
Physical Therapy Note:    Attempted to see patient this AM for physical therapy treatment  session. Patient ROSALES for a procedure presently. Will follow and re-attempt as schedule permits/patient available. Thank you,    Blake Mcknight, PT     Rehab Caseload Tracker

## 2025-02-17 NOTE — PLAN OF CARE
Problem: Discharge Planning  Goal: Discharge to home or other facility with appropriate resources  Outcome: Progressing  Flowsheets (Taken 2/17/2025 0847)  Discharge to home or other facility with appropriate resources:   Identify barriers to discharge with patient and caregiver   Arrange for needed discharge resources and transportation as appropriate   Identify discharge learning needs (meds, wound care, etc)   Refer to discharge planning if patient needs post-hospital services based on physician order or complex needs related to functional status, cognitive ability or social support system     Problem: Safety - Adult  Goal: Free from fall injury  Outcome: Progressing     Problem: ABCDS Injury Assessment  Goal: Absence of physical injury  Outcome: Progressing     Problem: Safety - Medical Restraint  Goal: Remains free of injury from restraints (Restraint for Interference with Medical Device)  Description: INTERVENTIONS:  1. Determine that other, less restrictive measures have been tried or would not be effective before applying the restraint  2. Evaluate the patient's condition at the time of restraint application  3. Inform patient/family regarding the reason for restraint  4. Q2H: Monitor safety, psychosocial status, comfort, nutrition and hydration  Outcome: Progressing  Flowsheets  Taken 2/17/2025 0800 by Dominique Rowe RN  Remains free of injury from restraints (restraint for interference with medical device):   Determine that other, less restrictive measures have been tried or would not be effective before applying the restraint   Evaluate the patient's condition at the time of restraint application  Taken 2/17/2025 0200 by Carlton Ladd RN  Remains free of injury from restraints (restraint for interference with medical device):   Determine that other, less restrictive measures have been tried or would not be effective before applying the restraint   Evaluate the patient's condition at the time

## 2025-02-18 NOTE — PLAN OF CARE
Problem: Discharge Planning  Goal: Discharge to home or other facility with appropriate resources  2/17/2025 2314 by Amy Bolaños RN  Outcome: Progressing  2/17/2025 1312 by Dominique Rowe RN  Outcome: Progressing  Flowsheets (Taken 2/17/2025 0847)  Discharge to home or other facility with appropriate resources:   Identify barriers to discharge with patient and caregiver   Arrange for needed discharge resources and transportation as appropriate   Identify discharge learning needs (meds, wound care, etc)   Refer to discharge planning if patient needs post-hospital services based on physician order or complex needs related to functional status, cognitive ability or social support system     Problem: Safety - Adult  Goal: Free from fall injury  2/17/2025 2314 by Amy Bolaños RN  Outcome: Progressing  2/17/2025 1312 by Dominique Rowe RN  Outcome: Progressing     Problem: ABCDS Injury Assessment  Goal: Absence of physical injury  2/17/2025 2314 by Amy Bolaños RN  Outcome: Progressing  2/17/2025 1312 by Dominique Rowe RN  Outcome: Progressing     Problem: Safety - Medical Restraint  Goal: Remains free of injury from restraints (Restraint for Interference with Medical Device)  Description: INTERVENTIONS:  1. Determine that other, less restrictive measures have been tried or would not be effective before applying the restraint  2. Evaluate the patient's condition at the time of restraint application  3. Inform patient/family regarding the reason for restraint  4. Q2H: Monitor safety, psychosocial status, comfort, nutrition and hydration  2/17/2025 2314 by Amy Bolaños RN  Outcome: Progressing  2/17/2025 1312 by Dominique Rowe RN  Outcome: Progressing  Flowsheets  Taken 2/17/2025 0800 by Dominique Rowe RN  Remains free of injury from restraints (restraint for interference with medical device):   Determine that other, less restrictive measures have been tried or would not be

## 2025-02-18 NOTE — PLAN OF CARE
Problem: Discharge Planning  Goal: Discharge to home or other facility with appropriate resources  2/18/2025 1017 by Norma Guerrero RN  Outcome: Progressing  Flowsheets (Taken 2/18/2025 0843)  Discharge to home or other facility with appropriate resources:   Identify barriers to discharge with patient and caregiver   Arrange for needed discharge resources and transportation as appropriate   Identify discharge learning needs (meds, wound care, etc)   Arrange for interpreters to assist at discharge as needed   Refer to discharge planning if patient needs post-hospital services based on physician order or complex needs related to functional status, cognitive ability or social support system  2/17/2025 2314 by Amy Bolaños, RN  Outcome: Progressing     Problem: Safety - Adult  Goal: Free from fall injury  2/18/2025 1017 by Norma Guerrero RN  Outcome: Progressing  Flowsheets (Taken 2/18/2025 0843)  Free From Fall Injury:   Instruct family/caregiver on patient safety   Based on caregiver fall risk screen, instruct family/caregiver to ask for assistance with transferring infant if caregiver noted to have fall risk factors  2/17/2025 2314 by Amy Bolaños RN  Outcome: Progressing     Problem: ABCDS Injury Assessment  Goal: Absence of physical injury  2/18/2025 1017 by Norma Guerrero RN  Outcome: Progressing  2/17/2025 2314 by Amy Bolaños RN  Outcome: Progressing     Problem: Safety - Medical Restraint  Goal: Remains free of injury from restraints (Restraint for Interference with Medical Device)  Description: INTERVENTIONS:  1. Determine that other, less restrictive measures have been tried or would not be effective before applying the restraint  2. Evaluate the patient's condition at the time of restraint application  3. Inform patient/family regarding the reason for restraint  4. Q2H: Monitor safety, psychosocial status, comfort, nutrition and hydration  2/18/2025 1017 by Norma Guerrero, YAIR  Outcome:

## 2025-02-18 NOTE — PROGRESS NOTES
Pt lethargic this AM. Alert after some time with continued stimulation. Mouth care performed and pt now agreeable to swallowing/eating breakfast (dysphagia - minced and moist). Pt sitting upright. Able to swallow smaller pills whole in applesauce, but has difficulty with larger pills/capsules. Meds given per MAR as permitted by pharmacy.     Pt with multiple non-sustaining bursts of SVT/ST. HR up to 160s. Now Apaced 84. Orion Stephenson NP notified at nurse's station. Verbal orders received for IVP lopressor q6 PRN for HR sustaining >120. Per pharmacy, tikosyn is NOT on the \"do not crush\" list, but is recommended not to crush. Hold further tikosyn doses due to pt inability to swallow larger pills/capsules.

## 2025-02-18 NOTE — CARE COORDINATION
CM met with patient's son, Sly, who is at bedside. Sly reports patient is less responsive today. Sly requests list of SNF as [facility] wasn't able to provide what the family requested.

## 2025-02-18 NOTE — PROGRESS NOTES
Pt with LUE occlusion to cephalic vein. Alex PRO notified. Per MD, cephalic vein is superficial. Okay to use IV present in L FA. Apply warm compress to area of redness/swelling.    Compress applied per MD. Good blood return noted to L FA IV. Flushed and patent. Alcohol cap applied.

## 2025-02-18 NOTE — PROGRESS NOTES
deficiency    Pacemaker    Hyperlipidemia    Mixed stress and urge urinary incontinence    Acute UTI    Atrial fibrillation with RVR (HCC)    Acute encephalopathy    Secondary hypercoagulable state (HCC)  Resolved Problems:    * No resolved hospital problems. *      Objective:   Patient Vitals for the past 24 hrs:   Temp Pulse Resp BP SpO2   02/18/25 1219 97.6 °F (36.4 °C) 74 20 -- 92 %   02/18/25 1015 -- 74 -- -- --   02/18/25 0917 -- 84 -- -- --   02/18/25 0843 -- 72 -- (!) 146/92 --   02/18/25 0732 98 °F (36.7 °C) 73 19 (!) 143/83 94 %   02/18/25 0445 98.2 °F (36.8 °C) 71 20 (!) 104/90 96 %   02/17/25 2329 97.7 °F (36.5 °C) 71 18 (!) 150/80 92 %   02/17/25 2045 -- 84 -- (!) 145/76 --   02/17/25 1944 97.7 °F (36.5 °C) 75 18 (!) 145/76 92 %       Oxygen Therapy  SpO2: 92 %  Pulse via Oximetry: 106 beats per minute  Pulse Oximeter Device Mode: Intermittent  Pulse Oximeter Device Location: Finger  O2 Device: None (Room air)    Estimated body mass index is 22.69 kg/m² as calculated from the following:    Height as of this encounter: 1.676 m (5' 6\").    Weight as of this encounter: 63.8 kg (140 lb 9.6 oz).    Intake/Output Summary (Last 24 hours) at 2/18/2025 1618  Last data filed at 2/18/2025 1219  Gross per 24 hour   Intake 270 ml   Output 700 ml   Net -430 ml         Physical Exam:     General:    Well nourished.  Elderly woman, conversant, drowsy  Head:  Normocephalic, atraumatic  Eyes:  Sclerae appear normal.  Pupils equally round.  ENT:  Nares appear normal.  Dry oral mucosa with some erythema noted  Neck:  No restricted ROM.  Trachea midline   CV:   Irregular.  No m/r/g.  No jugular venous distension.  Lungs:   CTAB.  No wheezing, rhonchi, or rales.  Symmetric expansion.  Abdomen:   Soft, nontender, nondistended.  NABS  Extremities: No cyanosis or clubbing.  No edema.  Safety mitts on bilateral hands  Skin:     No rashes.  Normal coloration.   Warm and dry.    Neuro:  Face symmetric.  Moving four  MD    Part of this note may have been written by using a voice dictation software.  The note has been proof read but may still contain some grammatical/other typographical errors.

## 2025-02-18 NOTE — PROGRESS NOTES
[facility] CARDIOLOGY PROGRESS NOTE           2/18/2025 11:36 AM    Admit Date: 2/11/2025      Subjective:   -Weight charted at 63.8 kg today-140 pounds  Intake versus output-charted net -310 cc  -Had multiple runs of atrial flutter/SVT this morning-received 1 dose of IV metoprolol.  Patient drowsy and unable to swallow pills this morning.  Now agitated and confused.    ROS:  Cardiovascular:  As noted above    Objective:      Vitals:    02/18/25 0732 02/18/25 0843 02/18/25 0917 02/18/25 1015   BP: (!) 143/83 (!) 146/92     Pulse: 73 72 84 74   Resp: 19      Temp: 98 °F (36.7 °C)      TempSrc: Temporal      SpO2: 94%      Weight:       Height:           Physical Exam:  General-No Acute Distress  Neck- supple, no JVD  CV- regular rate and rhythm no MRG  Lung- clear bilaterally  Abd- soft, nontender, nondistended  Ext- no edema bilaterally.  Skin- warm and dry    Data Review:     Lab Results   Component Value Date/Time     02/18/2025 07:57 AM    K 3.6 02/18/2025 07:57 AM     02/18/2025 07:57 AM    CO2 22 02/18/2025 07:57 AM    BUN 13 02/18/2025 07:57 AM    CREATININE 0.55 02/18/2025 07:57 AM    GLUCOSE 106 02/18/2025 07:57 AM    CALCIUM 9.0 02/18/2025 07:57 AM         Lab Results   Component Value Date    WBC 19.4 (H) 02/18/2025    HGB 14.8 02/18/2025    HCT 44.1 02/18/2025    MCV 93.0 02/18/2025     02/18/2025 04/16/24    ECHO (TTE) COMPLETE (PRN CONTRAST/BUBBLE/STRAIN/3D) 04/16/2024  1:50 PM (Final)    Interpretation Summary    Left Ventricle: Normal left ventricular systolic function. EF by 2D Simpsons Biplane is 57%. Left ventricle size is normal. Normal wall thickness. Normal wall motion. Normal diastolic function.    Mitral Valve: Mild annular calcification of the mitral valve. Mild regurgitation.    Tricuspid Valve: Mild regurgitation. The estimated RVSP is 45 mmHg.    Left Atrium: Left atrium is moderately dilated. LA Vol Index is  41 ml/m2. LA Vol Index A/L is 44

## 2025-02-18 NOTE — PROGRESS NOTES
Patient voided but unable to measure UOP due to Purewick malfunction. Purewick and brief replaced. Patient repositioned to help with suctioning.     0412 Patient voided in brief but again Purewick malfunctioned. Only 50 mL in cannister. Brief saturated along with nola pad and sheet.     0520 Bladder scan showed  mL of urine at this time. Sheets, brief, and Purewick changed. Purewick back in place.

## 2025-02-18 NOTE — PROGRESS NOTES
ACUTE OCCUPATIONAL THERAPY GOALS:   (Developed with and agreed upon by patient and/or caregiver.)  1. Pt will toilet with SBA   2. Pt will complete functional mobility for ADLs with SBA using AD as needed  3. Pt will complete lower body dressing with SBA using AE as neededx  4. Pt will complete grooming and hygiene at sink with SBA  5.  Pt will tolerate 23 minutes functional activity with min or fewer rest breaks to promote increased endurance for ADLs     Timeframe: 7 visits    OCCUPATIONAL THERAPY: Daily Note PM   OT Visit Days: 3   Time In/Out  OT Charge Capture  Rehab Caseload Tracker  OT Orders    Filomena Stuart is a 85 y.o. female   PRIMARY DIAGNOSIS: Sepsis (HCC)  Atrial fibrillation with rapid ventricular response (HCC) [I48.91]  Acute cystitis without hematuria [N30.00]  Acute encephalopathy [G93.40]  Sepsis (HCC) [A41.9]       Inpatient: Payor: MEDICARE / Plan: MEDICARE PART A AND B / Product Type: *No Product type* /     ASSESSMENT:     REHAB RECOMMENDATIONS:   Recommendation to date pending progress:  Setting:  Short-term Rehab    Equipment:    To Be Determined     ASSESSMENT:  Ms. Stuart remains limited by deficits in cognition, overall strength, mobility, balance, and activity tolerance impacting ADLs. Pt with continued confusion, today she was more lethargic and only speaking nonsensically. No command following today. Max-total Ax2 for rolling R/L while getting assistance with brief change and hygiene. Attempted to have pt participate in simple grooming task of washing her face, however again unable to follow command/ sequence task. Further mobility deferred at this time. No progress towards goals today, continue POC.        SUBJECTIVE:     Ms. Stuart states, \"299\"     Social/Functional Lives With: Alone  Type of Home: Assisted living ([facility])  Home Layout: One level  Home Equipment: Cane, Rollator  Has the patient had two or more falls in the past year or any fall with injury in the past

## 2025-02-18 NOTE — PROGRESS NOTES
IP Consult to Vascular Access Team  Consult performed by: Ivan Richards RN  Consult ordered by: Santosh Zaidi MD       US Guided PIV access-    Ultrasound was used to find the vein which was compressible and without any ultrasound features of an artery or nerve bundle. Skin was cleaned and disinfected prior to IV puncture.  Under real-time ultrasound guidance peripheral access was obtained in the left forearm using 22 G 1.75\" Peripheral IV catheter after 1 attempt(s). Blood return was present and IV flushed without difficulty with no clinical signs of infiltration. IV dressing applied and no immediate complications noted. Patient did not tolerate the procedure well.

## 2025-02-18 NOTE — PROGRESS NOTES
Patient pulled out IV, upon assessing the area, a big red lump was noticed on the LUE close to the IV site. The site is swollen, red, and warm to the touch. Patient exhibits no pain when touching site. The IV was previously wrapped in koband. Notified Karla Baker NP. NP ordered for vascular duplex of LUE.     0050 When trying for another IV, the lump had gone down but the LUE is still swollen and red in that particular area. Vascular duplex still ordered.    0332 Vascular duplex results show an occlusion of the cephalic vein. Patient already on Eliquis 5 mg twice daily. Notified Clif Hargrove MD. MD said \"morning team will address it.\" No new orders.

## 2025-02-19 NOTE — PROGRESS NOTES
Physical Therapy Note:    Attempted to see patient this PM for physical therapy treatment  session. Patient's RN advised to hold pt due to agitation. Will follow and re-attempt as schedule permits/patient available. Thank you,    Blake Mcknight, PT     Rehab Caseload Tracker

## 2025-02-19 NOTE — PROGRESS NOTES
SPEECH LANGUAGE PATHOLOGY: ATTEMPT     NAME: Filomena Stuart  : 1940  MRN: [id number]    ADMISSION DATE: 2025  PRIMARY DIAGNOSIS: Sepsis (HCC)    Change in status noted this AM. Patient is not alert enough for po intake. Discussed with Orion Stephenson, Cardiology. Medications have been transitioned to IV and palliative care has been consulted.  Recommend NPO until she can be re-evaluated by speech therapy.  Will follow up as medical status improves and as indicated by goals of care.     Daysi Mahajan, EUNICE  2025 8:25 AM

## 2025-02-19 NOTE — PROGRESS NOTES
[facility] CARDIOLOGY PROGRESS NOTE           2/19/2025 11:55 AM    Admit Date: 2/11/2025      Subjective:   Unable to obtain HPI due to encephalopathy.  Unable to get oral medications today due to mental status.  Tachycardic overnight and this morning.  ROS:  Cardiovascular:  As noted above    Objective:      Vitals:    02/19/25 0806 02/19/25 0842 02/19/25 1023 02/19/25 1107   BP:  (!) 137/98 129/76 110/63   Pulse: (!) 121 (!) 117 (!) 124 (!) 125   Resp: 18 26     Temp: 97.6 °F (36.4 °C)      TempSrc: Temporal      SpO2: 90%      Weight:       Height:           Physical Exam:  General-encephalopathic  Neck- supple, no JVD  CV-rapid and irregular  Lung- clear bilaterally  Abd- soft, nontender, nondistended  Ext- no edema bilaterally.  Skin- warm and dry    Data Review:     Lab Results   Component Value Date/Time     02/19/2025 03:37 AM    K 3.6 02/19/2025 03:37 AM     02/19/2025 03:37 AM    CO2 25 02/19/2025 03:37 AM    BUN 14 02/19/2025 03:37 AM    CREATININE 0.55 02/19/2025 03:37 AM    GLUCOSE 131 02/19/2025 03:37 AM    CALCIUM 8.9 02/19/2025 03:37 AM         Lab Results   Component Value Date    WBC 17.4 (H) 02/19/2025    HGB 13.9 02/19/2025    HCT 42.1 02/19/2025    MCV 95.0 02/19/2025     02/19/2025 04/16/24    ECHO (TTE) COMPLETE (PRN CONTRAST/BUBBLE/STRAIN/3D) 04/16/2024  1:50 PM (Final)    Interpretation Summary    Left Ventricle: Normal left ventricular systolic function. EF by 2D Simpsons Biplane is 57%. Left ventricle size is normal. Normal wall thickness. Normal wall motion. Normal diastolic function.    Mitral Valve: Mild annular calcification of the mitral valve. Mild regurgitation.    Tricuspid Valve: Mild regurgitation. The estimated RVSP is 45 mmHg.    Left Atrium: Left atrium is moderately dilated. LA Vol Index is  41 ml/m2. LA Vol Index A/L is 44 mL/m2.    Right Atrium: Lead present in the right atrium. Right atrium is mildly dilated.    Image

## 2025-02-19 NOTE — PLAN OF CARE
Problem: Discharge Planning  Goal: Discharge to home or other facility with appropriate resources  2/19/2025 0924 by Norma Guerrero RN  Outcome: Progressing  Flowsheets (Taken 2/19/2025 0845)  Discharge to home or other facility with appropriate resources:   Identify barriers to discharge with patient and caregiver   Arrange for needed discharge resources and transportation as appropriate   Identify discharge learning needs (meds, wound care, etc)   Arrange for interpreters to assist at discharge as needed  2/18/2025 2323 by Amy Bolaños RN  Outcome: Progressing     Problem: Safety - Adult  Goal: Free from fall injury  2/19/2025 0924 by Norma Guerrero RN  Outcome: Progressing  Flowsheets (Taken 2/19/2025 0845)  Free From Fall Injury:   Instruct family/caregiver on patient safety   Based on caregiver fall risk screen, instruct family/caregiver to ask for assistance with transferring infant if caregiver noted to have fall risk factors  2/18/2025 2323 by Amy Bolaños RN  Outcome: Progressing     Problem: ABCDS Injury Assessment  Goal: Absence of physical injury  2/19/2025 0924 by Norma Guerrero RN  Outcome: Progressing  2/18/2025 2323 by Amy Bolaños RN  Outcome: Progressing     Problem: Safety - Medical Restraint  Goal: Remains free of injury from restraints (Restraint for Interference with Medical Device)  Description: INTERVENTIONS:  1. Determine that other, less restrictive measures have been tried or would not be effective before applying the restraint  2. Evaluate the patient's condition at the time of restraint application  3. Inform patient/family regarding the reason for restraint  4. Q2H: Monitor safety, psychosocial status, comfort, nutrition and hydration  2/19/2025 0924 by Norma Guerrero RN  Outcome: Progressing  2/18/2025 2323 by Amy Bolaños RN  Outcome: Progressing     Problem: Respiratory - Adult  Goal: Achieves optimal ventilation and oxygenation  2/19/2025 0924 by Cesar

## 2025-02-19 NOTE — CARE COORDINATION
CM reviewed clinicals and status discussed in IDR. More sedate today. Has not eaten since breakfast on 2/18, when she required feeding.   Palliative consult ordered. Hospital delirium. Morphine and IV fluid to support.    Patient had been recommended for SNF. [facility] declined due to full capacity.  [facility] reporting she can't be considered for their facility until restraints off 48 hrs. CM informed by primary nurse that restraints have been off since 2/17 at 1000.  Continue to monitor.

## 2025-02-19 NOTE — CONSULTS
Patient: Filomena Stuart MRN: [id number]  SSN: [id number]    YOB: 1940  Age: 85 y.o.  Sex: female       Date of Request: 2/19/2025  Date of Consult:  2/19/2025  Reason for Consult:  family support and education, goals of care, and medical decision making  Requesting Physician: Dr. Johnson     Assessment/Plan:     Principal Diagnosis:    Altered Mental Status R41.82    Additional Diagnoses:   Debility, Unspecified  R53.81  Frailty  R54  Counseling, Encounter for Medical Advice  Z71.9  Encounter for Palliative Care  Z51.5    Palliative Performance Scale (PPS):       Medical Decision Making:   Reviewed and summarized labs and imaging from admission.  Pt not following commands.  Spoke with family at bedside.   Pt was functional prior to admission for sepsis, UTI.  Pt son Chon is decision maker for pt and DNR is in place.  Pt not eating or drinking per family and moans several times during my visit.   Will start morphine 2 mg iv q 4 hr prn moderate/severe pain.  Will also start normal saline 100cc/hr infusion as pt not drinking currently.  Likely delirium multifactorial from UTI and hospitalization.  Will follow    Will discuss findings with members of the interdisciplinary team.      Thank you for this referral.         Subjective:     History obtained from:  Family and Chart    Chief Complaint: altered mental status  History of Present Illness:  85 y.o. woman with medical history of atrial fibrillation on Eliquis hypertension, prior CVA, hypothyroidism deficiency, status post pacemaker, dyslipidemia, mixed origin stress incontinence, osteoarthritis/debility sent from her facility in view of generalized weakness, increased confusion going on for past 2 to 3 days.  Patient is a long-term resident of [facility] on [address], patient was noted to have excessive weakness and lethargy for past 2 to 3 days which is progressively getting worse and was noted to have some dysuria with pungent odor to it,  patient also reported of having some dysuria and right leg pain.  Patient was also reported to be confused per the facility nursing staff.  History mostly obtained from ER provider, patient's son and chart review.  While being in EMS, patient developed A-fib with RVR.  No reported vomiting, diarrhea, syncopal event, facial droop, chest pain.  VS on arrival: Tmax 98.8, RR 16-34,  IR IR, /109, room air sats of 92%.  Blood work essentially unremarkable, UA with 2+ bacteria and positive nitrites and leukocyte esterase.  Chest x-ray showed small bilateral pleural effusions with compressive atelectasis.  CT head with no acute intracranial normality showed stable hypoattenuation white matter chronic in bilateral temporal lobes, worse on the right than left with periventricular and deep white matter representing moderate to severe chronic small vessel ischemic changes.     She was admitted to the Hospitalist service.  She received bolus IV fluids as well as 4 doses of Solu-Cortef IV.  Urine grew pansensitive E. coli and she completed a course of Rocephin.  She was seen in consultation by Cardiology with diltiazem drip weaned off and rate control continue with metoprolol, Cardizem, and Tikosyn.  She will continue on Eliquis twice daily.    Advance Directive: Yes       Code Status:  DNR            Health Care Power of : Yes - Copy of Health Care Power of  on file.    Past Medical History:   Diagnosis Date    Acute encephalopathy 6/27/2016    Anxiety     Arthritis     Atrial fibrillation (HCC) 4/16/2018    Chronic pain     hip    REYES (dyspnea on exertion) 8/29/2022    Endocrine disease     hypothyroidism    Hypertension     Obesity, unspecified obesity severity, unspecified obesity type 3/27/2024    JACKI (obstructive sleep apnea) 9/1/2022    Sepsis (HCC) 6/27/2016    Thyroid disease     TIA (transient ischemic attack) 11/14/2017    Vitamin D deficiency 9/1/2015      Past Surgical History:   Procedure

## 2025-02-19 NOTE — PROGRESS NOTES
0701: Notified by monitor room pt in SVT HR 170s. This RN to bedside. HR sustaining 130s-150s.     0708: 5 mg IVP lopressor given per PRN protocol.     0730: HR continues to sustain. Currently 130s. /117 on the left leg. Unable to obtain pressure from BUE due to RUE thrombus/LUE occlusion. Orion Stephenson NP notified at nurses station. Verbal orders received for 5 mg IVP lopressor x1 now. Modify PRN lopressor orders to 5 mg IVP lopressor q6 scheduled. Next dose due at 1400.     [place] in room. VS cycling. Pt restless in bed, but with eyes closed. Unable to take PO meds at this time. Orion Stephenson NP aware. Palliative cx placed.     1023: Alex PRO updated at nurses station of patient's current status/events from this AM. Verbal orders received for 10 mg IVP dilt x1 now. HR currently 120s. /76. MD to round.

## 2025-02-19 NOTE — PROGRESS NOTES
Nutrition Assessment  Assessment Type: Initial, LOS  Reason for visit:  Length of Stay  Malnutrition Screening Tool Score: 1    Nutrition Intervention:   Food and/or Nutrient Delivery:   Meals and Snacks:  Diet: Continue NPO per SLP evaluation  Medical Food Supplements:   Medical food supplement therapy:  None Deferred pt is NPO      Malnutrition Assessment:  [facility] Clinical Malnutrition Criteria  Malnutrition Status: Insufficient data  Nutrition Focused Physical Exam: Deferred due to agitation/confusion    Nutrition Assessment:  Food/Nutrition Related History:   Unable to assess. Pt agitated/confused and no family is present.      Do You Have Any Cultural, Zoroastrian, or Ethnic Food Preferences?: No     Weight History:   EMR weight history review: 140# 2/29/24 (cardiology), 139# 4/18/24 (cardiology), 139# 7/10/24 (cardiology), 129# 12/20/24 (FOV). Bedscale weight of 139# obtained today.     Nutrition Background:       PMH significant for Afib, HTN, CVA, hypothyroidism, pacemaker placement, HLD, OA, anxiety, arthritis, TIA. She presents with generalized weakness & confusion. She is admitted with sepsis.   2/17: KUB - Nonspecific mild distention of colon with stool and gas. No definite obstruction. Suspected pulmonary edema with small/moderate pleural effusions.    Nutrition Monitoring/Evaluation:  Patient is reclined in bed, no family present during my visit this afternoon. She is out of mittens currently. Remains pleasantly confused at this time, unable to provide any information. Note patient has only really eaten 1 meal since admit (breakfast 2/18) with assistance. Is currently NPO. Will add ONS once diet is advanced.      Current Nutrition Therapies:  No diet orders on file    Current Intake:   Average Meal Intake: NPO        Anthropometric Measures:  Height: 167.6 cm (5' 5.98\")  Current Body Wt: 63 kg (139 lb) (2/19), Weight source: Bed scale  BMI: 22.4, Normal Weight (BMI 22.0 to 24.9) age over

## 2025-02-19 NOTE — PROGRESS NOTES
Hospitalist Progress Note   Admit Date:  2025  2:23 PM   Name:  Filomena Stuart   Age:  85 y.o.  Sex:  female  :  1940   MRN:  [id number]   Room:  [facility]/    Presenting/Chief Complaint: Leg Pain, Dysuria, and Irregular Heart Beat     Reason(s) for Admission: Atrial fibrillation with rapid ventricular response (HCC) [I48.91]  Acute cystitis without hematuria [N30.00]  Acute encephalopathy [G93.40]  Sepsis (HCC) [A41.9]     Hospital Course:     Filomena Stuart is a 85 y.o. woman with medical history of atrial fibrillation on Eliquis hypertension, prior CVA, hypothyroidism deficiency, status post pacemaker, dyslipidemia, mixed origin stress incontinence, osteoarthritis/debility sent from her facility in view of generalized weakness, increased confusion going on for past 2 to 3 days.  Patient is a long-term resident of [facility] on , patient was noted to have excessive weakness and lethargy for past 2 to 3 days which is progressively getting worse and was noted to have some dysuria with pungent odor to it, patient also reported of having some dysuria and right leg pain.  Patient was also reported to be confused per the facility nursing staff.  History mostly obtained from ER provider, patient's son and chart review.  While being in EMS, patient developed A-fib with RVR.  No reported vomiting, diarrhea, syncopal event, facial droop, chest pain.  VS on arrival: Tmax 98.8, RR 16-34,  IR IR, /109, room air sats of 92%.  Blood work essentially unremarkable, UA with 2+ bacteria and positive nitrites and leukocyte esterase.  Chest x-ray showed small bilateral pleural effusions with compressive atelectasis.  CT head with no acute intracranial normality showed stable hypoattenuation white matter chronic in bilateral temporal lobes, worse on the right than left with periventricular and deep white matter representing moderate to severe chronic small vessel ischemic changes.    She was  discomfort  Currently n.p.o. given current mental status  Speech Therapy following     HTN (hypertension)  Blood pressure is optimally controlled.  Continue Toprol-XL     History of CVA (cerebrovascular accident)  Continue aspirin, Eliquis and statin.  CT head consistent with moderate to severe chronic microangiopathic ischemic changes.     Hypothyroidism  Synthroid 88 mcg p.o. daily before breakfast.     Vitamin D deficiency  Continue vitamin D supplementation.     Hyperlipidemia  Lipitor.     Mixed stress and urge urinary incontinence  Substituted Gemtesa with Sanctura while inpatient.    Anticipated Discharge Arrangements:   Skilled Nursing Facility    PT/OT evals ordered?  Therapy evals ordered  Diet:  No diet orders on file  VTE prophylaxis: Already on anticoagulation  Code status: DNR      Management of Delirium      Non-pharmacological intervention  Reorient the patient throughout the day  Window open and lights on during the day. Lights off, television off, noises down during the night. If able, decrease nursing checks at night  Therapies as often as possible  Avoid restraints to the best of your ability   Avoid sensory deprivation by using glasses and hearing aids, if applicable        Pharmacological intervention  Replete electrolyte abnormalities and correct metabolic abnormalities  Limit benzodiazepines, antihistamines, narcotics, anticholinergics. Preference towards Precedex for sedation over fentanyl and benzodiazepines/GABAa agonists.   For dangerous behavior/aggression to self or others can consider Zyprexa or Seroquel if benefits outweigh risk  For persistent insomnia can use melatonin four hours prior to bedtime or Seroquel 25 mg at bedtime       Non-peripheral Lines and Tubes (if present):              Telemetry (if present):  Cardiac/Telemetry Monitor On: Bedside monitor in use, Portable telemetry pack applied        Hospital Problems:  Principal Problem:    Sepsis (HCC)  Active Problems:

## 2025-02-19 NOTE — PLAN OF CARE
Problem: Discharge Planning  Goal: Discharge to home or other facility with appropriate resources  2/18/2025 2323 by Amy Bolaños RN  Outcome: Progressing  2/18/2025 1017 by Norma Guerrero RN  Outcome: Progressing  Flowsheets (Taken 2/18/2025 0843)  Discharge to home or other facility with appropriate resources:   Identify barriers to discharge with patient and caregiver   Arrange for needed discharge resources and transportation as appropriate   Identify discharge learning needs (meds, wound care, etc)   Arrange for interpreters to assist at discharge as needed   Refer to discharge planning if patient needs post-hospital services based on physician order or complex needs related to functional status, cognitive ability or social support system     Problem: Safety - Adult  Goal: Free from fall injury  2/18/2025 2323 by Amy Bolaños RN  Outcome: Progressing  2/18/2025 1017 by Norma Guerrero RN  Outcome: Progressing  Flowsheets (Taken 2/18/2025 0843)  Free From Fall Injury:   Instruct family/caregiver on patient safety   Based on caregiver fall risk screen, instruct family/caregiver to ask for assistance with transferring infant if caregiver noted to have fall risk factors     Problem: ABCDS Injury Assessment  Goal: Absence of physical injury  2/18/2025 2323 by Amy Bolaños RN  Outcome: Progressing  2/18/2025 1017 by Norma Guerrero RN  Outcome: Progressing     Problem: Safety - Medical Restraint  Goal: Remains free of injury from restraints (Restraint for Interference with Medical Device)  Description: INTERVENTIONS:  1. Determine that other, less restrictive measures have been tried or would not be effective before applying the restraint  2. Evaluate the patient's condition at the time of restraint application  3. Inform patient/family regarding the reason for restraint  4. Q2H: Monitor safety, psychosocial status, comfort, nutrition and hydration  2/18/2025 2323 by Amy Bolaños RN  Outcome:  and sodium. Initiate appropriate interventions as ordered  Goal: Achieves maximal functionality and self care  2/18/2025 2323 by Amy Bolaños RN  Outcome: Progressing  2/18/2025 1017 by Norma Guerrero RN  Outcome: Progressing  Flowsheets (Taken 2/18/2025 0843)  Achieves maximal functionality and self care:   Monitor swallowing and airway patency with patient fatigue and changes in neurological status   Encourage and assist patient to increase activity and self care with guidance from physical therapy/occupational therapy   Encourage visually impaired, hearing impaired and aphasic patients to use assistive/communication devices     Problem: Skin/Tissue Integrity - Adult  Goal: Skin integrity remains intact  Description: 1.  Monitor for areas of redness and/or skin breakdown  2.  Assess vascular access sites hourly  3.  Every 4-6 hours minimum:  Change oxygen saturation probe site  4.  Every 4-6 hours:  If on nasal continuous positive airway pressure, respiratory therapy assess nares and determine need for appliance change or resting period  2/18/2025 2323 by Amy Bolaños RN  Outcome: Progressing  2/18/2025 1017 by Norma Guerrero RN  Outcome: Progressing  Flowsheets (Taken 2/18/2025 0843)  Skin Integrity Remains Intact:   Assess vascular access sites hourly   Monitor for areas of redness and/or skin breakdown     Problem: Musculoskeletal - Adult  Goal: Return mobility to safest level of function  2/18/2025 2323 by Amy Bolaños RN  Outcome: Progressing  2/18/2025 1017 by Norma Guerrero RN  Outcome: Progressing  Flowsheets (Taken 2/18/2025 0843)  Return Mobility to Safest Level of Function:   Assess patient stability and activity tolerance for standing, transferring and ambulating with or without assistive devices   Assist with transfers and ambulation using safe patient handling equipment as needed   Ensure adequate protection for wounds/incisions during mobilization   Obtain physical

## 2025-02-20 NOTE — PROGRESS NOTES
GOALS:  LTG: Patient will tolerate least restrictive diet without overt signs or symptoms of airway compromise.   STG: Patient will tolerate puree diet with mildly thick/nectar thick liquids without overt signs or symptoms of airway compromise. Updated 2025  STG: Patient will participate in modified barium swallow study as clinically indicated.  Ongoing 2025  STG: Speech therapy will perform assessment of speech and cognitive function, as patient is able to participate in assessment. Additional goals and recommendations to follow. Completed 2025    LTG: Patient will improve cognitive- communicative function to participate in daily activities at highest possible level.   STG: Patient will sustain attention to therapy tasks for 3 minute interval with min A provided.   STG: Patient will answer open ended questions related to self/status with 90% accuracy.     SPEECH LANGUAGE PATHOLOGY: DYSPHAGIA Daily Note #5  and Cognitive- Communicative Evaluation    Acknowledge Order  I  Therapy Time  I   Charges     I  Rehab Caseload Tracker      NAME: Filomena Stuart  : 1940  MRN: [id number]    ADMISSION DATE: 2025  PRIMARY DIAGNOSIS: Sepsis (HCC)    ICD-10: Treatment Diagnosis: R13.12 Dysphagia, Oropharyngeal Phase    RECOMMENDATIONS   Diet:    Pureed  Mildly Thick Liquids (Nectar)    Medication: whole floated in puree or applesauce and crushed in puree or applesauce, as permitted by pharmacy   Compensatory Swallowing Strategies:   Assist feed  Small bites/sips  Upright as possible for all oral intake   Therapeutic Intervention:   Patient/family education  Dysphagia treatment  Speech treatment  Cognitive-linguistic treatment   Patient continues to require skilled intervention:  Yes. Recommend ongoing speech therapy services during this hospitalization.     Anticipated Discharge Needs: Ongoing speech therapy is recommended at next level of care.      ASSESSMENT    Dysphagia: Patient exhibits moderate  Dysphagia: Intermittent supervision/cuing. One-two diet    consistencies restricted.   [x] 3 Moderate Dysphagia: Total assistance, supervision, or strategies.       Two or more diet consistencies restricted.   [] 2 Moderate-Severe Dysphagia: Maximum assistance or maximum use     of strategies with partial po intake   [] 1 Severe dysphagia- NPO. Unable to tolerate any po safely     PLAN    Duration/Frequency: Continue to follow patient 3x/week for duration of hospitalization and/or until goals met    Rehabilitation Potential For Stated Goals: Good    Interdisciplinary Collaboration: RN/ PCT    Medical Necessity    Skilled intervention continues to be required due to medical complications.    Education:   Patient and/or family/caregiver educated on Role of speech therapy, SLP recommendations, and SLP plan  Education response: Needs reinforcement    Safety:   Call light within reach  In Bed  RN notified   Family/visitors at bedside    Therapy Time:  Time In: 1145  Time Out: 1225  Minutes: 40    PATRICK HERNANDEZ, SLP  2/20/2025 12:38 PM

## 2025-02-20 NOTE — PROGRESS NOTES
0800: Shift assessment. Meds given as permitted per MAR. Pt alert, but confused; coughing during mouth care - unable to take PO meds at this time.     1000: Pt awake and alert, talking with clear speech. Alert to self. Disoriented to time, place, and situation. Speech therapy notified regarding repeat eval given pt's improved mentation.     1130: Guilherme SLP at bedside.     1208: Per Guilherme, SLP, recommend puree diet with mildly thick/nectar thick liquids when patient is alert. Orion Stephenson NP and Darby Holloway MD notified face to face at nurse's station. Okay to give po meds as tolerated.     Per Orion Stephenson NP, repeat EKG 2 hours after getting tikosyn dose. Continue EKG s/p 2 hours for the next 4 doses. Metoprolol orders updated.     Per Darby Estrada MD, effer K ordered as potassium replacement (K 3.2).     Most PO meds able to be given whole floated in applesauce as tolerated by patient. See MAR.     EKG orders placed - timed for 1400.     Pt remains in afib. Cardizem running at 15 mg/hr.

## 2025-02-20 NOTE — PROGRESS NOTES
[facility] CARDIOLOGY PROGRESS NOTE           2/20/2025 9:34 AM    Admit Date: 2/11/2025      Subjective:   -Went into rapid atrial fibrillation over the past 48 hours especially since she is not able to take her Tikosyn with confusion.  She was very confused yesterday and did not take her pills by mouth.  Has been on IV diltiazem but it has been difficult to rate control her.  Telemetry overnight shows atrial fibrillation with rapid ventricular response.  No chest pain of any significant dyspnea but still very confused-cannot complete sentences, oriented x 0    ROS:  Cardiovascular:  As noted above    Objective:      Vitals:    02/20/25 0406 02/20/25 0700 02/20/25 0800 02/20/25 0900   BP: 116/85 99/84 133/81 134/74   Pulse: (!) 116 (!) 114 (!) 123 (!) 118   Resp: 18  20    Temp: 97.7 °F (36.5 °C)  97.5 °F (36.4 °C)    TempSrc: Axillary  Tympanic    SpO2: 90%  94%    Weight: 61.3 kg (135 lb 1.6 oz)      Height:           Physical Exam:  General-No Acute Distress, disoriented  Neck- supple, mild JVD  CV-irregularly irregular heart rhythm, tachycardic, no significant murmurs rubs or gallops  Lung- clear bilaterally anteriorly and laterally.  Abd- soft, nontender, nondistended  Ext- no edema bilaterally.  Skin- warm and dry    Data Review:     Lab Results   Component Value Date/Time     02/19/2025 03:37 AM    K 3.6 02/19/2025 03:37 AM     02/19/2025 03:37 AM    CO2 25 02/19/2025 03:37 AM    BUN 14 02/19/2025 03:37 AM    CREATININE 0.55 02/19/2025 03:37 AM    GLUCOSE 131 02/19/2025 03:37 AM    CALCIUM 8.9 02/19/2025 03:37 AM         Lab Results   Component Value Date    WBC 17.4 (H) 02/19/2025    HGB 13.9 02/19/2025    HCT 42.1 02/19/2025    MCV 95.0 02/19/2025     02/19/2025 04/16/24    ECHO (TTE) COMPLETE (PRN CONTRAST/BUBBLE/STRAIN/3D) 04/16/2024  1:50 PM (Final)    Interpretation Summary    Left Ventricle: Normal left ventricular systolic function. EF by 2D [employer]ons

## 2025-02-20 NOTE — PROGRESS NOTES
Hospitalist Progress Note   Admit Date:  2025  2:23 PM   Name:  Filomena Stuart   Age:  85 y.o.  Sex:  female  :  1940   MRN:  [id number]   Room:  [facility]/    Presenting/Chief Complaint: Leg Pain, Dysuria, and Irregular Heart Beat     Reason(s) for Admission: Atrial fibrillation with rapid ventricular response (HCC) [I48.91]  Acute cystitis without hematuria [N30.00]  Acute encephalopathy [G93.40]  Sepsis (HCC) [A41.9]     Hospital Course:   Filomena Stuart is a 85 y.o. female with medical history of HTN, A-fib on Eliquis, prior CVA, pacemaker in place who presented with increased confusion of 2 or 3-day duration associated with generalized weakness.      Patient was noted to be in A-fib with RVR in ED.  CXR shows small bilateral pleural effusions.  CT head shows no acute findings.    Patient was admitted with sepsis secondary to UTI and associated acute encephalopathy most likely due to delirium.  Urine culture grew E. coli and patient completed 7-day course of Rocephin inpatient.  Cardiology was consulted for A-fib with RVR and patient was started on IV diltiazem, metoprolol and Tikosyn.  Due to her mentation, she has not had consistent intake of p.o. medications.      Subjective & 24hr Events:     Went into rapid A-fib in past 24 hours and patient has not been able to take her p.o. meds due to confusion.  At bedside today, patient stated that she feels cold.  Still very confused with unclear baseline.  Follows basic commands.      Assessment & Plan:     Sepsis   Acute cystitis without hematuria  Mixed stress and urge urinary incontinence  Urine culture 2025 grew E. Coli  BCX: NGTD  Patient completed course of Rocephin  -   Continue trospium    Atrial fibrillation with RVR   Pacemaker  Secondary hypercoagulable state  Keep K>4, Mag>2  Continue Eliquis  Continue Tikosyn twice daily  Continue metoprolol every 6 hours  Cardiology on board, appreciate recs    Acute  without hematuria    HTN (hypertension)    History of CVA (cerebrovascular accident)    Hypothyroidism    Vitamin D deficiency    Pacemaker    Hyperlipidemia    Mixed stress and urge urinary incontinence    Acute UTI    Atrial fibrillation with RVR (HCC)    Acute encephalopathy    Secondary hypercoagulable state  Resolved Problems:    * No resolved hospital problems. *      Objective:   Patient Vitals for the past 24 hrs:   Temp Pulse Resp BP SpO2   02/20/25 1600 -- 87 -- (!) 96/59 --   02/20/25 1500 -- (!) 107 -- 116/75 --   02/20/25 1400 -- (!) 114 -- 126/70 --   02/20/25 1350 -- (!) 117 -- (!) 120/94 --   02/20/25 1300 97.8 °F (36.6 °C) (!) 106 18 124/65 96 %   02/20/25 1208 -- (!) 126 -- 130/75 --   02/20/25 1100 -- (!) 116 -- (!) 142/93 --   02/20/25 1000 -- (!) 108 -- (!) 146/74 --   02/20/25 0900 -- (!) 118 -- 134/74 --   02/20/25 0800 97.5 °F (36.4 °C) (!) 123 20 133/81 94 %   02/20/25 0700 -- (!) 114 -- 99/84 --   02/20/25 0406 97.7 °F (36.5 °C) (!) 116 18 116/85 90 %   02/20/25 0241 -- -- -- 95/62 --   02/20/25 0210 -- -- -- 102/73 --   02/19/25 2354 97.7 °F (36.5 °C) (!) 148 18 (!) 149/104 92 %   02/19/25 2254 -- -- 16 -- --   02/19/25 2050 98.2 °F (36.8 °C) (!) 132 18 116/73 91 %       Oxygen Therapy  SpO2: 96 %  Pulse via Oximetry: 106 beats per minute  Pulse Oximeter Device Mode: Intermittent  Pulse Oximeter Device Location: Finger  O2 Device: None (Room air)  O2 Flow Rate (L/min): 2 L/min    Estimated body mass index is 21.82 kg/m² as calculated from the following:    Height as of this encounter: 1.676 m (5' 5.98\").    Weight as of this encounter: 61.3 kg (135 lb 1.6 oz).    Intake/Output Summary (Last 24 hours) at 2/20/2025 1619  Last data filed at 2/20/2025 1606  Gross per 24 hour   Intake 1968.57 ml   Output 0 ml   Net 1968.57 ml         Physical Exam:     General:    Ill appearing.     Head:  Normocephalic, atraumatic  Eyes:  Sclerae appear normal.  Pupils equally round.  ENT:  Nares appear

## 2025-02-20 NOTE — PROGRESS NOTES
IP Consult to Vascular Access Team  Consult performed by: Ivan Richards, RN  Consult ordered by: Anjum Johnson MD       Pt assessed for US guided PIV insertion. R arm exclusion s/t thrombus. New thrombus in L cephalic at antecubital fossa. Limited vessel availability. Vessels assessed were too small for catheterization. Only appropriate vessel for current catheterization is R upper brachial vein, but it is too deep to reach without a trimmable midline or PICC. Discussed w/ pt RN, who is to follow up with hospitalist.

## 2025-02-20 NOTE — PLAN OF CARE
Problem: Discharge Planning  Goal: Discharge to home or other facility with appropriate resources  Outcome: Progressing  Flowsheets (Taken 2/20/2025 0800)  Discharge to home or other facility with appropriate resources:   Identify barriers to discharge with patient and caregiver   Arrange for needed discharge resources and transportation as appropriate   Identify discharge learning needs (meds, wound care, etc)   Arrange for interpreters to assist at discharge as needed   Refer to discharge planning if patient needs post-hospital services based on physician order or complex needs related to functional status, cognitive ability or social support system     Problem: Safety - Adult  Goal: Free from fall injury  Outcome: Progressing  Flowsheets (Taken 2/20/2025 0800)  Free From Fall Injury:   Based on caregiver fall risk screen, instruct family/caregiver to ask for assistance with transferring infant if caregiver noted to have fall risk factors   Instruct family/caregiver on patient safety     Problem: ABCDS Injury Assessment  Goal: Absence of physical injury  Outcome: Progressing     Problem: Safety - Medical Restraint  Goal: Remains free of injury from restraints (Restraint for Interference with Medical Device)  Description: INTERVENTIONS:  1. Determine that other, less restrictive measures have been tried or would not be effective before applying the restraint  2. Evaluate the patient's condition at the time of restraint application  3. Inform patient/family regarding the reason for restraint  4. Q2H: Monitor safety, psychosocial status, comfort, nutrition and hydration  Outcome: Progressing     Problem: Respiratory - Adult  Goal: Achieves optimal ventilation and oxygenation  Outcome: Progressing  Flowsheets (Taken 2/20/2025 0800)  Achieves optimal ventilation and oxygenation:   Assess for changes in respiratory status   Assess for changes in mentation and behavior   Position to facilitate oxygenation and minimize

## 2025-02-20 NOTE — PROGRESS NOTES
Physical Therapy Note:    Attempted to see patient this PM for physical therapy treatment  session. Patient's RN advised to hold pt session again today due to agitation. Will follow and re-attempt as schedule permits/patient available. Thank you,    Blake Mcknight, PT     Rehab Caseload Tracker

## 2025-02-20 NOTE — PLAN OF CARE
Pt HR sustaining in the 170s, Cardizem drip started, max @15. HR still in upper 120s and 130s. Despite IV 5mg Lopressor Q6. No change. Unable to give PO medications PT is refusing oral medication and has failed swallow test. Q4 morphine ordered no change in pt status.

## 2025-02-21 NOTE — PROGRESS NOTES
Hospitalist Progress Note   Admit Date:  2025  2:23 PM   Name:  Filomena Stuart   Age:  85 y.o.  Sex:  female  :  1940   MRN:  [id number]   Room:  [facility]/    Presenting/Chief Complaint: Leg Pain, Dysuria, and Irregular Heart Beat     Reason(s) for Admission: Atrial fibrillation with rapid ventricular response (HCC) [I48.91]  Acute cystitis without hematuria [N30.00]  Acute encephalopathy [G93.40]  Sepsis (HCC) [A41.9]     Hospital Course:   Filomena Stuart is a 85 y.o. female with medical history of HTN, A-fib on Eliquis, prior CVA, pacemaker in place who presented with increased confusion of 2 or 3-day duration associated with generalized weakness.      Patient was noted to be in A-fib with RVR in ED.  CXR shows small bilateral pleural effusions.  CT head shows no acute findings.    Patient was admitted with sepsis secondary to UTI and associated acute encephalopathy most likely due to delirium.  Urine culture grew E. coli and patient completed 7-day course of Rocephin inpatient.  Cardiology was consulted for A-fib with RVR and patient was started on IV diltiazem, metoprolol and Tikosyn.  Due to her mentation, she has not had consistent intake of p.o. medications.      Subjective & 24hr Events:     Pt in good spirit this morning, knows her  but still confused. Daughter in law at bedside. We discussed ongoing delirium. Pt stated she \"feels fine.\" Ate some pureed diet/sauce this morning. Takes po meds with apple sauce with RN. Denies SOB or chest pain.       Assessment & Plan:     Sepsis   Acute cystitis without hematuria  Mixed stress and urge urinary incontinence  Urine culture 2025 grew E. Coli  BCX: NGTD  Patient completed course of Rocephin  -   Continue trospium    Atrial fibrillation with RVR   Pacemaker  Secondary hypercoagulable state  Keep K>4, Mag>2  Continue Eliquis  Continue Tikosyn twice daily  Continue metoprolol every 6 hours  Check Mag  Monitor Qtc interval while  Platelets 340 150 - 450 K/uL    MPV 9.7 9.4 - 12.3 FL    nRBC 0.00 0.0 - 0.2 K/uL    Neutrophils % 67.3 43.0 - 78.0 %    Lymphocytes % 8.1 (L) 13.0 - 44.0 %    Monocytes % 18.0 (H) 4.0 - 12.0 %    Eosinophils % 0.7 0.5 - 7.8 %    Basophils % 0.7 0.0 - 2.0 %    Immature Granulocytes % 5.2 (H) 0.0 - 5.0 %    Neutrophils Absolute 11.84 (H) 1.70 - 8.20 K/UL    Lymphocytes Absolute 1.43 0.50 - 4.60 K/UL    Monocytes Absolute 3.17 (H) 0.10 - 1.30 K/UL    Eosinophils Absolute 0.12 0.00 - 0.80 K/UL    Basophils Absolute 0.12 0.00 - 0.20 K/UL    Immature Granulocytes Absolute 0.92 (H) 0.0 - 0.5 K/UL    RBC Comment SLIGHT  ANISOCYTOSIS + POIKILOCYTOSIS        RBC Comment OCCASIONAL  OVALOCYTES        RBC Comment OCCASIONAL  POLYCHROMASIA        WBC Comment Result Confirmed By Smear      Platelet Comment ADEQUATE      Differential Type AUTOMATED     Basic Metabolic Panel w/ Reflex to MG    Collection Time: 02/21/25  5:08 AM   Result Value Ref Range    Sodium 144 136 - 145 mmol/L    Potassium 3.4 (L) 3.5 - 5.1 mmol/L    Chloride 109 (H) 98 - 107 mmol/L    CO2 23 20 - 29 mmol/L    Anion Gap 12 7 - 16 mmol/L    Glucose 117 (H) 70 - 99 mg/dL    BUN 18 8 - 23 MG/DL    Creatinine 0.67 0.60 - 1.10 MG/DL    Est, Glom Filt Rate 85 >60 ml/min/1.73m2    Calcium 8.6 (L) 8.8 - 10.2 MG/DL   Magnesium    Collection Time: 02/21/25  5:08 AM   Result Value Ref Range    Magnesium 1.8 1.8 - 2.4 mg/dL       No results for input(s): \"COVID19\" in the last 72 hours.    Current Meds:  Current Facility-Administered Medications   Medication Dose Route Frequency    metoprolol (LOPRESSOR) injection 5 mg  5 mg IntraVENous Q6H    Or    metoprolol tartrate (LOPRESSOR) tablet 25 mg  25 mg Oral Q6H    morphine (PF) injection 1 mg  1 mg IntraVENous Q4H PRN    medicated lip ointment (BLISTEX)   Topical PRN    0.9 % sodium chloride infusion   IntraVENous Continuous    dilTIAZem 100 mg in sodium chloride 0.9 % 100 mL infusion (ADD-[place])  2.5-15 mg/hr

## 2025-02-21 NOTE — PROGRESS NOTES
OT Note:    OT will HOLD treatment today due to patient's status. Will re-attempt to see patient at a later date/time.    Shira Jerome MARICRUZ

## 2025-02-21 NOTE — CARE COORDINATION
Patient's status discussed in IDR. Awake today. Confused. On Cardizem drip.Primary nurse reports patient taking one pill at a time for AM doses. Tikosyn load in progress. Third dose of six given this AM. Cardizem drip.  CM met with patient's DIL (Sly's spouse) at bedside. CM informed of plan to get medications back on board then attempt to place.  Therapy held this AM for heartrate.   Of the SNF preferences sent, [facility] and [facility] are considering patient if appropriate for rehab. [facility] reiterating no restraints for 48 hrs prior to placement.Patient also must participate with therapy while in house to be accepted by facilities.  If unable to place in rehab, CM informed the DIL that [facility] may be appropriate to allow patient time to recover from delirium.

## 2025-02-21 NOTE — PROGRESS NOTES
GOALS:  LTG: Patient will tolerate least restrictive diet without overt signs or symptoms of airway compromise.   STG: Patient will tolerate puree diet with mildly thick/nectar thick liquids without overt signs or symptoms of airway compromise. Updated 2025  STG: Patient will participate in modified barium swallow study as clinically indicated.  Ongoing 2025  STG: Speech therapy will perform assessment of speech and cognitive function, as patient is able to participate in assessment. Additional goals and recommendations to follow. Completed 2025    LTG: Patient will improve cognitive- communicative function to participate in daily activities at highest possible level.   STG: Patient will sustain attention to therapy tasks for 3 minute interval with min A provided.   STG: Patient will answer open ended questions related to self/status with 90% accuracy.     SPEECH LANGUAGE PATHOLOGY: DYSPHAGIA and COGNITIVE COMMUNICATION Daily Note #6      Acknowledge Order  I  Therapy Time  I   Charges     I  Rehab Caseload Tracker      NAME: Filomena Stuart  : 1940  MRN: [id number]    ADMISSION DATE: 2025  PRIMARY DIAGNOSIS: Sepsis (HCC)    ICD-10: Treatment Diagnosis: R13.12 Dysphagia, Oropharyngeal Phase    RECOMMENDATIONS   Diet:    Minced and Moist  Thin Liquids    Medication: whole floated in puree or applesauce and crushed in puree or applesauce, as permitted by pharmacy   Compensatory Swallowing Strategies:   Assist feed  Small bites/sips  Upright as possible for all oral intake   Therapeutic Intervention:   Patient/family education  Dysphagia treatment  Speech treatment  Cognitive-linguistic treatment   Patient continues to require skilled intervention:  Yes. Recommend ongoing speech therapy services during this hospitalization.     Anticipated Discharge Needs: Ongoing speech therapy is recommended at next level of care.      ASSESSMENT    Dysphagia: Patient continues to exhibit moderate oral

## 2025-02-21 NOTE — PROGRESS NOTES
Nutrition Assessment  Assessment Type: Reassess  Reason for visit:  Length of Stay  Malnutrition Screening Tool Score: 1    Nutrition Intervention:   Food and/or Nutrient Delivery:   Meals and Snacks:  Diet: Continue current diet per SLP evaluation  Medical Food Supplements:   Medical food supplement therapy:  Continue Ensure Plus High Protein (high calorie high protein) 350 calories, 20 grams protein per 8 ounce serving  ordered per MD     Malnutrition Assessment:  [facility] Clinical Malnutrition Criteria  Malnutrition Status: At risk for malnutrition (poor intakes during admit r/t mentation & unsafe for po)  Nutrition Focused Physical Exam: Deferred due to agitation/confusion    Nutrition Assessment:  Food/Nutrition Related History:   Unable to assess. Pt agitated/confused and no family is present.   2/21: patient is reclined in bed, son at her side. States that while at home she was eating well, consuming at least 2 meals/day plus snacks. Doesn't follow a specific diet at baseline. Reports they moved her to a facility ~2 weeks ago and was eating well there.      Do You Have Any Cultural, Sikhism, or Ethnic Food Preferences?: No     Weight History:   EMR weight history review: 140# 2/29/24 (cardiology), 139# 4/18/24 (cardiology), 139# 7/10/24 (cardiology), 129# 12/20/24 (FOV). Bedscale weight of 139# obtained today.   2/21: son states he isn't sure of her UBW, but reports it looks like she's lost weight.     Nutrition Background:       PMH significant for Afib, HTN, CVA, hypothyroidism, pacemaker placement, HLD, OA, anxiety, arthritis, TIA. She presents with generalized weakness & confusion. She is admitted with sepsis.   2/17: KUB - Nonspecific mild distention of colon with stool and gas. No definite obstruction. Suspected pulmonary edema with small/moderate pleural effusions.    Nutrition Monitoring/Evaluation:  2/13: minced and moist w/ thin liquids per SLP  2/19: NPO r/t poor mentation per SLP  2/20:

## 2025-02-21 NOTE — PROGRESS NOTES
Physical Therapy Note:    Attempted to see patient this AM for physical therapy treatment  session. Patient's RN advises to hold treatment. Will follow and re-attempt as schedule permits/patient available. Thank you,    Blake Mcknight, PT     Rehab Caseload Tracker

## 2025-02-21 NOTE — PROGRESS NOTES
[facility] CARDIOLOGY PROGRESS NOTE           2/21/2025 9:04 AM    Admit Date: 2/11/2025      Subjective:       Review of Systems        Objective:      Vitals:    02/20/25 2359 02/21/25 0200 02/21/25 0449 02/21/25 0720   BP: 129/73  (!) 144/71 105/82   Pulse: (!) 104  94    Resp: 18 18 18 15   Temp: 97 °F (36.1 °C)  97.5 °F (36.4 °C) 97.5 °F (36.4 °C)   TempSrc: Axillary  Axillary Axillary   SpO2: 94%  94% 95%   Weight:   64.2 kg (141 lb 9.6 oz)    Height:             Physical Exam    Data Review:   Recent Labs     02/20/25  0943 02/21/25  0508    144   K 3.2* 3.4*   MG 1.8 1.8   BUN 12 18   WBC 21.0* 17.6*   HGB 13.4 13.3   HCT 39.7 40.7    340       [unfilled]  Current Facility-Administered Medications   Medication Dose Route Frequency    potassium bicarb-citric acid (EFFER-K) effervescent tablet 40 mEq  40 mEq Oral Daily    metoprolol (LOPRESSOR) injection 5 mg  5 mg IntraVENous Q6H    Or    metoprolol tartrate (LOPRESSOR) tablet 25 mg  25 mg Oral Q6H    morphine (PF) injection 1 mg  1 mg IntraVENous Q4H PRN    medicated lip ointment (BLISTEX)   Topical PRN    dilTIAZem 100 mg in sodium chloride 0.9 % 100 mL infusion (ADD-[place])  2.5-15 mg/hr IntraVENous Continuous    famotidine (PEPCID) tablet 20 mg  20 mg Oral BID    thiamine tablet 100 mg  100 mg Oral Daily    sennosides-docusate sodium (SENOKOT-S) 8.6-50 MG tablet 2 tablet  2 tablet Oral Daily    polyethylene glycol (GLYCOLAX) packet 17 g  17 g Oral Daily    [Held by provider] dilTIAZem (CARDIZEM CD) extended release capsule 120 mg  120 mg Oral Daily    magic (miracle) mouthwash  5 mL Swish & Spit 4x Daily PRN    diclofenac sodium (VOLTAREN) 1 % gel 2 g  2 g Topical 4x Daily PRN    [Held by provider] QUEtiapine (SEROQUEL) tablet 12.5 mg  12.5 mg Oral Q12H    QUEtiapine (SEROQUEL) tablet 12.5 mg  12.5 mg Oral Q6H PRN    levothyroxine (SYNTHROID) tablet 88 mcg  88 mcg Oral QAM AC    atorvastatin (LIPITOR) tablet 40 mg

## 2025-02-22 PROBLEM — J96.01 ACUTE RESPIRATORY FAILURE WITH HYPOXIA (HCC): Status: ACTIVE | Noted: 2025-01-01

## 2025-02-22 PROBLEM — E87.0 HYPERNATREMIA: Status: ACTIVE | Noted: 2025-01-01

## 2025-02-22 NOTE — DISCHARGE SUMMARY
Hospitalist Discharge Summary   Admit Date:  2025  2:23 PM   DC Note date: 2025  Name:  Filomena Stuart   Age:  85 y.o.  Sex:  female  :  1940   MRN:  [id number]   Room:  [facility] [place]  PCP:  Izabela Christopher MD    Presenting Complaint: Leg Pain, Dysuria, and Irregular Heart Beat     Initial Admission Diagnosis: Atrial fibrillation with rapid ventricular response (HCC) [I48.91]  Acute cystitis without hematuria [N30.00]  Acute encephalopathy [G93.40]  Sepsis (HCC) [A41.9]     Problem List for this Hospitalization (present on admission):    Principal Problem:    Sepsis (HCC)  Active Problems:    Acute cystitis without hematuria    HTN (hypertension)    History of CVA (cerebrovascular accident)    Hypothyroidism    Vitamin D deficiency    Pacemaker    Hyperlipidemia    Mixed stress and urge urinary incontinence    Acute UTI    Atrial fibrillation with RVR (HCC)    Acute encephalopathy    Secondary hypercoagulable state    Acute respiratory failure with hypoxia (HCC)    Hypernatremia  Resolved Problems:    * No resolved hospital problems. *      Hospital Course:  Filomena Stuart is a 85 y.o. female with medical history of HTN, A-fib on Eliquis, prior CVA, pacemaker in place who presented with increased confusion of 2 or 3-day duration associated with generalized weakness.       Patient was noted to be in A-fib with RVR in ED.  CXR shows small bilateral pleural effusions.  CT head shows no acute findings.     Patient was admitted with sepsis secondary to UTI and associated acute encephalopathy most likely due to delirium.  Urine culture grew E. coli and patient completed 7-day course of Rocephin inpatient.  Cardiology was consulted for A-fib with RVR and patient was started on IV diltiazem, metoprolol and Tikosyn.  Due to her mentation, she has not had consistent intake of p.o. medications. Despite aggressive intervention, pt continues to decompensate with worsening mental status not safe for po intake

## 2025-02-22 NOTE — PROGRESS NOTES
Physical Therapy Note:    Attempted to see patient this AM for physical therapy treatment  session. Patient is a hold secondary to volume overload and delirium.   Per nursing hold through weekend and check back on Monday.  Will follow and re-attempt as schedule permits/patient available. Thank you,    Keon Hayward, PT     Rehab Caseload Tracker

## 2025-02-22 NOTE — ACP (ADVANCE CARE PLANNING)
Advance Care Planning Note   Admit Date:  2025  2:23 PM   Name:  Filomena Stuart   Age:  85 y.o.  Sex:  female  :  1940   MRN:  [id number]   Room:  [facility]/    Filomena Stuart has capacity to make her own decisions:   No    If pt unable to make decisions, POA/surrogate decision maker:  Son Chon Stuart  Son Sly Stuart  Son Francis Stuart    Called to discuss with pt's son Sly about GOC. Patient was admitted with sepsis with profound delirium associated with A-fib awith RVR.  Hospital course has been complicated by her mentation affecting her p.o. intake.  She continues to decompensate with worsening mental status and became hypoxic with agonal breathing on .  Discussed with pt's son that when the illness becomes too overwhelming and the body is too weak to fight despite everything we are doing with our aggressive medical intervention, I am concerned that the things we are doing are becoming to the patient instead of for the patient at this time and we are just prolonging suffering.  Sly expressed understanding.  I discussed hospice philosophy and comfort measures to keep patient comfortable at the end of life to minimize suffering.  Sly was agreeable for comfort measures but wanted to discuss with his brothers first.  He stated that he will let us know of decision and was appreciated of the call.    Called patient's son Sly back about decision after he has discussed it with his other siblings.  They are on their way to see patient and final decision is to keep patient comfortable on strict comfort measures.  Discussed that this means we will treat patient's pain with morphine and anxiety with Ativan but otherwise will discontinue any further workup and other medications that are no longer helping her.  Family agreed to proceed.  I expressed my support for family during this difficult time.  Comfort care orders placed.   requested.  Discussed with RN.    Patient or surrogate

## 2025-02-22 NOTE — CARE COORDINATION
Pt now on comfort measures and family wants to move forward with hospice. Are requesting a hospice house. Requested [facility], referral sent. Advised family that the hospice nurse and their MD will determine if she is hospice house appropriate, reported understanding.     1556-Sue with [facility] spoke with family, pt is approved for the [facility]. DNR signed. [facility] scheduled for 1700. Family aware of transport time.    1637-Primary RN advised writer that they are waiting on someone to turn off pts PM and uncertain when they will arrive to the hospital. Transport pushed back to 1800. Family aware. If transport needs to be pushed back to another time after CM leaves, [facility] can be reached at: [phone], option #2.

## 2025-02-22 NOTE — PROGRESS NOTES
Hospitalist Progress Note   Admit Date:  2025  2:23 PM   Name:  Filomena Stuart   Age:  85 y.o.  Sex:  female  :  1940   MRN:  [id number]   Room:  [facility]/    Presenting/Chief Complaint: Leg Pain, Dysuria, and Irregular Heart Beat     Reason(s) for Admission: Atrial fibrillation with rapid ventricular response (HCC) [I48.91]  Acute cystitis without hematuria [N30.00]  Acute encephalopathy [G93.40]  Sepsis (HCC) [A41.9]     Hospital Course:   Filomena Stuart is a 85 y.o. female with medical history of HTN, A-fib on Eliquis, prior CVA, pacemaker in place who presented with increased confusion of 2 or 3-day duration associated with generalized weakness.      Patient was noted to be in A-fib with RVR in ED.  CXR shows small bilateral pleural effusions.  CT head shows no acute findings.    Patient was admitted with sepsis secondary to UTI and associated acute encephalopathy most likely due to delirium.  Urine culture grew E. coli and patient completed 7-day course of Rocephin inpatient.  Cardiology was consulted for A-fib with RVR and patient was started on IV diltiazem, metoprolol and Tikosyn.  Due to her mentation, she has not had consistent intake of p.o. medications.      Subjective & 24hr Events:     Pt was more somnolent this AM with O2 sat 87% and was placed on 2LO2NC. She was agitated last PM and was given Seroquel 12.5 at 2AM. Only moans and tries to mumble answers but not following commands.       Assessment & Plan:     Sepsis   Acute cystitis without hematuria  Mixed stress and urge urinary incontinence  Urine culture 2025 grew E. Coli  BCX: NGTD  Patient completed course of Rocephin  -   Continue trospium    Atrial fibrillation with RVR   Pacemaker  Secondary hypercoagulable state  Keep K>4, Mag>2  Continue Eliquis  Continue Tikosyn twice daily  Continue metoprolol every 6 hours  Monitor Qtc interval while on Tikosyn  Cont Dilt gtt  Cardiology on board, appreciate recs. Pt  (H) 11.9 - 14.6 %    Platelets 334 150 - 450 K/uL    MPV 9.6 9.4 - 12.3 FL    nRBC 0.00 0.0 - 0.2 K/uL    Neutrophils % 67.9 43.0 - 78.0 %    Lymphocytes % 8.1 (L) 13.0 - 44.0 %    Monocytes % 17.6 (H) 4.0 - 12.0 %    Eosinophils % 0.5 0.5 - 7.8 %    Basophils % 0.3 0.0 - 2.0 %    Immature Granulocytes % 5.6 (H) 0.0 - 5.0 %    Neutrophils Absolute 13.58 (H) 1.70 - 8.20 K/UL    Lymphocytes Absolute 1.62 0.50 - 4.60 K/UL    Monocytes Absolute 3.52 (H) 0.10 - 1.30 K/UL    Eosinophils Absolute 0.10 0.00 - 0.80 K/UL    Basophils Absolute 0.06 0.00 - 0.20 K/UL    Immature Granulocytes Absolute 1.12 (H) 0.0 - 0.5 K/UL    RBC Comment SLIGHT  ANISOCYTOSIS + POIKILOCYTOSIS        RBC Comment SLIGHT  POLYCHROMASIA        WBC Comment Result Confirmed By Smear      Platelet Comment ADEQUATE      Differential Type AUTOMATED     Basic Metabolic Panel w/ Reflex to MG    Collection Time: 02/22/25  4:54 AM   Result Value Ref Range    Sodium 147 (H) 136 - 145 mmol/L    Potassium 3.5 3.5 - 5.1 mmol/L    Chloride 110 (H) 98 - 107 mmol/L    CO2 27 20 - 29 mmol/L    Anion Gap 10 7 - 16 mmol/L    Glucose 134 (H) 70 - 99 mg/dL    BUN 21 8 - 23 MG/DL    Creatinine 0.61 0.60 - 1.10 MG/DL    Est, Glom Filt Rate 86 >60 ml/min/1.73m2    Calcium 9.2 8.8 - 10.2 MG/DL   Magnesium    Collection Time: 02/22/25  4:54 AM   Result Value Ref Range    Magnesium 1.8 1.8 - 2.4 mg/dL       No results for input(s): \"COVID19\" in the last 72 hours.    Current Meds:  Current Facility-Administered Medications   Medication Dose Route Frequency    dextrose 5 % solution   IntraVENous Continuous    potassium bicarb-citric acid (EFFER-K) effervescent tablet 40 mEq  40 mEq Oral Daily    metoprolol (LOPRESSOR) injection 5 mg  5 mg IntraVENous Q6H    Or    metoprolol tartrate (LOPRESSOR) tablet 25 mg  25 mg Oral Q6H    morphine (PF) injection 1 mg  1 mg IntraVENous Q4H PRN    medicated lip ointment (BLISTEX)   Topical PRN    dilTIAZem 100 mg in sodium chloride 0.9 % 100

## 2025-02-22 NOTE — PROGRESS NOTES
[facility] CARDIOLOGY PROGRESS NOTE           2/22/2025 9:00 AM    Admit Date: 2/11/2025      Subjective:       Review of Systems        Objective:      Vitals:    02/22/25 0730 02/22/25 0745 02/22/25 0800 02/22/25 0815   BP:   (!) 163/82 (!) 150/75   Pulse: 71 80 91 70   Resp:    26   Temp:    97.5 °F (36.4 °C)   TempSrc:    Axillary   SpO2: 94%      Weight:       Height:             Physical Exam  Vitals reviewed.   Constitutional:       Appearance: She is ill-appearing.   HENT:      Head: Normocephalic.      Right Ear: External ear normal.      Left Ear: External ear normal.      Nose: Nose normal.   Eyes:      General: No scleral icterus.  Cardiovascular:      Rate and Rhythm: Rhythm irregular.   Pulmonary:      Effort: Pulmonary effort is normal.   Abdominal:      General: There is no distension.   Musculoskeletal:      Cervical back: Neck supple.   Skin:     General: Skin is warm.   Neurological:      Mental Status: She is alert. She is disoriented.         Data Review:   Recent Labs     02/21/25  0508 02/22/25  0454    147*   K 3.4* 3.5   MG 1.8 1.8   BUN 18 21   WBC 17.6* 20.0*   HGB 13.3 13.4   HCT 40.7 40.4    334       [unfilled]  Current Facility-Administered Medications   Medication Dose Route Frequency    dextrose 5 % solution   IntraVENous Continuous    potassium bicarb-citric acid (EFFER-K) effervescent tablet 40 mEq  40 mEq Oral Daily    metoprolol (LOPRESSOR) injection 5 mg  5 mg IntraVENous Q6H    Or    metoprolol tartrate (LOPRESSOR) tablet 25 mg  25 mg Oral Q6H    medicated lip ointment (BLISTEX)   Topical PRN    dilTIAZem 100 mg in sodium chloride 0.9 % 100 mL infusion (ADD-[place])  2.5-15 mg/hr IntraVENous Continuous    famotidine (PEPCID) tablet 20 mg  20 mg Oral BID    thiamine tablet 100 mg  100 mg Oral Daily    sennosides-docusate sodium (SENOKOT-S) 8.6-50 MG tablet 2 tablet  2 tablet Oral Daily    polyethylene glycol (GLYCOLAX) packet 17 g  17 g Oral  Daily    [Held by provider] dilTIAZem (CARDIZEM CD) extended release capsule 120 mg  120 mg Oral Daily    magic (miracle) mouthwash  5 mL Swish & Spit 4x Daily PRN    diclofenac sodium (VOLTAREN) 1 % gel 2 g  2 g Topical 4x Daily PRN    levothyroxine (SYNTHROID) tablet 88 mcg  88 mcg Oral QAM AC    atorvastatin (LIPITOR) tablet 40 mg  40 mg Oral Nightly    dofetilide (TIKOSYN) capsule 250 mcg  250 mcg Oral 2 times per day    apixaban (ELIQUIS) tablet 5 mg  5 mg Oral BID    trospium (SANCTURA) tablet 20 mg  20 mg Oral BID AC    fluticasone (FLONASE) 50 MCG/ACT nasal spray 1 spray  1 spray Each Nostril Daily PRN    lactobacillus (CULTURELLE) capsule 1 capsule  1 capsule Oral Daily with breakfast    aspirin chewable tablet 81 mg  81 mg Oral Daily    sodium chloride flush 0.9 % injection 5-40 mL  5-40 mL IntraVENous 2 times per day    sodium chloride flush 0.9 % injection 5-40 mL  5-40 mL IntraVENous PRN    0.9 % sodium chloride infusion   IntraVENous PRN    acetaminophen (TYLENOL) tablet 650 mg  650 mg Oral Q6H PRN    Or    acetaminophen (TYLENOL) suppository 650 mg  650 mg Rectal Q6H PRN           Assessment/Plan:     Paroxysmal atrial fibrillation  -Currently still in atrial fibrillation rates are better controlled she is having difficulty swallowing pills therefore her outpatient regimen may be difficult  -Tikosyn is likely ineffective in not ideal in light of her age and other comorbidities.  Plan to initiate alternative therapies digoxin may be used for rate control as well as amiodarone.  However will hold off for now due to recent Tikosyn dosing.  Will consider this after Tikosyn therapy has been stopped  -For now rate control with beta-blocker and calcium channel blocker therapies  -The rate control strategy may be more appropriate in light of her permanent device  -Eliquis 5 twice daily    Dual-chamber pacemaker in situ    Encephalopathy  -Currently being managed for delirium  -Additionally underlying

## 2025-02-22 NOTE — MANAGEMENT PLAN
-Discussed with the patient's son, Sly and given the family and patient's desire to transition to palliative/hospice care he is in agreement with DEACTIVATION of ICD.  The [employer] rep is in route and will deactivate the device prior to transfer to hospice house today.

## 2025-02-24 ENCOUNTER — CARE COORDINATION (OUTPATIENT)
Dept: CARE COORDINATION | Facility: CLINIC | Age: 85
End: 2025-02-24

## 2025-02-24 NOTE — PROGRESS NOTES
Physician Progress Note      PATIENT:               KISHORE MONROY  CSN #:                  [id number]  :                       1940  ADMIT DATE:       2025 2:23 PM  DISCH DATE:        2025 5:46 PM  RESPONDING  PROVIDER #:        Darby Holloway DO          QUERY TEXT:    Patient admitted with sepsis. Noted documentation of acute respiratory failure   in  progress note & discharge summary. In order to support the diagnosis   of acute respiratory failure, please include additional clinical indicators in   your documentation.  Or please document if the diagnosis of acute respiratory   failure has been ruled out after further study.    The medical record reflects the following:  Risk Factors: 85 y.o. female with medical history of HTN, A-fib on Eliquis,   prior CVA, pacemaker in place who presented with increased confusion of 2 or   3-day duration associated with generalized weakness.  Clinical Indicators:  progress note \"Acute respiratory failure with   hypoxia-?  to sedation. O2 sat 87% on RA on  and pt was placed on   2LO2NC.  Lungs: Decrease lung sounds b/l.  No wheezing, rhonchi, or rales.  Symmetric   expansion.\"  CXR: effusions and bibasilar airspace disease likely related to cardiac   decompensation.  RR 20, 16, 26, 28, No ABG values  Treatment: 2LNC  Options provided:  -- Acute Respiratory Failure ruled out after study  -- Acute Respiratory Failure as evidenced by, Please document evidence.  -- Other - I will add my own diagnosis  -- Disagree - Not applicable / Not valid  -- Disagree - Clinically unable to determine / Unknown  -- Refer to Clinical Documentation Reviewer    PROVIDER RESPONSE TEXT:    This patient is in acute respiratory failure as evidenced by O2 sat 87% on       Query created by: Lorrie Wang on 2025 10:08 AM      Electronically signed by:  Darby Holloway DO 2025 10:52 AM

## 2025-02-24 NOTE — PROGRESS NOTES
Physician Progress Note      PATIENT:               KISHORE MONROY  CSN #:                  [id number]  :                       1940  ADMIT DATE:       2025 2:23 PM  DISCH DATE:        2025 5:46 PM  RESPONDING  PROVIDER #:        Neno Agee MD          QUERY TEXT:    Pt admitted with sepsis. Pt noted to have volume overloaded in  cardiology   progress note. If possible, please document in the progress notes and   discharge summary if you are evaluating and/or treating any of the following:    The medical record reflects the following:  Risk Factors:  85 y.o. female with medical history of HTN, A-fib on Eliquis,   prior CVA, pacemaker in place who presented with increased confusion of 2 or   3-day duration associated with generalized weakness.  Clinical Indicators:  Cardiology progress note \"Hypoxemic respiratory   failure-Patient volume overloaded on exam and on radiograph initiate Lasix\"  CXR: Cardiomegaly with bilateral pulmonary vascular congestion and effusions   and bibasilar airspace disease likely related to cardiac decompensation.\"  Treatment: 40mg IV Lasix BID  Options provided:  -- Noncardiogenic acute pulmonary edema due to  -- Please specify  -- Other - I will add my own diagnosis  -- Disagree - Not applicable / Not valid  -- Disagree - Clinically unable to determine / Unknown  -- Refer to Clinical Documentation Reviewer    PROVIDER RESPONSE TEXT:    This patient has noncardiogenic acute pulmonary edema due to    Query created by: Lorrie Wang on 2025 10:14 AM      Electronically signed by:  Neno Agee MD 2025 10:21 AM

## 2025-02-25 NOTE — PROGRESS NOTES
Physician Progress Note      PATIENT:               KISHORE MONROY  CSN #:                  [id number]  :                       1940  ADMIT DATE:       2025 2:23 PM  DISCH DATE:        2025 5:46 PM  RESPONDING  PROVIDER #:        Neno Agee MD          QUERY TEXT:    Pt admitted with sepsis. Pt noted to have volume overloaded in  cardiology   progress note. If possible, please document in the progress notes and   discharge summary if you are evaluating and/or treating any of the following:    The medical record reflects the following:  Risk Factors:  85 y.o. female with medical history of HTN, A-fib on Eliquis,   prior CVA, pacemaker in place who presented with increased confusion of 2 or   3-day duration associated with generalized weakness.  Clinical Indicators:  Cardiology progress note \"Hypoxemic respiratory   failure-Patient volume overloaded on exam and on radiograph initiate Lasix\"  CXR: Cardiomegaly with bilateral pulmonary vascular congestion and effusions   and bibasilar airspace disease likely related to cardiac decompensation.\"  Treatment: 40mg IV Lasix BID  Options provided:  -- Noncardiogenic acute pulmonary edema due to  -- Please specify  -- Other - I will add my own diagnosis  -- Disagree - Not applicable / Not valid  -- Disagree - Clinically unable to determine / Unknown  -- Refer to Clinical Documentation Reviewer    PROVIDER RESPONSE TEXT:    This patient has noncardiogenic acute pulmonary edema due to    Query created by: Lorrie Wang on 2025 3:43 PM      Electronically signed by:  Neno Agee MD 2025 7:12 AM

## 2025-02-27 LAB
BACTERIA SPEC CULT: NORMAL
BACTERIA SPEC CULT: NORMAL
SERVICE CMNT-IMP: NORMAL
SERVICE CMNT-IMP: NORMAL